# Patient Record
Sex: MALE | Race: WHITE | NOT HISPANIC OR LATINO | Employment: OTHER | URBAN - METROPOLITAN AREA
[De-identification: names, ages, dates, MRNs, and addresses within clinical notes are randomized per-mention and may not be internally consistent; named-entity substitution may affect disease eponyms.]

---

## 2021-11-02 ENCOUNTER — OFFICE VISIT (OUTPATIENT)
Dept: FAMILY MEDICINE | Facility: CLINIC | Age: 71
End: 2021-11-02
Payer: MEDICARE

## 2021-11-02 VITALS
TEMPERATURE: 98 F | BODY MASS INDEX: 32.75 KG/M2 | RESPIRATION RATE: 16 BRPM | WEIGHT: 233.94 LBS | HEART RATE: 65 BPM | OXYGEN SATURATION: 98 % | HEIGHT: 71 IN | DIASTOLIC BLOOD PRESSURE: 66 MMHG | SYSTOLIC BLOOD PRESSURE: 110 MMHG

## 2021-11-02 DIAGNOSIS — M10.9 GOUT, UNSPECIFIED CAUSE, UNSPECIFIED CHRONICITY, UNSPECIFIED SITE: ICD-10-CM

## 2021-11-02 DIAGNOSIS — Z23 NEED FOR INFLUENZA VACCINATION: ICD-10-CM

## 2021-11-02 DIAGNOSIS — Z86.79 HISTORY OF ATRIAL FIBRILLATION: ICD-10-CM

## 2021-11-02 DIAGNOSIS — I77.0 A-V FISTULA: ICD-10-CM

## 2021-11-02 DIAGNOSIS — Z76.89 ESTABLISHING CARE WITH NEW DOCTOR, ENCOUNTER FOR: Primary | ICD-10-CM

## 2021-11-02 DIAGNOSIS — E66.09 CLASS 1 OBESITY DUE TO EXCESS CALORIES WITH SERIOUS COMORBIDITY AND BODY MASS INDEX (BMI) OF 32.0 TO 32.9 IN ADULT: ICD-10-CM

## 2021-11-02 DIAGNOSIS — N18.4 CKD (CHRONIC KIDNEY DISEASE), STAGE IV: ICD-10-CM

## 2021-11-02 DIAGNOSIS — I34.0 MITRAL VALVE INSUFFICIENCY, UNSPECIFIED ETIOLOGY: ICD-10-CM

## 2021-11-02 DIAGNOSIS — Z95.0 PACEMAKER: ICD-10-CM

## 2021-11-02 DIAGNOSIS — I25.10 CORONARY ARTERY DISEASE, UNSPECIFIED VESSEL OR LESION TYPE, UNSPECIFIED WHETHER ANGINA PRESENT, UNSPECIFIED WHETHER NATIVE OR TRANSPLANTED HEART: ICD-10-CM

## 2021-11-02 DIAGNOSIS — I10 HYPERTENSION, UNSPECIFIED TYPE: ICD-10-CM

## 2021-11-02 PROCEDURE — 99999 PR PBB SHADOW E&M-NEW PATIENT-LVL III: ICD-10-PCS | Mod: PBBFAC,,, | Performed by: INTERNAL MEDICINE

## 2021-11-02 PROCEDURE — 4010F ACE/ARB THERAPY RXD/TAKEN: CPT | Mod: CPTII,S$GLB,, | Performed by: INTERNAL MEDICINE

## 2021-11-02 PROCEDURE — 1159F PR MEDICATION LIST DOCUMENTED IN MEDICAL RECORD: ICD-10-PCS | Mod: CPTII,S$GLB,, | Performed by: INTERNAL MEDICINE

## 2021-11-02 PROCEDURE — 3288F FALL RISK ASSESSMENT DOCD: CPT | Mod: CPTII,S$GLB,, | Performed by: INTERNAL MEDICINE

## 2021-11-02 PROCEDURE — 3008F BODY MASS INDEX DOCD: CPT | Mod: CPTII,S$GLB,, | Performed by: INTERNAL MEDICINE

## 2021-11-02 PROCEDURE — 1159F MED LIST DOCD IN RCRD: CPT | Mod: CPTII,S$GLB,, | Performed by: INTERNAL MEDICINE

## 2021-11-02 PROCEDURE — 1101F PR PT FALLS ASSESS DOC 0-1 FALLS W/OUT INJ PAST YR: ICD-10-PCS | Mod: CPTII,S$GLB,, | Performed by: INTERNAL MEDICINE

## 2021-11-02 PROCEDURE — 3078F DIAST BP <80 MM HG: CPT | Mod: CPTII,S$GLB,, | Performed by: INTERNAL MEDICINE

## 2021-11-02 PROCEDURE — 1160F RVW MEDS BY RX/DR IN RCRD: CPT | Mod: CPTII,S$GLB,, | Performed by: INTERNAL MEDICINE

## 2021-11-02 PROCEDURE — 4010F PR ACE/ARB THEARPY RXD/TAKEN: ICD-10-PCS | Mod: CPTII,S$GLB,, | Performed by: INTERNAL MEDICINE

## 2021-11-02 PROCEDURE — 1101F PT FALLS ASSESS-DOCD LE1/YR: CPT | Mod: CPTII,S$GLB,, | Performed by: INTERNAL MEDICINE

## 2021-11-02 PROCEDURE — 1126F AMNT PAIN NOTED NONE PRSNT: CPT | Mod: CPTII,S$GLB,, | Performed by: INTERNAL MEDICINE

## 2021-11-02 PROCEDURE — 99999 PR PBB SHADOW E&M-NEW PATIENT-LVL III: CPT | Mod: PBBFAC,,, | Performed by: INTERNAL MEDICINE

## 2021-11-02 PROCEDURE — 1160F PR REVIEW ALL MEDS BY PRESCRIBER/CLIN PHARMACIST DOCUMENTED: ICD-10-PCS | Mod: CPTII,S$GLB,, | Performed by: INTERNAL MEDICINE

## 2021-11-02 PROCEDURE — 3074F PR MOST RECENT SYSTOLIC BLOOD PRESSURE < 130 MM HG: ICD-10-PCS | Mod: CPTII,S$GLB,, | Performed by: INTERNAL MEDICINE

## 2021-11-02 PROCEDURE — 99204 OFFICE O/P NEW MOD 45 MIN: CPT | Mod: S$GLB,,, | Performed by: INTERNAL MEDICINE

## 2021-11-02 PROCEDURE — 3078F PR MOST RECENT DIASTOLIC BLOOD PRESSURE < 80 MM HG: ICD-10-PCS | Mod: CPTII,S$GLB,, | Performed by: INTERNAL MEDICINE

## 2021-11-02 PROCEDURE — 3074F SYST BP LT 130 MM HG: CPT | Mod: CPTII,S$GLB,, | Performed by: INTERNAL MEDICINE

## 2021-11-02 PROCEDURE — 3288F PR FALLS RISK ASSESSMENT DOCUMENTED: ICD-10-PCS | Mod: CPTII,S$GLB,, | Performed by: INTERNAL MEDICINE

## 2021-11-02 PROCEDURE — 1126F PR PAIN SEVERITY QUANTIFIED, NO PAIN PRESENT: ICD-10-PCS | Mod: CPTII,S$GLB,, | Performed by: INTERNAL MEDICINE

## 2021-11-02 PROCEDURE — 99204 PR OFFICE/OUTPT VISIT, NEW, LEVL IV, 45-59 MIN: ICD-10-PCS | Mod: S$GLB,,, | Performed by: INTERNAL MEDICINE

## 2021-11-02 PROCEDURE — 3008F PR BODY MASS INDEX (BMI) DOCUMENTED: ICD-10-PCS | Mod: CPTII,S$GLB,, | Performed by: INTERNAL MEDICINE

## 2021-11-02 RX ORDER — VALSARTAN 160 MG/1
160 TABLET ORAL
COMMUNITY
Start: 2021-09-06 | End: 2022-02-02 | Stop reason: SDUPTHER

## 2021-11-02 RX ORDER — ESCITALOPRAM OXALATE 20 MG/1
20 TABLET ORAL DAILY
COMMUNITY
Start: 2021-09-06 | End: 2022-03-17 | Stop reason: SDUPTHER

## 2021-11-02 RX ORDER — FEBUXOSTAT 40 MG/1
40 TABLET, FILM COATED ORAL
COMMUNITY
Start: 2021-09-06 | End: 2022-03-17 | Stop reason: SDUPTHER

## 2021-11-02 RX ORDER — NITROGLYCERIN 0.4 MG/1
0.4 TABLET SUBLINGUAL
COMMUNITY
Start: 2021-09-06 | End: 2023-10-23 | Stop reason: SDUPTHER

## 2021-11-02 RX ORDER — WARFARIN 2 MG/1
2 TABLET ORAL
COMMUNITY
Start: 2021-09-06 | End: 2022-02-02 | Stop reason: SDUPTHER

## 2021-11-02 RX ORDER — HYDROCODONE BITARTRATE AND ACETAMINOPHEN 5; 325 MG/1; MG/1
1 TABLET ORAL EVERY 6 HOURS PRN
COMMUNITY
Start: 2021-09-07 | End: 2022-03-13 | Stop reason: ALTCHOICE

## 2021-11-02 RX ORDER — ATORVASTATIN CALCIUM 40 MG/1
40 TABLET, FILM COATED ORAL
COMMUNITY
Start: 2021-09-06 | End: 2022-05-10 | Stop reason: SDUPTHER

## 2021-11-02 RX ORDER — CALCITRIOL 0.25 UG/1
0.25 CAPSULE ORAL
COMMUNITY
Start: 2021-09-06 | End: 2022-06-27 | Stop reason: SDUPTHER

## 2021-11-02 RX ORDER — ASPIRIN 81 MG/1
81 TABLET ORAL DAILY
COMMUNITY
End: 2022-03-17 | Stop reason: SDUPTHER

## 2021-11-02 RX ORDER — TORSEMIDE 20 MG/1
20 TABLET ORAL
COMMUNITY
Start: 2021-09-06 | End: 2022-02-23 | Stop reason: SDUPTHER

## 2021-11-03 PROBLEM — M10.9 GOUT: Status: ACTIVE | Noted: 2021-11-03

## 2021-11-03 PROBLEM — Z86.79 HISTORY OF ATRIAL FIBRILLATION: Status: ACTIVE | Noted: 2021-11-03

## 2021-11-03 PROBLEM — I77.0 A-V FISTULA: Status: ACTIVE | Noted: 2021-11-03

## 2021-11-03 PROBLEM — Z95.0 PACEMAKER: Status: ACTIVE | Noted: 2021-11-03

## 2021-11-03 PROBLEM — N18.4 CKD (CHRONIC KIDNEY DISEASE), STAGE IV: Status: ACTIVE | Noted: 2021-11-03

## 2021-11-03 PROBLEM — I34.0 MITRAL VALVE INSUFFICIENCY: Status: ACTIVE | Noted: 2021-11-03

## 2021-11-03 PROBLEM — I10 HYPERTENSION: Status: ACTIVE | Noted: 2021-11-03

## 2021-11-03 PROBLEM — I25.10 CORONARY ARTERY DISEASE: Status: ACTIVE | Noted: 2021-11-03

## 2021-11-03 PROBLEM — E66.09 CLASS 1 OBESITY DUE TO EXCESS CALORIES WITH SERIOUS COMORBIDITY AND BODY MASS INDEX (BMI) OF 32.0 TO 32.9 IN ADULT: Status: ACTIVE | Noted: 2021-11-03

## 2021-11-03 PROBLEM — E66.811 CLASS 1 OBESITY DUE TO EXCESS CALORIES WITH SERIOUS COMORBIDITY AND BODY MASS INDEX (BMI) OF 32.0 TO 32.9 IN ADULT: Status: ACTIVE | Noted: 2021-11-03

## 2021-11-05 DIAGNOSIS — Z12.11 COLON CANCER SCREENING: ICD-10-CM

## 2022-01-10 ENCOUNTER — TELEPHONE (OUTPATIENT)
Dept: FAMILY MEDICINE | Facility: CLINIC | Age: 72
End: 2022-01-10

## 2022-02-02 ENCOUNTER — PATIENT MESSAGE (OUTPATIENT)
Dept: CARDIOLOGY | Facility: CLINIC | Age: 72
End: 2022-02-02
Payer: MEDICARE

## 2022-02-02 ENCOUNTER — ANTI-COAG VISIT (OUTPATIENT)
Dept: CARDIOLOGY | Facility: CLINIC | Age: 72
End: 2022-02-02
Payer: MEDICARE

## 2022-02-02 ENCOUNTER — OFFICE VISIT (OUTPATIENT)
Dept: FAMILY MEDICINE | Facility: CLINIC | Age: 72
End: 2022-02-02
Payer: MEDICARE

## 2022-02-02 ENCOUNTER — LAB VISIT (OUTPATIENT)
Dept: LAB | Facility: HOSPITAL | Age: 72
End: 2022-02-02
Attending: INTERNAL MEDICINE
Payer: MEDICARE

## 2022-02-02 VITALS
OXYGEN SATURATION: 97 % | SYSTOLIC BLOOD PRESSURE: 110 MMHG | HEIGHT: 71 IN | RESPIRATION RATE: 16 BRPM | BODY MASS INDEX: 32.1 KG/M2 | TEMPERATURE: 98 F | DIASTOLIC BLOOD PRESSURE: 64 MMHG | WEIGHT: 229.25 LBS | HEART RATE: 63 BPM

## 2022-02-02 DIAGNOSIS — Z86.79 HISTORY OF ATRIAL FIBRILLATION: ICD-10-CM

## 2022-02-02 DIAGNOSIS — Z00.00 ROUTINE GENERAL MEDICAL EXAMINATION AT HEALTH CARE FACILITY: ICD-10-CM

## 2022-02-02 DIAGNOSIS — Z12.5 ENCOUNTER FOR PROSTATE CANCER SCREENING: ICD-10-CM

## 2022-02-02 DIAGNOSIS — Z79.01 LONG TERM (CURRENT) USE OF ANTICOAGULANTS: Primary | ICD-10-CM

## 2022-02-02 DIAGNOSIS — R73.9 ELEVATED BLOOD SUGAR: ICD-10-CM

## 2022-02-02 DIAGNOSIS — I10 HYPERTENSION, UNSPECIFIED TYPE: Primary | ICD-10-CM

## 2022-02-02 DIAGNOSIS — I10 HYPERTENSION, UNSPECIFIED TYPE: ICD-10-CM

## 2022-02-02 DIAGNOSIS — I34.0 MITRAL VALVE INSUFFICIENCY, UNSPECIFIED ETIOLOGY: ICD-10-CM

## 2022-02-02 DIAGNOSIS — I25.10 CORONARY ARTERY DISEASE, UNSPECIFIED VESSEL OR LESION TYPE, UNSPECIFIED WHETHER ANGINA PRESENT, UNSPECIFIED WHETHER NATIVE OR TRANSPLANTED HEART: ICD-10-CM

## 2022-02-02 DIAGNOSIS — Z13.6 ENCOUNTER FOR LIPID SCREENING FOR CARDIOVASCULAR DISEASE: ICD-10-CM

## 2022-02-02 DIAGNOSIS — Z85.46 HISTORY OF PROSTATE CANCER: ICD-10-CM

## 2022-02-02 DIAGNOSIS — N18.4 CKD (CHRONIC KIDNEY DISEASE), STAGE IV: ICD-10-CM

## 2022-02-02 DIAGNOSIS — Z13.220 ENCOUNTER FOR LIPID SCREENING FOR CARDIOVASCULAR DISEASE: ICD-10-CM

## 2022-02-02 DIAGNOSIS — Z95.0 PACEMAKER: ICD-10-CM

## 2022-02-02 DIAGNOSIS — I77.0 A-V FISTULA: ICD-10-CM

## 2022-02-02 DIAGNOSIS — M10.9 GOUT, UNSPECIFIED CAUSE, UNSPECIFIED CHRONICITY, UNSPECIFIED SITE: ICD-10-CM

## 2022-02-02 DIAGNOSIS — G43.809 OTHER MIGRAINE WITHOUT STATUS MIGRAINOSUS, NOT INTRACTABLE: ICD-10-CM

## 2022-02-02 DIAGNOSIS — Z79.01 LONG TERM (CURRENT) USE OF ANTICOAGULANTS: ICD-10-CM

## 2022-02-02 DIAGNOSIS — E66.09 CLASS 1 OBESITY DUE TO EXCESS CALORIES WITH SERIOUS COMORBIDITY AND BODY MASS INDEX (BMI) OF 32.0 TO 32.9 IN ADULT: ICD-10-CM

## 2022-02-02 LAB
BASOPHILS # BLD AUTO: 0.04 K/UL (ref 0–0.2)
BASOPHILS NFR BLD: 0.5 % (ref 0–1.9)
COMPLEXED PSA SERPL-MCNC: <0.01 NG/ML (ref 0–4)
DIFFERENTIAL METHOD: ABNORMAL
EOSINOPHIL # BLD AUTO: 0.3 K/UL (ref 0–0.5)
EOSINOPHIL NFR BLD: 3.3 % (ref 0–8)
ERYTHROCYTE [DISTWIDTH] IN BLOOD BY AUTOMATED COUNT: 12.9 % (ref 11.5–14.5)
ESTIMATED AVG GLUCOSE: 100 MG/DL (ref 68–131)
HBA1C MFR BLD: 5.1 % (ref 4–5.6)
HCT VFR BLD AUTO: 42.4 % (ref 40–54)
HGB BLD-MCNC: 13.5 G/DL (ref 14–18)
IMM GRANULOCYTES # BLD AUTO: 0.02 K/UL (ref 0–0.04)
IMM GRANULOCYTES NFR BLD AUTO: 0.2 % (ref 0–0.5)
INR PPP: 1.4 (ref 0.8–1.2)
INR PPP: 1.4 (ref 0.8–1.2)
LYMPHOCYTES # BLD AUTO: 2.3 K/UL (ref 1–4.8)
LYMPHOCYTES NFR BLD: 27.8 % (ref 18–48)
MCH RBC QN AUTO: 29.9 PG (ref 27–31)
MCHC RBC AUTO-ENTMCNC: 31.8 G/DL (ref 32–36)
MCV RBC AUTO: 94 FL (ref 82–98)
MONOCYTES # BLD AUTO: 1.3 K/UL (ref 0.3–1)
MONOCYTES NFR BLD: 15.1 % (ref 4–15)
NEUTROPHILS # BLD AUTO: 4.4 K/UL (ref 1.8–7.7)
NEUTROPHILS NFR BLD: 53.1 % (ref 38–73)
NRBC BLD-RTO: 0 /100 WBC
PLATELET # BLD AUTO: 170 K/UL (ref 150–450)
PMV BLD AUTO: 12.7 FL (ref 9.2–12.9)
PROTHROMBIN TIME: 14.6 SEC (ref 9–12.5)
PROTHROMBIN TIME: 14.6 SEC (ref 9–12.5)
RBC # BLD AUTO: 4.51 M/UL (ref 4.6–6.2)
WBC # BLD AUTO: 8.27 K/UL (ref 3.9–12.7)

## 2022-02-02 PROCEDURE — 3288F PR FALLS RISK ASSESSMENT DOCUMENTED: ICD-10-PCS | Mod: CPTII,S$GLB,, | Performed by: INTERNAL MEDICINE

## 2022-02-02 PROCEDURE — 1101F PR PT FALLS ASSESS DOC 0-1 FALLS W/OUT INJ PAST YR: ICD-10-PCS | Mod: CPTII,S$GLB,, | Performed by: INTERNAL MEDICINE

## 2022-02-02 PROCEDURE — 99214 PR OFFICE/OUTPT VISIT, EST, LEVL IV, 30-39 MIN: ICD-10-PCS | Mod: S$GLB,,, | Performed by: INTERNAL MEDICINE

## 2022-02-02 PROCEDURE — 3074F SYST BP LT 130 MM HG: CPT | Mod: CPTII,S$GLB,, | Performed by: INTERNAL MEDICINE

## 2022-02-02 PROCEDURE — 3078F DIAST BP <80 MM HG: CPT | Mod: CPTII,S$GLB,, | Performed by: INTERNAL MEDICINE

## 2022-02-02 PROCEDURE — 3008F PR BODY MASS INDEX (BMI) DOCUMENTED: ICD-10-PCS | Mod: CPTII,S$GLB,, | Performed by: INTERNAL MEDICINE

## 2022-02-02 PROCEDURE — 3074F PR MOST RECENT SYSTOLIC BLOOD PRESSURE < 130 MM HG: ICD-10-PCS | Mod: CPTII,S$GLB,, | Performed by: INTERNAL MEDICINE

## 2022-02-02 PROCEDURE — 1101F PT FALLS ASSESS-DOCD LE1/YR: CPT | Mod: CPTII,S$GLB,, | Performed by: INTERNAL MEDICINE

## 2022-02-02 PROCEDURE — 3078F PR MOST RECENT DIASTOLIC BLOOD PRESSURE < 80 MM HG: ICD-10-PCS | Mod: CPTII,S$GLB,, | Performed by: INTERNAL MEDICINE

## 2022-02-02 PROCEDURE — 36415 COLL VENOUS BLD VENIPUNCTURE: CPT | Mod: PO | Performed by: INTERNAL MEDICINE

## 2022-02-02 PROCEDURE — 99999 PR PBB SHADOW E&M-EST. PATIENT-LVL V: CPT | Mod: PBBFAC,,, | Performed by: INTERNAL MEDICINE

## 2022-02-02 PROCEDURE — 85610 PROTHROMBIN TIME: CPT | Performed by: INTERNAL MEDICINE

## 2022-02-02 PROCEDURE — 4010F ACE/ARB THERAPY RXD/TAKEN: CPT | Mod: CPTII,S$GLB,, | Performed by: INTERNAL MEDICINE

## 2022-02-02 PROCEDURE — 99214 OFFICE O/P EST MOD 30 MIN: CPT | Mod: S$GLB,,, | Performed by: INTERNAL MEDICINE

## 2022-02-02 PROCEDURE — 80053 COMPREHEN METABOLIC PANEL: CPT | Performed by: INTERNAL MEDICINE

## 2022-02-02 PROCEDURE — 1160F RVW MEDS BY RX/DR IN RCRD: CPT | Mod: CPTII,S$GLB,, | Performed by: INTERNAL MEDICINE

## 2022-02-02 PROCEDURE — 84443 ASSAY THYROID STIM HORMONE: CPT | Performed by: INTERNAL MEDICINE

## 2022-02-02 PROCEDURE — 1159F MED LIST DOCD IN RCRD: CPT | Mod: CPTII,S$GLB,, | Performed by: INTERNAL MEDICINE

## 2022-02-02 PROCEDURE — 1159F PR MEDICATION LIST DOCUMENTED IN MEDICAL RECORD: ICD-10-PCS | Mod: CPTII,S$GLB,, | Performed by: INTERNAL MEDICINE

## 2022-02-02 PROCEDURE — 4010F PR ACE/ARB THEARPY RXD/TAKEN: ICD-10-PCS | Mod: CPTII,S$GLB,, | Performed by: INTERNAL MEDICINE

## 2022-02-02 PROCEDURE — 3288F FALL RISK ASSESSMENT DOCD: CPT | Mod: CPTII,S$GLB,, | Performed by: INTERNAL MEDICINE

## 2022-02-02 PROCEDURE — 1125F AMNT PAIN NOTED PAIN PRSNT: CPT | Mod: CPTII,S$GLB,, | Performed by: INTERNAL MEDICINE

## 2022-02-02 PROCEDURE — 99999 PR PBB SHADOW E&M-EST. PATIENT-LVL V: ICD-10-PCS | Mod: PBBFAC,,, | Performed by: INTERNAL MEDICINE

## 2022-02-02 PROCEDURE — 1160F PR REVIEW ALL MEDS BY PRESCRIBER/CLIN PHARMACIST DOCUMENTED: ICD-10-PCS | Mod: CPTII,S$GLB,, | Performed by: INTERNAL MEDICINE

## 2022-02-02 PROCEDURE — 84153 ASSAY OF PSA TOTAL: CPT | Performed by: INTERNAL MEDICINE

## 2022-02-02 PROCEDURE — 80061 LIPID PANEL: CPT | Performed by: INTERNAL MEDICINE

## 2022-02-02 PROCEDURE — 85025 COMPLETE CBC W/AUTO DIFF WBC: CPT | Performed by: INTERNAL MEDICINE

## 2022-02-02 PROCEDURE — 1125F PR PAIN SEVERITY QUANTIFIED, PAIN PRESENT: ICD-10-PCS | Mod: CPTII,S$GLB,, | Performed by: INTERNAL MEDICINE

## 2022-02-02 PROCEDURE — 83036 HEMOGLOBIN GLYCOSYLATED A1C: CPT | Performed by: INTERNAL MEDICINE

## 2022-02-02 PROCEDURE — 3008F BODY MASS INDEX DOCD: CPT | Mod: CPTII,S$GLB,, | Performed by: INTERNAL MEDICINE

## 2022-02-02 RX ORDER — WARFARIN 2 MG/1
TABLET ORAL
Qty: 90 TABLET | Refills: 0 | Status: SHIPPED | OUTPATIENT
Start: 2022-02-02 | End: 2022-05-10 | Stop reason: SDUPTHER

## 2022-02-02 RX ORDER — VALSARTAN 160 MG/1
160 TABLET ORAL DAILY
Qty: 90 TABLET | Refills: 1 | Status: SHIPPED | OUTPATIENT
Start: 2022-02-02 | End: 2022-03-23

## 2022-02-02 NOTE — PROGRESS NOTES
Subjective:       Patient ID: Vivek Lema is a pleasant 71 y.o. White male patient    Chief Complaint: Follow-up      Patient is a pt I saw last on 11/02/2021 to establish care, see my last notes and the list of problems below.    HPI     Pt with PMH as below, who just moved from [place] with his wife 4 weeks ago to be close to their daughter.   He came to see me in November to start to establish care in [facility] and make sure he has a good transition due to his complicated PMH. He had his records faxed to me.  As per last visit:  Reported CAD, he had CABG 3 year ago with probable treatment of AS. H/O a fib and on Coumadin. He has a PM. He reported that he would need repair of the mitral valve, possibly clip?   He reported good control of his Coumadin level.  He also had a Nephrologist, he had a AV fistular built on [employer], as creat went up to 4, but went down to 2-2.7, so monitoring only.   He also has gout but no recent flare.  He states his today with his wife again, risk of needing to be a new patient to me tomorrow.  His daughter is goes going to establish care with me today.  He reports feeling fine, though a bit tired because of the moving and missing sometime [place].  Would like to be referred to Cardiology and Nephrology, as well as to the Coumadin Clinic. Would also like to do blood work, checking among others re: PSA.  He needs a refill for BP meds and Coumadin.  He reports that he may take pain meds for migraines but very rarely.       Patient Active Problem List   Diagnosis    Hypertension    CKD (chronic kidney disease), stage IV    A-V fistula    Coronary artery disease    History of atrial fibrillation    Pacemaker    Mitral valve insufficiency    Class 1 obesity due to excess calories with serious comorbidity and body mass index (BMI) of 32.0 to 32.9 in adult    Gout    Long term (current) use of anticoagulants          ACTIVE MEDICAL ISSUES:  Documented in Problem List     PAST MEDICAL  "HISTORY  Documented     PAST SURGICAL HISTORY:  Documented     SOCIAL HISTORY:  Documented     FAMILY HISTORY:  Documented     ALLERGIES AND MEDICATIONS: updated and reviewed.  Documented    Review of Systems   Constitutional: Negative.    HENT: Negative.    Respiratory: Negative.    Cardiovascular: Negative.    Gastrointestinal: Negative.    Neurological: Negative.    All other systems reviewed and are negative.      Objective:      Physical Exam  Constitutional:       Appearance: Normal appearance. He is obese.   HENT:      Right Ear: Tympanic membrane normal.      Left Ear: Tympanic membrane normal.   Eyes:      Conjunctiva/sclera: Conjunctivae normal.   Cardiovascular:      Rate and Rhythm: Normal rate and regular rhythm.      Pulses: Normal pulses.      Heart sounds: Murmur (2/6 holosystolic apex, clic of aortic valve?) heard.        Comments: PM  Pulmonary:      Effort: Pulmonary effort is normal.      Breath sounds: Normal breath sounds.   Abdominal:      General: Bowel sounds are normal.   Skin:     Comments: L sided AV fistula  Scar of CABG calm   Neurological:      General: No focal deficit present.      Mental Status: He is alert and oriented to person, place, and time.   Psychiatric:         Mood and Affect: Mood normal.         Behavior: Behavior normal.         Thought Content: Thought content normal.         Judgment: Judgment normal.         Vitals:    02/02/22 0848   BP: 110/64   BP Location: Right arm   Patient Position: Sitting   BP Method: Large (Manual)   Pulse: 63   Resp: 16   Temp: 98.2 °F (36.8 °C)   TempSrc: Oral   SpO2: 97%   Weight: 104 kg (229 lb 4.5 oz)   Height: 5' 11" (1.803 m)     Body mass index is 31.98 kg/m².    RESULTS: Reviewed labs from last 12 months    Last Lab Results:     Lab Results   Component Value Date    WBC 8.27 02/02/2022    HGB 13.5 (L) 02/02/2022    HCT 42.4 02/02/2022     02/02/2022     Assessment:       1. Hypertension, unspecified type    2. Coronary " artery disease, unspecified vessel or lesion type, unspecified whether angina present, unspecified whether native or transplanted heart    3. History of atrial fibrillation    4. Pacemaker    5. Mitral valve insufficiency, unspecified etiology    6. CKD (chronic kidney disease), stage IV    7. A-V fistula    8. Class 1 obesity due to excess calories with serious comorbidity and body mass index (BMI) of 32.0 to 32.9 in adult    9. Gout, unspecified cause, unspecified chronicity, unspecified site    10. Other migraine without status migrainosus, not intractable    11. History of prostate cancer    12. Encounter for lipid screening for cardiovascular disease    13. Elevated blood sugar    14. Encounter for prostate cancer screening    15. Routine general medical examination at health care facility        Plan:   Vivek was seen today for follow-up.    Diagnoses and all orders for this visit:    Hypertension, unspecified type  -     CBC Auto Differential; Future  -     Comprehensive Metabolic Panel; Future  -     TSH; Future  -     valsartan (DIOVAN) 160 MG tablet; Take 1 tablet (160 mg total) by mouth once daily.    Will do blood work and refill meds. BP at goal.    Coronary artery disease, unspecified vessel or lesion type, unspecified whether angina present, unspecified whether native or transplanted heart  -     Ambulatory referral/consult to Cardiology; Future    See HPI and former records.  See below.    History of atrial fibrillation  -     Ambulatory referral/consult to Cardiology; Future  -     Ambulatory referral/consult to Anticoagulation Monitoring; Future  -     Protime-INR; Future  -     warfarin (COUMADIN) 2 MG tablet; As per INR    On Coumadin, INR checked, referred to Coumadin Clinic.    Pacemaker    Referred to Cardiology.    Mitral valve insufficiency, unspecified etiology  -     Ambulatory referral/consult to Cardiology; Future    I had all of his records scanned and attached, but as pt is supposed  to establish care soon as may need procedure, I did a copy of the part re: Cardiology for him to provide to specialist if needed. Referral placed.    CKD (chronic kidney disease), stage IV  -     Ambulatory referral/consult to Nephrology; Future    Will monitor and refer to Nephrology. See HPI.    A-V fistula    Class 1 obesity due to excess calories with serious comorbidity and body mass index (BMI) of 32.0 to 32.9 in adult    Gout, unspecified cause, unspecified chronicity, unspecified site    Other migraine without status migrainosus, not intractable    States he received Lortab 7.5 mg PRN, taking more than sparingly, still has some left.    History of prostate cancer  -     PSA, Screening; Future    Will check PSA.    Encounter for lipid screening for cardiovascular disease  -     Lipid Panel; Future    Elevated blood sugar  -     Hemoglobin A1C; Future    Encounter for prostate cancer screening  -     PSA, Screening; Future    Routine general medical examination at health care facility  -     Hemoglobin A1C; Future      Follow up in about 4 weeks (around 3/2/2022) for f-up.    This note was created by combination of typed  and M-Modal dictation.  Transcription errors may be present.  If there are any questions, please contact me.

## 2022-02-02 NOTE — PROGRESS NOTES
72 y/o male on OAC for stroke prevention with atrial fibrillation - previously managed elsewhere. Other PMH includes mitral valve insufficiency, CAD, HTN, obesity, gout, CKD.    Per MD, acceptable to change INR goal to 2-3 from 2-2.5 on consult.

## 2022-02-02 NOTE — PROGRESS NOTES
Health Maintenance Due   Topic Date Due    Hepatitis C Screening  Consult with pcp    Lipid Panel  Pt will complete    Colorectal Cancer Screening  Pt will schedule    Shingles Vaccine (1 of 2) Hx chickenpox, notified can get it at the pharmacy      Pneumococcal Vaccines (Age 65+) (1 of 1 - PPSV23) Pt completed    Influenza Vaccine (1) Pt completed    COVID-19 Vaccine (3 - Booster for Pfizer series) Pt completed

## 2022-02-02 NOTE — PROGRESS NOTES
Spoke with Mr. Lema. Pt education reviewed regarding when to call the coumadin clinic as well as diet while taking warfarin. Mr. Lema confirmed he has a 2mg warfarin tablet. He takes 4mg on Mon, Wed, Fri and 2mg all other days. He avoids moderate to high vitamin K foods. He has 1-2 ETOH drinks every other day. We discussed the risks associated with alcohol use while taking warfarin. Pt's questions addressed and he verbalized understanding or all information provided. 'Diet' and 'When to call' information sheet provided via portal.     INR low- pt denies any changes.

## 2022-02-03 ENCOUNTER — TELEPHONE (OUTPATIENT)
Dept: FAMILY MEDICINE | Facility: CLINIC | Age: 72
End: 2022-02-03
Payer: MEDICARE

## 2022-02-03 LAB
ALBUMIN SERPL BCP-MCNC: 3.9 G/DL (ref 3.5–5.2)
ALP SERPL-CCNC: 82 U/L (ref 55–135)
ALT SERPL W/O P-5'-P-CCNC: 11 U/L (ref 10–44)
ANION GAP SERPL CALC-SCNC: 13 MMOL/L (ref 8–16)
AST SERPL-CCNC: 15 U/L (ref 10–40)
BILIRUB SERPL-MCNC: 1.2 MG/DL (ref 0.1–1)
BUN SERPL-MCNC: 42 MG/DL (ref 8–23)
CALCIUM SERPL-MCNC: 9.3 MG/DL (ref 8.7–10.5)
CHLORIDE SERPL-SCNC: 107 MMOL/L (ref 95–110)
CO2 SERPL-SCNC: 22 MMOL/L (ref 23–29)
CREAT SERPL-MCNC: 3.4 MG/DL (ref 0.5–1.4)
EST. GFR  (AFRICAN AMERICAN): 20 ML/MIN/1.73 M^2
EST. GFR  (NON AFRICAN AMERICAN): 17 ML/MIN/1.73 M^2
GLUCOSE SERPL-MCNC: 101 MG/DL (ref 70–110)
POTASSIUM SERPL-SCNC: 4.5 MMOL/L (ref 3.5–5.1)
PROT SERPL-MCNC: 7.2 G/DL (ref 6–8.4)
SODIUM SERPL-SCNC: 142 MMOL/L (ref 136–145)
TSH SERPL DL<=0.005 MIU/L-ACNC: 1.78 UIU/ML (ref 0.4–4)

## 2022-02-03 NOTE — TELEPHONE ENCOUNTER
Received call from [facility] regarding directions for coumadin, cannot take as directed; I gave them the current directions from coumadin clinic of 4mg on mon, wed and fri and 2mg on all other days

## 2022-02-04 LAB
CHOLEST SERPL-MCNC: 154 MG/DL (ref 120–199)
CHOLEST/HDLC SERPL: 4.7 {RATIO} (ref 2–5)
HDLC SERPL-MCNC: 33 MG/DL (ref 40–75)
HDLC SERPL: 21.4 % (ref 20–50)
LDLC SERPL CALC-MCNC: 86.8 MG/DL (ref 63–159)
NONHDLC SERPL-MCNC: 121 MG/DL
TRIGL SERPL-MCNC: 171 MG/DL (ref 30–150)

## 2022-02-07 ENCOUNTER — PATIENT OUTREACH (OUTPATIENT)
Dept: ADMINISTRATIVE | Facility: OTHER | Age: 72
End: 2022-02-07
Payer: MEDICARE

## 2022-02-07 NOTE — PROGRESS NOTES
Health Maintenance Due   Topic Date Due    Hepatitis C Screening  Never done    Colorectal Cancer Screening  Never done    Shingles Vaccine (1 of 2) Never done    Pneumococcal Vaccines (Age 65+) (1 of 1 - PPSV23) Never done    Influenza Vaccine (1) Never done     Updates were requested from care everywhere.  Chart was reviewed for overdue Proactive [facility] Encounters (THANIA) topics (CRS, Breast Cancer Screening, Eye exam)  Health Maintenance has been updated.  LINKS immunization registry triggered.  Immunizations were reconciled.

## 2022-02-08 ENCOUNTER — ANTI-COAG VISIT (OUTPATIENT)
Dept: CARDIOLOGY | Facility: CLINIC | Age: 72
End: 2022-02-08
Payer: MEDICARE

## 2022-02-08 ENCOUNTER — LAB VISIT (OUTPATIENT)
Dept: LAB | Facility: HOSPITAL | Age: 72
End: 2022-02-08
Attending: INTERNAL MEDICINE
Payer: MEDICARE

## 2022-02-08 ENCOUNTER — OFFICE VISIT (OUTPATIENT)
Dept: CARDIOLOGY | Facility: CLINIC | Age: 72
End: 2022-02-08
Payer: MEDICARE

## 2022-02-08 VITALS
HEIGHT: 71 IN | WEIGHT: 229 LBS | HEART RATE: 61 BPM | DIASTOLIC BLOOD PRESSURE: 66 MMHG | OXYGEN SATURATION: 98 % | SYSTOLIC BLOOD PRESSURE: 126 MMHG | BODY MASS INDEX: 32.06 KG/M2

## 2022-02-08 DIAGNOSIS — I25.10 CORONARY ARTERY DISEASE INVOLVING NATIVE HEART WITHOUT ANGINA PECTORIS, UNSPECIFIED VESSEL OR LESION TYPE: ICD-10-CM

## 2022-02-08 DIAGNOSIS — I34.0 MITRAL VALVE INSUFFICIENCY, UNSPECIFIED ETIOLOGY: ICD-10-CM

## 2022-02-08 DIAGNOSIS — E78.49 OTHER HYPERLIPIDEMIA: ICD-10-CM

## 2022-02-08 DIAGNOSIS — Z95.2 S/P AVR (AORTIC VALVE REPLACEMENT) AND AORTOPLASTY: Primary | ICD-10-CM

## 2022-02-08 DIAGNOSIS — Z79.01 LONG TERM (CURRENT) USE OF ANTICOAGULANTS: ICD-10-CM

## 2022-02-08 DIAGNOSIS — Z86.79 HISTORY OF ATRIAL FIBRILLATION: ICD-10-CM

## 2022-02-08 DIAGNOSIS — N18.4 CKD (CHRONIC KIDNEY DISEASE), STAGE IV: ICD-10-CM

## 2022-02-08 DIAGNOSIS — I10 PRIMARY HYPERTENSION: ICD-10-CM

## 2022-02-08 DIAGNOSIS — Z95.0 PACEMAKER: ICD-10-CM

## 2022-02-08 DIAGNOSIS — Z86.79 HISTORY OF ATRIAL FIBRILLATION: Primary | ICD-10-CM

## 2022-02-08 DIAGNOSIS — R06.02 SHORTNESS OF BREATH: ICD-10-CM

## 2022-02-08 DIAGNOSIS — I48.0 PAF (PAROXYSMAL ATRIAL FIBRILLATION): ICD-10-CM

## 2022-02-08 DIAGNOSIS — E66.09 CLASS 1 OBESITY DUE TO EXCESS CALORIES WITH SERIOUS COMORBIDITY AND BODY MASS INDEX (BMI) OF 31.0 TO 31.9 IN ADULT: ICD-10-CM

## 2022-02-08 DIAGNOSIS — I77.0 A-V FISTULA: ICD-10-CM

## 2022-02-08 LAB
INR PPP: 2.1 (ref 0.8–1.2)
PROTHROMBIN TIME: 21.9 SEC (ref 9–12.5)

## 2022-02-08 PROCEDURE — 3288F FALL RISK ASSESSMENT DOCD: CPT | Mod: CPTII,S$GLB,, | Performed by: INTERNAL MEDICINE

## 2022-02-08 PROCEDURE — 93793 PR ANTICOAGULANT MGMT FOR PT TAKING WARFARIN: ICD-10-PCS | Mod: S$GLB,,,

## 2022-02-08 PROCEDURE — 3044F HG A1C LEVEL LT 7.0%: CPT | Mod: CPTII,S$GLB,, | Performed by: INTERNAL MEDICINE

## 2022-02-08 PROCEDURE — 99204 PR OFFICE/OUTPT VISIT, NEW, LEVL IV, 45-59 MIN: ICD-10-PCS | Mod: S$GLB,,, | Performed by: INTERNAL MEDICINE

## 2022-02-08 PROCEDURE — 3078F PR MOST RECENT DIASTOLIC BLOOD PRESSURE < 80 MM HG: ICD-10-PCS | Mod: CPTII,S$GLB,, | Performed by: INTERNAL MEDICINE

## 2022-02-08 PROCEDURE — 4010F ACE/ARB THERAPY RXD/TAKEN: CPT | Mod: CPTII,S$GLB,, | Performed by: INTERNAL MEDICINE

## 2022-02-08 PROCEDURE — 4010F PR ACE/ARB THEARPY RXD/TAKEN: ICD-10-PCS | Mod: CPTII,S$GLB,, | Performed by: INTERNAL MEDICINE

## 2022-02-08 PROCEDURE — 1160F RVW MEDS BY RX/DR IN RCRD: CPT | Mod: CPTII,S$GLB,, | Performed by: INTERNAL MEDICINE

## 2022-02-08 PROCEDURE — 85610 PROTHROMBIN TIME: CPT | Performed by: INTERNAL MEDICINE

## 2022-02-08 PROCEDURE — 1101F PT FALLS ASSESS-DOCD LE1/YR: CPT | Mod: CPTII,S$GLB,, | Performed by: INTERNAL MEDICINE

## 2022-02-08 PROCEDURE — 3008F BODY MASS INDEX DOCD: CPT | Mod: CPTII,S$GLB,, | Performed by: INTERNAL MEDICINE

## 2022-02-08 PROCEDURE — 3044F PR MOST RECENT HEMOGLOBIN A1C LEVEL <7.0%: ICD-10-PCS | Mod: CPTII,S$GLB,, | Performed by: INTERNAL MEDICINE

## 2022-02-08 PROCEDURE — 99999 PR PBB SHADOW E&M-EST. PATIENT-LVL IV: CPT | Mod: PBBFAC,,, | Performed by: INTERNAL MEDICINE

## 2022-02-08 PROCEDURE — 3074F PR MOST RECENT SYSTOLIC BLOOD PRESSURE < 130 MM HG: ICD-10-PCS | Mod: CPTII,S$GLB,, | Performed by: INTERNAL MEDICINE

## 2022-02-08 PROCEDURE — 1101F PR PT FALLS ASSESS DOC 0-1 FALLS W/OUT INJ PAST YR: ICD-10-PCS | Mod: CPTII,S$GLB,, | Performed by: INTERNAL MEDICINE

## 2022-02-08 PROCEDURE — 1160F PR REVIEW ALL MEDS BY PRESCRIBER/CLIN PHARMACIST DOCUMENTED: ICD-10-PCS | Mod: CPTII,S$GLB,, | Performed by: INTERNAL MEDICINE

## 2022-02-08 PROCEDURE — 1126F AMNT PAIN NOTED NONE PRSNT: CPT | Mod: CPTII,S$GLB,, | Performed by: INTERNAL MEDICINE

## 2022-02-08 PROCEDURE — 99204 OFFICE O/P NEW MOD 45 MIN: CPT | Mod: S$GLB,,, | Performed by: INTERNAL MEDICINE

## 2022-02-08 PROCEDURE — 99999 PR PBB SHADOW E&M-EST. PATIENT-LVL IV: ICD-10-PCS | Mod: PBBFAC,,, | Performed by: INTERNAL MEDICINE

## 2022-02-08 PROCEDURE — 1126F PR PAIN SEVERITY QUANTIFIED, NO PAIN PRESENT: ICD-10-PCS | Mod: CPTII,S$GLB,, | Performed by: INTERNAL MEDICINE

## 2022-02-08 PROCEDURE — 3078F DIAST BP <80 MM HG: CPT | Mod: CPTII,S$GLB,, | Performed by: INTERNAL MEDICINE

## 2022-02-08 PROCEDURE — 1159F MED LIST DOCD IN RCRD: CPT | Mod: CPTII,S$GLB,, | Performed by: INTERNAL MEDICINE

## 2022-02-08 PROCEDURE — 3074F SYST BP LT 130 MM HG: CPT | Mod: CPTII,S$GLB,, | Performed by: INTERNAL MEDICINE

## 2022-02-08 PROCEDURE — 3288F PR FALLS RISK ASSESSMENT DOCUMENTED: ICD-10-PCS | Mod: CPTII,S$GLB,, | Performed by: INTERNAL MEDICINE

## 2022-02-08 PROCEDURE — 93793 ANTICOAG MGMT PT WARFARIN: CPT | Mod: S$GLB,,,

## 2022-02-08 PROCEDURE — 36415 COLL VENOUS BLD VENIPUNCTURE: CPT | Mod: PO | Performed by: INTERNAL MEDICINE

## 2022-02-08 PROCEDURE — 3008F PR BODY MASS INDEX (BMI) DOCUMENTED: ICD-10-PCS | Mod: CPTII,S$GLB,, | Performed by: INTERNAL MEDICINE

## 2022-02-08 PROCEDURE — 1159F PR MEDICATION LIST DOCUMENTED IN MEDICAL RECORD: ICD-10-PCS | Mod: CPTII,S$GLB,, | Performed by: INTERNAL MEDICINE

## 2022-02-08 NOTE — PROGRESS NOTES
CARDIOLOGY CONSULTATION    REASON FOR CONSULT:   Vivek Lema is a 72 y.o. male who presents for establishment of care.      HISTORY OF PRESENT ILLNESS:     Vivek Lema presents for establishment of care.  Recently relocated from [place].  History of aortic bioprosthesis secondary to aortic insufficiency with an aortic aneurysm.  Left atrial appendage ligation.  Sick sinus syndrome status post dual chamber pacemaker placement.  In August 2021 underwent attempted MitraClip procedure.  Unsuccessful percutaneous repair.  Patient was deemed unsuitable for open repair secondary to comorbidities.  Prior sternotomy.  Patient states that his shortness of breath is worsening.  Symptoms less than 1 block.  No PND orthopnea.    Echocardiogram 08/18/2021:  Ejection fraction of 60-65%.  Normal right ventricular size and function.  Severe left atrial enlargement.  Status post Bentall procedure with a 27 mm magna ease bioprosthesis in the aortic position.  Mild to moderate mitral regurgitation.    Right/left heart catheterization 07/16/2021:    Nonobstructive coronary artery disease with the exception of a ramus intermedius that was of small caliber.  Mild pulmonary hypertension.  Elevated V-wave in setting of severe mitral regurgitation.  V-wave of 43 mm Hg.  Left anterior descending artery with a mid to distal 30-40% stenosis.  Ramus intermedius with a mid 70 80% stenosis.  Small caliber vessel.  Left circumflex artery:  Non dominant.  Ectatic and aneurysmal.  Mid 40% stenosis in the AV groove followed by a 40-50% stenosis prior to the bifurcation of OM 2.   Right coronary artery is ectatic and aneurysmal with a mid 20-30% stenosis.  Superior branch of the PDA has a 50% stenosis proximally in the inferior branch has luminal irregularities.  PLV branch with a proximal 30-40% stenosis followed by mid 40-50% stenosis.    Echocardiogram 05/24/2021:  Ejection fraction 45-50%.  Severe left atrial enlargement.  Bioprosthetic  valve in aortic position (27 mm magna ease bioprosthesis).  Moderate to severe mitral regurgitation    Echocardiogram 03/31/2020:  Ejection fraction 54%.  Aortic bioprosthesis.  No perivalvular regurgitation.        CARDIOVASCULAR HISTORY:     Aortic bioprosthesis secondary to aortic aneurysm/aortic insufficiency  Atrial fibrillation  Mitral regurgitation  Cardiomyopathy  Permanent pacemaker secondary to sick sinus syndrome    PAST MEDICAL HISTORY:     Past Medical History:   Diagnosis Date    CAD (coronary artery disease)     Disorder of kidney and ureter        PAST SURGICAL HISTORY:     Past Surgical History:   Procedure Laterality Date    CORONARY ARTERY BYPASS GRAFT      with valve repair (aortic?)       ALLERGIES AND MEDICATION:   Review of patient's allergies indicates:  No Known Allergies     Medication List          Accurate as of February 8, 2022 10:21 AM. If you have any questions, ask your nurse or doctor.            CONTINUE taking these medications    aspirin 81 MG EC tablet  Commonly known as: ECOTRIN     atorvastatin 40 MG tablet  Commonly known as: LIPITOR     calcitRIOL 0.25 MCG Cap  Commonly known as: ROCALTROL     EScitalopram oxalate 20 MG tablet  Commonly known as: LEXAPRO     febuxostat 40 mg Tab  Commonly known as: ULORIC     HYDROcodone-acetaminophen 5-325 mg per tablet  Commonly known as: NORCO     nitroGLYCERIN 0.4 MG SL tablet  Commonly known as: NITROSTAT     torsemide 20 MG Tab  Commonly known as: DEMADEX     valsartan 160 MG tablet  Commonly known as: DIOVAN  Take 1 tablet (160 mg total) by mouth once daily.     warfarin 2 MG tablet  Commonly known as: COUMADIN  As per INR            SOCIAL HISTORY:     Social History     Socioeconomic History    Marital status:    Tobacco Use    Smoking status: Never Smoker    Smokeless tobacco: Never Used       FAMILY HISTORY:   History reviewed. No pertinent family history.    REVIEW OF SYSTEMS:   Review of Systems   Respiratory:  "Positive for shortness of breath. Negative for sputum production and wheezing.    Cardiovascular: Positive for chest pain. Negative for palpitations, orthopnea, claudication, leg swelling and PND.   Gastrointestinal: Negative for abdominal pain, heartburn, nausea and vomiting.   Neurological: Negative for dizziness, speech change, focal weakness, loss of consciousness, weakness and headaches.       PHYSICAL EXAM:     Vitals:    02/08/22 0902   BP: 126/66   Pulse: 61    Body mass index is 31.94 kg/m².  Weight: 103.9 kg (229 lb)   Height: 5' 11" (180.3 cm)     Physical Exam  Vitals reviewed.   Constitutional:       General: He is not in acute distress.     Appearance: He is well-developed, overweight and well-nourished. He is not diaphoretic.   Neck:      Vascular: No carotid bruit or JVD.   Cardiovascular:      Rate and Rhythm: Normal rate and regular rhythm.      Pulses: Normal pulses.      Heart sounds: Murmur heard.    Systolic murmur is present with a grade of 2/6.      Pulmonary:      Effort: Pulmonary effort is normal.      Breath sounds: Normal breath sounds.   Abdominal:      General: Bowel sounds are normal.      Palpations: Abdomen is soft.      Tenderness: There is no abdominal tenderness.   Musculoskeletal:      Right lower leg: No edema.      Left lower leg: No edema.   Neurological:      Mental Status: He is alert and oriented to person, place, and time.   Psychiatric:         Mood and Affect: Mood and affect normal.         Speech: Speech normal.         Behavior: Behavior normal.         DATA:   EKG: (personally reviewed tracing)  02/08/2022-atrial paced  Laboratory:  CBC:  Recent Labs   Lab 02/02/22  0945   WBC 8.27   Hemoglobin 13.5 L   Hematocrit 42.4   Platelets 170       CHEMISTRIES:  Recent Labs   Lab 02/02/22  0945   Glucose 101   Sodium 142   Potassium 4.5   BUN 42 H   Creatinine 3.4 H   eGFR if  20 A   eGFR if non African American 17 A   Calcium 9.3       CARDIAC BIOMARKERS:   "      COAGS:  Recent Labs   Lab 02/02/22  0945   INR 1.4 H  1.4 H       LIPIDS/LFTS:  Recent Labs   Lab 02/02/22  0945   Cholesterol 154   Triglycerides 171 H   HDL 33 L   LDL Cholesterol 86.8   Non-HDL Cholesterol 121   AST 15   ALT 11       Cardiovascular Testing:    Echocardiogram 08/18/2021:  Ejection fraction of 60-65%.  Normal right ventricular size and function.  Severe left atrial enlargement.  Status post Bentall procedure with a 27 mm magna ease bioprosthesis in the aortic position.  Mild to moderate mitral regurgitation.    Right/left heart catheterization 07/16/2021:    Nonobstructive coronary artery disease with the exception of a ramus intermedius that was of small caliber.  Mild pulmonary hypertension.  Elevated V-wave in setting of severe mitral regurgitation.  V-wave of 43 mm Hg.  Left anterior descending artery with a mid to distal 30-40% stenosis.  Ramus intermedius with a mid 70 80% stenosis.  Small caliber vessel.  Left circumflex artery:  Non dominant.  Ectatic and aneurysmal.  Mid 40% stenosis in the AV groove followed by a 40-50% stenosis prior to the bifurcation of OM 2.   Right coronary artery is ectatic and aneurysmal with a mid 20-30% stenosis.  Superior branch of the PDA has a 50% stenosis proximally in the inferior branch has luminal irregularities.  PLV branch with a proximal 30-40% stenosis followed by mid 40-50% stenosis.    Echocardiogram 05/24/2021:  Ejection fraction 45-50%.  Severe left atrial enlargement.  Bioprosthetic valve in aortic position (27 mm magna ease bioprosthesis).  Moderate to severe mitral regurgitation    Echocardiogram 03/31/2020:  Ejection fraction 54%.  Aortic bioprosthesis.  No perivalvular regurgitation.      ASSESSMENT:     1. Aortic valve prosthesis: Secondary to ascending aortic aneurysm/aortic insufficiency.  27 mm magna ease bioprosthesis.  2. Mitral regurgitation:  Status post unsuccessful MitraClip  3. Permanent pacemaker/sick sinus syndrome  4.  Paroxysmal atrial fibrillation  5. Chronic anticoagulation  6. Nonobstructive coronary artery disease  7. Hypertension  8. Hyperlipidemia:  LDL 86  9. Chronic kidney disease:  Creatinine 3.4  10. Obesity    PLAN:     1. Aortic valve prosthesis:  Follow-up echocardiogram  2. Mitral regurgitation:  Progressive shortness of breath.  Follow-up echocardiogram.  [facility].  3. Permanent pacemaker:  Obtain device information.  Interrogate when able.  4. Paroxysmal atrial fibrillation:  Rate control.  Anticoagulation.  5. Nonobstructive coronary artery disease:  By heart catheterization in 2021. Risk factor modification.  6. Hypertension:  Continue current management.  7. Hyperlipidemia:  Continue current management.  8. Chronic kidney disease:  Has been referred to nephrology  9. Return to clinic 1 month.          Vivek Hannon MD, MPH, FACC, [facility]

## 2022-02-23 ENCOUNTER — LAB VISIT (OUTPATIENT)
Dept: LAB | Facility: HOSPITAL | Age: 72
End: 2022-02-23
Attending: INTERNAL MEDICINE
Payer: MEDICARE

## 2022-02-23 ENCOUNTER — ANTI-COAG VISIT (OUTPATIENT)
Dept: CARDIOLOGY | Facility: CLINIC | Age: 72
End: 2022-02-23
Payer: MEDICARE

## 2022-02-23 ENCOUNTER — OFFICE VISIT (OUTPATIENT)
Dept: NEPHROLOGY | Facility: CLINIC | Age: 72
End: 2022-02-23
Payer: MEDICARE

## 2022-02-23 VITALS
DIASTOLIC BLOOD PRESSURE: 80 MMHG | WEIGHT: 224 LBS | OXYGEN SATURATION: 98 % | HEART RATE: 74 BPM | SYSTOLIC BLOOD PRESSURE: 128 MMHG | BODY MASS INDEX: 31.24 KG/M2

## 2022-02-23 DIAGNOSIS — Z79.01 LONG TERM (CURRENT) USE OF ANTICOAGULANTS: ICD-10-CM

## 2022-02-23 DIAGNOSIS — Z86.79 HISTORY OF ATRIAL FIBRILLATION: Primary | ICD-10-CM

## 2022-02-23 DIAGNOSIS — Z86.79 HISTORY OF ATRIAL FIBRILLATION: ICD-10-CM

## 2022-02-23 DIAGNOSIS — N18.4 CKD (CHRONIC KIDNEY DISEASE), STAGE IV: Primary | ICD-10-CM

## 2022-02-23 LAB
INR PPP: 3.2 (ref 0.8–1.2)
PROTHROMBIN TIME: 32.3 SEC (ref 9–12.5)

## 2022-02-23 PROCEDURE — 99999 PR PBB SHADOW E&M-EST. PATIENT-LVL IV: CPT | Mod: PBBFAC,,, | Performed by: INTERNAL MEDICINE

## 2022-02-23 PROCEDURE — 3044F HG A1C LEVEL LT 7.0%: CPT | Mod: CPTII,S$GLB,, | Performed by: INTERNAL MEDICINE

## 2022-02-23 PROCEDURE — 4010F PR ACE/ARB THEARPY RXD/TAKEN: ICD-10-PCS | Mod: CPTII,S$GLB,, | Performed by: INTERNAL MEDICINE

## 2022-02-23 PROCEDURE — 1159F MED LIST DOCD IN RCRD: CPT | Mod: CPTII,S$GLB,, | Performed by: INTERNAL MEDICINE

## 2022-02-23 PROCEDURE — 3044F PR MOST RECENT HEMOGLOBIN A1C LEVEL <7.0%: ICD-10-PCS | Mod: CPTII,S$GLB,, | Performed by: INTERNAL MEDICINE

## 2022-02-23 PROCEDURE — 1160F PR REVIEW ALL MEDS BY PRESCRIBER/CLIN PHARMACIST DOCUMENTED: ICD-10-PCS | Mod: CPTII,S$GLB,, | Performed by: INTERNAL MEDICINE

## 2022-02-23 PROCEDURE — 3079F PR MOST RECENT DIASTOLIC BLOOD PRESSURE 80-89 MM HG: ICD-10-PCS | Mod: CPTII,S$GLB,, | Performed by: INTERNAL MEDICINE

## 2022-02-23 PROCEDURE — 93793 PR ANTICOAGULANT MGMT FOR PT TAKING WARFARIN: ICD-10-PCS | Mod: S$GLB,,,

## 2022-02-23 PROCEDURE — 1160F RVW MEDS BY RX/DR IN RCRD: CPT | Mod: CPTII,S$GLB,, | Performed by: INTERNAL MEDICINE

## 2022-02-23 PROCEDURE — 3008F BODY MASS INDEX DOCD: CPT | Mod: CPTII,S$GLB,, | Performed by: INTERNAL MEDICINE

## 2022-02-23 PROCEDURE — 1126F AMNT PAIN NOTED NONE PRSNT: CPT | Mod: CPTII,S$GLB,, | Performed by: INTERNAL MEDICINE

## 2022-02-23 PROCEDURE — 1159F PR MEDICATION LIST DOCUMENTED IN MEDICAL RECORD: ICD-10-PCS | Mod: CPTII,S$GLB,, | Performed by: INTERNAL MEDICINE

## 2022-02-23 PROCEDURE — 93793 ANTICOAG MGMT PT WARFARIN: CPT | Mod: S$GLB,,,

## 2022-02-23 PROCEDURE — 3074F SYST BP LT 130 MM HG: CPT | Mod: CPTII,S$GLB,, | Performed by: INTERNAL MEDICINE

## 2022-02-23 PROCEDURE — 36415 COLL VENOUS BLD VENIPUNCTURE: CPT | Mod: PO | Performed by: INTERNAL MEDICINE

## 2022-02-23 PROCEDURE — 1101F PT FALLS ASSESS-DOCD LE1/YR: CPT | Mod: CPTII,S$GLB,, | Performed by: INTERNAL MEDICINE

## 2022-02-23 PROCEDURE — 99215 OFFICE O/P EST HI 40 MIN: CPT | Mod: S$GLB,,, | Performed by: INTERNAL MEDICINE

## 2022-02-23 PROCEDURE — 3288F PR FALLS RISK ASSESSMENT DOCUMENTED: ICD-10-PCS | Mod: CPTII,S$GLB,, | Performed by: INTERNAL MEDICINE

## 2022-02-23 PROCEDURE — 3288F FALL RISK ASSESSMENT DOCD: CPT | Mod: CPTII,S$GLB,, | Performed by: INTERNAL MEDICINE

## 2022-02-23 PROCEDURE — 3074F PR MOST RECENT SYSTOLIC BLOOD PRESSURE < 130 MM HG: ICD-10-PCS | Mod: CPTII,S$GLB,, | Performed by: INTERNAL MEDICINE

## 2022-02-23 PROCEDURE — 3008F PR BODY MASS INDEX (BMI) DOCUMENTED: ICD-10-PCS | Mod: CPTII,S$GLB,, | Performed by: INTERNAL MEDICINE

## 2022-02-23 PROCEDURE — 4010F ACE/ARB THERAPY RXD/TAKEN: CPT | Mod: CPTII,S$GLB,, | Performed by: INTERNAL MEDICINE

## 2022-02-23 PROCEDURE — 85610 PROTHROMBIN TIME: CPT | Performed by: INTERNAL MEDICINE

## 2022-02-23 PROCEDURE — 1101F PR PT FALLS ASSESS DOC 0-1 FALLS W/OUT INJ PAST YR: ICD-10-PCS | Mod: CPTII,S$GLB,, | Performed by: INTERNAL MEDICINE

## 2022-02-23 PROCEDURE — 3079F DIAST BP 80-89 MM HG: CPT | Mod: CPTII,S$GLB,, | Performed by: INTERNAL MEDICINE

## 2022-02-23 PROCEDURE — 3066F PR DOCUMENTATION OF TREATMENT FOR NEPHROPATHY: ICD-10-PCS | Mod: CPTII,S$GLB,, | Performed by: INTERNAL MEDICINE

## 2022-02-23 PROCEDURE — 3066F NEPHROPATHY DOC TX: CPT | Mod: CPTII,S$GLB,, | Performed by: INTERNAL MEDICINE

## 2022-02-23 PROCEDURE — 1126F PR PAIN SEVERITY QUANTIFIED, NO PAIN PRESENT: ICD-10-PCS | Mod: CPTII,S$GLB,, | Performed by: INTERNAL MEDICINE

## 2022-02-23 PROCEDURE — 99999 PR PBB SHADOW E&M-EST. PATIENT-LVL IV: ICD-10-PCS | Mod: PBBFAC,,, | Performed by: INTERNAL MEDICINE

## 2022-02-23 PROCEDURE — 99215 PR OFFICE/OUTPT VISIT, EST, LEVL V, 40-54 MIN: ICD-10-PCS | Mod: S$GLB,,, | Performed by: INTERNAL MEDICINE

## 2022-02-23 RX ORDER — ACETAMINOPHEN 500 MG
1 TABLET ORAL DAILY
COMMUNITY

## 2022-02-23 RX ORDER — TORSEMIDE 20 MG/1
80 TABLET ORAL DAILY
Qty: 360 TABLET | Refills: 0 | Status: SHIPPED | OUTPATIENT
Start: 2022-02-23 | End: 2022-03-23

## 2022-02-23 NOTE — PROGRESS NOTES
CHIEF COMPLAINT/HPI: Vivek is a 72 y.o. man with PMH of CAD CABG 3 year ago, repaired thoracic aortic aneurysm  ,In August 2021 underwent attempted MitraClip procedure.  Unsuccessful percutaneous repair.  Patient was deemed unsuitable for open repair secondary to comorbidities ,A.fib , mitral regurgitation , PPM secondary to SSS , Aortic bioprosthesis secondary to aortic aneurysm/aortic insufficiency . EF 45-50%  prostate cancer s/p surgery 2019 and clear for 2 years   Advanced CKD was following with Nephrologist in [place] , they relocate to [place] with as their daughter lives here , they have their own place . He has AVF in place .  He is here to establish care in [facility] , with his wife ,   They are very pleasant and seem very compliant .      Home # is [phone]     Past Medical History:   Diagnosis Date    CAD (coronary artery disease)     Disorder of kidney and ureter        Vivek reports that he has never smoked. He has never used smokeless tobacco.   Drinks beer daily ( ~2 cans )    FAMILY HX :  + family hx of HTN , Emphysema and CKD .    ROS:    Review of Systems   Constitutional: Negative for activity change, appetite change, chills, fever and unexpected weight change.   HENT: Negative for congestion, ear discharge, hearing loss, nosebleeds, sore throat and tinnitus.    Eyes: Negative for photophobia, pain, redness and visual disturbance.   Respiratory: Negative for cough, chest tightness, shortness of breath and wheezing.    Cardiovascular: Negative for chest pain, palpitations and leg swelling.   Gastrointestinal: Negative for abdominal distention, constipation, diarrhea, nausea and vomiting.   Endocrine: Negative for cold intolerance, heat intolerance, polydipsia and polyuria.   Genitourinary: Negative for decreased urine volume, difficulty urinating, dysuria, flank pain and hematuria.   Musculoskeletal: Negative for arthralgias, gait problem, joint swelling and neck stiffness.   Skin: Negative  for rash.   Neurological: Negative for dizziness, tremors, weakness, numbness and headaches.   Psychiatric/Behavioral: Negative for confusion and sleep disturbance.       PE:    Vitals:    02/23/22 1033   BP: 128/80   Pulse: 74   SpO2: 98%   Weight: 101.6 kg (224 lb)       Physical Exam  Constitutional:       General: He is not in acute distress.     Appearance: He is not diaphoretic.   HENT:      Head: Normocephalic and atraumatic.      Right Ear: External ear normal.      Left Ear: External ear normal.   Eyes:      General:         Right eye: No discharge.         Left eye: No discharge.      Conjunctiva/sclera: Conjunctivae normal.      Pupils: Pupils are equal, round, and reactive to light.   Neck:      Vascular: No JVD.   Cardiovascular:      Rate and Rhythm: Normal rate and regular rhythm.      Heart sounds: No murmur heard.    No friction rub. No gallop.   Pulmonary:      Effort: Pulmonary effort is normal. No respiratory distress.      Breath sounds: Normal breath sounds. No stridor. No wheezing or rales.   Chest:      Chest wall: No tenderness.   Abdominal:      Palpations: Abdomen is soft.      Tenderness: There is no abdominal tenderness. There is no guarding or rebound.   Musculoskeletal:         General: No tenderness.      Cervical back: Normal range of motion and neck supple.      Comments: Lr forearm AVF with thrill and bruit    Skin:     Findings: No erythema or rash.   Neurological:      Mental Status: He is alert and oriented to person, place, and time.           Impression/Plan:    1. CKD (chronic kidney disease), stage IV      # CKD V: likely due to DM , and HTN ,   Has AVF inplace ,  No issues with volume, K and Acidosis so far per patient.  Will obtain record from previous nephrology .     Lab Results   Component Value Date    CREATININE 3.4 (H) 02/02/2022     Protein Creatinine Ratios:     No results found for: UTPCR    Acid-Base:   Lab Results   Component Value Date     02/02/2022     K 4.5 02/02/2022    CO2 22 (L) 02/02/2022     #HTN: Blood pressures acceptable ,  on Torsemide 60 mg in am and 20 mgat night  and Valsartan 160 mg   He feels so thirsty and stated his creatinine was in the 2s range and after his diuretic increased to 80 mg daily , so will decrease to 60 daily and stop the 20 mg in pm , and patient counseled on taking the night dose if increased wt , LE edema ,   Daily wt .    # Renal osteodystrophy:   Lab Results   Component Value Date    .6 (H) 02/23/2022    CALCIUM 9.3 02/02/2022       # Anemia:   Lab Results   Component Value Date    HGB 12.7 (L) 02/23/2022        # DM:  Last HbA1C   Lab Results   Component Value Date    HGBA1C 5.1 02/02/2022       # Lipid management:   Lab Results   Component Value Date    LDLCALC 86.8 02/02/2022       # ESRD planing: AVF in place     Follow up in 1 m , with labs today and labs before he comes .

## 2022-02-25 ENCOUNTER — TELEPHONE (OUTPATIENT)
Dept: NEPHROLOGY | Facility: CLINIC | Age: 72
End: 2022-02-25
Payer: MEDICARE

## 2022-02-25 NOTE — TELEPHONE ENCOUNTER
----- Message from Fareed Doe MD sent at 2/24/2022  7:19 PM CST -----  Contact: Pharmacy  No , that is what he is on , but we discussed 60 mg in am only and the night  if needed     ----- Message -----  From: Rissa Navarrete LPN  Sent: 2/24/2022  11:08 AM CST  To: Fareed Doe MD    Did you want pt to take 3 tabs in the A.M  and 1tab in the P.M for a total of 80mg daily   ----- Message -----  From: Latonya Morales  Sent: 2/24/2022  10:21 AM CST  To: Nader Guerrier Staff    Pharmacy has been trying to get in contact w/ nurse in regards to instructions on prescription: torsemide (DEMADEX) 20 MG Tab    [id number] [place] [facility] [place] [place] - [address]   Phone:  [phone]  Fax:  [phone]

## 2022-03-02 ENCOUNTER — HOSPITAL ENCOUNTER (OUTPATIENT)
Dept: CARDIOLOGY | Facility: HOSPITAL | Age: 72
Discharge: HOME | End: 2022-03-02
Attending: INTERNAL MEDICINE
Payer: MEDICARE

## 2022-03-02 DIAGNOSIS — Z79.01 LONG TERM (CURRENT) USE OF ANTICOAGULANTS: ICD-10-CM

## 2022-03-02 DIAGNOSIS — Z95.0 PACEMAKER: ICD-10-CM

## 2022-03-02 DIAGNOSIS — I48.0 PAF (PAROXYSMAL ATRIAL FIBRILLATION): ICD-10-CM

## 2022-03-02 DIAGNOSIS — Z95.2 S/P AVR (AORTIC VALVE REPLACEMENT) AND AORTOPLASTY: ICD-10-CM

## 2022-03-02 DIAGNOSIS — I10 PRIMARY HYPERTENSION: ICD-10-CM

## 2022-03-02 DIAGNOSIS — N18.4 CKD (CHRONIC KIDNEY DISEASE), STAGE IV: ICD-10-CM

## 2022-03-02 DIAGNOSIS — E78.49 OTHER HYPERLIPIDEMIA: ICD-10-CM

## 2022-03-02 DIAGNOSIS — I34.0 MITRAL VALVE INSUFFICIENCY, UNSPECIFIED ETIOLOGY: ICD-10-CM

## 2022-03-02 LAB
AV INDEX (PROSTH): 0.71
AV MEAN GRADIENT: 14 MMHG
AV PEAK GRADIENT: 24 MMHG
AV VALVE AREA: 3.49 CM2
AV VELOCITY RATIO: 0.66
CV ECHO LV RWT: 0.49 CM
DOP CALC AO PEAK VEL: 2.44 M/S
DOP CALC AO VTI: 49.21 CM
DOP CALC LVOT AREA: 4.9 CM2
DOP CALC LVOT DIAMETER: 2.5 CM
DOP CALC LVOT PEAK VEL: 1.6 M/S
DOP CALC LVOT STROKE VOLUME: 171.72 CM3
DOP CALCLVOT PEAK VEL VTI: 35 CM
E WAVE DECELERATION TIME: 223.02 MSEC
E/A RATIO: 1.39
E/E' RATIO: 12.24 M/S
ECHO LV POSTERIOR WALL: 1.31 CM (ref 0.6–1.1)
EJECTION FRACTION: 55 %
FRACTIONAL SHORTENING: 25 % (ref 28–44)
INTERVENTRICULAR SEPTUM: 1.27 CM (ref 0.6–1.1)
IVRT: 212.23 MSEC
LA MAJOR: 6.35 CM
LA MINOR: 6.33 CM
LA WIDTH: 4.09 CM
LEFT ATRIUM SIZE: 5.35 CM
LEFT ATRIUM VOLUME: 117.92 CM3
LEFT INTERNAL DIMENSION IN SYSTOLE: 4.04 CM (ref 2.1–4)
LEFT VENTRICLE DIASTOLIC VOLUME: 141.09 ML
LEFT VENTRICLE SYSTOLIC VOLUME: 71.77 ML
LEFT VENTRICULAR INTERNAL DIMENSION IN DIASTOLE: 5.4 CM (ref 3.5–6)
LEFT VENTRICULAR MASS: 292.39 G
LV LATERAL E/E' RATIO: 8.67 M/S
LV SEPTAL E/E' RATIO: 20.8 M/S
MV PEAK A VEL: 0.75 M/S
MV PEAK E VEL: 1.04 M/S
MV STENOSIS PRESSURE HALF TIME: 64.68 MS
MV VALVE AREA P 1/2 METHOD: 3.4 CM2
PISA TR MAX VEL: 1.23 M/S
PULM VEIN S/D RATIO: 0.89
PV PEAK D VEL: 0.62 M/S
PV PEAK S VEL: 0.55 M/S
PV PEAK VELOCITY: 1.08 CM/S
RA MAJOR: 6.08 CM
RA PRESSURE: 3 MMHG
RA WIDTH: 4.6 CM
RIGHT VENTRICULAR END-DIASTOLIC DIMENSION: 3.84 CM
RV TISSUE DOPPLER FREE WALL SYSTOLIC VELOCITY 1 (APICAL 4 CHAMBER VIEW): 8.51 CM/S
SINUS: 3.37 CM
STJ: 2.94 CM
TDI LATERAL: 0.12 M/S
TDI SEPTAL: 0.05 M/S
TDI: 0.09 M/S
TR MAX PG: 6 MMHG
TRICUSPID ANNULAR PLANE SYSTOLIC EXCURSION: 1.49 CM
TV REST PULMONARY ARTERY PRESSURE: 9 MMHG

## 2022-03-02 PROCEDURE — 93306 TTE W/DOPPLER COMPLETE: CPT | Mod: 26,,, | Performed by: INTERNAL MEDICINE

## 2022-03-02 PROCEDURE — 93306 ECHO (CUPID ONLY): ICD-10-PCS | Mod: 26,,, | Performed by: INTERNAL MEDICINE

## 2022-03-02 PROCEDURE — 93306 TTE W/DOPPLER COMPLETE: CPT

## 2022-03-13 ENCOUNTER — HOSPITAL ENCOUNTER (EMERGENCY)
Facility: HOSPITAL | Age: 72
Discharge: HOME | End: 2022-03-13
Attending: EMERGENCY MEDICINE
Payer: MEDICARE

## 2022-03-13 VITALS
RESPIRATION RATE: 15 BRPM | SYSTOLIC BLOOD PRESSURE: 124 MMHG | OXYGEN SATURATION: 100 % | BODY MASS INDEX: 31.92 KG/M2 | WEIGHT: 228 LBS | DIASTOLIC BLOOD PRESSURE: 65 MMHG | HEART RATE: 60 BPM | TEMPERATURE: 98 F | HEIGHT: 71 IN

## 2022-03-13 DIAGNOSIS — R79.1 ELEVATED INR (INTERNATIONAL NORMALIZED RATIO) DUE TO PRIOR ANTICOAGULANT MEDICATION INGESTION: ICD-10-CM

## 2022-03-13 DIAGNOSIS — M79.652 ACUTE THIGH PAIN, LEFT: ICD-10-CM

## 2022-03-13 DIAGNOSIS — S70.12XA HEMATOMA OF LEFT THIGH, INITIAL ENCOUNTER: Primary | ICD-10-CM

## 2022-03-13 LAB
ALBUMIN SERPL BCP-MCNC: 3.4 G/DL (ref 3.5–5.2)
ALP SERPL-CCNC: 75 U/L (ref 55–135)
ALT SERPL W/O P-5'-P-CCNC: 13 U/L (ref 10–44)
ANION GAP SERPL CALC-SCNC: 14 MMOL/L (ref 8–16)
AST SERPL-CCNC: 21 U/L (ref 10–40)
BASOPHILS # BLD AUTO: 0.06 K/UL (ref 0–0.2)
BASOPHILS NFR BLD: 0.6 % (ref 0–1.9)
BILIRUB SERPL-MCNC: 1.3 MG/DL (ref 0.1–1)
BUN SERPL-MCNC: 49 MG/DL (ref 8–23)
CALCIUM SERPL-MCNC: 9.2 MG/DL (ref 8.7–10.5)
CHLORIDE SERPL-SCNC: 102 MMOL/L (ref 95–110)
CK SERPL-CCNC: 500 U/L (ref 20–200)
CO2 SERPL-SCNC: 21 MMOL/L (ref 23–29)
CREAT SERPL-MCNC: 3.1 MG/DL (ref 0.5–1.4)
DIFFERENTIAL METHOD: ABNORMAL
EOSINOPHIL # BLD AUTO: 0.3 K/UL (ref 0–0.5)
EOSINOPHIL NFR BLD: 3 % (ref 0–8)
ERYTHROCYTE [DISTWIDTH] IN BLOOD BY AUTOMATED COUNT: 13.2 % (ref 11.5–14.5)
EST. GFR  (AFRICAN AMERICAN): 22 ML/MIN/1.73 M^2
EST. GFR  (NON AFRICAN AMERICAN): 19 ML/MIN/1.73 M^2
GLUCOSE SERPL-MCNC: 94 MG/DL (ref 70–110)
HCT VFR BLD AUTO: 34.9 % (ref 40–54)
HGB BLD-MCNC: 11.4 G/DL (ref 14–18)
IMM GRANULOCYTES # BLD AUTO: 0.05 K/UL (ref 0–0.04)
IMM GRANULOCYTES NFR BLD AUTO: 0.5 % (ref 0–0.5)
INR PPP: 5 (ref 0.8–1.2)
LYMPHOCYTES # BLD AUTO: 2.4 K/UL (ref 1–4.8)
LYMPHOCYTES NFR BLD: 22.7 % (ref 18–48)
MAGNESIUM SERPL-MCNC: 2.3 MG/DL (ref 1.6–2.6)
MCH RBC QN AUTO: 30.9 PG (ref 27–31)
MCHC RBC AUTO-ENTMCNC: 32.7 G/DL (ref 32–36)
MCV RBC AUTO: 95 FL (ref 82–98)
MONOCYTES # BLD AUTO: 1.6 K/UL (ref 0.3–1)
MONOCYTES NFR BLD: 15.1 % (ref 4–15)
NEUTROPHILS # BLD AUTO: 6.2 K/UL (ref 1.8–7.7)
NEUTROPHILS NFR BLD: 58.1 % (ref 38–73)
NRBC BLD-RTO: 0 /100 WBC
PHOSPHATE SERPL-MCNC: 4.2 MG/DL (ref 2.7–4.5)
PLATELET # BLD AUTO: 169 K/UL (ref 150–450)
PMV BLD AUTO: 12.4 FL (ref 9.2–12.9)
POTASSIUM SERPL-SCNC: 3.6 MMOL/L (ref 3.5–5.1)
PROT SERPL-MCNC: 6.8 G/DL (ref 6–8.4)
PROTHROMBIN TIME: 49.6 SEC (ref 9–12.5)
RBC # BLD AUTO: 3.69 M/UL (ref 4.6–6.2)
SODIUM SERPL-SCNC: 137 MMOL/L (ref 136–145)
WBC # BLD AUTO: 10.64 K/UL (ref 3.9–12.7)

## 2022-03-13 PROCEDURE — 80053 COMPREHEN METABOLIC PANEL: CPT | Performed by: EMERGENCY MEDICINE

## 2022-03-13 PROCEDURE — 25000003 PHARM REV CODE 250: Performed by: EMERGENCY MEDICINE

## 2022-03-13 PROCEDURE — 85025 COMPLETE CBC W/AUTO DIFF WBC: CPT | Performed by: EMERGENCY MEDICINE

## 2022-03-13 PROCEDURE — 83735 ASSAY OF MAGNESIUM: CPT | Performed by: EMERGENCY MEDICINE

## 2022-03-13 PROCEDURE — 84100 ASSAY OF PHOSPHORUS: CPT | Performed by: EMERGENCY MEDICINE

## 2022-03-13 PROCEDURE — 82550 ASSAY OF CK (CPK): CPT | Performed by: EMERGENCY MEDICINE

## 2022-03-13 PROCEDURE — 99284 EMERGENCY DEPT VISIT MOD MDM: CPT

## 2022-03-13 PROCEDURE — 85610 PROTHROMBIN TIME: CPT | Performed by: EMERGENCY MEDICINE

## 2022-03-13 RX ORDER — OXYCODONE AND ACETAMINOPHEN 5; 325 MG/1; MG/1
1 TABLET ORAL
Status: COMPLETED | OUTPATIENT
Start: 2022-03-13 | End: 2022-03-13

## 2022-03-13 RX ORDER — OXYCODONE AND ACETAMINOPHEN 5; 325 MG/1; MG/1
1 TABLET ORAL EVERY 4 HOURS PRN
Qty: 18 TABLET | Refills: 0 | Status: SHIPPED | OUTPATIENT
Start: 2022-03-13 | End: 2022-03-17 | Stop reason: SDUPTHER

## 2022-03-13 RX ADMIN — OXYCODONE HYDROCHLORIDE AND ACETAMINOPHEN 1 TABLET: 5; 325 TABLET ORAL at 09:03

## 2022-03-13 NOTE — PROGRESS NOTES
Patient in need of Rolling walker; order written; processed through [facility]. Chat sent to Ms. Chelsea Mckeon for approval; waiting to hear back from  Ms. Mckeon to determine if RW can be provided through [facility]     [place] approved for walker to be pulled from our DME closet. Packet prepared, walker pulled, and delivered to patient in ED.

## 2022-03-13 NOTE — DISCHARGE INSTRUCTIONS
Call your primary care doctor tomorrow morning and report your INR 0 5. Please have her advise you on when to begin your Coumadin at what dose.  Please stop her Coumadin for now.  Walker.  Elevate your leg.  Return if you get worse or if new problems develop.  Percocet for pain.

## 2022-03-13 NOTE — ED PROVIDER NOTES
Encounter Date: 3/13/2022       History     Chief Complaint   Patient presents with    Hip Pain     Pt to ER with c/o left hip pain x few days s/p hitting hip on side of furniture. Pt reports swelling from hip to knee.     HPI   This 72-year-old white male presents to the emergency with a history of atrial fibrillation and valvular heart disease complaining of pain in the lateral aspect of the left thigh after hitting it on a piece of furniture 5 days ago.  The trauma was minor.  The patient is maintained on Coumadin.  He is otherwise well without other injuries or problems.  He has also had coronary artery bypass grafting.  The patient relates that he is had surgery on his aorta as well.  Review of patient's allergies indicates:  No Known Allergies  Past Medical History:   Diagnosis Date    CAD (coronary artery disease)     Disorder of kidney and ureter      Past Surgical History:   Procedure Laterality Date    CORONARY ARTERY BYPASS GRAFT      with valve repair (aortic?)     History reviewed. No pertinent family history.  Social History     Tobacco Use    Smoking status: Never Smoker    Smokeless tobacco: Never Used   Substance Use Topics    Alcohol use: Yes     Alcohol/week: 6.0 standard drinks     Types: 6 Cans of beer per week     Comment: daily beer drinker    Drug use: Never     Review of Systems  The patient was questioned specifically with regard to the following.  General: Fever, chills, sweats. Neuro: Headache. Eyes: eye problems. ENT: Ear pain, sore throat. Cardiovascular: Chest pain. Respiratory: Cough, shortness of breath. Gastrointestinal: Abdominal pain, vomiting, diarrhea. Genitourinary: Painful urination.  Musculoskeletal: Arm and leg problems. Skin: Rash.  The review of systems was negative except for the following:  Left thigh pain, swelling, ecchymosis.  The patient is unable to walk.  Physical Exam     Initial Vitals [03/13/22 0729]   BP Pulse Resp Temp SpO2   110/62 72 20 98.6 °F (37  °C) (!) 1 %      MAP       --         Physical Exam  The patient was examined specifically for the following:   General:No significant distress, Good color, Warm and dry. Head and neck:Scalp atraumatic, Neck supple. Neurological:Appropriate conversation, Gross motor deficits. Eyes:Conjugate gaze, Clear corneas. ENT: No epistaxis. Cardiac: Regular rate and rhythm, Grossly normal heart tones. Pulmonary: Wheezing, Rales. Gastrointestinal: Abdominal tenderness, Abdominal distention. Musculoskeletal: Extremity deformity, Apparent pain with range of motion of the joints. Skin: Rash.   The findings on examination were normal except for the following:  The patient has swelling and tenderness of the left lateral thigh.  He has a 8 cm area of ecchymosis at the lateral mid thigh on the left.  Long bones are stable.  There is no significant hip tenderness or pain with range of motion of the hip her knee.  The patient will not walk.  Lungs are clear.  The heart tones are normal.  The patient has regular rate and rhythm.  The abdomen is nontender.  The patient has an elevated BMI.  ED Course   Procedures  Labs Reviewed   COMPREHENSIVE METABOLIC PANEL - Abnormal; Notable for the following components:       Result Value    CO2 21 (*)     BUN 49 (*)     Creatinine 3.1 (*)     Albumin 3.4 (*)     Total Bilirubin 1.3 (*)     eGFR if  22 (*)     eGFR if non  19 (*)     All other components within normal limits   CBC W/ AUTO DIFFERENTIAL - Abnormal; Notable for the following components:    RBC 3.69 (*)     Hemoglobin 11.4 (*)     Hematocrit 34.9 (*)     Immature Grans (Abs) 0.05 (*)     Mono # 1.6 (*)     Mono % 15.1 (*)     All other components within normal limits   PROTIME-INR - Abnormal; Notable for the following components:    Prothrombin Time 49.6 (*)     INR 5.0 (*)     All other components within normal limits    Narrative:     PT/INR critical result(s) called and verbal readback obtained from    Chary Su RN by JOVANY 03/13/2022 08:52   CK - Abnormal; Notable for the following components:     (*)     All other components within normal limits   MAGNESIUM   PHOSPHORUS          Imaging Results          X-Ray Femur Ap/Lat Left (Final result)  Result time 03/13/22 08:46:22    Final result by Tahir Graves DO (03/13/22 08:46:22)                 Impression:      No acute osseous abnormality of the left femur.      Electronically signed by: Tahir Graves  Date:    03/13/2022  Time:    08:46             Narrative:    EXAMINATION:  XR FEMUR 2 VIEW LEFT    CLINICAL HISTORY:  Pain in left thigh    TECHNIQUE:  AP and lateral views of the left femur were performed.    COMPARISON:  None.    FINDINGS:  There is no acute fracture or dislocation of the left femur.  Alignment is normal.  Joint spaces are preserved.  There is soft tissue edema.                              Medical decision making:  Given the above this patient presents to the emergency room complaining of pain in the left lateral thigh.  He hit this on a piece of furniture 5 days ago.  The patient is on Coumadin for valvular heart disease in atrial fibrillation.  His INR today is 5. I have asked him to stop his Coumadin and call Dr. Conway tomorrow.  He has a brisk dorsalis pedis pulse.  He is neurologically intact distal to his hematoma of the left thigh.  The hematoma is large.  X-rays of the femur failed to reveal fracture.  I will treat with a walker and pain medicine.       Medications   oxyCODONE-acetaminophen 5-325 mg per tablet 1 tablet (has no administration in time range)                          Clinical Impression:   Final diagnoses:  [M79.652] Acute thigh pain, left  [S70.12XA] Hematoma of left thigh, initial encounter (Primary)  [R79.1] Elevated INR (international normalized ratio) due to prior anticoagulant medication ingestion          ED Disposition Condition    Discharge Stable        ED Prescriptions     Medication Sig Dispense  Start Date End Date Auth. Provider    oxyCODONE-acetaminophen (PERCOCET) 5-325 mg per tablet Take 1 tablet by mouth every 4 (four) hours as needed for Pain. 18 tablet 3/13/2022  Todd Arreola MD        Follow-up Information     Follow up With Specialties Details Why Contact Info    Michelle Conway MD Family Medicine, Internal Medicine In 3 days  [address] [place] [address]  [phone]             Todd Arreola MD  03/13/22 0932

## 2022-03-14 ENCOUNTER — ANTI-COAG VISIT (OUTPATIENT)
Dept: CARDIOLOGY | Facility: CLINIC | Age: 72
End: 2022-03-14
Payer: MEDICARE

## 2022-03-14 DIAGNOSIS — Z86.79 HISTORY OF ATRIAL FIBRILLATION: Primary | ICD-10-CM

## 2022-03-14 DIAGNOSIS — Z79.01 LONG TERM (CURRENT) USE OF ANTICOAGULANTS: ICD-10-CM

## 2022-03-14 PROCEDURE — 93793 PR ANTICOAGULANT MGMT FOR PT TAKING WARFARIN: ICD-10-PCS | Mod: S$GLB,,,

## 2022-03-14 PROCEDURE — 93793 ANTICOAG MGMT PT WARFARIN: CPT | Mod: S$GLB,,,

## 2022-03-15 ENCOUNTER — ANTI-COAG VISIT (OUTPATIENT)
Dept: CARDIOLOGY | Facility: CLINIC | Age: 72
End: 2022-03-15
Payer: MEDICARE

## 2022-03-15 ENCOUNTER — LAB VISIT (OUTPATIENT)
Dept: LAB | Facility: HOSPITAL | Age: 72
End: 2022-03-15
Attending: INTERNAL MEDICINE
Payer: MEDICARE

## 2022-03-15 DIAGNOSIS — Z86.79 HISTORY OF ATRIAL FIBRILLATION: Primary | ICD-10-CM

## 2022-03-15 DIAGNOSIS — Z86.79 HISTORY OF ATRIAL FIBRILLATION: ICD-10-CM

## 2022-03-15 DIAGNOSIS — Z79.01 LONG TERM (CURRENT) USE OF ANTICOAGULANTS: ICD-10-CM

## 2022-03-15 LAB
INR PPP: 3.8 (ref 0.8–1.2)
PROTHROMBIN TIME: 38 SEC (ref 9–12.5)

## 2022-03-15 PROCEDURE — 93793 ANTICOAG MGMT PT WARFARIN: CPT | Mod: S$GLB,,,

## 2022-03-15 PROCEDURE — 85610 PROTHROMBIN TIME: CPT | Performed by: INTERNAL MEDICINE

## 2022-03-15 PROCEDURE — 36415 COLL VENOUS BLD VENIPUNCTURE: CPT | Mod: PO | Performed by: INTERNAL MEDICINE

## 2022-03-15 PROCEDURE — 93793 PR ANTICOAGULANT MGMT FOR PT TAKING WARFARIN: ICD-10-PCS | Mod: S$GLB,,,

## 2022-03-15 NOTE — PROGRESS NOTES
INR not at goal but is coming down. Medications, chart, and patient findings reviewed. See calendar for adjustments to dose and follow up plan.

## 2022-03-17 ENCOUNTER — OFFICE VISIT (OUTPATIENT)
Dept: FAMILY MEDICINE | Facility: CLINIC | Age: 72
End: 2022-03-17
Payer: MEDICARE

## 2022-03-17 ENCOUNTER — LAB VISIT (OUTPATIENT)
Dept: LAB | Facility: HOSPITAL | Age: 72
End: 2022-03-17
Attending: INTERNAL MEDICINE
Payer: MEDICARE

## 2022-03-17 VITALS
WEIGHT: 234.38 LBS | TEMPERATURE: 98 F | HEART RATE: 71 BPM | BODY MASS INDEX: 32.69 KG/M2 | DIASTOLIC BLOOD PRESSURE: 48 MMHG | SYSTOLIC BLOOD PRESSURE: 74 MMHG | RESPIRATION RATE: 18 BRPM

## 2022-03-17 DIAGNOSIS — S80.10XA HEMATOMA OF LOWER LEG: Primary | ICD-10-CM

## 2022-03-17 DIAGNOSIS — F32.A ANXIETY AND DEPRESSION: ICD-10-CM

## 2022-03-17 DIAGNOSIS — Z86.79 HISTORY OF ATRIAL FIBRILLATION: ICD-10-CM

## 2022-03-17 DIAGNOSIS — M10.9 GOUT, UNSPECIFIED CAUSE, UNSPECIFIED CHRONICITY, UNSPECIFIED SITE: ICD-10-CM

## 2022-03-17 DIAGNOSIS — I10 HYPERTENSION, UNSPECIFIED TYPE: ICD-10-CM

## 2022-03-17 DIAGNOSIS — I25.10 CORONARY ARTERY DISEASE, UNSPECIFIED VESSEL OR LESION TYPE, UNSPECIFIED WHETHER ANGINA PRESENT, UNSPECIFIED WHETHER NATIVE OR TRANSPLANTED HEART: ICD-10-CM

## 2022-03-17 DIAGNOSIS — S80.10XA HEMATOMA OF LOWER LEG: ICD-10-CM

## 2022-03-17 DIAGNOSIS — F41.9 ANXIETY AND DEPRESSION: ICD-10-CM

## 2022-03-17 DIAGNOSIS — I95.9 HYPOTENSION, UNSPECIFIED HYPOTENSION TYPE: ICD-10-CM

## 2022-03-17 LAB
ALBUMIN SERPL BCP-MCNC: 3.2 G/DL (ref 3.5–5.2)
ALP SERPL-CCNC: 67 U/L (ref 55–135)
ALT SERPL W/O P-5'-P-CCNC: 17 U/L (ref 10–44)
ANION GAP SERPL CALC-SCNC: 12 MMOL/L (ref 8–16)
AST SERPL-CCNC: 29 U/L (ref 10–40)
BASOPHILS # BLD AUTO: 0.04 K/UL (ref 0–0.2)
BASOPHILS NFR BLD: 0.4 % (ref 0–1.9)
BILIRUB SERPL-MCNC: 2.2 MG/DL (ref 0.1–1)
BUN SERPL-MCNC: 76 MG/DL (ref 8–23)
CALCIUM SERPL-MCNC: 9.6 MG/DL (ref 8.7–10.5)
CHLORIDE SERPL-SCNC: 101 MMOL/L (ref 95–110)
CO2 SERPL-SCNC: 23 MMOL/L (ref 23–29)
CREAT SERPL-MCNC: 4 MG/DL (ref 0.5–1.4)
DIFFERENTIAL METHOD: ABNORMAL
EOSINOPHIL # BLD AUTO: 0.4 K/UL (ref 0–0.5)
EOSINOPHIL NFR BLD: 3.6 % (ref 0–8)
ERYTHROCYTE [DISTWIDTH] IN BLOOD BY AUTOMATED COUNT: 13.6 % (ref 11.5–14.5)
EST. GFR  (AFRICAN AMERICAN): 16 ML/MIN/1.73 M^2
EST. GFR  (NON AFRICAN AMERICAN): 13.8 ML/MIN/1.73 M^2
GLUCOSE SERPL-MCNC: 106 MG/DL (ref 70–110)
HCT VFR BLD AUTO: 30 % (ref 40–54)
HGB BLD-MCNC: 9.4 G/DL (ref 14–18)
IMM GRANULOCYTES # BLD AUTO: 0.05 K/UL (ref 0–0.04)
IMM GRANULOCYTES NFR BLD AUTO: 0.5 % (ref 0–0.5)
LYMPHOCYTES # BLD AUTO: 1.2 K/UL (ref 1–4.8)
LYMPHOCYTES NFR BLD: 12 % (ref 18–48)
MCH RBC QN AUTO: 30.4 PG (ref 27–31)
MCHC RBC AUTO-ENTMCNC: 31.3 G/DL (ref 32–36)
MCV RBC AUTO: 97 FL (ref 82–98)
MONOCYTES # BLD AUTO: 1.5 K/UL (ref 0.3–1)
MONOCYTES NFR BLD: 14.6 % (ref 4–15)
NEUTROPHILS # BLD AUTO: 7 K/UL (ref 1.8–7.7)
NEUTROPHILS NFR BLD: 68.9 % (ref 38–73)
NRBC BLD-RTO: 0 /100 WBC
PLATELET # BLD AUTO: 181 K/UL (ref 150–450)
PMV BLD AUTO: 12.3 FL (ref 9.2–12.9)
POTASSIUM SERPL-SCNC: 4.5 MMOL/L (ref 3.5–5.1)
PROT SERPL-MCNC: 6.9 G/DL (ref 6–8.4)
RBC # BLD AUTO: 3.09 M/UL (ref 4.6–6.2)
SODIUM SERPL-SCNC: 136 MMOL/L (ref 136–145)
WBC # BLD AUTO: 10.08 K/UL (ref 3.9–12.7)

## 2022-03-17 PROCEDURE — 99214 OFFICE O/P EST MOD 30 MIN: CPT | Mod: S$GLB,,, | Performed by: INTERNAL MEDICINE

## 2022-03-17 PROCEDURE — 3044F PR MOST RECENT HEMOGLOBIN A1C LEVEL <7.0%: ICD-10-PCS | Mod: CPTII,S$GLB,, | Performed by: INTERNAL MEDICINE

## 2022-03-17 PROCEDURE — 1101F PT FALLS ASSESS-DOCD LE1/YR: CPT | Mod: CPTII,S$GLB,, | Performed by: INTERNAL MEDICINE

## 2022-03-17 PROCEDURE — 3008F PR BODY MASS INDEX (BMI) DOCUMENTED: ICD-10-PCS | Mod: CPTII,S$GLB,, | Performed by: INTERNAL MEDICINE

## 2022-03-17 PROCEDURE — 3060F PR POS MICROALBUMINURIA RESULT DOCUMENTED/REVIEW: ICD-10-PCS | Mod: CPTII,S$GLB,, | Performed by: INTERNAL MEDICINE

## 2022-03-17 PROCEDURE — 80053 COMPREHEN METABOLIC PANEL: CPT | Performed by: INTERNAL MEDICINE

## 2022-03-17 PROCEDURE — 1125F AMNT PAIN NOTED PAIN PRSNT: CPT | Mod: CPTII,S$GLB,, | Performed by: INTERNAL MEDICINE

## 2022-03-17 PROCEDURE — 1125F PR PAIN SEVERITY QUANTIFIED, PAIN PRESENT: ICD-10-PCS | Mod: CPTII,S$GLB,, | Performed by: INTERNAL MEDICINE

## 2022-03-17 PROCEDURE — 3074F SYST BP LT 130 MM HG: CPT | Mod: CPTII,S$GLB,, | Performed by: INTERNAL MEDICINE

## 2022-03-17 PROCEDURE — 1101F PR PT FALLS ASSESS DOC 0-1 FALLS W/OUT INJ PAST YR: ICD-10-PCS | Mod: CPTII,S$GLB,, | Performed by: INTERNAL MEDICINE

## 2022-03-17 PROCEDURE — 85025 COMPLETE CBC W/AUTO DIFF WBC: CPT | Performed by: INTERNAL MEDICINE

## 2022-03-17 PROCEDURE — 3078F PR MOST RECENT DIASTOLIC BLOOD PRESSURE < 80 MM HG: ICD-10-PCS | Mod: CPTII,S$GLB,, | Performed by: INTERNAL MEDICINE

## 2022-03-17 PROCEDURE — 1160F RVW MEDS BY RX/DR IN RCRD: CPT | Mod: CPTII,S$GLB,, | Performed by: INTERNAL MEDICINE

## 2022-03-17 PROCEDURE — 3288F FALL RISK ASSESSMENT DOCD: CPT | Mod: CPTII,S$GLB,, | Performed by: INTERNAL MEDICINE

## 2022-03-17 PROCEDURE — 3074F PR MOST RECENT SYSTOLIC BLOOD PRESSURE < 130 MM HG: ICD-10-PCS | Mod: CPTII,S$GLB,, | Performed by: INTERNAL MEDICINE

## 2022-03-17 PROCEDURE — 3008F BODY MASS INDEX DOCD: CPT | Mod: CPTII,S$GLB,, | Performed by: INTERNAL MEDICINE

## 2022-03-17 PROCEDURE — 99999 PR PBB SHADOW E&M-EST. PATIENT-LVL III: ICD-10-PCS | Mod: PBBFAC,,, | Performed by: INTERNAL MEDICINE

## 2022-03-17 PROCEDURE — 4010F PR ACE/ARB THEARPY RXD/TAKEN: ICD-10-PCS | Mod: CPTII,S$GLB,, | Performed by: INTERNAL MEDICINE

## 2022-03-17 PROCEDURE — 1159F MED LIST DOCD IN RCRD: CPT | Mod: CPTII,S$GLB,, | Performed by: INTERNAL MEDICINE

## 2022-03-17 PROCEDURE — 3288F PR FALLS RISK ASSESSMENT DOCUMENTED: ICD-10-PCS | Mod: CPTII,S$GLB,, | Performed by: INTERNAL MEDICINE

## 2022-03-17 PROCEDURE — 3066F NEPHROPATHY DOC TX: CPT | Mod: CPTII,S$GLB,, | Performed by: INTERNAL MEDICINE

## 2022-03-17 PROCEDURE — 36415 COLL VENOUS BLD VENIPUNCTURE: CPT | Mod: PO | Performed by: INTERNAL MEDICINE

## 2022-03-17 PROCEDURE — 4010F ACE/ARB THERAPY RXD/TAKEN: CPT | Mod: CPTII,S$GLB,, | Performed by: INTERNAL MEDICINE

## 2022-03-17 PROCEDURE — 3044F HG A1C LEVEL LT 7.0%: CPT | Mod: CPTII,S$GLB,, | Performed by: INTERNAL MEDICINE

## 2022-03-17 PROCEDURE — 3078F DIAST BP <80 MM HG: CPT | Mod: CPTII,S$GLB,, | Performed by: INTERNAL MEDICINE

## 2022-03-17 PROCEDURE — 1160F PR REVIEW ALL MEDS BY PRESCRIBER/CLIN PHARMACIST DOCUMENTED: ICD-10-PCS | Mod: CPTII,S$GLB,, | Performed by: INTERNAL MEDICINE

## 2022-03-17 PROCEDURE — 99999 PR PBB SHADOW E&M-EST. PATIENT-LVL III: CPT | Mod: PBBFAC,,, | Performed by: INTERNAL MEDICINE

## 2022-03-17 PROCEDURE — 99214 PR OFFICE/OUTPT VISIT, EST, LEVL IV, 30-39 MIN: ICD-10-PCS | Mod: S$GLB,,, | Performed by: INTERNAL MEDICINE

## 2022-03-17 PROCEDURE — 1159F PR MEDICATION LIST DOCUMENTED IN MEDICAL RECORD: ICD-10-PCS | Mod: CPTII,S$GLB,, | Performed by: INTERNAL MEDICINE

## 2022-03-17 PROCEDURE — 3060F POS MICROALBUMINURIA REV: CPT | Mod: CPTII,S$GLB,, | Performed by: INTERNAL MEDICINE

## 2022-03-17 PROCEDURE — 3066F PR DOCUMENTATION OF TREATMENT FOR NEPHROPATHY: ICD-10-PCS | Mod: CPTII,S$GLB,, | Performed by: INTERNAL MEDICINE

## 2022-03-17 RX ORDER — ESCITALOPRAM OXALATE 20 MG/1
20 TABLET ORAL DAILY
Qty: 90 TABLET | Refills: 1 | Status: SHIPPED | OUTPATIENT
Start: 2022-03-17 | End: 2022-10-01

## 2022-03-17 RX ORDER — OXYCODONE AND ACETAMINOPHEN 5; 325 MG/1; MG/1
1 TABLET ORAL EVERY 6 HOURS PRN
Qty: 28 TABLET | Refills: 0 | Status: SHIPPED | OUTPATIENT
Start: 2022-03-17 | End: 2022-06-27

## 2022-03-17 RX ORDER — ASPIRIN 81 MG/1
81 TABLET ORAL DAILY
Qty: 90 TABLET | Refills: 1 | Status: SHIPPED | OUTPATIENT
Start: 2022-03-17 | End: 2022-09-28 | Stop reason: SDUPTHER

## 2022-03-17 RX ORDER — FEBUXOSTAT 40 MG/1
40 TABLET, FILM COATED ORAL DAILY
Qty: 90 TABLET | Refills: 1 | Status: SHIPPED | OUTPATIENT
Start: 2022-03-17 | End: 2022-09-27

## 2022-03-17 NOTE — PROGRESS NOTES
Subjective:       Patient ID: Vivek Lema is a pleasant 72 y.o. White male patient    Chief Complaint: Follow-up      Patient is a pt I saw last on 02/02/2022. See my last notes and the list of problems below.    HPI     In the interval:  - 02/08/2022 Dr. Hannon, Cardiology.  - 02/23/2022 Dr. Doe, Nephrology.  - 03/13/2022 ED for painful  hematoma of L thigh in pt with INR 5.0 (on Coumadin) after hitting the area on a piece of furniture 5 days before.   He comes today stating that he is still in pain. He uses his walker, states it is hard for him to sit down, especially when has to sit down on the toilet, he can sleep at night when lying on the other side.   He reports that Percocet is helping, he takes it QID. Otherwise pain is really bad.  He thinks that INR has been too high as above as he drank more beer after the trauma, due to pain. He went to the ED as the pain was worsening, his daughter who is in nursing school advised him to go for fear of a severe bleeding.  He has been made aware at the ED that the pain may last for days/months.  INR is now 3.8, he was contacted by the [facility] re: how to go on with blood thinner.  He applied heating pads on his thigh.   No blood in the urine, no other bleeding.    Patient Active Problem List   Diagnosis    Hypertension    CKD (chronic kidney disease), stage IV    A-V fistula    Coronary artery disease    History of atrial fibrillation    Pacemaker    Mitral valve insufficiency    Class 1 obesity due to excess calories with serious comorbidity and body mass index (BMI) of 32.0 to 32.9 in adult    Gout    Long term (current) use of anticoagulants          ACTIVE MEDICAL ISSUES:  Documented in Problem List     PAST MEDICAL HISTORY  Documented     PAST SURGICAL HISTORY:  Documented     SOCIAL HISTORY:  Documented     FAMILY HISTORY:  Documented     ALLERGIES AND MEDICATIONS: updated and reviewed.  Documented    Review of Systems   Constitutional:  Negative.    HENT: Negative.    Respiratory: Negative.    Cardiovascular: Negative.    Gastrointestinal: Negative.    Musculoskeletal:        Pain L thigh where is a large hematoma   Neurological: Negative.    All other systems reviewed and are negative.      Objective:      Physical Exam  Vitals and nursing note reviewed.   Constitutional:       Appearance: Normal appearance.   HENT:      Right Ear: Tympanic membrane normal.      Left Ear: Tympanic membrane normal.   Cardiovascular:      Rate and Rhythm: Normal rate and regular rhythm.      Pulses: Normal pulses.      Heart sounds: Normal heart sounds.   Pulmonary:      Effort: Pulmonary effort is normal.      Breath sounds: Normal breath sounds.   Musculoskeletal:        Legs:    Neurological:      Mental Status: He is alert.         Vitals:    03/17/22 1324   BP: (!) 74/48   BP Location: Right arm   Patient Position: Sitting   BP Method: Large (Automatic)   Pulse: 71   Resp: 18   Temp: 98.1 °F (36.7 °C)   TempSrc: Oral   Weight: 106.3 kg (234 lb 5.6 oz)     Body mass index is 32.69 kg/m².    RESULTS: Reviewed labs from last 12 months    Last Lab Results:     Lab Results   Component Value Date    WBC 10.64 03/13/2022    HGB 11.4 (L) 03/13/2022    HCT 34.9 (L) 03/13/2022     03/13/2022     03/13/2022    K 3.6 03/13/2022     03/13/2022    CO2 21 (L) 03/13/2022    BUN 49 (H) 03/13/2022    CREATININE 3.1 (H) 03/13/2022    CALCIUM 9.2 03/13/2022    ALBUMIN 3.4 (L) 03/13/2022    AST 21 03/13/2022    ALT 13 03/13/2022    CHOL 154 02/02/2022    TRIG 171 (H) 02/02/2022    HDL 33 (L) 02/02/2022    LDLCALC 86.8 02/02/2022    HGBA1C 5.1 02/02/2022    TSH 1.782 02/02/2022    PSA <0.01 02/02/2022     Assessment:       1. Hematoma of lower leg    2. Hypotension, unspecified hypotension type    3. Hypertension, unspecified type    4. History of atrial fibrillation    5. Coronary artery disease, unspecified vessel or lesion type, unspecified whether angina  present, unspecified whether native or transplanted heart    6. Anxiety and depression    7. Gout, unspecified cause, unspecified chronicity, unspecified site        Plan:   Vivek was seen today for follow-up.    Diagnoses and all orders for this visit:    Hematoma of lower leg  -     Comprehensive Metabolic Panel; Future  -     CBC Auto Differential; Future  -     oxyCODONE-acetaminophen (PERCOCET) 5-325 mg per tablet; Take 1 tablet by mouth every 6 (six) hours as needed for Pain.    See HPI. Severe related pain, will refill Percocet,  checked, discussed the need to take stool softeners alongside oxycodone and not to take additional APAP. Needs to take the minimal amount needed. Cannot take NSAIDs. Advised to rest and elevate this LE, to apply ice, and he is aware that it may take a long time for this hematoma to go away. Has to use his walker. Of note, his R knee is rather weak as per his report and now he suffers from the L side, so risk of fall is increased. Foot is warm, and I can palpate the peripheral arteries well.    Hypotension, unspecified hypotension type    Double checked by me. Concerning as was normal in the past. Not symptomatic.  I will do blood work to rule out severe anemia, as hematoma is extensive.  Will need close monitoring.  He is on BP meds that I may have to adjust dosage of.     Hypertension, unspecified type    See above.    History of atrial fibrillation    On Coumadin, INR still at 3.8, is following recommendations of the [facility].    Coronary artery disease, unspecified vessel or lesion type, unspecified whether angina present, unspecified whether native or transplanted heart  -     aspirin (ECOTRIN) 81 MG EC tablet; Take 1 tablet (81 mg total) by mouth once daily.    No symptoms. I am concerned re: possible anemia and hypotension due to this.     Anxiety and depression  -     EScitalopram oxalate (LEXAPRO) 20 MG tablet; Take 1 tablet (20 mg total) by mouth once  daily.    Refill provided.    Gout, unspecified cause, unspecified chronicity, unspecified site  -     febuxostat (ULORIC) 40 mg Tab; Take 1 tablet (40 mg total) by mouth once daily.    Refill provided.    No follow-ups on file.    This note was created by combination of typed  and M-Modal dictation.  Transcription errors may be present.  If there are any questions, please contact me.    Blood work showing Hb down to 9.4, so no indication for transfusions, however creat is up from 3.2 to 4 with prerenal component. Due to very low BP, though asymptomatic, called the pt and had him take one pill of torsemide instead of 4 and 1/2 Diovan only this morning. Will see him this afternoon to monitor global situation and BP before week-end.

## 2022-03-18 ENCOUNTER — OFFICE VISIT (OUTPATIENT)
Dept: FAMILY MEDICINE | Facility: CLINIC | Age: 72
End: 2022-03-18
Payer: MEDICARE

## 2022-03-18 VITALS
RESPIRATION RATE: 16 BRPM | HEART RATE: 78 BPM | BODY MASS INDEX: 32.78 KG/M2 | OXYGEN SATURATION: 98 % | TEMPERATURE: 99 F | DIASTOLIC BLOOD PRESSURE: 55 MMHG | WEIGHT: 234.13 LBS | HEIGHT: 71 IN | SYSTOLIC BLOOD PRESSURE: 95 MMHG

## 2022-03-18 DIAGNOSIS — I95.9 HYPOTENSION, UNSPECIFIED HYPOTENSION TYPE: Primary | ICD-10-CM

## 2022-03-18 DIAGNOSIS — I25.10 CORONARY ARTERY DISEASE, UNSPECIFIED VESSEL OR LESION TYPE, UNSPECIFIED WHETHER ANGINA PRESENT, UNSPECIFIED WHETHER NATIVE OR TRANSPLANTED HEART: ICD-10-CM

## 2022-03-18 DIAGNOSIS — S80.10XA HEMATOMA OF LOWER LEG: ICD-10-CM

## 2022-03-18 DIAGNOSIS — Z95.0 PACEMAKER: ICD-10-CM

## 2022-03-18 DIAGNOSIS — N18.4 CKD (CHRONIC KIDNEY DISEASE), STAGE IV: ICD-10-CM

## 2022-03-18 DIAGNOSIS — Z86.79 HISTORY OF ATRIAL FIBRILLATION: ICD-10-CM

## 2022-03-18 DIAGNOSIS — D64.9 ANEMIA, UNSPECIFIED TYPE: ICD-10-CM

## 2022-03-18 PROCEDURE — 1159F MED LIST DOCD IN RCRD: CPT | Mod: CPTII,S$GLB,, | Performed by: INTERNAL MEDICINE

## 2022-03-18 PROCEDURE — 99214 PR OFFICE/OUTPT VISIT, EST, LEVL IV, 30-39 MIN: ICD-10-PCS | Mod: S$GLB,,, | Performed by: INTERNAL MEDICINE

## 2022-03-18 PROCEDURE — 99214 OFFICE O/P EST MOD 30 MIN: CPT | Mod: S$GLB,,, | Performed by: INTERNAL MEDICINE

## 2022-03-18 PROCEDURE — 3060F POS MICROALBUMINURIA REV: CPT | Mod: CPTII,S$GLB,, | Performed by: INTERNAL MEDICINE

## 2022-03-18 PROCEDURE — 3008F BODY MASS INDEX DOCD: CPT | Mod: CPTII,S$GLB,, | Performed by: INTERNAL MEDICINE

## 2022-03-18 PROCEDURE — 1160F PR REVIEW ALL MEDS BY PRESCRIBER/CLIN PHARMACIST DOCUMENTED: ICD-10-PCS | Mod: CPTII,S$GLB,, | Performed by: INTERNAL MEDICINE

## 2022-03-18 PROCEDURE — 99999 PR PBB SHADOW E&M-EST. PATIENT-LVL III: ICD-10-PCS | Mod: PBBFAC,,, | Performed by: INTERNAL MEDICINE

## 2022-03-18 PROCEDURE — 1159F PR MEDICATION LIST DOCUMENTED IN MEDICAL RECORD: ICD-10-PCS | Mod: CPTII,S$GLB,, | Performed by: INTERNAL MEDICINE

## 2022-03-18 PROCEDURE — 3078F DIAST BP <80 MM HG: CPT | Mod: CPTII,S$GLB,, | Performed by: INTERNAL MEDICINE

## 2022-03-18 PROCEDURE — 3078F PR MOST RECENT DIASTOLIC BLOOD PRESSURE < 80 MM HG: ICD-10-PCS | Mod: CPTII,S$GLB,, | Performed by: INTERNAL MEDICINE

## 2022-03-18 PROCEDURE — 4010F PR ACE/ARB THEARPY RXD/TAKEN: ICD-10-PCS | Mod: CPTII,S$GLB,, | Performed by: INTERNAL MEDICINE

## 2022-03-18 PROCEDURE — 3066F PR DOCUMENTATION OF TREATMENT FOR NEPHROPATHY: ICD-10-PCS | Mod: CPTII,S$GLB,, | Performed by: INTERNAL MEDICINE

## 2022-03-18 PROCEDURE — 3074F PR MOST RECENT SYSTOLIC BLOOD PRESSURE < 130 MM HG: ICD-10-PCS | Mod: CPTII,S$GLB,, | Performed by: INTERNAL MEDICINE

## 2022-03-18 PROCEDURE — 3066F NEPHROPATHY DOC TX: CPT | Mod: CPTII,S$GLB,, | Performed by: INTERNAL MEDICINE

## 2022-03-18 PROCEDURE — 3060F PR POS MICROALBUMINURIA RESULT DOCUMENTED/REVIEW: ICD-10-PCS | Mod: CPTII,S$GLB,, | Performed by: INTERNAL MEDICINE

## 2022-03-18 PROCEDURE — 3008F PR BODY MASS INDEX (BMI) DOCUMENTED: ICD-10-PCS | Mod: CPTII,S$GLB,, | Performed by: INTERNAL MEDICINE

## 2022-03-18 PROCEDURE — 3044F PR MOST RECENT HEMOGLOBIN A1C LEVEL <7.0%: ICD-10-PCS | Mod: CPTII,S$GLB,, | Performed by: INTERNAL MEDICINE

## 2022-03-18 PROCEDURE — 3074F SYST BP LT 130 MM HG: CPT | Mod: CPTII,S$GLB,, | Performed by: INTERNAL MEDICINE

## 2022-03-18 PROCEDURE — 1160F RVW MEDS BY RX/DR IN RCRD: CPT | Mod: CPTII,S$GLB,, | Performed by: INTERNAL MEDICINE

## 2022-03-18 PROCEDURE — 3044F HG A1C LEVEL LT 7.0%: CPT | Mod: CPTII,S$GLB,, | Performed by: INTERNAL MEDICINE

## 2022-03-18 PROCEDURE — 4010F ACE/ARB THERAPY RXD/TAKEN: CPT | Mod: CPTII,S$GLB,, | Performed by: INTERNAL MEDICINE

## 2022-03-18 PROCEDURE — 99999 PR PBB SHADOW E&M-EST. PATIENT-LVL III: CPT | Mod: PBBFAC,,, | Performed by: INTERNAL MEDICINE

## 2022-03-18 NOTE — PROGRESS NOTES
Subjective:       Patient ID: Vivek Lema is a pleasant 72 y.o. White male patient    Chief Complaint: BP check       Patient is a pt I saw yesterday, see my last notes and the list of problems below.    HPI     See my notes from yesterday.  I did blood work to assess for possible severe anemia in the context of a large hematoma in a patient with INR at 5.0 last week.  Hb came back at 9.4 while at 11 before.  Creatinine was up to 4.0 with prerenal component.  I called the patient this morning myself and asked him to take only 1 torsemide instead of 3 alongside half the dose of Diovan due to very low blood pressure yesterday, and I wanted to see him back as it is Friday to monitor his condition and to come with a plan for the weekend.  I saw him today with his wife and his daughter who is in nursing school,  going to graduate this year.  He reports no new element from yesterday, still has severe pain in regard of his LLE  where he has the large bruising.  Has been compliant with my recommendations.  His blood pressure is up to 95/55.     Patient Active Problem List   Diagnosis    Hypertension    CKD (chronic kidney disease), stage IV    A-V fistula    Coronary artery disease    History of atrial fibrillation    Pacemaker    Mitral valve insufficiency    Class 1 obesity due to excess calories with serious comorbidity and body mass index (BMI) of 32.0 to 32.9 in adult    Gout    Long term (current) use of anticoagulants          ACTIVE MEDICAL ISSUES:  Documented in Problem List     PAST MEDICAL HISTORY  Documented     PAST SURGICAL HISTORY:  Documented     SOCIAL HISTORY:  Documented     FAMILY HISTORY:  Documented     ALLERGIES AND MEDICATIONS: updated and reviewed.  Documented    Review of Systems   Constitutional: Negative.    HENT: Negative.    Respiratory: Negative.    Cardiovascular: Negative.    Gastrointestinal: Negative.    Musculoskeletal:        Pain L thigh where is a large hematoma  "  Neurological: Negative.    All other systems reviewed and are negative.      Objective:      Physical Exam  Vitals and nursing note reviewed.   Constitutional:       Appearance: Normal appearance.   HENT:      Right Ear: Tympanic membrane normal.      Left Ear: Tympanic membrane normal.   Cardiovascular:      Rate and Rhythm: Normal rate and regular rhythm.      Pulses: Normal pulses.      Heart sounds: Normal heart sounds.   Pulmonary:      Effort: Pulmonary effort is normal.      Breath sounds: Normal breath sounds.   Musculoskeletal:        Legs:    Neurological:      Mental Status: He is alert.         Vitals:    03/18/22 1314   BP: (!) 95/55   BP Location: Right arm   Patient Position: Sitting   BP Method: Medium (Manual)   Pulse: 78   Resp: 16   Temp: 98.7 °F (37.1 °C)   TempSrc: Oral   SpO2: 98%   Weight: 106.2 kg (234 lb 2.1 oz)   Height: 5' 11" (1.803 m)     Body mass index is 32.65 kg/m².    RESULTS: Reviewed labs from last 12 months    Last Lab Results:     Lab Results   Component Value Date    WBC 10.08 03/17/2022    HGB 9.4 (L) 03/17/2022    HCT 30.0 (L) 03/17/2022     03/17/2022     03/17/2022    K 4.5 03/17/2022     03/17/2022    CO2 23 03/17/2022    BUN 76 (H) 03/17/2022    CREATININE 4.0 (H) 03/17/2022    CALCIUM 9.6 03/17/2022    ALBUMIN 3.2 (L) 03/17/2022    AST 29 03/17/2022    ALT 17 03/17/2022    CHOL 154 02/02/2022    TRIG 171 (H) 02/02/2022    HDL 33 (L) 02/02/2022    LDLCALC 86.8 02/02/2022    HGBA1C 5.1 02/02/2022    TSH 1.782 02/02/2022    PSA <0.01 02/02/2022       Assessment:       1. Hypotension, unspecified hypotension type    2. Hematoma of lower leg    3. Anemia, unspecified type    4. CKD (chronic kidney disease), stage IV    5. Coronary artery disease, unspecified vessel or lesion type, unspecified whether angina present, unspecified whether native or transplanted heart    6. History of atrial fibrillation    7. Pacemaker        Plan:   Vivek was seen today " for bp check .    Diagnoses and all orders for this visit:    Hypotension, unspecified hypotension type    See HPI. Better today, will go on taking only one torsemide a day alongside 1/2 Diovan for the week-end, and he will monitor BP and symptoms. All infos provided in written form, all explanations given to daughter who is in nursing school.    Hematoma of lower leg    No change. Painful, take Percocet that I told him to use sparingly, not too concerned re: CKD as hepatic elimination. INR monitored.    Anemia, unspecified type    See HPI. No signs of active bleeding. INR monitored by [facility]. Anemia not severe enough to justify transfusions. Will be repeated on Monday for Nephrologist.    CKD (chronic kidney disease), stage IV    Worsened, probably due to bleeding. See hypotension. Blood work repeated on Monday for Nephrologist.     Coronary artery disease, unspecified vessel or lesion type, unspecified whether angina present, unspecified whether native or transplanted heart    No symptoms.    History of atrial fibrillation    Pacemaker    Low threshold to seek for medical attention this week-end. Informed both Cardiologist and Nephrologist.    No follow-ups on file.    This note was created by combination of typed  and M-Modal dictation.  Transcription errors may be present.  If there are any questions, please contact me.

## 2022-03-21 ENCOUNTER — ANTI-COAG VISIT (OUTPATIENT)
Dept: CARDIOLOGY | Facility: CLINIC | Age: 72
End: 2022-03-21
Payer: MEDICARE

## 2022-03-21 ENCOUNTER — LAB VISIT (OUTPATIENT)
Dept: LAB | Facility: HOSPITAL | Age: 72
End: 2022-03-21
Attending: INTERNAL MEDICINE
Payer: MEDICARE

## 2022-03-21 DIAGNOSIS — Z79.01 LONG TERM (CURRENT) USE OF ANTICOAGULANTS: ICD-10-CM

## 2022-03-21 DIAGNOSIS — N18.4 CKD (CHRONIC KIDNEY DISEASE), STAGE IV: ICD-10-CM

## 2022-03-21 DIAGNOSIS — Z86.79 HISTORY OF ATRIAL FIBRILLATION: ICD-10-CM

## 2022-03-21 DIAGNOSIS — Z86.79 HISTORY OF ATRIAL FIBRILLATION: Primary | ICD-10-CM

## 2022-03-21 LAB
ALBUMIN SERPL BCP-MCNC: 3.3 G/DL (ref 3.5–5.2)
ALP SERPL-CCNC: 61 U/L (ref 55–135)
ALT SERPL W/O P-5'-P-CCNC: 21 U/L (ref 10–44)
ANION GAP SERPL CALC-SCNC: 9 MMOL/L (ref 8–16)
AST SERPL-CCNC: 28 U/L (ref 10–40)
BASOPHILS # BLD AUTO: 0.05 K/UL (ref 0–0.2)
BASOPHILS NFR BLD: 0.7 % (ref 0–1.9)
BILIRUB SERPL-MCNC: 1.8 MG/DL (ref 0.1–1)
BUN SERPL-MCNC: 78 MG/DL (ref 8–23)
CALCIUM SERPL-MCNC: 9.6 MG/DL (ref 8.7–10.5)
CHLORIDE SERPL-SCNC: 108 MMOL/L (ref 95–110)
CO2 SERPL-SCNC: 23 MMOL/L (ref 23–29)
CREAT SERPL-MCNC: 3.3 MG/DL (ref 0.5–1.4)
DIFFERENTIAL METHOD: ABNORMAL
EOSINOPHIL # BLD AUTO: 0.5 K/UL (ref 0–0.5)
EOSINOPHIL NFR BLD: 6.1 % (ref 0–8)
ERYTHROCYTE [DISTWIDTH] IN BLOOD BY AUTOMATED COUNT: 13.4 % (ref 11.5–14.5)
EST. GFR  (AFRICAN AMERICAN): 20.1 ML/MIN/1.73 M^2
EST. GFR  (NON AFRICAN AMERICAN): 17.4 ML/MIN/1.73 M^2
GLUCOSE SERPL-MCNC: 101 MG/DL (ref 70–110)
HCT VFR BLD AUTO: 30.4 % (ref 40–54)
HGB BLD-MCNC: 9.5 G/DL (ref 14–18)
IMM GRANULOCYTES # BLD AUTO: 0.04 K/UL (ref 0–0.04)
IMM GRANULOCYTES NFR BLD AUTO: 0.5 % (ref 0–0.5)
INR PPP: 1.8 (ref 0.8–1.2)
LYMPHOCYTES # BLD AUTO: 1.5 K/UL (ref 1–4.8)
LYMPHOCYTES NFR BLD: 20.4 % (ref 18–48)
MCH RBC QN AUTO: 29.9 PG (ref 27–31)
MCHC RBC AUTO-ENTMCNC: 31.3 G/DL (ref 32–36)
MCV RBC AUTO: 96 FL (ref 82–98)
MONOCYTES # BLD AUTO: 1 K/UL (ref 0.3–1)
MONOCYTES NFR BLD: 13.2 % (ref 4–15)
NEUTROPHILS # BLD AUTO: 4.5 K/UL (ref 1.8–7.7)
NEUTROPHILS NFR BLD: 59.1 % (ref 38–73)
NRBC BLD-RTO: 0 /100 WBC
PLATELET # BLD AUTO: 265 K/UL (ref 150–450)
PMV BLD AUTO: 11.2 FL (ref 9.2–12.9)
POTASSIUM SERPL-SCNC: 4.7 MMOL/L (ref 3.5–5.1)
PROT SERPL-MCNC: 7 G/DL (ref 6–8.4)
PROTHROMBIN TIME: 18.2 SEC (ref 9–12.5)
RBC # BLD AUTO: 3.18 M/UL (ref 4.6–6.2)
SODIUM SERPL-SCNC: 140 MMOL/L (ref 136–145)
WBC # BLD AUTO: 7.55 K/UL (ref 3.9–12.7)

## 2022-03-21 PROCEDURE — 85025 COMPLETE CBC W/AUTO DIFF WBC: CPT | Performed by: INTERNAL MEDICINE

## 2022-03-21 PROCEDURE — 93793 PR ANTICOAGULANT MGMT FOR PT TAKING WARFARIN: ICD-10-PCS | Mod: S$GLB,,,

## 2022-03-21 PROCEDURE — 85610 PROTHROMBIN TIME: CPT | Performed by: INTERNAL MEDICINE

## 2022-03-21 PROCEDURE — 93793 ANTICOAG MGMT PT WARFARIN: CPT | Mod: S$GLB,,,

## 2022-03-21 PROCEDURE — 80053 COMPREHEN METABOLIC PANEL: CPT | Performed by: INTERNAL MEDICINE

## 2022-03-21 PROCEDURE — 36415 COLL VENOUS BLD VENIPUNCTURE: CPT | Performed by: INTERNAL MEDICINE

## 2022-03-21 NOTE — PROGRESS NOTES
INR slightly below goal. Medications, chart, and patient findings reviewed. Will not boost given supratherapeutic INRs last week - will continue new dose & recheck INR in 1 week. See calendar for adjustments to dose and follow up plan.    Update - patient reports taking lower dose than advised after holding last week. Will start new dose of 2mg daily & recheck INR Thursday.

## 2022-03-22 ENCOUNTER — PATIENT MESSAGE (OUTPATIENT)
Dept: ADMINISTRATIVE | Facility: HOSPITAL | Age: 72
End: 2022-03-22
Payer: MEDICARE

## 2022-03-23 ENCOUNTER — OFFICE VISIT (OUTPATIENT)
Dept: NEPHROLOGY | Facility: CLINIC | Age: 72
End: 2022-03-23
Payer: MEDICARE

## 2022-03-23 VITALS
HEART RATE: 60 BPM | HEIGHT: 71 IN | BODY MASS INDEX: 33 KG/M2 | WEIGHT: 235.69 LBS | DIASTOLIC BLOOD PRESSURE: 64 MMHG | SYSTOLIC BLOOD PRESSURE: 97 MMHG | OXYGEN SATURATION: 98 %

## 2022-03-23 DIAGNOSIS — N18.4 CKD (CHRONIC KIDNEY DISEASE) STAGE 4, GFR 15-29 ML/MIN: Primary | ICD-10-CM

## 2022-03-23 DIAGNOSIS — I10 HYPERTENSION, UNSPECIFIED TYPE: ICD-10-CM

## 2022-03-23 PROCEDURE — 1126F PR PAIN SEVERITY QUANTIFIED, NO PAIN PRESENT: ICD-10-PCS | Mod: CPTII,S$GLB,, | Performed by: INTERNAL MEDICINE

## 2022-03-23 PROCEDURE — 3078F DIAST BP <80 MM HG: CPT | Mod: CPTII,S$GLB,, | Performed by: INTERNAL MEDICINE

## 2022-03-23 PROCEDURE — 3074F SYST BP LT 130 MM HG: CPT | Mod: CPTII,S$GLB,, | Performed by: INTERNAL MEDICINE

## 2022-03-23 PROCEDURE — 3288F FALL RISK ASSESSMENT DOCD: CPT | Mod: CPTII,S$GLB,, | Performed by: INTERNAL MEDICINE

## 2022-03-23 PROCEDURE — 99215 OFFICE O/P EST HI 40 MIN: CPT | Mod: S$GLB,,, | Performed by: INTERNAL MEDICINE

## 2022-03-23 PROCEDURE — 4010F PR ACE/ARB THEARPY RXD/TAKEN: ICD-10-PCS | Mod: CPTII,S$GLB,, | Performed by: INTERNAL MEDICINE

## 2022-03-23 PROCEDURE — 3288F PR FALLS RISK ASSESSMENT DOCUMENTED: ICD-10-PCS | Mod: CPTII,S$GLB,, | Performed by: INTERNAL MEDICINE

## 2022-03-23 PROCEDURE — 99215 PR OFFICE/OUTPT VISIT, EST, LEVL V, 40-54 MIN: ICD-10-PCS | Mod: S$GLB,,, | Performed by: INTERNAL MEDICINE

## 2022-03-23 PROCEDURE — 1159F PR MEDICATION LIST DOCUMENTED IN MEDICAL RECORD: ICD-10-PCS | Mod: CPTII,S$GLB,, | Performed by: INTERNAL MEDICINE

## 2022-03-23 PROCEDURE — 3060F POS MICROALBUMINURIA REV: CPT | Mod: CPTII,S$GLB,, | Performed by: INTERNAL MEDICINE

## 2022-03-23 PROCEDURE — 3060F PR POS MICROALBUMINURIA RESULT DOCUMENTED/REVIEW: ICD-10-PCS | Mod: CPTII,S$GLB,, | Performed by: INTERNAL MEDICINE

## 2022-03-23 PROCEDURE — 99999 PR PBB SHADOW E&M-EST. PATIENT-LVL III: ICD-10-PCS | Mod: PBBFAC,,, | Performed by: INTERNAL MEDICINE

## 2022-03-23 PROCEDURE — 4010F ACE/ARB THERAPY RXD/TAKEN: CPT | Mod: CPTII,S$GLB,, | Performed by: INTERNAL MEDICINE

## 2022-03-23 PROCEDURE — 3008F BODY MASS INDEX DOCD: CPT | Mod: CPTII,S$GLB,, | Performed by: INTERNAL MEDICINE

## 2022-03-23 PROCEDURE — 99999 PR PBB SHADOW E&M-EST. PATIENT-LVL III: CPT | Mod: PBBFAC,,, | Performed by: INTERNAL MEDICINE

## 2022-03-23 PROCEDURE — 3044F HG A1C LEVEL LT 7.0%: CPT | Mod: CPTII,S$GLB,, | Performed by: INTERNAL MEDICINE

## 2022-03-23 PROCEDURE — 1126F AMNT PAIN NOTED NONE PRSNT: CPT | Mod: CPTII,S$GLB,, | Performed by: INTERNAL MEDICINE

## 2022-03-23 PROCEDURE — 3066F PR DOCUMENTATION OF TREATMENT FOR NEPHROPATHY: ICD-10-PCS | Mod: CPTII,S$GLB,, | Performed by: INTERNAL MEDICINE

## 2022-03-23 PROCEDURE — 3078F PR MOST RECENT DIASTOLIC BLOOD PRESSURE < 80 MM HG: ICD-10-PCS | Mod: CPTII,S$GLB,, | Performed by: INTERNAL MEDICINE

## 2022-03-23 PROCEDURE — 1101F PT FALLS ASSESS-DOCD LE1/YR: CPT | Mod: CPTII,S$GLB,, | Performed by: INTERNAL MEDICINE

## 2022-03-23 PROCEDURE — 1159F MED LIST DOCD IN RCRD: CPT | Mod: CPTII,S$GLB,, | Performed by: INTERNAL MEDICINE

## 2022-03-23 PROCEDURE — 3008F PR BODY MASS INDEX (BMI) DOCUMENTED: ICD-10-PCS | Mod: CPTII,S$GLB,, | Performed by: INTERNAL MEDICINE

## 2022-03-23 PROCEDURE — 3074F PR MOST RECENT SYSTOLIC BLOOD PRESSURE < 130 MM HG: ICD-10-PCS | Mod: CPTII,S$GLB,, | Performed by: INTERNAL MEDICINE

## 2022-03-23 PROCEDURE — 3066F NEPHROPATHY DOC TX: CPT | Mod: CPTII,S$GLB,, | Performed by: INTERNAL MEDICINE

## 2022-03-23 PROCEDURE — 3044F PR MOST RECENT HEMOGLOBIN A1C LEVEL <7.0%: ICD-10-PCS | Mod: CPTII,S$GLB,, | Performed by: INTERNAL MEDICINE

## 2022-03-23 PROCEDURE — 1101F PR PT FALLS ASSESS DOC 0-1 FALLS W/OUT INJ PAST YR: ICD-10-PCS | Mod: CPTII,S$GLB,, | Performed by: INTERNAL MEDICINE

## 2022-03-23 RX ORDER — VALSARTAN 80 MG/1
80 TABLET ORAL DAILY
Qty: 90 TABLET | Refills: 0 | Status: SHIPPED | OUTPATIENT
Start: 2022-03-23 | End: 2022-06-22

## 2022-03-23 RX ORDER — TORSEMIDE 20 MG/1
60 TABLET ORAL DAILY
Qty: 270 TABLET | Refills: 0 | Status: SHIPPED | OUTPATIENT
Start: 2022-03-23 | End: 2022-05-24

## 2022-03-23 NOTE — PROGRESS NOTES
"CHIEF COMPLAINT/HPI: Vivek is a 72 y.o. man with PMH of CAD CABG 3 year ago, repaired thoracic aortic aneurysm  ,In August 2021 underwent attempted MitraClip procedure.  Unsuccessful percutaneous repair.  Patient was deemed unsuitable for open repair secondary to comorbidities ,A.fib , mitral regurgitation , PPM secondary to SSS , Aortic bioprosthesis secondary to aortic aneurysm/aortic insufficiency . EF 45-50%  prostate cancer s/p surgery 2019 and clear for 2 years   Advanced CKD was following with Nephrologist in [place] , they relocate to [place] with as their daughter lives here , they have their own place . He has AVF in place .  He is here to establish care in [facility] , with his wife ,   They are very pleasant and seem very compliant .    On 3/13 he hit his Lt hip on a piece of furniture and sustained hematoma ,Hb dropped to ~9 from ~11 , BP was low in his PCP visit and his BP meds doses were dropped ,   I tried to contact him on Monday 3/21 ( after PCP secured chat )  to check on his BP and may be stop BP meds all together , but no answer .   Daughter with him today , feels fine , " not drinking as much since the incident of hematoma "  Home # is [phone]     Past Medical History:   Diagnosis Date    CAD (coronary artery disease)     Disorder of kidney and ureter        Vivek reports that he has never smoked. He has never used smokeless tobacco. He reports current alcohol use of about 6.0 standard drinks of alcohol per week. He reports that he does not use drugs.   Drinks beer daily ( ~2 cans )    FAMILY HX :  + family hx of HTN , Emphysema and CKD .    ROS:    Review of Systems   Constitutional: Negative for activity change, appetite change, chills, fever and unexpected weight change.   HENT: Negative for congestion, ear discharge, hearing loss, nosebleeds, sore throat and tinnitus.    Eyes: Negative for photophobia, pain, redness and visual disturbance.   Respiratory: Negative for cough, chest " "tightness, shortness of breath and wheezing.    Cardiovascular: Negative for chest pain, palpitations and leg swelling.   Gastrointestinal: Negative for abdominal distention, constipation, diarrhea, nausea and vomiting.   Endocrine: Negative for cold intolerance, heat intolerance, polydipsia and polyuria.   Genitourinary: Negative for decreased urine volume, difficulty urinating, dysuria, flank pain and hematuria.   Musculoskeletal: Negative for arthralgias, gait problem, joint swelling and neck stiffness.   Skin: Negative for rash.   Neurological: Negative for dizziness, tremors, weakness, numbness and headaches.   Psychiatric/Behavioral: Negative for confusion and sleep disturbance.       PE:    Vitals:    03/23/22 0913   BP: 97/64   Pulse: 60   SpO2: 98%   Weight: 106.9 kg (235 lb 10.8 oz)   Height: 5' 11" (1.803 m)       Physical Exam  Constitutional:       General: He is not in acute distress.     Appearance: He is not diaphoretic.   HENT:      Head: Normocephalic and atraumatic.      Right Ear: External ear normal.      Left Ear: External ear normal.   Eyes:      General:         Right eye: No discharge.         Left eye: No discharge.      Conjunctiva/sclera: Conjunctivae normal.      Pupils: Pupils are equal, round, and reactive to light.   Neck:      Vascular: No JVD.   Cardiovascular:      Rate and Rhythm: Normal rate and regular rhythm.      Heart sounds: No murmur heard.    No friction rub. No gallop.   Pulmonary:      Effort: Pulmonary effort is normal. No respiratory distress.      Breath sounds: Normal breath sounds. No stridor. No wheezing or rales.   Chest:      Chest wall: No tenderness.   Abdominal:      Palpations: Abdomen is soft.      Tenderness: There is no abdominal tenderness. There is no guarding or rebound.   Musculoskeletal:         General: No tenderness.      Cervical back: Normal range of motion and neck supple.      Comments: Lr forearm AVF with thrill and bruit    Skin:     Findings: " No erythema or rash.   Neurological:      Mental Status: He is alert and oriented to person, place, and time.           Impression/Plan:    1. Hypertension, unspecified type      # CKD V: likely due to DM , and HTN ,   Has AVF inplace ,  -No issues with volume, K and Acidosis so far per patient.  -request to obtain record from previous nephrology was sent .  -had YAHAIRA after the hematoma, creatinine trending down now , will keep monitoring ,     Lab Results   Component Value Date    CREATININE 3.3 (H) 03/21/2022     Protein Creatinine Ratios:    Prot/Creat Ratio, Urine   Date Value Ref Range Status   03/21/2022 0.12 0.00 - 0.20 Final   02/23/2022 Unable to calculate 0.00 - 0.20 Final       Acid-Base:   Lab Results   Component Value Date     03/21/2022    K 4.7 03/21/2022    CO2 23 03/21/2022     #HTN: Blood pressures acceptable ,  on Torsemide 60 mg in am and 20 mgat night  and Valsartan 160 mg   In the first visit he stated : He feels so thirsty and stated his creatinine was in the 2s range and after his diuretic increased to 80 mg daily , so will decrease to 60 daily and stop the 20 mg in pm , and patient counseled on taking the night dose if increased wt , LE edema ,   Daily wt .  3/23/22 : Due to low BP ( noticed after hematoma ) will lower Valsartan to 80 mg ( he will cut in half and monitor BP ) and Torsemide 40 mg and monitor UOP and symptoms / sign of overload and asked to take 3 if any or if not sure .  Will monitor closely     # Renal osteodystrophy:   Lab Results   Component Value Date    .6 (H) 02/23/2022    CALCIUM 9.6 03/21/2022    PHOS 4.2 03/13/2022       # Anemia:   Lab Results   Component Value Date    HGB 9.5 (L) 03/21/2022        # DM:  Last HbA1C   Lab Results   Component Value Date    HGBA1C 5.1 02/02/2022       # Lipid management:   Lab Results   Component Value Date    LDLCALC 86.8 02/02/2022       # ESRD planing: AVF in place     Follow up in 6w , but labs in 4 weeks

## 2022-03-24 ENCOUNTER — ANTI-COAG VISIT (OUTPATIENT)
Dept: CARDIOLOGY | Facility: CLINIC | Age: 72
End: 2022-03-24
Payer: MEDICARE

## 2022-03-24 ENCOUNTER — LAB VISIT (OUTPATIENT)
Dept: LAB | Facility: HOSPITAL | Age: 72
End: 2022-03-24
Attending: INTERNAL MEDICINE
Payer: MEDICARE

## 2022-03-24 DIAGNOSIS — Z79.01 LONG TERM (CURRENT) USE OF ANTICOAGULANTS: ICD-10-CM

## 2022-03-24 DIAGNOSIS — Z86.79 HISTORY OF ATRIAL FIBRILLATION: ICD-10-CM

## 2022-03-24 DIAGNOSIS — Z86.79 HISTORY OF ATRIAL FIBRILLATION: Primary | ICD-10-CM

## 2022-03-24 LAB
INR PPP: 1.5 (ref 0.8–1.2)
PROTHROMBIN TIME: 15.8 SEC (ref 9–12.5)

## 2022-03-24 PROCEDURE — 36415 COLL VENOUS BLD VENIPUNCTURE: CPT | Mod: PO | Performed by: INTERNAL MEDICINE

## 2022-03-24 PROCEDURE — 85610 PROTHROMBIN TIME: CPT | Performed by: INTERNAL MEDICINE

## 2022-03-24 PROCEDURE — 93793 PR ANTICOAGULANT MGMT FOR PT TAKING WARFARIN: ICD-10-PCS | Mod: S$GLB,,,

## 2022-03-24 PROCEDURE — 93793 ANTICOAG MGMT PT WARFARIN: CPT | Mod: S$GLB,,,

## 2022-03-28 ENCOUNTER — PATIENT OUTREACH (OUTPATIENT)
Dept: ADMINISTRATIVE | Facility: OTHER | Age: 72
End: 2022-03-28
Payer: MEDICARE

## 2022-03-29 ENCOUNTER — LAB VISIT (OUTPATIENT)
Dept: LAB | Facility: HOSPITAL | Age: 72
End: 2022-03-29
Attending: INTERNAL MEDICINE
Payer: MEDICARE

## 2022-03-29 ENCOUNTER — OFFICE VISIT (OUTPATIENT)
Dept: CARDIOLOGY | Facility: CLINIC | Age: 72
End: 2022-03-29
Payer: MEDICARE

## 2022-03-29 ENCOUNTER — ANTI-COAG VISIT (OUTPATIENT)
Dept: CARDIOLOGY | Facility: CLINIC | Age: 72
End: 2022-03-29
Payer: MEDICARE

## 2022-03-29 VITALS
OXYGEN SATURATION: 99 % | DIASTOLIC BLOOD PRESSURE: 62 MMHG | WEIGHT: 233.38 LBS | BODY MASS INDEX: 32.67 KG/M2 | SYSTOLIC BLOOD PRESSURE: 122 MMHG | HEIGHT: 71 IN | HEART RATE: 69 BPM | RESPIRATION RATE: 15 BRPM

## 2022-03-29 DIAGNOSIS — Z95.0 PACEMAKER: ICD-10-CM

## 2022-03-29 DIAGNOSIS — E78.49 OTHER HYPERLIPIDEMIA: ICD-10-CM

## 2022-03-29 DIAGNOSIS — Z95.2 S/P AVR (AORTIC VALVE REPLACEMENT) AND AORTOPLASTY: ICD-10-CM

## 2022-03-29 DIAGNOSIS — Z86.79 HISTORY OF ATRIAL FIBRILLATION: Primary | ICD-10-CM

## 2022-03-29 DIAGNOSIS — Z79.01 LONG TERM (CURRENT) USE OF ANTICOAGULANTS: ICD-10-CM

## 2022-03-29 DIAGNOSIS — R06.02 SHORTNESS OF BREATH: ICD-10-CM

## 2022-03-29 DIAGNOSIS — I10 PRIMARY HYPERTENSION: ICD-10-CM

## 2022-03-29 DIAGNOSIS — D64.9 ANEMIA, UNSPECIFIED TYPE: ICD-10-CM

## 2022-03-29 DIAGNOSIS — E66.09 CLASS 1 OBESITY DUE TO EXCESS CALORIES WITH SERIOUS COMORBIDITY AND BODY MASS INDEX (BMI) OF 32.0 TO 32.9 IN ADULT: ICD-10-CM

## 2022-03-29 DIAGNOSIS — Z86.79 HISTORY OF ATRIAL FIBRILLATION: ICD-10-CM

## 2022-03-29 DIAGNOSIS — I34.0 MITRAL VALVE INSUFFICIENCY, UNSPECIFIED ETIOLOGY: Primary | ICD-10-CM

## 2022-03-29 DIAGNOSIS — N18.4 CKD (CHRONIC KIDNEY DISEASE), STAGE IV: ICD-10-CM

## 2022-03-29 DIAGNOSIS — I48.0 PAF (PAROXYSMAL ATRIAL FIBRILLATION): ICD-10-CM

## 2022-03-29 LAB
INR PPP: 1.5 (ref 0.8–1.2)
PROTHROMBIN TIME: 15.7 SEC (ref 9–12.5)

## 2022-03-29 PROCEDURE — 93793 ANTICOAG MGMT PT WARFARIN: CPT | Mod: S$GLB,,,

## 2022-03-29 PROCEDURE — 1160F PR REVIEW ALL MEDS BY PRESCRIBER/CLIN PHARMACIST DOCUMENTED: ICD-10-PCS | Mod: CPTII,S$GLB,, | Performed by: INTERNAL MEDICINE

## 2022-03-29 PROCEDURE — 3078F PR MOST RECENT DIASTOLIC BLOOD PRESSURE < 80 MM HG: ICD-10-PCS | Mod: CPTII,S$GLB,, | Performed by: INTERNAL MEDICINE

## 2022-03-29 PROCEDURE — 3044F HG A1C LEVEL LT 7.0%: CPT | Mod: CPTII,S$GLB,, | Performed by: INTERNAL MEDICINE

## 2022-03-29 PROCEDURE — 4010F ACE/ARB THERAPY RXD/TAKEN: CPT | Mod: CPTII,S$GLB,, | Performed by: INTERNAL MEDICINE

## 2022-03-29 PROCEDURE — 85610 PROTHROMBIN TIME: CPT | Performed by: INTERNAL MEDICINE

## 2022-03-29 PROCEDURE — 3060F POS MICROALBUMINURIA REV: CPT | Mod: CPTII,S$GLB,, | Performed by: INTERNAL MEDICINE

## 2022-03-29 PROCEDURE — 3008F BODY MASS INDEX DOCD: CPT | Mod: CPTII,S$GLB,, | Performed by: INTERNAL MEDICINE

## 2022-03-29 PROCEDURE — 99214 PR OFFICE/OUTPT VISIT, EST, LEVL IV, 30-39 MIN: ICD-10-PCS | Mod: S$GLB,,, | Performed by: INTERNAL MEDICINE

## 2022-03-29 PROCEDURE — 1101F PR PT FALLS ASSESS DOC 0-1 FALLS W/OUT INJ PAST YR: ICD-10-PCS | Mod: CPTII,S$GLB,, | Performed by: INTERNAL MEDICINE

## 2022-03-29 PROCEDURE — 1160F RVW MEDS BY RX/DR IN RCRD: CPT | Mod: CPTII,S$GLB,, | Performed by: INTERNAL MEDICINE

## 2022-03-29 PROCEDURE — 93793 PR ANTICOAGULANT MGMT FOR PT TAKING WARFARIN: ICD-10-PCS | Mod: S$GLB,,,

## 2022-03-29 PROCEDURE — 3288F FALL RISK ASSESSMENT DOCD: CPT | Mod: CPTII,S$GLB,, | Performed by: INTERNAL MEDICINE

## 2022-03-29 PROCEDURE — 1126F PR PAIN SEVERITY QUANTIFIED, NO PAIN PRESENT: ICD-10-PCS | Mod: CPTII,S$GLB,, | Performed by: INTERNAL MEDICINE

## 2022-03-29 PROCEDURE — 3060F PR POS MICROALBUMINURIA RESULT DOCUMENTED/REVIEW: ICD-10-PCS | Mod: CPTII,S$GLB,, | Performed by: INTERNAL MEDICINE

## 2022-03-29 PROCEDURE — 4010F PR ACE/ARB THEARPY RXD/TAKEN: ICD-10-PCS | Mod: CPTII,S$GLB,, | Performed by: INTERNAL MEDICINE

## 2022-03-29 PROCEDURE — 3288F PR FALLS RISK ASSESSMENT DOCUMENTED: ICD-10-PCS | Mod: CPTII,S$GLB,, | Performed by: INTERNAL MEDICINE

## 2022-03-29 PROCEDURE — 3066F NEPHROPATHY DOC TX: CPT | Mod: CPTII,S$GLB,, | Performed by: INTERNAL MEDICINE

## 2022-03-29 PROCEDURE — 36415 COLL VENOUS BLD VENIPUNCTURE: CPT | Mod: PO | Performed by: INTERNAL MEDICINE

## 2022-03-29 PROCEDURE — 1159F MED LIST DOCD IN RCRD: CPT | Mod: CPTII,S$GLB,, | Performed by: INTERNAL MEDICINE

## 2022-03-29 PROCEDURE — 3074F SYST BP LT 130 MM HG: CPT | Mod: CPTII,S$GLB,, | Performed by: INTERNAL MEDICINE

## 2022-03-29 PROCEDURE — 99999 PR PBB SHADOW E&M-EST. PATIENT-LVL IV: CPT | Mod: PBBFAC,,, | Performed by: INTERNAL MEDICINE

## 2022-03-29 PROCEDURE — 1159F PR MEDICATION LIST DOCUMENTED IN MEDICAL RECORD: ICD-10-PCS | Mod: CPTII,S$GLB,, | Performed by: INTERNAL MEDICINE

## 2022-03-29 PROCEDURE — 3074F PR MOST RECENT SYSTOLIC BLOOD PRESSURE < 130 MM HG: ICD-10-PCS | Mod: CPTII,S$GLB,, | Performed by: INTERNAL MEDICINE

## 2022-03-29 PROCEDURE — 3008F PR BODY MASS INDEX (BMI) DOCUMENTED: ICD-10-PCS | Mod: CPTII,S$GLB,, | Performed by: INTERNAL MEDICINE

## 2022-03-29 PROCEDURE — 3044F PR MOST RECENT HEMOGLOBIN A1C LEVEL <7.0%: ICD-10-PCS | Mod: CPTII,S$GLB,, | Performed by: INTERNAL MEDICINE

## 2022-03-29 PROCEDURE — 99999 PR PBB SHADOW E&M-EST. PATIENT-LVL IV: ICD-10-PCS | Mod: PBBFAC,,, | Performed by: INTERNAL MEDICINE

## 2022-03-29 PROCEDURE — 1101F PT FALLS ASSESS-DOCD LE1/YR: CPT | Mod: CPTII,S$GLB,, | Performed by: INTERNAL MEDICINE

## 2022-03-29 PROCEDURE — 3066F PR DOCUMENTATION OF TREATMENT FOR NEPHROPATHY: ICD-10-PCS | Mod: CPTII,S$GLB,, | Performed by: INTERNAL MEDICINE

## 2022-03-29 PROCEDURE — 99214 OFFICE O/P EST MOD 30 MIN: CPT | Mod: S$GLB,,, | Performed by: INTERNAL MEDICINE

## 2022-03-29 PROCEDURE — 3078F DIAST BP <80 MM HG: CPT | Mod: CPTII,S$GLB,, | Performed by: INTERNAL MEDICINE

## 2022-03-29 PROCEDURE — 1126F AMNT PAIN NOTED NONE PRSNT: CPT | Mod: CPTII,S$GLB,, | Performed by: INTERNAL MEDICINE

## 2022-03-29 NOTE — PROGRESS NOTES
CARDIOLOGY CLINIC VISIT        HISTORY OF PRESENT ILLNESS:     Vivek Lema presents for continued care.  Seen 02/08/2022 for establishment of care.History of aortic bioprosthesis secondary to aortic insufficiency with an aortic aneurysm.  Left atrial appendage ligation.  Sick sinus syndrome status post dual chamber pacemaker placement.  In August 2021 underwent attempted MitraClip procedure.  Unsuccessful percutaneous repair.  Patient was deemed unsuitable for open repair secondary to comorbidities.  Prior sternotomy.  Patient states that his shortness of breath is worsening.  Symptoms less than 1 block.  No PND orthopnea.    Echocardiogram for 03/02/2022 shows normal left ventricular systolic function.  Severe left atrial enlargement.  No pulmonary hypertension.  No mention of mitral regurgitation.  Patient still with complaints of shortness of breath on exertion.  Last visit wanted him to see someone at [facility] for evaluation.    CARDIOVASCULAR HISTORY:     Aortic bioprosthesis secondary to aortic aneurysm/aortic insufficiency  Atrial fibrillation  Mitral regurgitation  Cardiomyopathy  Permanent pacemaker secondary to sick sinus syndrome    PAST MEDICAL HISTORY:     Past Medical History:   Diagnosis Date    CAD (coronary artery disease)     Disorder of kidney and ureter        PAST SURGICAL HISTORY:     Past Surgical History:   Procedure Laterality Date    CORONARY ARTERY BYPASS GRAFT      with valve repair (aortic?)       ALLERGIES AND MEDICATION:   Review of patient's allergies indicates:  No Known Allergies     Medication List          Accurate as of March 29, 2022  2:02 PM. If you have any questions, ask your nurse or doctor.            CHANGE how you take these medications    warfarin 2 MG tablet  Commonly known as: COUMADIN  As per INR  What changed:   · how much to take  · how to take this  · when to take this  · additional instructions        CONTINUE taking these medications    aspirin 81 MG EC  tablet  Commonly known as: ECOTRIN  Take 1 tablet (81 mg total) by mouth once daily.     atorvastatin 40 MG tablet  Commonly known as: LIPITOR     calcitRIOL 0.25 MCG Cap  Commonly known as: ROCALTROL     cholecalciferol (vitamin D3) 50 mcg (2,000 unit) Cap  Commonly known as: VITAMIN D3     EScitalopram oxalate 20 MG tablet  Commonly known as: LEXAPRO  Take 1 tablet (20 mg total) by mouth once daily.     febuxostat 40 mg Tab  Commonly known as: ULORIC  Take 1 tablet (40 mg total) by mouth once daily.     nitroGLYCERIN 0.4 MG SL tablet  Commonly known as: NITROSTAT     oxyCODONE-acetaminophen 5-325 mg per tablet  Commonly known as: PERCOCET  Take 1 tablet by mouth every 6 (six) hours as needed for Pain.     torsemide 20 MG Tab  Commonly known as: DEMADEX  Take 3 tablets (60 mg total) by mouth once daily. Take 3 tab in am     valsartan 80 MG tablet  Commonly known as: DIOVAN  Take 1 tablet (80 mg total) by mouth once daily.            SOCIAL HISTORY:     Social History     Socioeconomic History    Marital status:    Tobacco Use    Smoking status: Never Smoker    Smokeless tobacco: Never Used   Substance and Sexual Activity    Alcohol use: Yes     Alcohol/week: 6.0 standard drinks     Types: 6 Cans of beer per week     Comment: daily beer drinker    Drug use: Never       FAMILY HISTORY:   History reviewed. No pertinent family history.    REVIEW OF SYSTEMS:   Review of Systems   Respiratory: Positive for shortness of breath. Negative for sputum production and wheezing.    Cardiovascular: Negative for chest pain, palpitations, orthopnea, claudication, leg swelling and PND.   Gastrointestinal: Negative for abdominal pain, heartburn, nausea and vomiting.   Neurological: Negative for dizziness, speech change, focal weakness, loss of consciousness, weakness and headaches.       PHYSICAL EXAM:     Vitals:    03/29/22 1336   BP: 122/62   Pulse: 69   Resp: 15    Body mass index is 32.55 kg/m².  Weight: 105.8 kg  "(233 lb 5.7 oz)   Height: 5' 11" (180.3 cm)     Physical Exam  Vitals reviewed.   Constitutional:       General: He is not in acute distress.     Appearance: He is well-developed and overweight. He is not diaphoretic.   Neck:      Vascular: No carotid bruit or JVD.   Cardiovascular:      Rate and Rhythm: Normal rate and regular rhythm.      Pulses: Normal pulses.      Heart sounds: Murmur heard.    Systolic murmur is present with a grade of 2/6.  Pulmonary:      Effort: Pulmonary effort is normal.      Breath sounds: Normal breath sounds.   Abdominal:      General: Bowel sounds are normal.      Palpations: Abdomen is soft.      Tenderness: There is no abdominal tenderness.   Musculoskeletal:      Right lower leg: No edema.      Left lower leg: No edema.   Neurological:      Mental Status: He is alert and oriented to person, place, and time.   Psychiatric:         Speech: Speech normal.         Behavior: Behavior normal.         DATA:   EKG: (personally reviewed tracing)    Laboratory:  CBC:  Recent Labs   Lab 03/13/22  0755 03/17/22  1419 03/21/22  1110   WBC 10.64 10.08 7.55   Hemoglobin 11.4 L 9.4 L 9.5 L   Hematocrit 34.9 L 30.0 L 30.4 L   Platelets 169 181 265       CHEMISTRIES:  Recent Labs   Lab 02/23/22  1213 03/13/22  0755 03/17/22  1419 03/21/22  1110   Glucose 95 94 106 101   Sodium 144 137 136 140   Potassium 3.9 3.6 4.5 4.7   BUN 53 H 49 H 76 H 78 H   Creatinine 3.2 H 3.1 H 4.0 H 3.3 H   eGFR if  20.9 A 22 A 16.0 A 20.1 A   eGFR if non  18.1 A 19 A 13.8 A 17.4 A   Calcium 9.4 9.2 9.6 9.6   Magnesium 2.2 2.3  --   --        CARDIAC BIOMARKERS:  Recent Labs   Lab 03/13/22  0755    H       COAGS:  Recent Labs   Lab 03/21/22  1110 03/24/22  1103 03/29/22  1030   INR 1.8 H 1.5 H 1.5 H       LIPIDS/LFTS:  Recent Labs   Lab 02/02/22  0945 02/23/22  1213 03/13/22  0755 03/17/22  1419 03/21/22  1110   Cholesterol 154  --   --   --   --    Triglycerides 171 H  --   --   --   " --    HDL 33 L  --   --   --   --    LDL Cholesterol 86.8  --   --   --   --    Non-HDL Cholesterol 121  --   --   --   --    AST 15   < > 21 29 28   ALT 11   < > 13 17 21    < > = values in this interval not displayed.       Cardiovascular Testing:    Echocardiogram 03/02/2022:    · The left ventricle is normal in size with mild concentric hypertrophy and normal systolic function.  · The estimated ejection fraction is 55%.  · Normal left ventricular diastolic function.  · Severe left atrial enlargement.  · Mild right atrial enlargement.  · Normal right ventricular size with normal right ventricular systolic function.  · Normal central venous pressure (3 mmHg).  · The estimated PA systolic pressure is 9 mmHg.    Echocardiogram 08/18/2021:  Ejection fraction of 60-65%.  Normal right ventricular size and function.  Severe left atrial enlargement.  Status post Bentall procedure with a 27 mm magna ease bioprosthesis in the aortic position.  Mild to moderate mitral regurgitation.     Right/left heart catheterization 07/16/2021:     Nonobstructive coronary artery disease with the exception of a ramus intermedius that was of small caliber.  Mild pulmonary hypertension.  Elevated V-wave in setting of severe mitral regurgitation.  V-wave of 43 mm Hg.  Left anterior descending artery with a mid to distal 30-40% stenosis.  Ramus intermedius with a mid 70 80% stenosis.  Small caliber vessel.  Left circumflex artery:  Non dominant.  Ectatic and aneurysmal.  Mid 40% stenosis in the AV groove followed by a 40-50% stenosis prior to the bifurcation of OM 2.   Right coronary artery is ectatic and aneurysmal with a mid 20-30% stenosis.  Superior branch of the PDA has a 50% stenosis proximally in the inferior branch has luminal irregularities.  PLV branch with a proximal 30-40% stenosis followed by mid 40-50% stenosis.     Echocardiogram 05/24/2021:  Ejection fraction 45-50%.  Severe left atrial enlargement.  Bioprosthetic valve in  aortic position (27 mm magna ease bioprosthesis).  Moderate to severe mitral regurgitation     Echocardiogram 03/31/2020:  Ejection fraction 54%.  Aortic bioprosthesis.  No perivalvular regurgitation.    ASSESSMENT:     1. Aortic valve prosthesis: Secondary to ascending aortic aneurysm/aortic insufficiency.  27 mm magna ease bioprosthesis.  2. Mitral regurgitation:  Status post unsuccessful MitraClip  3. Permanent pacemaker/sick sinus syndrome  4. Paroxysmal atrial fibrillation  5. Chronic anticoagulation  6. Nonobstructive coronary artery disease  7. Hypertension  8. Hyperlipidemia:  LDL 86  9. Chronic kidney disease  10. Obesity  11. Shortness of breath:  Multifactorial.  Anemia.  Obesity.  Hypertension.  Prior report of significant mitral regurgitation.  Reported trivial on most recent echocardiogram.  12. Anemia    PLAN:     1. Aortic valve prosthesis:  Follow-up echocardiogram  2. Mitral regurgitation:  Progressive shortness of breath. (anemia/ckd) Follow-up echocardiogram did not mention MR.  Still will refer to [facility] for evaluation, repeat echo. On exam murmur of MR.  3. Permanent pacemaker:  Obtain device information.   4. Paroxysmal atrial fibrillation:  Rate control.  Anticoagulation.  5. Nonobstructive coronary artery disease:  By heart catheterization in 2021. Risk factor modification.  6. Hypertension:  Continue current management.  7. Hyperlipidemia:  Continue current management.  8. Chronic kidney disease:  Has been seen by nephrology  9. Return to clinic 2 months.           Vivek Hannon MD, MPH, FACC, [facility]

## 2022-04-05 ENCOUNTER — LAB VISIT (OUTPATIENT)
Dept: LAB | Facility: HOSPITAL | Age: 72
End: 2022-04-05
Attending: INTERNAL MEDICINE
Payer: MEDICARE

## 2022-04-05 ENCOUNTER — ANTI-COAG VISIT (OUTPATIENT)
Dept: CARDIOLOGY | Facility: CLINIC | Age: 72
End: 2022-04-05
Payer: MEDICARE

## 2022-04-05 DIAGNOSIS — Z86.79 HISTORY OF ATRIAL FIBRILLATION: Primary | ICD-10-CM

## 2022-04-05 DIAGNOSIS — Z79.01 LONG TERM (CURRENT) USE OF ANTICOAGULANTS: ICD-10-CM

## 2022-04-05 DIAGNOSIS — Z86.79 HISTORY OF ATRIAL FIBRILLATION: ICD-10-CM

## 2022-04-05 LAB
INR PPP: 1.8 (ref 0.8–1.2)
PROTHROMBIN TIME: 18.9 SEC (ref 9–12.5)

## 2022-04-05 PROCEDURE — 85610 PROTHROMBIN TIME: CPT | Performed by: INTERNAL MEDICINE

## 2022-04-05 PROCEDURE — 93793 PR ANTICOAGULANT MGMT FOR PT TAKING WARFARIN: ICD-10-PCS | Mod: S$GLB,,,

## 2022-04-05 PROCEDURE — 93793 ANTICOAG MGMT PT WARFARIN: CPT | Mod: S$GLB,,,

## 2022-04-05 PROCEDURE — 36415 COLL VENOUS BLD VENIPUNCTURE: CPT | Mod: PO | Performed by: INTERNAL MEDICINE

## 2022-04-12 ENCOUNTER — LAB VISIT (OUTPATIENT)
Dept: LAB | Facility: HOSPITAL | Age: 72
End: 2022-04-12
Attending: INTERNAL MEDICINE
Payer: MEDICARE

## 2022-04-12 ENCOUNTER — ANTI-COAG VISIT (OUTPATIENT)
Dept: CARDIOLOGY | Facility: CLINIC | Age: 72
End: 2022-04-12
Payer: MEDICARE

## 2022-04-12 DIAGNOSIS — Z79.01 LONG TERM (CURRENT) USE OF ANTICOAGULANTS: ICD-10-CM

## 2022-04-12 DIAGNOSIS — Z86.79 HISTORY OF ATRIAL FIBRILLATION: ICD-10-CM

## 2022-04-12 DIAGNOSIS — Z86.79 HISTORY OF ATRIAL FIBRILLATION: Primary | ICD-10-CM

## 2022-04-12 LAB
INR PPP: 2.7 (ref 0.8–1.2)
PROTHROMBIN TIME: 27.7 SEC (ref 9–12.5)

## 2022-04-12 PROCEDURE — 36415 COLL VENOUS BLD VENIPUNCTURE: CPT | Mod: PO | Performed by: INTERNAL MEDICINE

## 2022-04-12 PROCEDURE — 85610 PROTHROMBIN TIME: CPT | Performed by: INTERNAL MEDICINE

## 2022-04-12 PROCEDURE — 93793 ANTICOAG MGMT PT WARFARIN: CPT | Mod: S$GLB,,,

## 2022-04-12 PROCEDURE — 93793 PR ANTICOAGULANT MGMT FOR PT TAKING WARFARIN: ICD-10-PCS | Mod: S$GLB,,,

## 2022-04-19 ENCOUNTER — OFFICE VISIT (OUTPATIENT)
Dept: CARDIOLOGY | Facility: CLINIC | Age: 72
End: 2022-04-19
Payer: MEDICARE

## 2022-04-19 VITALS
DIASTOLIC BLOOD PRESSURE: 70 MMHG | HEIGHT: 72 IN | SYSTOLIC BLOOD PRESSURE: 124 MMHG | HEART RATE: 60 BPM | OXYGEN SATURATION: 99 % | WEIGHT: 228.81 LBS | BODY MASS INDEX: 30.99 KG/M2

## 2022-04-19 DIAGNOSIS — I25.10 CORONARY ARTERY DISEASE, UNSPECIFIED VESSEL OR LESION TYPE, UNSPECIFIED WHETHER ANGINA PRESENT, UNSPECIFIED WHETHER NATIVE OR TRANSPLANTED HEART: ICD-10-CM

## 2022-04-19 DIAGNOSIS — Z95.0 PACEMAKER: ICD-10-CM

## 2022-04-19 DIAGNOSIS — Z86.79 HISTORY OF ATRIAL FIBRILLATION: ICD-10-CM

## 2022-04-19 DIAGNOSIS — N18.4 CKD (CHRONIC KIDNEY DISEASE), STAGE IV: ICD-10-CM

## 2022-04-19 DIAGNOSIS — I34.0 MITRAL VALVE INSUFFICIENCY, UNSPECIFIED ETIOLOGY: ICD-10-CM

## 2022-04-19 PROCEDURE — 3078F PR MOST RECENT DIASTOLIC BLOOD PRESSURE < 80 MM HG: ICD-10-PCS | Mod: CPTII,S$GLB,, | Performed by: INTERNAL MEDICINE

## 2022-04-19 PROCEDURE — 3008F PR BODY MASS INDEX (BMI) DOCUMENTED: ICD-10-PCS | Mod: CPTII,S$GLB,, | Performed by: INTERNAL MEDICINE

## 2022-04-19 PROCEDURE — 3074F SYST BP LT 130 MM HG: CPT | Mod: CPTII,S$GLB,, | Performed by: INTERNAL MEDICINE

## 2022-04-19 PROCEDURE — 99214 PR OFFICE/OUTPT VISIT, EST, LEVL IV, 30-39 MIN: ICD-10-PCS | Mod: S$GLB,,, | Performed by: INTERNAL MEDICINE

## 2022-04-19 PROCEDURE — 1125F PR PAIN SEVERITY QUANTIFIED, PAIN PRESENT: ICD-10-PCS | Mod: CPTII,S$GLB,, | Performed by: INTERNAL MEDICINE

## 2022-04-19 PROCEDURE — 4010F ACE/ARB THERAPY RXD/TAKEN: CPT | Mod: CPTII,S$GLB,, | Performed by: INTERNAL MEDICINE

## 2022-04-19 PROCEDURE — 3044F HG A1C LEVEL LT 7.0%: CPT | Mod: CPTII,S$GLB,, | Performed by: INTERNAL MEDICINE

## 2022-04-19 PROCEDURE — 3066F PR DOCUMENTATION OF TREATMENT FOR NEPHROPATHY: ICD-10-PCS | Mod: CPTII,S$GLB,, | Performed by: INTERNAL MEDICINE

## 2022-04-19 PROCEDURE — 3074F PR MOST RECENT SYSTOLIC BLOOD PRESSURE < 130 MM HG: ICD-10-PCS | Mod: CPTII,S$GLB,, | Performed by: INTERNAL MEDICINE

## 2022-04-19 PROCEDURE — 3008F BODY MASS INDEX DOCD: CPT | Mod: CPTII,S$GLB,, | Performed by: INTERNAL MEDICINE

## 2022-04-19 PROCEDURE — 99214 OFFICE O/P EST MOD 30 MIN: CPT | Mod: S$GLB,,, | Performed by: INTERNAL MEDICINE

## 2022-04-19 PROCEDURE — 4010F PR ACE/ARB THEARPY RXD/TAKEN: ICD-10-PCS | Mod: CPTII,S$GLB,, | Performed by: INTERNAL MEDICINE

## 2022-04-19 PROCEDURE — 1160F PR REVIEW ALL MEDS BY PRESCRIBER/CLIN PHARMACIST DOCUMENTED: ICD-10-PCS | Mod: CPTII,S$GLB,, | Performed by: INTERNAL MEDICINE

## 2022-04-19 PROCEDURE — 1160F RVW MEDS BY RX/DR IN RCRD: CPT | Mod: CPTII,S$GLB,, | Performed by: INTERNAL MEDICINE

## 2022-04-19 PROCEDURE — 3044F PR MOST RECENT HEMOGLOBIN A1C LEVEL <7.0%: ICD-10-PCS | Mod: CPTII,S$GLB,, | Performed by: INTERNAL MEDICINE

## 2022-04-19 PROCEDURE — 3060F PR POS MICROALBUMINURIA RESULT DOCUMENTED/REVIEW: ICD-10-PCS | Mod: CPTII,S$GLB,, | Performed by: INTERNAL MEDICINE

## 2022-04-19 PROCEDURE — 3078F DIAST BP <80 MM HG: CPT | Mod: CPTII,S$GLB,, | Performed by: INTERNAL MEDICINE

## 2022-04-19 PROCEDURE — 3066F NEPHROPATHY DOC TX: CPT | Mod: CPTII,S$GLB,, | Performed by: INTERNAL MEDICINE

## 2022-04-19 PROCEDURE — 99999 PR PBB SHADOW E&M-EST. PATIENT-LVL IV: ICD-10-PCS | Mod: PBBFAC,,, | Performed by: INTERNAL MEDICINE

## 2022-04-19 PROCEDURE — 3060F POS MICROALBUMINURIA REV: CPT | Mod: CPTII,S$GLB,, | Performed by: INTERNAL MEDICINE

## 2022-04-19 PROCEDURE — 99999 PR PBB SHADOW E&M-EST. PATIENT-LVL IV: CPT | Mod: PBBFAC,,, | Performed by: INTERNAL MEDICINE

## 2022-04-19 PROCEDURE — 1159F PR MEDICATION LIST DOCUMENTED IN MEDICAL RECORD: ICD-10-PCS | Mod: CPTII,S$GLB,, | Performed by: INTERNAL MEDICINE

## 2022-04-19 PROCEDURE — 1125F AMNT PAIN NOTED PAIN PRSNT: CPT | Mod: CPTII,S$GLB,, | Performed by: INTERNAL MEDICINE

## 2022-04-19 PROCEDURE — 1159F MED LIST DOCD IN RCRD: CPT | Mod: CPTII,S$GLB,, | Performed by: INTERNAL MEDICINE

## 2022-04-19 NOTE — ASSESSMENT & PLAN NOTE
Coronary angiogram in 7/2021 with non obstructive CAD, no intervention needed at the time  Continue aspirin 81mg PO daily  Continue statin, goal LDL <70

## 2022-04-19 NOTE — ASSESSMENT & PLAN NOTE
Patient has history of moderate to severe mitral regurgitation s/p unsuccessful mitral clip attempt in 8/2020 at an OSH in [place]  He was since started on diuretics and most recent echo in 3/2022 with trace mitral insufficiency, given trace mitral insufficiency patient does not require any further intervention at this time  Patient euvolemic on exam, current diuretic dose is stable. Has not been started on dialysis (AVF in left arm)  Continue optimal medical therapy for mitral insufficiency  Can re-assess in the future if patient mitral disease worsens and requires intervention

## 2022-04-19 NOTE — ASSESSMENT & PLAN NOTE
Patient has not been started on dialysis, left arm AVF in place  Currently stable on diuretic dose

## 2022-04-19 NOTE — PROGRESS NOTES
Interventional Cardiology Clinic Note    Subjective:   Patient ID:  Vivek Lema is a 72 y.o. male with non obstructive coronary artery disease (2021), aortic bioprosthesis 27mm magna ease bioprosthesis, trace mitral insufficiency (in 5/2021 echo showed moderate to severe mitral regurgitation, echo from 3/2022 with trace mitral regurgitation), left atrial appendage ligation, sick sinus syndrome s/p pacemaker placement, paroxysmal atrial firbillation, unsuccessful mitral clip placement in 8/2021, and CKD stage III who was referred for evaluation of mitral insufficiency. Referred by Dr. Hannon.     HPI   Patient has reported increased shortness of breath over the past few months with exertion. He has associated chest pressure, it feels like someone is blowing up a balloon in his chest. It does not occur every time he exerts himself, only sometimes. Resolves without any intervention. He can walk as far as he needs to as long as he is going at a moderate pace, up to 1 hour. He can walk up 1 flight of stairs. Denies LE edema, PND, orthopnea. He has a left arm fistula but it has not been accessed for dialysis. He is on a stable diuretic dose. Denies syncope. Denies issues with bleeding. Compliant with his medications.       Review of Systems   Constitutional: Negative for chills and fever.   HENT: Negative for nosebleeds.    Eyes: Negative for redness.   Cardiovascular: Positive for dyspnea on exertion. Negative for chest pain, claudication, leg swelling, near-syncope, orthopnea, palpitations, paroxysmal nocturnal dyspnea and syncope.   Respiratory: Negative for cough, shortness of breath and sputum production.    Hematologic/Lymphatic: Negative for bleeding problem.   Musculoskeletal: Negative for joint swelling and muscle weakness.   Gastrointestinal: Negative for hematemesis, melena, nausea and vomiting.   Genitourinary: Negative for hematuria.   Neurological: Negative for dizziness and weakness.          History:        Past Medical History:   Diagnosis Date    CAD (coronary artery disease)     Disorder of kidney and ureter      Past Surgical History:   Procedure Laterality Date    CORONARY ARTERY BYPASS GRAFT      with valve repair (aortic?)     Social History     Socioeconomic History    Marital status:    Tobacco Use    Smoking status: Never Smoker    Smokeless tobacco: Never Used   Substance and Sexual Activity    Alcohol use: Yes     Alcohol/week: 6.0 standard drinks     Types: 6 Cans of beer per week     Comment: occ    Drug use: Never     History reviewed. No pertinent family history.   Review of patient's allergies indicates:  No Known Allergies  has a current medication list which includes the following prescription(s): aspirin, atorvastatin, calcitriol, cholecalciferol (vitamin d3), escitalopram oxalate, febuxostat, torsemide, valsartan, warfarin, nitroglycerin, and oxycodone-acetaminophen.           Meds:   Review of patient's allergies indicates:  No Known Allergies    Current Outpatient Medications:     aspirin (ECOTRIN) 81 MG EC tablet, Take 1 tablet (81 mg total) by mouth once daily., Disp: 90 tablet, Rfl: 1    atorvastatin (LIPITOR) 40 MG tablet, Take 40 mg by mouth., Disp: , Rfl:     calcitRIOL (ROCALTROL) 0.25 MCG Cap, Take 0.25 mcg by mouth., Disp: , Rfl:     cholecalciferol, vitamin D3, (VITAMIN D3) 50 mcg (2,000 unit) Cap, Take 1 capsule by mouth once daily., Disp: , Rfl:     EScitalopram oxalate (LEXAPRO) 20 MG tablet, Take 1 tablet (20 mg total) by mouth once daily., Disp: 90 tablet, Rfl: 1    febuxostat (ULORIC) 40 mg Tab, Take 1 tablet (40 mg total) by mouth once daily., Disp: 90 tablet, Rfl: 1    torsemide (DEMADEX) 20 MG Tab, Take 3 tablets (60 mg total) by mouth once daily. Take 3 tab in am (Patient taking differently: Take 60 mg by mouth once daily. Take 2 tab in am), Disp: 270 tablet, Rfl: 0    valsartan (DIOVAN) 80 MG tablet, Take 1 tablet (80 mg total) by mouth once  "daily., Disp: 90 tablet, Rfl: 0    warfarin (COUMADIN) 2 MG tablet, As per INR (Patient taking differently: Take 2 mg by mouth Daily. As per INR--takes 4mg on Monday, Wednesday and Friday and 2 mg on the other days), Disp: 90 tablet, Rfl: 0    nitroGLYCERIN (NITROSTAT) 0.4 MG SL tablet, Place 0.4 mg under the tongue., Disp: , Rfl:     oxyCODONE-acetaminophen (PERCOCET) 5-325 mg per tablet, Take 1 tablet by mouth every 6 (six) hours as needed for Pain. (Patient not taking: Reported on 4/19/2022), Disp: 28 tablet, Rfl: 0    Objective:   /70 (BP Location: Right arm, Patient Position: Sitting, BP Method: Large (Automatic))   Pulse 60   Ht 5' 11.5" (1.816 m)   Wt 103.8 kg (228 lb 13.4 oz)   SpO2 99%   BMI 31.47 kg/m²   Physical Exam  Constitutional:       Appearance: Normal appearance.   HENT:      Head: Normocephalic and atraumatic.      Mouth/Throat:      Mouth: Mucous membranes are moist.   Eyes:      Extraocular Movements: Extraocular movements intact.   Cardiovascular:      Rate and Rhythm: Normal rate and regular rhythm.      Pulses:           Radial pulses are 2+ on the right side.      Heart sounds: Normal heart sounds, S1 normal and S2 normal. No murmur heard.     Comments: Bioprosthetic click audible  Left arm with AV fistula - thrill present  Pulmonary:      Effort: Pulmonary effort is normal.      Breath sounds: Normal breath sounds. No wheezing, rhonchi or rales.   Abdominal:      General: Bowel sounds are normal. There is no distension.      Palpations: Abdomen is soft.      Tenderness: There is no abdominal tenderness.   Musculoskeletal:      Cervical back: Normal range of motion.      Right lower leg: No edema.      Left lower leg: No edema.   Skin:     General: Skin is warm and dry.   Neurological:      Mental Status: He is alert and oriented to person, place, and time.   Psychiatric:         Mood and Affect: Mood normal.         Behavior: Behavior normal.         Labs:     Lab Results "   Component Value Date     03/21/2022    K 4.7 03/21/2022     03/21/2022    CO2 23 03/21/2022    BUN 78 (H) 03/21/2022    CREATININE 3.3 (H) 03/21/2022    ANIONGAP 9 03/21/2022     Lab Results   Component Value Date    HGBA1C 5.1 02/02/2022     No results found for: BNP, BNPTRIAGEBLO    Lab Results   Component Value Date    WBC 7.55 03/21/2022    HGB 9.5 (L) 03/21/2022    HCT 30.4 (L) 03/21/2022     03/21/2022    GRAN 4.5 03/21/2022    GRAN 59.1 03/21/2022     Lab Results   Component Value Date    CHOL 154 02/02/2022    HDL 33 (L) 02/02/2022    LDLCALC 86.8 02/02/2022    TRIG 171 (H) 02/02/2022       Lab Results   Component Value Date     03/21/2022    K 4.7 03/21/2022     03/21/2022    CO2 23 03/21/2022    BUN 78 (H) 03/21/2022    CREATININE 3.3 (H) 03/21/2022    ANIONGAP 9 03/21/2022     Lab Results   Component Value Date    HGBA1C 5.1 02/02/2022     No results found for: BNP, BNPTRIAGEBLO Lab Results   Component Value Date    WBC 7.55 03/21/2022    HGB 9.5 (L) 03/21/2022    HCT 30.4 (L) 03/21/2022     03/21/2022    GRAN 4.5 03/21/2022    GRAN 59.1 03/21/2022     Lab Results   Component Value Date    CHOL 154 02/02/2022    HDL 33 (L) 02/02/2022    LDLCALC 86.8 02/02/2022    TRIG 171 (H) 02/02/2022                Cardiovascular Imaging:     Cardiovascular Testing:     Echocardiogram 03/02/2022:     · The left ventricle is normal in size with mild concentric hypertrophy and normal systolic function.  · The estimated ejection fraction is 55%.  · Normal left ventricular diastolic function.  · Severe left atrial enlargement.  · Mild right atrial enlargement.  · Normal right ventricular size with normal right ventricular systolic function.  · Normal central venous pressure (3 mmHg).  · The estimated PA systolic pressure is 9 mmHg.     Echocardiogram 08/18/2021:  Ejection fraction of 60-65%.  Normal right ventricular size and function.  Severe left atrial enlargement.  Status post  Bentall procedure with a 27 mm magna ease bioprosthesis in the aortic position.  Mild to moderate mitral regurgitation.     Right/left heart catheterization 07/16/2021:     Nonobstructive coronary artery disease with the exception of a ramus intermedius that was of small caliber.  Mild pulmonary hypertension.  Elevated V-wave in setting of severe mitral regurgitation.  V-wave of 43 mm Hg.  Left anterior descending artery with a mid to distal 30-40% stenosis.  Ramus intermedius with a mid 70 80% stenosis.  Small caliber vessel.  Left circumflex artery:  Non dominant.  Ectatic and aneurysmal.  Mid 40% stenosis in the AV groove followed by a 40-50% stenosis prior to the bifurcation of OM 2.   Right coronary artery is ectatic and aneurysmal with a mid 20-30% stenosis.  Superior branch of the PDA has a 50% stenosis proximally in the inferior branch has luminal irregularities.  PLV branch with a proximal 30-40% stenosis followed by mid 40-50% stenosis.     Echocardiogram 05/24/2021:  Ejection fraction 45-50%.  Severe left atrial enlargement.  Bioprosthetic valve in aortic position (27 mm magna ease bioprosthesis).  Moderate to severe mitral regurgitation     Echocardiogram 03/31/2020:  Ejection fraction 54%.  Aortic bioprosthesis.  No perivalvular regurgitation.    Echo 3/2/2022  · The left ventricle is normal in size with mild concentric hypertrophy and normal systolic function.  · The estimated ejection fraction is 55%.  · Normal left ventricular diastolic function.  · Severe left atrial enlargement.  · Mild right atrial enlargement.  · Normal right ventricular size with normal right ventricular systolic function.  · Normal central venous pressure (3 mmHg).  · The estimated PA systolic pressure is 9 mmHg.         Assessment:       1. Mitral valve insufficiency, unspecified etiology    2. CKD (chronic kidney disease), stage IV    3. Coronary artery disease, unspecified vessel or lesion type, unspecified whether angina  present, unspecified whether native or transplanted heart    4. History of atrial fibrillation    5. Pacemaker             Plan:     Nonrheumatic mitral valve regurgitation  Patient has history of moderate to severe mitral regurgitation s/p unsuccessful mitral clip attempt in 8/2020 at an OSH in [place]  He was since started on diuretics and most recent echo in 3/2022 with trace mitral insufficiency, given trace mitral insufficiency patient does not require any further intervention at this time  Patient euvolemic on exam, current diuretic dose is stable. Has not been started on dialysis (AVF in left arm)  Continue optimal medical therapy for mitral insufficiency  Can re-assess in the future if patient mitral disease worsens and requires intervention      CKD (chronic kidney disease), stage IV  Patient has not been started on dialysis, left arm AVF in place  Currently stable on diuretic dose    Coronary artery disease  Coronary angiogram in 7/2021 with non obstructive CAD, no intervention needed at the time  Continue aspirin 81mg PO daily  Continue statin, goal LDL <70    History of atrial fibrillation  Denies palpitations  On warfarin for anticoagulation, denies issues with bleeding    Pacemaker  History of sick sinus syndrome  Will need device checked at his next clinic visit with Dr. Hannon        Signed:  Chas Carlisle M.D.  Interventional Cardiology Fellow PGY-7  [facility]   04/19/2022          Interventional Cardiology Staff  I have personally taken the history and examined this patient. I have discussed and agree with the resident's findings and plan as documented in the resident's note.  72-year-old male who is on guideline directed medical therapy referred by Dr. Humphrey Hannon and now has mild mitral regurgitation.  I recommend continuing medical therapy for mitral regurgitation and follow-up with Dr. Hannon.  Will be happy to follow patient p.r.n. symptoms or moderate to severe  regurgitation.  Marino Morales

## 2022-04-29 ENCOUNTER — PATIENT MESSAGE (OUTPATIENT)
Dept: CARDIOLOGY | Facility: CLINIC | Age: 72
End: 2022-04-29
Payer: MEDICARE

## 2022-05-03 ENCOUNTER — ANTI-COAG VISIT (OUTPATIENT)
Dept: CARDIOLOGY | Facility: CLINIC | Age: 72
End: 2022-05-03
Payer: MEDICARE

## 2022-05-03 ENCOUNTER — LAB VISIT (OUTPATIENT)
Dept: LAB | Facility: HOSPITAL | Age: 72
End: 2022-05-03
Attending: INTERNAL MEDICINE
Payer: MEDICARE

## 2022-05-03 DIAGNOSIS — Z79.01 LONG TERM (CURRENT) USE OF ANTICOAGULANTS: ICD-10-CM

## 2022-05-03 DIAGNOSIS — Z86.79 HISTORY OF ATRIAL FIBRILLATION: ICD-10-CM

## 2022-05-03 DIAGNOSIS — Z86.79 HISTORY OF ATRIAL FIBRILLATION: Primary | ICD-10-CM

## 2022-05-03 LAB
INR PPP: 2.6 (ref 0.8–1.2)
PROTHROMBIN TIME: 27 SEC (ref 9–12.5)

## 2022-05-03 PROCEDURE — 85610 PROTHROMBIN TIME: CPT | Performed by: INTERNAL MEDICINE

## 2022-05-03 PROCEDURE — 93793 ANTICOAG MGMT PT WARFARIN: CPT | Mod: S$GLB,,,

## 2022-05-03 PROCEDURE — 36415 COLL VENOUS BLD VENIPUNCTURE: CPT | Mod: PO | Performed by: INTERNAL MEDICINE

## 2022-05-03 PROCEDURE — 93793 PR ANTICOAGULANT MGMT FOR PT TAKING WARFARIN: ICD-10-PCS | Mod: S$GLB,,,

## 2022-05-05 ENCOUNTER — PATIENT OUTREACH (OUTPATIENT)
Dept: ADMINISTRATIVE | Facility: OTHER | Age: 72
End: 2022-05-05
Payer: MEDICARE

## 2022-05-06 NOTE — PROGRESS NOTES
Health Maintenance Due   Topic Date Due    Hepatitis C Screening  Never done    Colorectal Cancer Screening  Never done    Shingles Vaccine (1 of 2) Never done    Pneumococcal Vaccines (Age 65+) (1 - PCV) Never done    COVID-19 Vaccine (4 - Booster for Pfizer series) 04/07/2022     Updates were requested from care everywhere.  Chart was reviewed for overdue Proactive [facility] Encounters (THANIA) topics (CRS, Breast Cancer Screening, Eye exam)  Health Maintenance has been updated.  LINKS immunization registry triggered.  Immunizations were reconciled.  Fecal Immunochemical Test (iFOBT)Expected 5/6/2022

## 2022-05-10 ENCOUNTER — LAB VISIT (OUTPATIENT)
Dept: LAB | Facility: HOSPITAL | Age: 72
End: 2022-05-10
Attending: INTERNAL MEDICINE
Payer: MEDICARE

## 2022-05-10 DIAGNOSIS — I10 HYPERTENSION, UNSPECIFIED TYPE: ICD-10-CM

## 2022-05-10 DIAGNOSIS — N18.4 CKD (CHRONIC KIDNEY DISEASE) STAGE 4, GFR 15-29 ML/MIN: ICD-10-CM

## 2022-05-10 DIAGNOSIS — Z86.79 HISTORY OF ATRIAL FIBRILLATION: ICD-10-CM

## 2022-05-10 LAB
25(OH)D3+25(OH)D2 SERPL-MCNC: 41 NG/ML (ref 30–96)
ALBUMIN SERPL BCP-MCNC: 3.6 G/DL (ref 3.5–5.2)
ALP SERPL-CCNC: 88 U/L (ref 55–135)
ALT SERPL W/O P-5'-P-CCNC: 14 U/L (ref 10–44)
ANION GAP SERPL CALC-SCNC: 10 MMOL/L (ref 8–16)
AST SERPL-CCNC: 20 U/L (ref 10–40)
BASOPHILS # BLD AUTO: 0.05 K/UL (ref 0–0.2)
BASOPHILS NFR BLD: 0.8 % (ref 0–1.9)
BILIRUB SERPL-MCNC: 1.4 MG/DL (ref 0.1–1)
BUN SERPL-MCNC: 45 MG/DL (ref 8–23)
CALCIUM SERPL-MCNC: 9.3 MG/DL (ref 8.7–10.5)
CHLORIDE SERPL-SCNC: 105 MMOL/L (ref 95–110)
CO2 SERPL-SCNC: 24 MMOL/L (ref 23–29)
CREAT SERPL-MCNC: 2.7 MG/DL (ref 0.5–1.4)
DIFFERENTIAL METHOD: ABNORMAL
EOSINOPHIL # BLD AUTO: 0.2 K/UL (ref 0–0.5)
EOSINOPHIL NFR BLD: 3.5 % (ref 0–8)
ERYTHROCYTE [DISTWIDTH] IN BLOOD BY AUTOMATED COUNT: 13.3 % (ref 11.5–14.5)
EST. GFR  (AFRICAN AMERICAN): 25.7 ML/MIN/1.73 M^2
EST. GFR  (NON AFRICAN AMERICAN): 22.2 ML/MIN/1.73 M^2
GLUCOSE SERPL-MCNC: 93 MG/DL (ref 70–110)
HCT VFR BLD AUTO: 36 % (ref 40–54)
HGB BLD-MCNC: 11.7 G/DL (ref 14–18)
IMM GRANULOCYTES # BLD AUTO: 0.02 K/UL (ref 0–0.04)
IMM GRANULOCYTES NFR BLD AUTO: 0.3 % (ref 0–0.5)
LYMPHOCYTES # BLD AUTO: 1.7 K/UL (ref 1–4.8)
LYMPHOCYTES NFR BLD: 26.4 % (ref 18–48)
MCH RBC QN AUTO: 29.4 PG (ref 27–31)
MCHC RBC AUTO-ENTMCNC: 32.5 G/DL (ref 32–36)
MCV RBC AUTO: 91 FL (ref 82–98)
MONOCYTES # BLD AUTO: 0.8 K/UL (ref 0.3–1)
MONOCYTES NFR BLD: 11.9 % (ref 4–15)
NEUTROPHILS # BLD AUTO: 3.7 K/UL (ref 1.8–7.7)
NEUTROPHILS NFR BLD: 57.1 % (ref 38–73)
NRBC BLD-RTO: 0 /100 WBC
PHOSPHATE SERPL-MCNC: 3.1 MG/DL (ref 2.7–4.5)
PLATELET # BLD AUTO: 174 K/UL (ref 150–450)
PMV BLD AUTO: 12.1 FL (ref 9.2–12.9)
POTASSIUM SERPL-SCNC: 4.1 MMOL/L (ref 3.5–5.1)
PROT SERPL-MCNC: 7.1 G/DL (ref 6–8.4)
RBC # BLD AUTO: 3.98 M/UL (ref 4.6–6.2)
SODIUM SERPL-SCNC: 139 MMOL/L (ref 136–145)
WBC # BLD AUTO: 6.48 K/UL (ref 3.9–12.7)

## 2022-05-10 PROCEDURE — 36415 COLL VENOUS BLD VENIPUNCTURE: CPT | Performed by: INTERNAL MEDICINE

## 2022-05-10 PROCEDURE — 85025 COMPLETE CBC W/AUTO DIFF WBC: CPT | Performed by: INTERNAL MEDICINE

## 2022-05-10 PROCEDURE — 82306 VITAMIN D 25 HYDROXY: CPT | Performed by: INTERNAL MEDICINE

## 2022-05-10 PROCEDURE — 80053 COMPREHEN METABOLIC PANEL: CPT | Performed by: INTERNAL MEDICINE

## 2022-05-10 PROCEDURE — 84100 ASSAY OF PHOSPHORUS: CPT | Performed by: INTERNAL MEDICINE

## 2022-05-10 RX ORDER — WARFARIN 2 MG/1
2 TABLET ORAL DAILY
Qty: 126 TABLET | Refills: 0 | Status: SHIPPED | OUTPATIENT
Start: 2022-05-10 | End: 2022-08-05

## 2022-05-10 RX ORDER — ATORVASTATIN CALCIUM 40 MG/1
40 TABLET, FILM COATED ORAL DAILY
Qty: 90 TABLET | Refills: 1 | Status: SHIPPED | OUTPATIENT
Start: 2022-05-10 | End: 2022-11-07

## 2022-05-10 RX ORDER — WARFARIN 2 MG/1
TABLET ORAL
Qty: 128 TABLET | OUTPATIENT
Start: 2022-05-10

## 2022-05-10 NOTE — TELEPHONE ENCOUNTER
No new care gaps identified.  [facility] Embedded Care Gaps. Reference number: [id number]. 5/10/2022   3:35:12 PM CDT

## 2022-05-10 NOTE — TELEPHONE ENCOUNTER
----- Message from Adriana Gómez sent at 5/10/2022  3:25 PM CDT -----  Regarding: self [phone]  Type: RX Refill Request    Who Called:  self    Have you contacted your pharmacy: yes    Refill or New Rx: refill    RX Name and Strength:warfarin (COUMADIN) 2 MG tablet  he stated he takes them at a schedule and 90 doesn't last                and atorvastatin (LIPITOR) 40 MG tablet      Preferred Pharmacy with phone number:   [id number] - [place] [address]  [place] [address]  Phone: [phone] Fax: [phone]    Local or Mail Order: local    Would the patient rather a call back or a response via [facility]? Call back    Best Call Back Number:[phone]

## 2022-05-21 ENCOUNTER — PATIENT MESSAGE (OUTPATIENT)
Dept: FAMILY MEDICINE | Facility: CLINIC | Age: 72
End: 2022-05-21
Payer: MEDICARE

## 2022-05-23 ENCOUNTER — TELEPHONE (OUTPATIENT)
Dept: FAMILY MEDICINE | Facility: CLINIC | Age: 72
End: 2022-05-23
Payer: MEDICARE

## 2022-05-23 DIAGNOSIS — U07.1 COVID-19 VIRUS INFECTION: Primary | ICD-10-CM

## 2022-05-23 DIAGNOSIS — U07.1 COVID: ICD-10-CM

## 2022-05-23 NOTE — TELEPHONE ENCOUNTER
----- Message from Khadijah Castañeda sent at 5/23/2022 10:51 AM CDT -----  Regarding: wife  .Type: Patient Call Back    Who called: wife- Evelia     What is the request in detail: replying to message previously and states the patient would definitely like to have the covid infusion placed. Please call     Can the clinic reply by [facility]? No     Would the patient rather a call back or a response via [facility]?  Call     Best call back number: [phone]

## 2022-05-23 NOTE — TELEPHONE ENCOUNTER
Spoke to pts spouse and informed her orders have been placed for infusion; she verbalized understanding

## 2022-05-25 ENCOUNTER — INFUSION (OUTPATIENT)
Dept: INFECTIOUS DISEASES | Facility: HOSPITAL | Age: 72
End: 2022-05-25
Attending: INTERNAL MEDICINE
Payer: MEDICARE

## 2022-05-25 VITALS
WEIGHT: 219 LBS | TEMPERATURE: 98 F | HEART RATE: 60 BPM | HEIGHT: 69 IN | SYSTOLIC BLOOD PRESSURE: 118 MMHG | OXYGEN SATURATION: 96 % | BODY MASS INDEX: 32.44 KG/M2 | RESPIRATION RATE: 18 BRPM | DIASTOLIC BLOOD PRESSURE: 74 MMHG

## 2022-05-25 DIAGNOSIS — U07.1 COVID-19 VIRUS INFECTION: ICD-10-CM

## 2022-05-25 DIAGNOSIS — U07.1 COVID-19: Primary | ICD-10-CM

## 2022-05-25 PROCEDURE — 63600175 PHARM REV CODE 636 W HCPCS: Performed by: INTERNAL MEDICINE

## 2022-05-25 PROCEDURE — M0222 HC IV INJECTION, BEBTELOVIMAB, INCL POST ADMIN MONIT: HCPCS | Performed by: INTERNAL MEDICINE

## 2022-05-25 RX ORDER — ONDANSETRON 4 MG/1
4 TABLET, ORALLY DISINTEGRATING ORAL
Status: ACTIVE | OUTPATIENT
Start: 2022-05-25 | End: 2022-05-26

## 2022-05-25 RX ORDER — ACETAMINOPHEN 325 MG/1
650 TABLET ORAL
Status: ACTIVE | OUTPATIENT
Start: 2022-05-25 | End: 2022-05-26

## 2022-05-25 RX ORDER — DIPHENHYDRAMINE HYDROCHLORIDE 50 MG/ML
25 INJECTION INTRAMUSCULAR; INTRAVENOUS
Status: ACTIVE | OUTPATIENT
Start: 2022-05-25 | End: 2022-05-26

## 2022-05-25 RX ORDER — EPINEPHRINE 0.3 MG/.3ML
0.3 INJECTION SUBCUTANEOUS
Status: ACTIVE | OUTPATIENT
Start: 2022-05-25 | End: 2022-05-28

## 2022-05-25 RX ORDER — ALBUTEROL SULFATE 90 UG/1
2 AEROSOL, METERED RESPIRATORY (INHALATION)
Status: ACTIVE | OUTPATIENT
Start: 2022-05-25 | End: 2022-05-28

## 2022-05-25 RX ORDER — BEBTELOVIMAB 87.5 MG/ML
175 INJECTION, SOLUTION INTRAVENOUS
Status: COMPLETED | OUTPATIENT
Start: 2022-05-25 | End: 2022-05-25

## 2022-05-25 RX ADMIN — BEBTELOVIMAB 175 MG: 87.5 INJECTION, SOLUTION INTRAVENOUS at 10:05

## 2022-05-25 NOTE — PROGRESS NOTES
Bebtelovimab IV Injection administered. Pt tolerated injection well. No S/S of injection reaction noted at present time. One hour observation started.     Patient received injection at 1035.

## 2022-05-25 NOTE — PROGRESS NOTES
Patient arrives for Bebtelovimab IV Injection. Ambulatory. Pt AAox3. No distress noted. RR even and unlabored.     Symptoms and onset date:  Flu-like symptoms, cough     Tested COVID + on 05/21/22

## 2022-05-26 ENCOUNTER — PATIENT MESSAGE (OUTPATIENT)
Dept: CARDIOLOGY | Facility: CLINIC | Age: 72
End: 2022-05-26
Payer: MEDICARE

## 2022-05-31 ENCOUNTER — OFFICE VISIT (OUTPATIENT)
Dept: CARDIOLOGY | Facility: CLINIC | Age: 72
End: 2022-05-31
Payer: MEDICARE

## 2022-05-31 ENCOUNTER — TELEPHONE (OUTPATIENT)
Dept: CARDIOLOGY | Facility: CLINIC | Age: 72
End: 2022-05-31

## 2022-05-31 VITALS
HEIGHT: 69 IN | SYSTOLIC BLOOD PRESSURE: 120 MMHG | RESPIRATION RATE: 15 BRPM | HEART RATE: 60 BPM | DIASTOLIC BLOOD PRESSURE: 74 MMHG | BODY MASS INDEX: 33.14 KG/M2 | OXYGEN SATURATION: 98 % | WEIGHT: 223.75 LBS

## 2022-05-31 DIAGNOSIS — Z95.2 S/P AVR (AORTIC VALVE REPLACEMENT) AND AORTOPLASTY: ICD-10-CM

## 2022-05-31 DIAGNOSIS — E66.09 CLASS 1 OBESITY DUE TO EXCESS CALORIES WITH SERIOUS COMORBIDITY AND BODY MASS INDEX (BMI) OF 33.0 TO 33.9 IN ADULT: ICD-10-CM

## 2022-05-31 DIAGNOSIS — E78.49 OTHER HYPERLIPIDEMIA: ICD-10-CM

## 2022-05-31 DIAGNOSIS — I10 PRIMARY HYPERTENSION: ICD-10-CM

## 2022-05-31 DIAGNOSIS — I48.0 PAF (PAROXYSMAL ATRIAL FIBRILLATION): Primary | ICD-10-CM

## 2022-05-31 DIAGNOSIS — Z95.0 PACEMAKER: ICD-10-CM

## 2022-05-31 DIAGNOSIS — Z79.01 LONG TERM (CURRENT) USE OF ANTICOAGULANTS: ICD-10-CM

## 2022-05-31 PROCEDURE — 99999 PR PBB SHADOW E&M-EST. PATIENT-LVL IV: ICD-10-PCS | Mod: PBBFAC,,, | Performed by: INTERNAL MEDICINE

## 2022-05-31 PROCEDURE — 4010F ACE/ARB THERAPY RXD/TAKEN: CPT | Mod: CPTII,S$GLB,, | Performed by: INTERNAL MEDICINE

## 2022-05-31 PROCEDURE — 3288F FALL RISK ASSESSMENT DOCD: CPT | Mod: CPTII,S$GLB,, | Performed by: INTERNAL MEDICINE

## 2022-05-31 PROCEDURE — 99999 PR PBB SHADOW E&M-EST. PATIENT-LVL IV: CPT | Mod: PBBFAC,,, | Performed by: INTERNAL MEDICINE

## 2022-05-31 PROCEDURE — 1126F PR PAIN SEVERITY QUANTIFIED, NO PAIN PRESENT: ICD-10-PCS | Mod: CPTII,S$GLB,, | Performed by: INTERNAL MEDICINE

## 2022-05-31 PROCEDURE — 3066F PR DOCUMENTATION OF TREATMENT FOR NEPHROPATHY: ICD-10-PCS | Mod: CPTII,S$GLB,, | Performed by: INTERNAL MEDICINE

## 2022-05-31 PROCEDURE — 1101F PT FALLS ASSESS-DOCD LE1/YR: CPT | Mod: CPTII,S$GLB,, | Performed by: INTERNAL MEDICINE

## 2022-05-31 PROCEDURE — 1159F MED LIST DOCD IN RCRD: CPT | Mod: CPTII,S$GLB,, | Performed by: INTERNAL MEDICINE

## 2022-05-31 PROCEDURE — 3008F PR BODY MASS INDEX (BMI) DOCUMENTED: ICD-10-PCS | Mod: CPTII,S$GLB,, | Performed by: INTERNAL MEDICINE

## 2022-05-31 PROCEDURE — 3044F PR MOST RECENT HEMOGLOBIN A1C LEVEL <7.0%: ICD-10-PCS | Mod: CPTII,S$GLB,, | Performed by: INTERNAL MEDICINE

## 2022-05-31 PROCEDURE — 99214 PR OFFICE/OUTPT VISIT, EST, LEVL IV, 30-39 MIN: ICD-10-PCS | Mod: S$GLB,,, | Performed by: INTERNAL MEDICINE

## 2022-05-31 PROCEDURE — 99214 OFFICE O/P EST MOD 30 MIN: CPT | Mod: S$GLB,,, | Performed by: INTERNAL MEDICINE

## 2022-05-31 PROCEDURE — 1159F PR MEDICATION LIST DOCUMENTED IN MEDICAL RECORD: ICD-10-PCS | Mod: CPTII,S$GLB,, | Performed by: INTERNAL MEDICINE

## 2022-05-31 PROCEDURE — 3060F POS MICROALBUMINURIA REV: CPT | Mod: CPTII,S$GLB,, | Performed by: INTERNAL MEDICINE

## 2022-05-31 PROCEDURE — 3074F PR MOST RECENT SYSTOLIC BLOOD PRESSURE < 130 MM HG: ICD-10-PCS | Mod: CPTII,S$GLB,, | Performed by: INTERNAL MEDICINE

## 2022-05-31 PROCEDURE — 3078F DIAST BP <80 MM HG: CPT | Mod: CPTII,S$GLB,, | Performed by: INTERNAL MEDICINE

## 2022-05-31 PROCEDURE — 3288F PR FALLS RISK ASSESSMENT DOCUMENTED: ICD-10-PCS | Mod: CPTII,S$GLB,, | Performed by: INTERNAL MEDICINE

## 2022-05-31 PROCEDURE — 3074F SYST BP LT 130 MM HG: CPT | Mod: CPTII,S$GLB,, | Performed by: INTERNAL MEDICINE

## 2022-05-31 PROCEDURE — 4010F PR ACE/ARB THEARPY RXD/TAKEN: ICD-10-PCS | Mod: CPTII,S$GLB,, | Performed by: INTERNAL MEDICINE

## 2022-05-31 PROCEDURE — 1101F PR PT FALLS ASSESS DOC 0-1 FALLS W/OUT INJ PAST YR: ICD-10-PCS | Mod: CPTII,S$GLB,, | Performed by: INTERNAL MEDICINE

## 2022-05-31 PROCEDURE — 1160F RVW MEDS BY RX/DR IN RCRD: CPT | Mod: CPTII,S$GLB,, | Performed by: INTERNAL MEDICINE

## 2022-05-31 PROCEDURE — 1160F PR REVIEW ALL MEDS BY PRESCRIBER/CLIN PHARMACIST DOCUMENTED: ICD-10-PCS | Mod: CPTII,S$GLB,, | Performed by: INTERNAL MEDICINE

## 2022-05-31 PROCEDURE — 3078F PR MOST RECENT DIASTOLIC BLOOD PRESSURE < 80 MM HG: ICD-10-PCS | Mod: CPTII,S$GLB,, | Performed by: INTERNAL MEDICINE

## 2022-05-31 PROCEDURE — 3060F PR POS MICROALBUMINURIA RESULT DOCUMENTED/REVIEW: ICD-10-PCS | Mod: CPTII,S$GLB,, | Performed by: INTERNAL MEDICINE

## 2022-05-31 PROCEDURE — 3008F BODY MASS INDEX DOCD: CPT | Mod: CPTII,S$GLB,, | Performed by: INTERNAL MEDICINE

## 2022-05-31 PROCEDURE — 1126F AMNT PAIN NOTED NONE PRSNT: CPT | Mod: CPTII,S$GLB,, | Performed by: INTERNAL MEDICINE

## 2022-05-31 PROCEDURE — 3044F HG A1C LEVEL LT 7.0%: CPT | Mod: CPTII,S$GLB,, | Performed by: INTERNAL MEDICINE

## 2022-05-31 PROCEDURE — 3066F NEPHROPATHY DOC TX: CPT | Mod: CPTII,S$GLB,, | Performed by: INTERNAL MEDICINE

## 2022-05-31 NOTE — TELEPHONE ENCOUNTER
I spoke with someone from the medical record office at the [facility] who informed me that the pt had his 2272 [employer] pacemaker placed on 2/15/2018, and he last had it checked by [facility] on 4/5/2021.

## 2022-05-31 NOTE — PROGRESS NOTES
CARDIOLOGY CLINIC VISIT        HISTORY OF PRESENT ILLNESS:     Vivek Lema presents for continued care. Last seen 03/29/2022.  Seen 02/08/2022 for establishment of care.History of aortic bioprosthesis secondary to aortic insufficiency with an aortic aneurysm.  Left atrial appendage ligation.  Sick sinus syndrome status post dual chamber pacemaker placement.  In August 2021 underwent attempted MitraClip procedure.  Unsuccessful percutaneous repair.  Patient was deemed unsuitable for open repair secondary to comorbidities.  Prior sternotomy.  Patient states that his shortness of breath is worsening.  Symptoms less than 1 block.  No PND orthopnea.    Echocardiogram for 03/02/2022 shows normal left ventricular systolic function.  Severe left atrial enlargement.  No pulmonary hypertension.  No mention of mitral regurgitation.  Patient still with complaints of shortness of breath on exertion.  Last visit wanted him to see someone at [facility] for evaluation.    05/31/2022:  Patient was evaluated by Dr. Morales.  Most recent echocardiogram did not show any significant mitral regurgitation.  Feels good.  No shortness of breath.  Blood pressure looks good.  Creatinine stable.        CARDIOVASCULAR HISTORY:     Aortic bioprosthesis secondary to aortic aneurysm/aortic insufficiency  Atrial fibrillation  Mitral regurgitation  Cardiomyopathy  Permanent pacemaker secondary to sick sinus syndrome    PAST MEDICAL HISTORY:     Past Medical History:   Diagnosis Date    CAD (coronary artery disease)     Disorder of kidney and ureter        PAST SURGICAL HISTORY:     Past Surgical History:   Procedure Laterality Date    CORONARY ARTERY BYPASS GRAFT      with valve repair (aortic?)       ALLERGIES AND MEDICATION:   Review of patient's allergies indicates:  No Known Allergies     Medication List          Accurate as of May 31, 2022 10:37 AM. If you have any questions, ask your nurse or doctor.            CONTINUE taking these  medications    aspirin 81 MG EC tablet  Commonly known as: ECOTRIN  Take 1 tablet (81 mg total) by mouth once daily.     atorvastatin 40 MG tablet  Commonly known as: LIPITOR  Take 1 tablet (40 mg total) by mouth once daily.     calcitRIOL 0.25 MCG Cap  Commonly known as: ROCALTROL     cholecalciferol (vitamin D3) 50 mcg (2,000 unit) Cap capsule  Commonly known as: VITAMIN D3     EScitalopram oxalate 20 MG tablet  Commonly known as: LEXAPRO  Take 1 tablet (20 mg total) by mouth once daily.     febuxostat 40 mg Tab  Commonly known as: ULORIC  Take 1 tablet (40 mg total) by mouth once daily.     nitroGLYCERIN 0.4 MG SL tablet  Commonly known as: NITROSTAT     oxyCODONE-acetaminophen 5-325 mg per tablet  Commonly known as: PERCOCET  Take 1 tablet by mouth every 6 (six) hours as needed for Pain.     torsemide 20 MG Tab  Commonly known as: DEMADEX  Take 3 tablets (60 mg total) by mouth once daily. Take 2 tab in am     valsartan 80 MG tablet  Commonly known as: DIOVAN  Take 1 tablet (80 mg total) by mouth once daily.     warfarin 2 MG tablet  Commonly known as: COUMADIN  Take 1 tablet (2 mg total) by mouth Daily. As per INR--takes 4mg on Monday, Wednesday and Friday and 2 mg on the other days            SOCIAL HISTORY:     Social History     Socioeconomic History    Marital status:    Tobacco Use    Smoking status: Never Smoker    Smokeless tobacco: Never Used   Substance and Sexual Activity    Alcohol use: Yes     Alcohol/week: 6.0 standard drinks     Types: 6 Cans of beer per week     Comment: occ    Drug use: Never       FAMILY HISTORY:   History reviewed. No pertinent family history.    REVIEW OF SYSTEMS:   Review of Systems   Respiratory: Negative for sputum production, shortness of breath and wheezing.    Cardiovascular: Negative for chest pain, palpitations, orthopnea, claudication, leg swelling and PND.   Gastrointestinal: Negative for abdominal pain, heartburn, nausea and vomiting.   Neurological:  "Negative for dizziness, speech change, focal weakness, loss of consciousness, weakness and headaches.       PHYSICAL EXAM:     Vitals:    05/31/22 1027   BP: 120/74   Pulse: 60   Resp: 15    Body mass index is 33.04 kg/m².  Weight: 101.5 kg (223 lb 12.3 oz)   Height: 5' 9" (175.3 cm)     Physical Exam  Vitals reviewed.   Constitutional:       General: He is not in acute distress.     Appearance: He is well-developed and overweight. He is not diaphoretic.   Neck:      Vascular: No carotid bruit or JVD.   Cardiovascular:      Rate and Rhythm: Normal rate and regular rhythm.      Pulses: Normal pulses.      Heart sounds: Murmur heard.    Systolic murmur is present with a grade of 2/6.  Pulmonary:      Effort: Pulmonary effort is normal.      Breath sounds: Normal breath sounds.   Abdominal:      General: Bowel sounds are normal.      Palpations: Abdomen is soft.      Tenderness: There is no abdominal tenderness.   Musculoskeletal:      Right lower leg: No edema.      Left lower leg: No edema.   Neurological:      Mental Status: He is alert and oriented to person, place, and time.   Psychiatric:         Speech: Speech normal.         Behavior: Behavior normal.         DATA:   EKG: (personally reviewed tracing)    Laboratory:  CBC:  Recent Labs   Lab 03/17/22  1419 03/21/22  1110 05/10/22  1413   WBC 10.08 7.55 6.48   Hemoglobin 9.4 L 9.5 L 11.7 L   Hematocrit 30.0 L 30.4 L 36.0 L   Platelets 181 265 174       CHEMISTRIES:  Recent Labs   Lab 02/23/22  1213 03/13/22  0755 03/17/22  1419 03/21/22  1110 05/10/22  1413   Glucose 95 94 106 101 93   Sodium 144 137 136 140 139   Potassium 3.9 3.6 4.5 4.7 4.1   BUN 53 H 49 H 76 H 78 H 45 H   Creatinine 3.2 H 3.1 H 4.0 H 3.3 H 2.7 H   eGFR if  20.9 A 22 A 16.0 A 20.1 A 25.7 A   eGFR if non  18.1 A 19 A 13.8 A 17.4 A 22.2 A   Calcium 9.4 9.2 9.6 9.6 9.3   Magnesium 2.2 2.3  --   --   --        CARDIAC BIOMARKERS:  Recent Labs   Lab 03/13/22  0755 "    H       COAGS:  Recent Labs   Lab 04/05/22  1101 04/12/22  0956 05/03/22  1327   INR 1.8 H 2.7 H 2.6 H       LIPIDS/LFTS:  Recent Labs   Lab 02/02/22  0945 02/23/22  1213 03/17/22  1419 03/21/22  1110 05/10/22  1413   Cholesterol 154  --   --   --   --    Triglycerides 171 H  --   --   --   --    HDL 33 L  --   --   --   --    LDL Cholesterol 86.8  --   --   --   --    Non-HDL Cholesterol 121  --   --   --   --    AST 15   < > 29 28 20   ALT 11   < > 17 21 14    < > = values in this interval not displayed.       Cardiovascular Testing:    Echocardiogram 03/02/2022:    · The left ventricle is normal in size with mild concentric hypertrophy and normal systolic function.  · The estimated ejection fraction is 55%.  · Normal left ventricular diastolic function.  · Severe left atrial enlargement.  · Mild right atrial enlargement.  · Normal right ventricular size with normal right ventricular systolic function.  · Normal central venous pressure (3 mmHg).  · The estimated PA systolic pressure is 9 mmHg.    Echocardiogram 08/18/2021:  Ejection fraction of 60-65%.  Normal right ventricular size and function.  Severe left atrial enlargement.  Status post Bentall procedure with a 27 mm magna ease bioprosthesis in the aortic position.  Mild to moderate mitral regurgitation.     Right/left heart catheterization 07/16/2021:     Nonobstructive coronary artery disease with the exception of a ramus intermedius that was of small caliber.  Mild pulmonary hypertension.  Elevated V-wave in setting of severe mitral regurgitation.  V-wave of 43 mm Hg.  Left anterior descending artery with a mid to distal 30-40% stenosis.  Ramus intermedius with a mid 70 80% stenosis.  Small caliber vessel.  Left circumflex artery:  Non dominant.  Ectatic and aneurysmal.  Mid 40% stenosis in the AV groove followed by a 40-50% stenosis prior to the bifurcation of OM 2.   Right coronary artery is ectatic and aneurysmal with a mid 20-30% stenosis.   Superior branch of the PDA has a 50% stenosis proximally in the inferior branch has luminal irregularities.  PLV branch with a proximal 30-40% stenosis followed by mid 40-50% stenosis.     Echocardiogram 05/24/2021:  Ejection fraction 45-50%.  Severe left atrial enlargement.  Bioprosthetic valve in aortic position (27 mm magna ease bioprosthesis).  Moderate to severe mitral regurgitation     Echocardiogram 03/31/2020:  Ejection fraction 54%.  Aortic bioprosthesis.  No perivalvular regurgitation.    ASSESSMENT:     1. Aortic valve prosthesis: Secondary to ascending aortic aneurysm/aortic insufficiency.  27 mm magna ease bioprosthesis.  2. Mitral regurgitation:   trivial by last echocardiogram  3. Permanent pacemaker/sick sinus syndrome  4. Paroxysmal atrial fibrillation  5. Chronic anticoagulation  6. Nonobstructive coronary artery disease  7. Hypertension  8. Hyperlipidemia:  LDL 86  9. Chronic kidney disease  10. Obesity      PLAN:     1. Aortic valve prosthesis:  Follow-up echocardiogram showed normal functioning  2. Mitral regurgitation:   monitor. Trivial by last echo  3. Permanent pacemaker:  Obtain device information.   4. Paroxysmal atrial fibrillation:  Rate control.  Anticoagulation.  5. Nonobstructive coronary artery disease:  By heart catheterization in 2021. Risk factor modification.  6. Hypertension:  Continue current management.  7. Hyperlipidemia:  Continue current management.  8. Return to clinic 6 months. Sooner depending on device interrogation.           Vivek Hannon MD, MPH, FACC, [facility]

## 2022-06-07 ENCOUNTER — ANTI-COAG VISIT (OUTPATIENT)
Dept: CARDIOLOGY | Facility: CLINIC | Age: 72
End: 2022-06-07
Payer: MEDICARE

## 2022-06-07 ENCOUNTER — LAB VISIT (OUTPATIENT)
Dept: LAB | Facility: HOSPITAL | Age: 72
End: 2022-06-07
Attending: INTERNAL MEDICINE
Payer: MEDICARE

## 2022-06-07 DIAGNOSIS — Z79.01 LONG TERM (CURRENT) USE OF ANTICOAGULANTS: ICD-10-CM

## 2022-06-07 DIAGNOSIS — Z86.79 HISTORY OF ATRIAL FIBRILLATION: ICD-10-CM

## 2022-06-07 DIAGNOSIS — Z86.79 HISTORY OF ATRIAL FIBRILLATION: Primary | ICD-10-CM

## 2022-06-07 LAB
INR PPP: 2.4 (ref 0.8–1.2)
PROTHROMBIN TIME: 24.9 SEC (ref 9–12.5)

## 2022-06-07 PROCEDURE — 85610 PROTHROMBIN TIME: CPT | Performed by: INTERNAL MEDICINE

## 2022-06-07 PROCEDURE — 36415 COLL VENOUS BLD VENIPUNCTURE: CPT | Mod: PO | Performed by: INTERNAL MEDICINE

## 2022-06-07 PROCEDURE — 93793 ANTICOAG MGMT PT WARFARIN: CPT | Mod: S$GLB,,,

## 2022-06-07 PROCEDURE — 93793 PR ANTICOAGULANT MGMT FOR PT TAKING WARFARIN: ICD-10-PCS | Mod: S$GLB,,,

## 2022-06-27 ENCOUNTER — OFFICE VISIT (OUTPATIENT)
Dept: FAMILY MEDICINE | Facility: CLINIC | Age: 72
End: 2022-06-27
Payer: MEDICARE

## 2022-06-27 ENCOUNTER — ANTI-COAG VISIT (OUTPATIENT)
Dept: CARDIOLOGY | Facility: CLINIC | Age: 72
End: 2022-06-27
Payer: MEDICARE

## 2022-06-27 ENCOUNTER — LAB VISIT (OUTPATIENT)
Dept: LAB | Facility: HOSPITAL | Age: 72
End: 2022-06-27
Attending: INTERNAL MEDICINE
Payer: MEDICARE

## 2022-06-27 VITALS
TEMPERATURE: 98 F | RESPIRATION RATE: 16 BRPM | WEIGHT: 228.81 LBS | HEART RATE: 60 BPM | DIASTOLIC BLOOD PRESSURE: 72 MMHG | OXYGEN SATURATION: 97 % | HEIGHT: 69 IN | BODY MASS INDEX: 33.89 KG/M2 | SYSTOLIC BLOOD PRESSURE: 130 MMHG

## 2022-06-27 DIAGNOSIS — Z86.79 HISTORY OF ATRIAL FIBRILLATION: ICD-10-CM

## 2022-06-27 DIAGNOSIS — Z86.79 HISTORY OF ATRIAL FIBRILLATION: Primary | ICD-10-CM

## 2022-06-27 DIAGNOSIS — I10 HYPERTENSION, UNSPECIFIED TYPE: ICD-10-CM

## 2022-06-27 DIAGNOSIS — F41.9 ANXIETY AND DEPRESSION: ICD-10-CM

## 2022-06-27 DIAGNOSIS — E66.09 CLASS 1 OBESITY DUE TO EXCESS CALORIES WITH SERIOUS COMORBIDITY AND BODY MASS INDEX (BMI) OF 33.0 TO 33.9 IN ADULT: ICD-10-CM

## 2022-06-27 DIAGNOSIS — I77.0 A-V FISTULA: ICD-10-CM

## 2022-06-27 DIAGNOSIS — Z86.16 HISTORY OF COVID-19: Primary | ICD-10-CM

## 2022-06-27 DIAGNOSIS — F32.A ANXIETY AND DEPRESSION: ICD-10-CM

## 2022-06-27 DIAGNOSIS — I34.0 MITRAL VALVE INSUFFICIENCY, UNSPECIFIED ETIOLOGY: ICD-10-CM

## 2022-06-27 DIAGNOSIS — M10.9 GOUT, UNSPECIFIED CAUSE, UNSPECIFIED CHRONICITY, UNSPECIFIED SITE: ICD-10-CM

## 2022-06-27 DIAGNOSIS — Z79.01 LONG TERM (CURRENT) USE OF ANTICOAGULANTS: ICD-10-CM

## 2022-06-27 DIAGNOSIS — Z95.0 PACEMAKER: ICD-10-CM

## 2022-06-27 DIAGNOSIS — Z85.46 HISTORY OF PROSTATE CANCER: ICD-10-CM

## 2022-06-27 DIAGNOSIS — I25.10 CORONARY ARTERY DISEASE, UNSPECIFIED VESSEL OR LESION TYPE, UNSPECIFIED WHETHER ANGINA PRESENT, UNSPECIFIED WHETHER NATIVE OR TRANSPLANTED HEART: ICD-10-CM

## 2022-06-27 DIAGNOSIS — N18.4 CKD (CHRONIC KIDNEY DISEASE), STAGE IV: ICD-10-CM

## 2022-06-27 LAB
INR PPP: 2.4 (ref 0.8–1.2)
PROTHROMBIN TIME: 24.5 SEC (ref 9–12.5)

## 2022-06-27 PROCEDURE — 3075F PR MOST RECENT SYSTOLIC BLOOD PRESS GE 130-139MM HG: ICD-10-PCS | Mod: CPTII,S$GLB,, | Performed by: INTERNAL MEDICINE

## 2022-06-27 PROCEDURE — 3044F PR MOST RECENT HEMOGLOBIN A1C LEVEL <7.0%: ICD-10-PCS | Mod: CPTII,S$GLB,, | Performed by: INTERNAL MEDICINE

## 2022-06-27 PROCEDURE — 1159F MED LIST DOCD IN RCRD: CPT | Mod: CPTII,S$GLB,, | Performed by: INTERNAL MEDICINE

## 2022-06-27 PROCEDURE — 3075F SYST BP GE 130 - 139MM HG: CPT | Mod: CPTII,S$GLB,, | Performed by: INTERNAL MEDICINE

## 2022-06-27 PROCEDURE — 3060F POS MICROALBUMINURIA REV: CPT | Mod: CPTII,S$GLB,, | Performed by: INTERNAL MEDICINE

## 2022-06-27 PROCEDURE — 1159F PR MEDICATION LIST DOCUMENTED IN MEDICAL RECORD: ICD-10-PCS | Mod: CPTII,S$GLB,, | Performed by: INTERNAL MEDICINE

## 2022-06-27 PROCEDURE — 3066F PR DOCUMENTATION OF TREATMENT FOR NEPHROPATHY: ICD-10-PCS | Mod: CPTII,S$GLB,, | Performed by: INTERNAL MEDICINE

## 2022-06-27 PROCEDURE — 99214 OFFICE O/P EST MOD 30 MIN: CPT | Mod: S$GLB,,, | Performed by: INTERNAL MEDICINE

## 2022-06-27 PROCEDURE — 1101F PR PT FALLS ASSESS DOC 0-1 FALLS W/OUT INJ PAST YR: ICD-10-PCS | Mod: CPTII,S$GLB,, | Performed by: INTERNAL MEDICINE

## 2022-06-27 PROCEDURE — 99999 PR PBB SHADOW E&M-EST. PATIENT-LVL IV: ICD-10-PCS | Mod: PBBFAC,,, | Performed by: INTERNAL MEDICINE

## 2022-06-27 PROCEDURE — 3066F NEPHROPATHY DOC TX: CPT | Mod: CPTII,S$GLB,, | Performed by: INTERNAL MEDICINE

## 2022-06-27 PROCEDURE — 1160F RVW MEDS BY RX/DR IN RCRD: CPT | Mod: CPTII,S$GLB,, | Performed by: INTERNAL MEDICINE

## 2022-06-27 PROCEDURE — 99999 PR PBB SHADOW E&M-EST. PATIENT-LVL IV: CPT | Mod: PBBFAC,,, | Performed by: INTERNAL MEDICINE

## 2022-06-27 PROCEDURE — 4010F PR ACE/ARB THEARPY RXD/TAKEN: ICD-10-PCS | Mod: CPTII,S$GLB,, | Performed by: INTERNAL MEDICINE

## 2022-06-27 PROCEDURE — 3288F FALL RISK ASSESSMENT DOCD: CPT | Mod: CPTII,S$GLB,, | Performed by: INTERNAL MEDICINE

## 2022-06-27 PROCEDURE — 3008F PR BODY MASS INDEX (BMI) DOCUMENTED: ICD-10-PCS | Mod: CPTII,S$GLB,, | Performed by: INTERNAL MEDICINE

## 2022-06-27 PROCEDURE — 3044F HG A1C LEVEL LT 7.0%: CPT | Mod: CPTII,S$GLB,, | Performed by: INTERNAL MEDICINE

## 2022-06-27 PROCEDURE — 1126F AMNT PAIN NOTED NONE PRSNT: CPT | Mod: CPTII,S$GLB,, | Performed by: INTERNAL MEDICINE

## 2022-06-27 PROCEDURE — 3078F DIAST BP <80 MM HG: CPT | Mod: CPTII,S$GLB,, | Performed by: INTERNAL MEDICINE

## 2022-06-27 PROCEDURE — 1101F PT FALLS ASSESS-DOCD LE1/YR: CPT | Mod: CPTII,S$GLB,, | Performed by: INTERNAL MEDICINE

## 2022-06-27 PROCEDURE — 1126F PR PAIN SEVERITY QUANTIFIED, NO PAIN PRESENT: ICD-10-PCS | Mod: CPTII,S$GLB,, | Performed by: INTERNAL MEDICINE

## 2022-06-27 PROCEDURE — 3008F BODY MASS INDEX DOCD: CPT | Mod: CPTII,S$GLB,, | Performed by: INTERNAL MEDICINE

## 2022-06-27 PROCEDURE — 99214 PR OFFICE/OUTPT VISIT, EST, LEVL IV, 30-39 MIN: ICD-10-PCS | Mod: S$GLB,,, | Performed by: INTERNAL MEDICINE

## 2022-06-27 PROCEDURE — 3288F PR FALLS RISK ASSESSMENT DOCUMENTED: ICD-10-PCS | Mod: CPTII,S$GLB,, | Performed by: INTERNAL MEDICINE

## 2022-06-27 PROCEDURE — 4010F ACE/ARB THERAPY RXD/TAKEN: CPT | Mod: CPTII,S$GLB,, | Performed by: INTERNAL MEDICINE

## 2022-06-27 PROCEDURE — 3060F PR POS MICROALBUMINURIA RESULT DOCUMENTED/REVIEW: ICD-10-PCS | Mod: CPTII,S$GLB,, | Performed by: INTERNAL MEDICINE

## 2022-06-27 PROCEDURE — 3078F PR MOST RECENT DIASTOLIC BLOOD PRESSURE < 80 MM HG: ICD-10-PCS | Mod: CPTII,S$GLB,, | Performed by: INTERNAL MEDICINE

## 2022-06-27 PROCEDURE — 1160F PR REVIEW ALL MEDS BY PRESCRIBER/CLIN PHARMACIST DOCUMENTED: ICD-10-PCS | Mod: CPTII,S$GLB,, | Performed by: INTERNAL MEDICINE

## 2022-06-27 PROCEDURE — 85610 PROTHROMBIN TIME: CPT | Performed by: INTERNAL MEDICINE

## 2022-06-27 RX ORDER — CALCITRIOL 0.25 UG/1
0.25 CAPSULE ORAL DAILY
Qty: 90 CAPSULE | Refills: 1 | Status: SHIPPED | OUTPATIENT
Start: 2022-06-27 | End: 2022-12-29

## 2022-06-27 RX ORDER — VALSARTAN 160 MG/1
160 TABLET ORAL DAILY
COMMUNITY
Start: 2022-05-05 | End: 2022-06-27

## 2022-06-27 NOTE — PROGRESS NOTES
Subjective:       Patient ID: Vivek Lema is a pleasant 72 y.o. White male patient    Chief Complaint: Follow-up      Patient is a pt I saw last on 03/17/2022. See my last notes and the list of problems below.    HPI     Pt with a long and complicated PMH, moved recently from [place].  In the interval, got COVID on 05/21/2022 alongside his wife..  Referred for infusion of bebtelovimab, good recovery, fell fatigued for 2 weeks of fatigue. Pleased of infusion.  Seen by Dr. Hannon on 05/31/2022 for PAF.Will check PM in 2 months. Has to do blood work for Dr. Doe, Nephrologist, on 08/22/2022, then visit with him beginning of September.  Would like to check PSA alongside BMP.  He is here with his wife today who is my next pt.     Patient Active Problem List   Diagnosis    Hypertension    CKD (chronic kidney disease), stage IV    A-V fistula    Coronary artery disease    History of atrial fibrillation    Pacemaker    Nonrheumatic mitral valve regurgitation    Class 1 obesity due to excess calories with serious comorbidity and body mass index (BMI) of 32.0 to 32.9 in adult    Gout    Long term (current) use of anticoagulants    COVID          ACTIVE MEDICAL ISSUES:  Documented in Problem List     PAST MEDICAL HISTORY  Documented     PAST SURGICAL HISTORY:  Documented     SOCIAL HISTORY:  Documented     FAMILY HISTORY:  Documented     ALLERGIES AND MEDICATIONS: updated and reviewed.  Documented    Review of Systems   Constitutional: Negative.    HENT: Negative.    Respiratory: Negative.    Cardiovascular: Negative.    Gastrointestinal: Negative.    Skin: Negative.    Neurological: Negative.    All other systems reviewed and are negative.      Objective:      Physical Exam  Vitals and nursing note reviewed.   Constitutional:       Appearance: He is well-developed. He is obese.   HENT:      Right Ear: Tympanic membrane and external ear normal.      Left Ear: Tympanic membrane and external ear normal.   Eyes:     "  Conjunctiva/sclera: Conjunctivae normal.   Neck:      Thyroid: No thyromegaly.   Cardiovascular:      Rate and Rhythm: Normal rate and regular rhythm.      Pulses: Normal pulses.      Heart sounds: Normal heart sounds.   Pulmonary:      Effort: Pulmonary effort is normal.      Breath sounds: Normal breath sounds. No wheezing.   Chest:      Chest wall: No tenderness.   Abdominal:      General: Bowel sounds are normal.      Palpations: Abdomen is soft. There is no mass.      Tenderness: There is no abdominal tenderness.   Musculoskeletal:         General: No tenderness or deformity. Normal range of motion.      Cervical back: Normal range of motion.   Lymphadenopathy:      Cervical: No cervical adenopathy.   Skin:     General: Skin is warm and dry.   Neurological:      Mental Status: He is alert and oriented to person, place, and time.   Psychiatric:         Mood and Affect: Mood normal.         Behavior: Behavior normal.         Thought Content: Thought content normal.         Judgment: Judgment normal.         Vitals:    06/27/22 1314   BP: 130/72   BP Location: Right arm   Patient Position: Sitting   BP Method: Small (Manual)   Pulse: 60   Resp: 16   Temp: 98.2 °F (36.8 °C)   TempSrc: Oral   SpO2: 97%   Weight: 103.8 kg (228 lb 13.4 oz)   Height: 5' 9" (1.753 m)     Body mass index is 33.79 kg/m².    RESULTS: Reviewed labs from last 12 months    Last Lab Results:     Lab Results   Component Value Date    WBC 6.48 05/10/2022    HGB 11.7 (L) 05/10/2022    HCT 36.0 (L) 05/10/2022     05/10/2022     06/27/2022    K 4.0 06/27/2022     06/27/2022    CO2 25 06/27/2022    BUN 41 (H) 06/27/2022    CREATININE 2.6 (H) 06/27/2022    CALCIUM 9.0 06/27/2022    ALBUMIN 3.6 05/10/2022    AST 20 05/10/2022    ALT 14 05/10/2022    CHOL 154 02/02/2022    TRIG 171 (H) 02/02/2022    HDL 33 (L) 02/02/2022    LDLCALC 86.8 02/02/2022    HGBA1C 5.1 02/02/2022    TSH 1.782 02/02/2022    PSA <0.01 02/02/2022 "       Echocardiogram 03/02/2022:     · The left ventricle is normal in size with mild concentric hypertrophy and normal systolic function.  · The estimated ejection fraction is 55%.  · Normal left ventricular diastolic function.  · Severe left atrial enlargement.  · Mild right atrial enlargement.  · Normal right ventricular size with normal right ventricular systolic function.  · Normal central venous pressure (3 mmHg).  · The estimated PA systolic pressure is 9 mmHg.    Right/left heart catheterization 07/16/2021:     Nonobstructive coronary artery disease with the exception of a ramus intermedius that was of small caliber.  Mild pulmonary hypertension.  Elevated V-wave in setting of severe mitral regurgitation.  V-wave of 43 mm Hg.  Left anterior descending artery with a mid to distal 30-40% stenosis.  Ramus intermedius with a mid 70 80% stenosis.  Small caliber vessel.  Left circumflex artery:  Non dominant.  Ectatic and aneurysmal.  Mid 40% stenosis in the AV groove followed by a 40-50% stenosis prior to the bifurcation of OM 2.   Right coronary artery is ectatic and aneurysmal with a mid 20-30% stenosis.  Superior branch of the PDA has a 50% stenosis proximally in the inferior branch has luminal irregularities.  PLV branch with a proximal 30-40% stenosis followed by mid 40-50% stenosis.     Assessment:       1. History of COVID-19    2. Hypertension, unspecified type    3. Class 1 obesity due to excess calories with serious comorbidity and body mass index (BMI) of 33.0 to 33.9 in adult    4. CKD (chronic kidney disease), stage IV    5. A-V fistula    6. Coronary artery disease, unspecified vessel or lesion type, unspecified whether angina present, unspecified whether native or transplanted heart    7. Mitral valve insufficiency, unspecified etiology    8. History of atrial fibrillation    9. Pacemaker    10. Anxiety and depression    11. Gout, unspecified cause, unspecified chronicity, unspecified site    12.  History of prostate cancer        Plan:   Vivek was seen today for follow-up.    Diagnoses and all orders for this visit:    History of COVID-19    See HPI. Good recovery. Advised to take the second booster 4 months after receiving EUA infusion.    Hypertension, unspecified type    BP at goal, same treatment.    Class 1 obesity due to excess calories with serious comorbidity and body mass index (BMI) of 33.0 to 33.9 in adult    Working on his lifestyle.    CKD (chronic kidney disease), stage IV  -     Basic Metabolic Panel; Future  -     calcitRIOL (ROCALTROL) 0.25 MCG Cap; Take 1 capsule (0.25 mcg total) by mouth once daily.    F-up Dr. Doe, blood work scheduled in August before seeing him but pt would like to recheck kidney function after COVID.     A-V fistula    Same, but never had to undergo dialysis.    Coronary artery disease, unspecified vessel or lesion type, unspecified whether angina present, unspecified whether native or transplanted heart    F-up Dr. Hannon.    Mitral valve insufficiency, unspecified etiology    See notes from Cardiology.    History of atrial fibrillation    On Coumadin.    Pacemaker    Will be checked in 2 months.    Anxiety and depression    Seems to be doing well, tries to adjust to [place].    Gout, unspecified cause, unspecified chronicity, unspecified site    No recent flare.    History of prostate cancer  -     PROSTATE SPECIFIC ANTIGEN, DIAGNOSTIC; Future    Will check PSA at his request.    Will take Shingrix at the pharmacy.    Follow up in about 3 months (around 9/27/2022) for f-up .    This note was created by combination of typed  and M-Modal dictation.  Transcription errors may be present.  If there are any questions, please contact me.

## 2022-06-27 NOTE — PROGRESS NOTES
Health Maintenance Due   Topic     Hepatitis C Screening  CONSULT WITH PCP    Colorectal Cancer Screening  CONSULT WITH PCP    Shingles Vaccine (1 of 2) hx chickenpox ; inform pt can get vaccine at pharmacy.    Pneumococcal Vaccines (Age 65+) (1 - PCV) CONSULT WITH PCP    COVID-19 Vaccine (4 - Booster for Pfizer series) CONSULT WITH PCP

## 2022-07-25 ENCOUNTER — ANTI-COAG VISIT (OUTPATIENT)
Dept: CARDIOLOGY | Facility: CLINIC | Age: 72
End: 2022-07-25
Payer: MEDICARE

## 2022-07-25 ENCOUNTER — LAB VISIT (OUTPATIENT)
Dept: LAB | Facility: HOSPITAL | Age: 72
End: 2022-07-25
Attending: INTERNAL MEDICINE
Payer: MEDICARE

## 2022-07-25 ENCOUNTER — PATIENT MESSAGE (OUTPATIENT)
Dept: ADMINISTRATIVE | Facility: HOSPITAL | Age: 72
End: 2022-07-25
Payer: MEDICARE

## 2022-07-25 DIAGNOSIS — Z79.01 LONG TERM (CURRENT) USE OF ANTICOAGULANTS: ICD-10-CM

## 2022-07-25 DIAGNOSIS — Z86.79 HISTORY OF ATRIAL FIBRILLATION: ICD-10-CM

## 2022-07-25 DIAGNOSIS — Z86.79 HISTORY OF ATRIAL FIBRILLATION: Primary | ICD-10-CM

## 2022-07-25 LAB
INR PPP: 2.1 (ref 0.8–1.2)
PROTHROMBIN TIME: 21.4 SEC (ref 9–12.5)

## 2022-07-25 PROCEDURE — 36415 COLL VENOUS BLD VENIPUNCTURE: CPT | Mod: PO | Performed by: INTERNAL MEDICINE

## 2022-07-25 PROCEDURE — 93793 ANTICOAG MGMT PT WARFARIN: CPT | Mod: S$GLB,,,

## 2022-07-25 PROCEDURE — 85610 PROTHROMBIN TIME: CPT | Performed by: INTERNAL MEDICINE

## 2022-07-25 PROCEDURE — 93793 PR ANTICOAGULANT MGMT FOR PT TAKING WARFARIN: ICD-10-PCS | Mod: S$GLB,,,

## 2022-08-07 DIAGNOSIS — I10 HYPERTENSION, UNSPECIFIED TYPE: ICD-10-CM

## 2022-08-07 NOTE — TELEPHONE ENCOUNTER
No new care gaps identified.  [facility] Embedded Care Gaps. Reference number: [id number]. 8/07/2022   8:29:05 AM YARA

## 2022-08-08 NOTE — TELEPHONE ENCOUNTER
Refill Routing Note   Medication(s) are not appropriate for processing by [facility] for the following reason(s):      - Patient states taking requested medication(s) differently than prescribed    [facility] action(s):  Defer       Medication Therapy Plan: Patient keeps picking different strengths of diovan.  Medication reconciliation completed: No     Appointments  past 12m or future 3m with PCP    Date Provider   Last Visit   6/27/2022 Michelle Conway MD   Next Visit   9/28/2022 Michelle Conway MD   ED visits in past 90 days: 0        Note composed:11:19 AM 08/08/2022

## 2022-08-11 RX ORDER — VALSARTAN 80 MG/1
80 TABLET ORAL DAILY
Qty: 90 TABLET | Refills: 0 | OUTPATIENT
Start: 2022-08-11

## 2022-08-11 RX ORDER — VALSARTAN 160 MG/1
TABLET ORAL
Qty: 90 TABLET | Refills: 1 | OUTPATIENT
Start: 2022-08-11

## 2022-08-15 ENCOUNTER — OFFICE VISIT (OUTPATIENT)
Dept: CARDIOLOGY | Facility: CLINIC | Age: 72
End: 2022-08-15
Payer: MEDICARE

## 2022-08-15 VITALS
SYSTOLIC BLOOD PRESSURE: 124 MMHG | RESPIRATION RATE: 16 BRPM | OXYGEN SATURATION: 97 % | WEIGHT: 226.44 LBS | BODY MASS INDEX: 33.44 KG/M2 | DIASTOLIC BLOOD PRESSURE: 72 MMHG | HEART RATE: 60 BPM

## 2022-08-15 DIAGNOSIS — E66.09 CLASS 1 OBESITY DUE TO EXCESS CALORIES WITH SERIOUS COMORBIDITY AND BODY MASS INDEX (BMI) OF 33.0 TO 33.9 IN ADULT: ICD-10-CM

## 2022-08-15 DIAGNOSIS — I10 PRIMARY HYPERTENSION: ICD-10-CM

## 2022-08-15 DIAGNOSIS — E78.49 OTHER HYPERLIPIDEMIA: ICD-10-CM

## 2022-08-15 DIAGNOSIS — Z95.2 S/P AVR (AORTIC VALVE REPLACEMENT) AND AORTOPLASTY: ICD-10-CM

## 2022-08-15 DIAGNOSIS — Z79.01 LONG TERM (CURRENT) USE OF ANTICOAGULANTS: ICD-10-CM

## 2022-08-15 DIAGNOSIS — I48.0 PAF (PAROXYSMAL ATRIAL FIBRILLATION): ICD-10-CM

## 2022-08-15 DIAGNOSIS — I25.10 CORONARY ARTERY DISEASE INVOLVING NATIVE CORONARY ARTERY OF NATIVE HEART WITHOUT ANGINA PECTORIS: ICD-10-CM

## 2022-08-15 DIAGNOSIS — Z95.0 PACEMAKER: Primary | ICD-10-CM

## 2022-08-15 PROCEDURE — 3066F NEPHROPATHY DOC TX: CPT | Mod: CPTII,S$GLB,, | Performed by: INTERNAL MEDICINE

## 2022-08-15 PROCEDURE — 3288F FALL RISK ASSESSMENT DOCD: CPT | Mod: CPTII,S$GLB,, | Performed by: INTERNAL MEDICINE

## 2022-08-15 PROCEDURE — 3044F HG A1C LEVEL LT 7.0%: CPT | Mod: CPTII,S$GLB,, | Performed by: INTERNAL MEDICINE

## 2022-08-15 PROCEDURE — 1159F PR MEDICATION LIST DOCUMENTED IN MEDICAL RECORD: ICD-10-PCS | Mod: CPTII,S$GLB,, | Performed by: INTERNAL MEDICINE

## 2022-08-15 PROCEDURE — 3008F PR BODY MASS INDEX (BMI) DOCUMENTED: ICD-10-PCS | Mod: CPTII,S$GLB,, | Performed by: INTERNAL MEDICINE

## 2022-08-15 PROCEDURE — 3074F SYST BP LT 130 MM HG: CPT | Mod: CPTII,S$GLB,, | Performed by: INTERNAL MEDICINE

## 2022-08-15 PROCEDURE — 3008F BODY MASS INDEX DOCD: CPT | Mod: CPTII,S$GLB,, | Performed by: INTERNAL MEDICINE

## 2022-08-15 PROCEDURE — 3044F PR MOST RECENT HEMOGLOBIN A1C LEVEL <7.0%: ICD-10-PCS | Mod: CPTII,S$GLB,, | Performed by: INTERNAL MEDICINE

## 2022-08-15 PROCEDURE — 1101F PR PT FALLS ASSESS DOC 0-1 FALLS W/OUT INJ PAST YR: ICD-10-PCS | Mod: CPTII,S$GLB,, | Performed by: INTERNAL MEDICINE

## 2022-08-15 PROCEDURE — 1160F RVW MEDS BY RX/DR IN RCRD: CPT | Mod: CPTII,S$GLB,, | Performed by: INTERNAL MEDICINE

## 2022-08-15 PROCEDURE — 93289 INTERROG DEVICE EVAL HEART: CPT | Mod: 26,,, | Performed by: INTERNAL MEDICINE

## 2022-08-15 PROCEDURE — 93289 PR INTERROG EVAL, IN PERSON,CARDVERT/DEFIB: ICD-10-PCS | Mod: 26,,, | Performed by: INTERNAL MEDICINE

## 2022-08-15 PROCEDURE — 4010F PR ACE/ARB THEARPY RXD/TAKEN: ICD-10-PCS | Mod: CPTII,S$GLB,, | Performed by: INTERNAL MEDICINE

## 2022-08-15 PROCEDURE — 1159F MED LIST DOCD IN RCRD: CPT | Mod: CPTII,S$GLB,, | Performed by: INTERNAL MEDICINE

## 2022-08-15 PROCEDURE — 1160F PR REVIEW ALL MEDS BY PRESCRIBER/CLIN PHARMACIST DOCUMENTED: ICD-10-PCS | Mod: CPTII,S$GLB,, | Performed by: INTERNAL MEDICINE

## 2022-08-15 PROCEDURE — 99999 PR PBB SHADOW E&M-EST. PATIENT-LVL IV: ICD-10-PCS | Mod: PBBFAC,,, | Performed by: INTERNAL MEDICINE

## 2022-08-15 PROCEDURE — 3060F POS MICROALBUMINURIA REV: CPT | Mod: CPTII,S$GLB,, | Performed by: INTERNAL MEDICINE

## 2022-08-15 PROCEDURE — 99999 PR PBB SHADOW E&M-EST. PATIENT-LVL IV: CPT | Mod: PBBFAC,,, | Performed by: INTERNAL MEDICINE

## 2022-08-15 PROCEDURE — 4010F ACE/ARB THERAPY RXD/TAKEN: CPT | Mod: CPTII,S$GLB,, | Performed by: INTERNAL MEDICINE

## 2022-08-15 PROCEDURE — 3288F PR FALLS RISK ASSESSMENT DOCUMENTED: ICD-10-PCS | Mod: CPTII,S$GLB,, | Performed by: INTERNAL MEDICINE

## 2022-08-15 PROCEDURE — 99214 OFFICE O/P EST MOD 30 MIN: CPT | Mod: 25,S$GLB,, | Performed by: INTERNAL MEDICINE

## 2022-08-15 PROCEDURE — 3066F PR DOCUMENTATION OF TREATMENT FOR NEPHROPATHY: ICD-10-PCS | Mod: CPTII,S$GLB,, | Performed by: INTERNAL MEDICINE

## 2022-08-15 PROCEDURE — 99214 PR OFFICE/OUTPT VISIT, EST, LEVL IV, 30-39 MIN: ICD-10-PCS | Mod: 25,S$GLB,, | Performed by: INTERNAL MEDICINE

## 2022-08-15 PROCEDURE — 1101F PT FALLS ASSESS-DOCD LE1/YR: CPT | Mod: CPTII,S$GLB,, | Performed by: INTERNAL MEDICINE

## 2022-08-15 PROCEDURE — 3060F PR POS MICROALBUMINURIA RESULT DOCUMENTED/REVIEW: ICD-10-PCS | Mod: CPTII,S$GLB,, | Performed by: INTERNAL MEDICINE

## 2022-08-15 PROCEDURE — 3074F PR MOST RECENT SYSTOLIC BLOOD PRESSURE < 130 MM HG: ICD-10-PCS | Mod: CPTII,S$GLB,, | Performed by: INTERNAL MEDICINE

## 2022-08-15 PROCEDURE — 3078F PR MOST RECENT DIASTOLIC BLOOD PRESSURE < 80 MM HG: ICD-10-PCS | Mod: CPTII,S$GLB,, | Performed by: INTERNAL MEDICINE

## 2022-08-15 PROCEDURE — 3078F DIAST BP <80 MM HG: CPT | Mod: CPTII,S$GLB,, | Performed by: INTERNAL MEDICINE

## 2022-08-15 NOTE — PROGRESS NOTES
CARDIOLOGY CLINIC VISIT        HISTORY OF PRESENT ILLNESS:     Vivek Lema presents for continued care. Last seen 05/31/2022.  Seen 02/08/2022 for establishment of care.History of aortic bioprosthesis secondary to aortic insufficiency with an aortic aneurysm.  Left atrial appendage ligation.  Sick sinus syndrome status post dual chamber pacemaker placement.  In August 2021 underwent attempted MitraClip procedure.  Unsuccessful percutaneous repair.  Patient was deemed unsuitable for open repair secondary to comorbidities.  Prior sternotomy.  Patient states that his shortness of breath is worsening.  Symptoms less than 1 block.  No PND orthopnea.    Echocardiogram for 03/02/2022 shows normal left ventricular systolic function.  Severe left atrial enlargement.  No pulmonary hypertension.  No mention of mitral regurgitation.  Patient still with complaints of shortness of breath on exertion.  Last visit wanted him to see someone at [facility] for evaluation.    05/31/2022:  Patient was evaluated by Dr. Morales.  Most recent echocardiogram did not show any significant mitral regurgitation.  Feels good.  No shortness of breath.  Blood pressure looks good.  Creatinine stable.    08/15/2022:  Feels good.  No complaints.  Pacemaker interrogated today.  36 seconds of atrial fibrillation noted.  PMT noted.  PVARP turned off.  RA decreased to 0.4 milliseconds.        CARDIOVASCULAR HISTORY:     Aortic bioprosthesis secondary to aortic aneurysm/aortic insufficiency  Atrial fibrillation  Mitral regurgitation  Cardiomyopathy  Permanent pacemaker secondary to sick sinus syndrome    PAST MEDICAL HISTORY:     Past Medical History:   Diagnosis Date    CAD (coronary artery disease)     Disorder of kidney and ureter        PAST SURGICAL HISTORY:     Past Surgical History:   Procedure Laterality Date    CORONARY ARTERY BYPASS GRAFT      with valve repair (aortic?)       ALLERGIES AND MEDICATION:   Review of patient's allergies  indicates:  No Known Allergies     Medication List          Accurate as of August 15, 2022  2:04 PM. If you have any questions, ask your nurse or doctor.            CONTINUE taking these medications    aspirin 81 MG EC tablet  Commonly known as: ECOTRIN  Take 1 tablet (81 mg total) by mouth once daily.     atorvastatin 40 MG tablet  Commonly known as: LIPITOR  Take 1 tablet (40 mg total) by mouth once daily.     calcitRIOL 0.25 MCG Cap  Commonly known as: ROCALTROL  Take 1 capsule (0.25 mcg total) by mouth once daily.     cholecalciferol (vitamin D3) 50 mcg (2,000 unit) Cap capsule  Commonly known as: VITAMIN D3     EScitalopram oxalate 20 MG tablet  Commonly known as: LEXAPRO  Take 1 tablet (20 mg total) by mouth once daily.     febuxostat 40 mg Tab  Commonly known as: ULORIC  Take 1 tablet (40 mg total) by mouth once daily.     nitroGLYCERIN 0.4 MG SL tablet  Commonly known as: NITROSTAT     torsemide 20 MG Tab  Commonly known as: DEMADEX  Take 3 tablets (60 mg total) by mouth once daily. Take 2 tab in am     valsartan 80 MG tablet  Commonly known as: DIOVAN  TAKE 1 TABLET(80 MG) BY MOUTH EVERY DAY     warfarin 2 MG tablet  Commonly known as: COUMADIN  AS PER INR TAKE 4 MG MONDAY, WEDNESDAY AND FRIDAY AND 2 MG ON THE OTHER DAYS            SOCIAL HISTORY:     Social History     Socioeconomic History    Marital status:    Tobacco Use    Smoking status: Never Smoker    Smokeless tobacco: Never Used   Substance and Sexual Activity    Alcohol use: Yes     Alcohol/week: 6.0 standard drinks     Types: 6 Cans of beer per week     Comment: occ    Drug use: Never       FAMILY HISTORY:   History reviewed. No pertinent family history.    REVIEW OF SYSTEMS:   Review of Systems   Respiratory: Negative for sputum production, shortness of breath and wheezing.    Cardiovascular: Negative for chest pain, palpitations, orthopnea, claudication, leg swelling and PND.   Gastrointestinal: Negative for abdominal pain,  heartburn, nausea and vomiting.   Neurological: Negative for dizziness, speech change, focal weakness, loss of consciousness, weakness and headaches.       PHYSICAL EXAM:     Vitals:    08/15/22 1302   BP: 124/72   Pulse: 60   Resp: 16    Body mass index is 33.44 kg/m².  Weight: 102.7 kg (226 lb 6.6 oz)         Physical Exam  Vitals reviewed.   Constitutional:       General: He is not in acute distress.     Appearance: He is well-developed and overweight. He is not diaphoretic.   Neck:      Vascular: No carotid bruit or JVD.   Cardiovascular:      Rate and Rhythm: Normal rate and regular rhythm.      Pulses: Normal pulses.      Heart sounds: Murmur heard.    Systolic murmur is present with a grade of 2/6.  Pulmonary:      Effort: Pulmonary effort is normal.      Breath sounds: Normal breath sounds.   Abdominal:      General: Bowel sounds are normal.      Palpations: Abdomen is soft.      Tenderness: There is no abdominal tenderness.   Musculoskeletal:      Right lower leg: No edema.      Left lower leg: No edema.   Neurological:      Mental Status: He is alert and oriented to person, place, and time.   Psychiatric:         Speech: Speech normal.         Behavior: Behavior normal.         DATA:   EKG: (personally reviewed tracing)    Laboratory:  CBC:  Recent Labs   Lab 03/17/22  1419 03/21/22  1110 05/10/22  1413   WBC 10.08 7.55 6.48   Hemoglobin 9.4 L 9.5 L 11.7 L   Hematocrit 30.0 L 30.4 L 36.0 L   Platelets 181 265 174       CHEMISTRIES:  Recent Labs   Lab 02/23/22  1213 03/13/22  0755 03/17/22  1419 03/21/22  1110 05/10/22  1413 06/27/22  1405   Glucose 95 94   < > 101 93 85   Sodium 144 137   < > 140 139 141   Potassium 3.9 3.6   < > 4.7 4.1 4.0   BUN 53 H 49 H   < > 78 H 45 H 41 H   Creatinine 3.2 H 3.1 H   < > 3.3 H 2.7 H 2.6 H   eGFR if  20.9 A 22 A   < > 20.1 A 25.7 A 27 A   eGFR if non  18.1 A 19 A   < > 17.4 A 22.2 A 23 A   Calcium 9.4 9.2   < > 9.6 9.3 9.0   Magnesium 2.2  2.3  --   --   --   --     < > = values in this interval not displayed.       CARDIAC BIOMARKERS:  Recent Labs   Lab 03/13/22  0755    H       COAGS:  Recent Labs   Lab 06/07/22  0947 06/27/22  1405 07/25/22  1032   INR 2.4 H 2.4 H  2.4 H 2.1 H       LIPIDS/LFTS:  Recent Labs   Lab 02/02/22  0945 02/23/22  1213 03/17/22  1419 03/21/22  1110 05/10/22  1413   Cholesterol 154  --   --   --   --    Triglycerides 171 H  --   --   --   --    HDL 33 L  --   --   --   --    LDL Cholesterol 86.8  --   --   --   --    Non-HDL Cholesterol 121  --   --   --   --    AST 15   < > 29 28 20   ALT 11   < > 17 21 14    < > = values in this interval not displayed.       Cardiovascular Testing:    Echocardiogram 03/02/2022:    · The left ventricle is normal in size with mild concentric hypertrophy and normal systolic function.  · The estimated ejection fraction is 55%.  · Normal left ventricular diastolic function.  · Severe left atrial enlargement.  · Mild right atrial enlargement.  · Normal right ventricular size with normal right ventricular systolic function.  · Normal central venous pressure (3 mmHg).  · The estimated PA systolic pressure is 9 mmHg.    Echocardiogram 08/18/2021:  Ejection fraction of 60-65%.  Normal right ventricular size and function.  Severe left atrial enlargement.  Status post Bentall procedure with a 27 mm magna ease bioprosthesis in the aortic position.  Mild to moderate mitral regurgitation.     Right/left heart catheterization 07/16/2021:     Nonobstructive coronary artery disease with the exception of a ramus intermedius that was of small caliber.  Mild pulmonary hypertension.  Elevated V-wave in setting of severe mitral regurgitation.  V-wave of 43 mm Hg.  Left anterior descending artery with a mid to distal 30-40% stenosis.  Ramus intermedius with a mid 70 80% stenosis.  Small caliber vessel.  Left circumflex artery:  Non dominant.  Ectatic and aneurysmal.  Mid 40% stenosis in the AV groove  followed by a 40-50% stenosis prior to the bifurcation of OM 2.   Right coronary artery is ectatic and aneurysmal with a mid 20-30% stenosis.  Superior branch of the PDA has a 50% stenosis proximally in the inferior branch has luminal irregularities.  PLV branch with a proximal 30-40% stenosis followed by mid 40-50% stenosis.     Echocardiogram 05/24/2021:  Ejection fraction 45-50%.  Severe left atrial enlargement.  Bioprosthetic valve in aortic position (27 mm magna ease bioprosthesis).  Moderate to severe mitral regurgitation     Echocardiogram 03/31/2020:  Ejection fraction 54%.  Aortic bioprosthesis.  No perivalvular regurgitation.    ASSESSMENT:     1. Aortic valve prosthesis: Secondary to ascending aortic aneurysm/aortic insufficiency.  27 mm magna ease bioprosthesis.  2. Mitral regurgitation:   trivial by last echocardiogram  3. Permanent pacemaker/sick sinus syndrome  4. Paroxysmal atrial fibrillation  5. Chronic anticoagulation  6. Nonobstructive coronary artery disease  7. Hypertension  8. Hyperlipidemia:  LDL 86  9. Chronic kidney disease  10. Obesity      PLAN:     1. Aortic valve prosthesis:  Follow-up echocardiogram showed normal functioning  2. Mitral regurgitation:   monitor. Trivial by last echo  3. Permanent pacemaker:  Interrogated today.  4. Paroxysmal atrial fibrillation:  Minimal noted PPM interrogation. Rate control.  Anticoagulation.  5. Nonobstructive coronary artery disease:  By heart catheterization in 2021. Risk factor modification.  6. Hypertension:  Continue current management.  7. Hyperlipidemia:  Continue current management.  8. Return to clinic 6 months. Device interrogation.         Vivek Hannon MD, MPH, FACC, [facility]

## 2022-08-29 ENCOUNTER — LAB VISIT (OUTPATIENT)
Dept: LAB | Facility: HOSPITAL | Age: 72
End: 2022-08-29
Attending: INTERNAL MEDICINE
Payer: MEDICARE

## 2022-08-29 ENCOUNTER — ANTI-COAG VISIT (OUTPATIENT)
Dept: CARDIOLOGY | Facility: CLINIC | Age: 72
End: 2022-08-29
Payer: MEDICARE

## 2022-08-29 DIAGNOSIS — Z86.79 HISTORY OF ATRIAL FIBRILLATION: Primary | ICD-10-CM

## 2022-08-29 DIAGNOSIS — N18.4 CKD (CHRONIC KIDNEY DISEASE), STAGE IV: ICD-10-CM

## 2022-08-29 DIAGNOSIS — Z79.01 LONG TERM (CURRENT) USE OF ANTICOAGULANTS: ICD-10-CM

## 2022-08-29 LAB
ALBUMIN/CREAT UR: 57.6 UG/MG (ref 0–30)
CREAT UR-MCNC: 59 MG/DL (ref 23–375)
CREAT UR-MCNC: 59 MG/DL (ref 23–375)
MICROALBUMIN UR DL<=1MG/L-MCNC: 34 UG/ML
PROT UR-MCNC: 7 MG/DL (ref 0–15)
PROT/CREAT UR: 0.12 MG/G{CREAT} (ref 0–0.2)

## 2022-08-29 PROCEDURE — 93793 PR ANTICOAGULANT MGMT FOR PT TAKING WARFARIN: ICD-10-PCS | Mod: S$GLB,,,

## 2022-08-29 PROCEDURE — 82570 ASSAY OF URINE CREATININE: CPT | Performed by: INTERNAL MEDICINE

## 2022-08-29 PROCEDURE — 93793 ANTICOAG MGMT PT WARFARIN: CPT | Mod: S$GLB,,,

## 2022-08-29 PROCEDURE — 82043 UR ALBUMIN QUANTITATIVE: CPT | Performed by: INTERNAL MEDICINE

## 2022-09-06 ENCOUNTER — TELEPHONE (OUTPATIENT)
Dept: NEPHROLOGY | Facility: CLINIC | Age: 72
End: 2022-09-06
Payer: MEDICARE

## 2022-09-06 NOTE — TELEPHONE ENCOUNTER
Pt came to lobby today and I scheduled appt with Dr. Doe     ----- Message from Angelito Navarrete sent at 9/6/2022  9:13 AM CDT -----  Contact: pt  Pt requesting call back RE: R/s appt that was missed, nothing available in epic. Please call        Confirmed contact below:  Contact Name:Vivek Lema  Phone Number: [phone]

## 2022-09-27 NOTE — PROGRESS NOTES
Subjective:       Patient ID: Vivek Lema is a pleasant 72 y.o. White male patient    Chief Complaint: Follow-up      Patient is a pt I saw last on 06/27/2022. See my last notes and the list of problems below.     HPI     In the interval:  - 08/15/2022 Dr. Hannon, Cardiology.  Pt with long and complicated PMH who comes today for a regular f-up visit.  Reports feeling fine, no change in medications as per Dr. Hannon.  He also has f-up with Dr. Doe from Nephrology.  Most recent blood work showing kidney function trending down.  He would like refill of Lortab he takes very sparingly when has a headache that is not improving with APAP (cannot take NSAIDs). His last refill was months ago.  He would like to be referred for colonoscopy. Last one was 5 year ago with polyps (in [place]).  Would like to have flu shot today.    Patient Active Problem List   Diagnosis    Hypertension    CKD (chronic kidney disease), stage IV    A-V fistula    Coronary artery disease    History of atrial fibrillation    Pacemaker    Nonrheumatic mitral valve regurgitation    Class 1 obesity due to excess calories with serious comorbidity and body mass index (BMI) of 32.0 to 32.9 in adult    Gout    Long term (current) use of anticoagulants    COVID          ACTIVE MEDICAL ISSUES:  Documented in Problem List     PAST MEDICAL HISTORY  Documented     PAST SURGICAL HISTORY:  Documented     SOCIAL HISTORY:  Documented     FAMILY HISTORY:  Documented     ALLERGIES AND MEDICATIONS: updated and reviewed.  Documented    Review of Systems   Constitutional: Negative.    HENT: Negative.     Respiratory: Negative.     Cardiovascular: Negative.    Gastrointestinal: Negative.    Skin: Negative.    Neurological: Negative.    All other systems reviewed and are negative.    Objective:      Physical Exam  Vitals and nursing note reviewed.   Constitutional:       Appearance: Normal appearance. He is well-developed. He is obese.   HENT:      Right Ear: Tympanic  "membrane and external ear normal.      Left Ear: Tympanic membrane and external ear normal.   Eyes:      Conjunctiva/sclera: Conjunctivae normal.   Neck:      Thyroid: No thyromegaly.   Cardiovascular:      Rate and Rhythm: Normal rate and regular rhythm.      Pulses: Normal pulses.      Heart sounds: Normal heart sounds.   Pulmonary:      Effort: Pulmonary effort is normal.      Breath sounds: Normal breath sounds. No wheezing.   Chest:      Chest wall: No tenderness.   Abdominal:      General: Bowel sounds are normal.      Palpations: Abdomen is soft. There is no mass.      Tenderness: There is no abdominal tenderness.   Musculoskeletal:         General: No tenderness or deformity. Normal range of motion.      Cervical back: Normal range of motion.   Lymphadenopathy:      Cervical: No cervical adenopathy.   Skin:     General: Skin is warm and dry.   Neurological:      Mental Status: He is alert and oriented to person, place, and time.   Psychiatric:         Mood and Affect: Mood normal.         Behavior: Behavior normal.         Thought Content: Thought content normal.         Judgment: Judgment normal.       Vitals:    09/28/22 0848 09/28/22 1336   BP: (!) 146/78 138/80   BP Location: Right arm Right arm   Patient Position: Sitting Sitting   BP Method: Large (Automatic) Large (Manual)   Pulse: 71    Resp: 16    Temp: 98.1 °F (36.7 °C)    TempSrc: Oral    SpO2: 96%    Weight: 104.6 kg (230 lb 9.6 oz)    Height: 5' 9" (1.753 m)      Body mass index is 34.05 kg/m².    RESULTS: Reviewed labs from last 12 months    Last Lab Results:     Lab Results   Component Value Date    WBC 7.33 08/29/2022    HGB 12.9 (L) 08/29/2022    HCT 40.1 08/29/2022     08/29/2022     08/29/2022    K 4.1 08/29/2022     08/29/2022    CO2 25 08/29/2022    BUN 31 (H) 08/29/2022    CREATININE 2.7 (H) 08/29/2022    CALCIUM 9.1 08/29/2022    ALBUMIN 3.6 08/29/2022    AST 19 08/29/2022    ALT 10 08/29/2022    CHOL 154 02/02/2022 "    TRIG 171 (H) 2022    HDL 33 (L) 2022    LDLCALC 86.8 2022    HGBA1C 5.1 2022    TSH 1.782 2022    PSA <0.01 2022       Assessment:       1. History of COVID-19    2. Hypertension, unspecified type    3. Class 1 obesity due to excess calories with serious comorbidity and body mass index (BMI) of 33.0 to 33.9 in adult    4. CKD (chronic kidney disease), stage IV    5. A-V fistula    6. Coronary artery disease, unspecified vessel or lesion type, unspecified whether angina present, unspecified whether native or transplanted heart    7. History of atrial fibrillation    8. Mitral valve insufficiency, unspecified etiology    9. Nonintractable headache, unspecified chronicity pattern, unspecified headache type    10. Need for influenza vaccination    11. Screening for malignant neoplasm of colon    12. Hemorrhoids, unspecified hemorrhoid type    13. Gout, unspecified cause, unspecified chronicity, unspecified site        Plan:   Vivek was seen today for follow-up.    Diagnoses and all orders for this visit:    History of COVID-19    Good recovery, received EUA infusion, will take the new COVID bivalent booster when 4 months from infusion.     Hypertension, unspecified type  -     valsartan (DIOVAN) 80 MG tablet; Take 1 tablet (80 mg total) by mouth once daily.    BP at goal at recheck, same treatment. Blood work reviewed.    Class 1 obesity due to excess calories with serious comorbidity and body mass index (BMI) of 33.0 to 33.9 in adult    Discussed lifestyle.   We discussed weight issues and safe, effective ways of losing pounds, includin) diet:  low carbohydrate, low fat diet, stay away from fast food, fried and processed food, use whole grain, lot of fruits and vegetables, use healthy fat such as avocado, nuts and olive oil in reasonable quantity, stay away from sodas. Regular meals with lean proteins.  2) physical activity: ideally 150 min a week, with cardiovascular and  resistance activity. WALKING ON THE LEVEE IS FINE!  Patient was encouraged to set realistic attainable goals for weight loss, and we will follow up periodically.    CKD (chronic kidney disease), stage IV    Monitored by Nephrologist. Stable, even better. No NSAIDs.    A-V fistula    Not in use.    Coronary artery disease, unspecified vessel or lesion type, unspecified whether angina present, unspecified whether native or transplanted heart  -     aspirin (ECOTRIN) 81 MG EC tablet; Take 1 tablet (81 mg total) by mouth once daily.    No symptoms. F-up Dr. Hannon.    History of atrial fibrillation    See above. On blood thinner.    Mitral valve insufficiency, unspecified etiology    Nonintractable headache, unspecified chronicity pattern, unspecified headache type  -     HYDROcodone-acetaminophen (NORCO) 7.5-325 mg per tablet; Take not more than one pill a day PRN as direct    See HPI. Using Norco more than sparingly as per his former PCP in [place].    Need for influenza vaccination  -     Influenza (FLUAD) - Quadrivalent (Adjuvanted) *Preferred* (65+) (PF)    Screening for malignant neoplasm of colon  -     Ambulatory referral/consult to Endo Procedure ; Future    Hemorrhoids, unspecified hemorrhoid type  -     hydrocortisone (ANUSOL-HC) 25 mg suppository; Place 1 suppository (25 mg total) rectally 2 (two) times daily as needed for Hemorrhoids.    Gout, unspecified cause, unspecified chronicity, unspecified site  -     febuxostat (ULORIC) 40 mg Tab; Take 1 tablet (40 mg total) by mouth once daily.    Follow up in about 3 months (around 12/28/2022) for f-up.    This note was created by combination of typed  and M-Modal dictation.  Transcription errors may be present.  If there are any questions, please contact me.

## 2022-09-28 ENCOUNTER — OFFICE VISIT (OUTPATIENT)
Dept: FAMILY MEDICINE | Facility: CLINIC | Age: 72
End: 2022-09-28
Payer: MEDICARE

## 2022-09-28 ENCOUNTER — LAB VISIT (OUTPATIENT)
Dept: LAB | Facility: HOSPITAL | Age: 72
End: 2022-09-28
Attending: INTERNAL MEDICINE
Payer: MEDICARE

## 2022-09-28 ENCOUNTER — ANTI-COAG VISIT (OUTPATIENT)
Dept: CARDIOLOGY | Facility: CLINIC | Age: 72
End: 2022-09-28
Payer: MEDICARE

## 2022-09-28 VITALS
HEART RATE: 71 BPM | DIASTOLIC BLOOD PRESSURE: 80 MMHG | OXYGEN SATURATION: 96 % | SYSTOLIC BLOOD PRESSURE: 138 MMHG | WEIGHT: 230.63 LBS | HEIGHT: 69 IN | TEMPERATURE: 98 F | BODY MASS INDEX: 34.16 KG/M2 | RESPIRATION RATE: 16 BRPM

## 2022-09-28 DIAGNOSIS — Z79.01 LONG TERM (CURRENT) USE OF ANTICOAGULANTS: ICD-10-CM

## 2022-09-28 DIAGNOSIS — Z86.16 HISTORY OF COVID-19: Primary | ICD-10-CM

## 2022-09-28 DIAGNOSIS — I77.0 A-V FISTULA: ICD-10-CM

## 2022-09-28 DIAGNOSIS — Z12.11 SCREENING FOR MALIGNANT NEOPLASM OF COLON: ICD-10-CM

## 2022-09-28 DIAGNOSIS — I25.10 CORONARY ARTERY DISEASE, UNSPECIFIED VESSEL OR LESION TYPE, UNSPECIFIED WHETHER ANGINA PRESENT, UNSPECIFIED WHETHER NATIVE OR TRANSPLANTED HEART: ICD-10-CM

## 2022-09-28 DIAGNOSIS — E66.09 CLASS 1 OBESITY DUE TO EXCESS CALORIES WITH SERIOUS COMORBIDITY AND BODY MASS INDEX (BMI) OF 33.0 TO 33.9 IN ADULT: ICD-10-CM

## 2022-09-28 DIAGNOSIS — N18.4 CKD (CHRONIC KIDNEY DISEASE), STAGE IV: ICD-10-CM

## 2022-09-28 DIAGNOSIS — M10.9 GOUT, UNSPECIFIED CAUSE, UNSPECIFIED CHRONICITY, UNSPECIFIED SITE: ICD-10-CM

## 2022-09-28 DIAGNOSIS — K64.9 HEMORRHOIDS, UNSPECIFIED HEMORRHOID TYPE: ICD-10-CM

## 2022-09-28 DIAGNOSIS — Z23 NEED FOR INFLUENZA VACCINATION: ICD-10-CM

## 2022-09-28 DIAGNOSIS — I10 HYPERTENSION, UNSPECIFIED TYPE: ICD-10-CM

## 2022-09-28 DIAGNOSIS — Z86.79 HISTORY OF ATRIAL FIBRILLATION: ICD-10-CM

## 2022-09-28 DIAGNOSIS — I34.0 MITRAL VALVE INSUFFICIENCY, UNSPECIFIED ETIOLOGY: ICD-10-CM

## 2022-09-28 DIAGNOSIS — R51.9 NONINTRACTABLE HEADACHE, UNSPECIFIED CHRONICITY PATTERN, UNSPECIFIED HEADACHE TYPE: ICD-10-CM

## 2022-09-28 DIAGNOSIS — Z86.79 HISTORY OF ATRIAL FIBRILLATION: Primary | ICD-10-CM

## 2022-09-28 LAB
INR PPP: 3.5 (ref 0.8–1.2)
PROTHROMBIN TIME: 35 SEC (ref 9–12.5)

## 2022-09-28 PROCEDURE — G0008 FLU VACCINE - QUADRIVALENT - ADJUVANTED: ICD-10-PCS | Mod: S$GLB,,, | Performed by: INTERNAL MEDICINE

## 2022-09-28 PROCEDURE — 1160F RVW MEDS BY RX/DR IN RCRD: CPT | Mod: CPTII,S$GLB,, | Performed by: INTERNAL MEDICINE

## 2022-09-28 PROCEDURE — 3008F BODY MASS INDEX DOCD: CPT | Mod: CPTII,S$GLB,, | Performed by: INTERNAL MEDICINE

## 2022-09-28 PROCEDURE — 3075F SYST BP GE 130 - 139MM HG: CPT | Mod: CPTII,S$GLB,, | Performed by: INTERNAL MEDICINE

## 2022-09-28 PROCEDURE — 4010F ACE/ARB THERAPY RXD/TAKEN: CPT | Mod: CPTII,S$GLB,, | Performed by: INTERNAL MEDICINE

## 2022-09-28 PROCEDURE — 3060F PR POS MICROALBUMINURIA RESULT DOCUMENTED/REVIEW: ICD-10-PCS | Mod: CPTII,S$GLB,, | Performed by: INTERNAL MEDICINE

## 2022-09-28 PROCEDURE — 3079F DIAST BP 80-89 MM HG: CPT | Mod: CPTII,S$GLB,, | Performed by: INTERNAL MEDICINE

## 2022-09-28 PROCEDURE — 99999 PR PBB SHADOW E&M-EST. PATIENT-LVL V: ICD-10-PCS | Mod: PBBFAC,,, | Performed by: INTERNAL MEDICINE

## 2022-09-28 PROCEDURE — 1159F PR MEDICATION LIST DOCUMENTED IN MEDICAL RECORD: ICD-10-PCS | Mod: CPTII,S$GLB,, | Performed by: INTERNAL MEDICINE

## 2022-09-28 PROCEDURE — 3079F PR MOST RECENT DIASTOLIC BLOOD PRESSURE 80-89 MM HG: ICD-10-PCS | Mod: CPTII,S$GLB,, | Performed by: INTERNAL MEDICINE

## 2022-09-28 PROCEDURE — 3075F PR MOST RECENT SYSTOLIC BLOOD PRESS GE 130-139MM HG: ICD-10-PCS | Mod: CPTII,S$GLB,, | Performed by: INTERNAL MEDICINE

## 2022-09-28 PROCEDURE — 90694 VACC AIIV4 NO PRSRV 0.5ML IM: CPT | Mod: S$GLB,,, | Performed by: INTERNAL MEDICINE

## 2022-09-28 PROCEDURE — G0008 ADMIN INFLUENZA VIRUS VAC: HCPCS | Mod: S$GLB,,, | Performed by: INTERNAL MEDICINE

## 2022-09-28 PROCEDURE — 3288F FALL RISK ASSESSMENT DOCD: CPT | Mod: CPTII,S$GLB,, | Performed by: INTERNAL MEDICINE

## 2022-09-28 PROCEDURE — 99214 OFFICE O/P EST MOD 30 MIN: CPT | Mod: S$GLB,,, | Performed by: INTERNAL MEDICINE

## 2022-09-28 PROCEDURE — 36415 COLL VENOUS BLD VENIPUNCTURE: CPT | Mod: PO | Performed by: INTERNAL MEDICINE

## 2022-09-28 PROCEDURE — 3060F POS MICROALBUMINURIA REV: CPT | Mod: CPTII,S$GLB,, | Performed by: INTERNAL MEDICINE

## 2022-09-28 PROCEDURE — 90694 FLU VACCINE - QUADRIVALENT - ADJUVANTED: ICD-10-PCS | Mod: S$GLB,,, | Performed by: INTERNAL MEDICINE

## 2022-09-28 PROCEDURE — 1160F PR REVIEW ALL MEDS BY PRESCRIBER/CLIN PHARMACIST DOCUMENTED: ICD-10-PCS | Mod: CPTII,S$GLB,, | Performed by: INTERNAL MEDICINE

## 2022-09-28 PROCEDURE — 3288F PR FALLS RISK ASSESSMENT DOCUMENTED: ICD-10-PCS | Mod: CPTII,S$GLB,, | Performed by: INTERNAL MEDICINE

## 2022-09-28 PROCEDURE — 3008F PR BODY MASS INDEX (BMI) DOCUMENTED: ICD-10-PCS | Mod: CPTII,S$GLB,, | Performed by: INTERNAL MEDICINE

## 2022-09-28 PROCEDURE — 99214 PR OFFICE/OUTPT VISIT, EST, LEVL IV, 30-39 MIN: ICD-10-PCS | Mod: S$GLB,,, | Performed by: INTERNAL MEDICINE

## 2022-09-28 PROCEDURE — 4010F PR ACE/ARB THEARPY RXD/TAKEN: ICD-10-PCS | Mod: CPTII,S$GLB,, | Performed by: INTERNAL MEDICINE

## 2022-09-28 PROCEDURE — 3044F HG A1C LEVEL LT 7.0%: CPT | Mod: CPTII,S$GLB,, | Performed by: INTERNAL MEDICINE

## 2022-09-28 PROCEDURE — 3044F PR MOST RECENT HEMOGLOBIN A1C LEVEL <7.0%: ICD-10-PCS | Mod: CPTII,S$GLB,, | Performed by: INTERNAL MEDICINE

## 2022-09-28 PROCEDURE — 1101F PT FALLS ASSESS-DOCD LE1/YR: CPT | Mod: CPTII,S$GLB,, | Performed by: INTERNAL MEDICINE

## 2022-09-28 PROCEDURE — 1101F PR PT FALLS ASSESS DOC 0-1 FALLS W/OUT INJ PAST YR: ICD-10-PCS | Mod: CPTII,S$GLB,, | Performed by: INTERNAL MEDICINE

## 2022-09-28 PROCEDURE — 85610 PROTHROMBIN TIME: CPT | Performed by: INTERNAL MEDICINE

## 2022-09-28 PROCEDURE — 3066F PR DOCUMENTATION OF TREATMENT FOR NEPHROPATHY: ICD-10-PCS | Mod: CPTII,S$GLB,, | Performed by: INTERNAL MEDICINE

## 2022-09-28 PROCEDURE — 3066F NEPHROPATHY DOC TX: CPT | Mod: CPTII,S$GLB,, | Performed by: INTERNAL MEDICINE

## 2022-09-28 PROCEDURE — 99999 PR PBB SHADOW E&M-EST. PATIENT-LVL V: CPT | Mod: PBBFAC,,, | Performed by: INTERNAL MEDICINE

## 2022-09-28 PROCEDURE — 1159F MED LIST DOCD IN RCRD: CPT | Mod: CPTII,S$GLB,, | Performed by: INTERNAL MEDICINE

## 2022-09-28 RX ORDER — FEBUXOSTAT 40 MG/1
40 TABLET, FILM COATED ORAL DAILY
Qty: 90 TABLET | Refills: 1 | Status: SHIPPED | OUTPATIENT
Start: 2022-09-28 | End: 2023-03-27

## 2022-09-28 RX ORDER — HYDROCORTISONE ACETATE 25 MG/1
25 SUPPOSITORY RECTAL 2 TIMES DAILY PRN
Qty: 100 SUPPOSITORY | Refills: 1 | Status: SHIPPED | OUTPATIENT
Start: 2022-09-28 | End: 2022-10-28

## 2022-09-28 RX ORDER — VALSARTAN 80 MG/1
80 TABLET ORAL DAILY
Qty: 90 TABLET | Refills: 0 | Status: SHIPPED | OUTPATIENT
Start: 2022-09-28 | End: 2022-12-29

## 2022-09-28 RX ORDER — ASPIRIN 81 MG/1
81 TABLET ORAL DAILY
Qty: 90 TABLET | Refills: 1 | Status: SHIPPED | OUTPATIENT
Start: 2022-09-28 | End: 2023-04-26

## 2022-09-28 RX ORDER — HYDROCODONE BITARTRATE AND ACETAMINOPHEN 7.5; 325 MG/1; MG/1
TABLET ORAL
Qty: 30 TABLET | Refills: 0 | Status: SHIPPED | OUTPATIENT
Start: 2022-09-28 | End: 2023-03-15 | Stop reason: SDUPTHER

## 2022-09-28 NOTE — PROGRESS NOTES
Health Maintenance Due   Topic     Hepatitis C Screening      Colorectal Cancer Screening      Shingles Vaccine (1 of 2)     Pneumococcal Vaccines (Age 65+) (1 - PCV)     COVID-19 Vaccine (4 - Booster for Pfizer series)     Influenza Vaccine (1)

## 2022-10-12 ENCOUNTER — ANTI-COAG VISIT (OUTPATIENT)
Dept: CARDIOLOGY | Facility: CLINIC | Age: 72
End: 2022-10-12
Payer: MEDICARE

## 2022-10-12 ENCOUNTER — LAB VISIT (OUTPATIENT)
Dept: LAB | Facility: HOSPITAL | Age: 72
End: 2022-10-12
Attending: INTERNAL MEDICINE
Payer: MEDICARE

## 2022-10-12 DIAGNOSIS — Z79.01 LONG TERM (CURRENT) USE OF ANTICOAGULANTS: ICD-10-CM

## 2022-10-12 DIAGNOSIS — Z86.79 HISTORY OF ATRIAL FIBRILLATION: ICD-10-CM

## 2022-10-12 DIAGNOSIS — Z86.79 HISTORY OF ATRIAL FIBRILLATION: Primary | ICD-10-CM

## 2022-10-12 LAB
INR PPP: 1.6 (ref 0.8–1.2)
PROTHROMBIN TIME: 17.2 SEC (ref 9–12.5)

## 2022-10-12 PROCEDURE — 93793 PR ANTICOAGULANT MGMT FOR PT TAKING WARFARIN: ICD-10-PCS | Mod: S$GLB,,,

## 2022-10-12 PROCEDURE — 85610 PROTHROMBIN TIME: CPT | Performed by: INTERNAL MEDICINE

## 2022-10-12 PROCEDURE — 36415 COLL VENOUS BLD VENIPUNCTURE: CPT | Mod: PO | Performed by: INTERNAL MEDICINE

## 2022-10-12 PROCEDURE — 93793 ANTICOAG MGMT PT WARFARIN: CPT | Mod: S$GLB,,,

## 2022-10-26 ENCOUNTER — ANTI-COAG VISIT (OUTPATIENT)
Dept: CARDIOLOGY | Facility: CLINIC | Age: 72
End: 2022-10-26
Payer: MEDICARE

## 2022-10-26 ENCOUNTER — LAB VISIT (OUTPATIENT)
Dept: LAB | Facility: HOSPITAL | Age: 72
End: 2022-10-26
Attending: INTERNAL MEDICINE
Payer: MEDICARE

## 2022-10-26 DIAGNOSIS — Z86.79 HISTORY OF ATRIAL FIBRILLATION: ICD-10-CM

## 2022-10-26 DIAGNOSIS — Z79.01 LONG TERM (CURRENT) USE OF ANTICOAGULANTS: ICD-10-CM

## 2022-10-26 DIAGNOSIS — Z86.79 HISTORY OF ATRIAL FIBRILLATION: Primary | ICD-10-CM

## 2022-10-26 LAB
INR PPP: 1.9 (ref 0.8–1.2)
PROTHROMBIN TIME: 19.3 SEC (ref 9–12.5)

## 2022-10-26 PROCEDURE — 93793 ANTICOAG MGMT PT WARFARIN: CPT | Mod: S$GLB,,,

## 2022-10-26 PROCEDURE — 93793 PR ANTICOAGULANT MGMT FOR PT TAKING WARFARIN: ICD-10-PCS | Mod: S$GLB,,,

## 2022-10-26 PROCEDURE — 85610 PROTHROMBIN TIME: CPT | Performed by: INTERNAL MEDICINE

## 2022-10-26 PROCEDURE — 36415 COLL VENOUS BLD VENIPUNCTURE: CPT | Mod: PO | Performed by: INTERNAL MEDICINE

## 2022-10-31 ENCOUNTER — CLINICAL SUPPORT (OUTPATIENT)
Dept: ENDOSCOPY | Facility: HOSPITAL | Age: 72
End: 2022-10-31
Attending: INTERNAL MEDICINE
Payer: MEDICARE

## 2022-10-31 ENCOUNTER — TELEPHONE (OUTPATIENT)
Dept: ENDOSCOPY | Facility: HOSPITAL | Age: 72
End: 2022-10-31

## 2022-10-31 DIAGNOSIS — Z12.11 SCREENING FOR MALIGNANT NEOPLASM OF COLON: ICD-10-CM

## 2022-10-31 NOTE — TELEPHONE ENCOUNTER
Patient has a referral for a Colonoscopy and is taking coumadin. Per endoscopy protocol it needs to be held for 5 days prior to the procedure. Ok to hold? Please advise.     Thanks,  Susu

## 2022-10-31 NOTE — PLAN OF CARE
Contacted patient for 1:30 pm PAT appointment to schedule colonoscopy. Patient is taking coumadin. Requested hold approval from Coumadin clinic. Patient rescheduled for PAT appointment on 11/14/22.

## 2022-11-02 ENCOUNTER — TELEPHONE (OUTPATIENT)
Dept: ENDOSCOPY | Facility: HOSPITAL | Age: 72
End: 2022-11-02
Payer: MEDICARE

## 2022-11-02 NOTE — TELEPHONE ENCOUNTER
November 1, 2022  Mihaela Ortega, PharmD  to Me  Michelle Conway MD      12:46 PM   We have cleared this patient to hold coumadin 5 days for c-scope procedure (date TBD). We are not planning to bridge with lovenox.  Please let us know if you have any questions or concerns; otherwise, we will proceed as planned.     Respectfully,   Mihaela Ortega, PharmD   Coumadin Clinic   Ph [phone]   October 31, 2022           3:29 PM  Olesya Davidson, PharmD routed this conversation to Mihaela Ortega, PharmD           1:45 PM  You routed this conversation to [facility] Coumad Provider    Me        1:45 PM  Note  Patient has a referral for a Colonoscopy and is taking coumadin. Per endoscopy protocol it needs to be held for 5 days prior to the procedure. Ok to hold? Please advise.      Thanks,  Susu

## 2022-11-09 ENCOUNTER — LAB VISIT (OUTPATIENT)
Dept: LAB | Facility: HOSPITAL | Age: 72
End: 2022-11-09
Attending: INTERNAL MEDICINE
Payer: MEDICARE

## 2022-11-09 ENCOUNTER — ANTI-COAG VISIT (OUTPATIENT)
Dept: CARDIOLOGY | Facility: CLINIC | Age: 72
End: 2022-11-09
Payer: MEDICARE

## 2022-11-09 ENCOUNTER — TELEPHONE (OUTPATIENT)
Dept: FAMILY MEDICINE | Facility: CLINIC | Age: 72
End: 2022-11-09
Payer: MEDICARE

## 2022-11-09 DIAGNOSIS — I10 HYPERTENSION, UNSPECIFIED TYPE: Primary | ICD-10-CM

## 2022-11-09 DIAGNOSIS — Z86.79 HISTORY OF ATRIAL FIBRILLATION: ICD-10-CM

## 2022-11-09 DIAGNOSIS — Z86.79 HISTORY OF ATRIAL FIBRILLATION: Primary | ICD-10-CM

## 2022-11-09 DIAGNOSIS — Z79.01 LONG TERM (CURRENT) USE OF ANTICOAGULANTS: ICD-10-CM

## 2022-11-09 LAB
INR PPP: 3.4 (ref 0.8–1.2)
PROTHROMBIN TIME: 34.2 SEC (ref 9–12.5)

## 2022-11-09 PROCEDURE — 85610 PROTHROMBIN TIME: CPT | Performed by: INTERNAL MEDICINE

## 2022-11-09 PROCEDURE — 36415 COLL VENOUS BLD VENIPUNCTURE: CPT | Mod: PO | Performed by: INTERNAL MEDICINE

## 2022-11-09 PROCEDURE — 93793 PR ANTICOAGULANT MGMT FOR PT TAKING WARFARIN: ICD-10-PCS | Mod: S$GLB,,,

## 2022-11-09 PROCEDURE — 93793 ANTICOAG MGMT PT WARFARIN: CPT | Mod: S$GLB,,,

## 2022-11-09 RX ORDER — ATORVASTATIN CALCIUM 40 MG/1
40 TABLET, FILM COATED ORAL DAILY
Qty: 90 TABLET | Refills: 0 | Status: SHIPPED | OUTPATIENT
Start: 2022-11-09 | End: 2023-02-06

## 2022-11-09 RX ORDER — WARFARIN 2 MG/1
TABLET ORAL
Qty: 126 TABLET | Refills: 0 | Status: SHIPPED | OUTPATIENT
Start: 2022-11-09 | End: 2023-02-04

## 2022-11-09 RX ORDER — WARFARIN 2 MG/1
TABLET ORAL
Qty: 126 TABLET | Refills: 0 | Status: SHIPPED | OUTPATIENT
Start: 2022-11-09 | End: 2022-11-09 | Stop reason: SDUPTHER

## 2022-11-14 ENCOUNTER — CLINICAL SUPPORT (OUTPATIENT)
Dept: ENDOSCOPY | Facility: HOSPITAL | Age: 72
End: 2022-11-14
Attending: INTERNAL MEDICINE
Payer: MEDICARE

## 2022-11-14 VITALS — BODY MASS INDEX: 29.96 KG/M2 | WEIGHT: 214 LBS | HEIGHT: 71 IN

## 2022-11-14 DIAGNOSIS — Z86.010 HISTORY OF COLON POLYPS: Primary | ICD-10-CM

## 2022-11-14 DIAGNOSIS — Z12.11 SCREENING FOR MALIGNANT NEOPLASM OF COLON: ICD-10-CM

## 2022-11-14 RX ORDER — SODIUM, POTASSIUM,MAG SULFATES 17.5-3.13G
1 SOLUTION, RECONSTITUTED, ORAL ORAL DAILY
Qty: 1 KIT | Refills: 0 | Status: SHIPPED | OUTPATIENT
Start: 2022-11-14 | End: 2022-11-16

## 2022-11-14 NOTE — PLAN OF CARE
Pt scheduled for colonoscopy on 1/17/22 on 2nd floor with Dr. Oviedo. Per coumadin clinic Pt ok to hold Coumadin x 5 days prior to procedure - see telephone encounter dated 11/2/22. Prep instructions reviewed and mailed to Pt  and Pt wife per Pt request. Pt  and Pt wife verbalized understanding.

## 2022-11-15 ENCOUNTER — LAB VISIT (OUTPATIENT)
Dept: LAB | Facility: HOSPITAL | Age: 72
End: 2022-11-15
Attending: INTERNAL MEDICINE
Payer: MEDICARE

## 2022-11-15 ENCOUNTER — OFFICE VISIT (OUTPATIENT)
Dept: NEPHROLOGY | Facility: CLINIC | Age: 72
End: 2022-11-15
Payer: MEDICARE

## 2022-11-15 VITALS
HEART RATE: 76 BPM | WEIGHT: 214 LBS | SYSTOLIC BLOOD PRESSURE: 120 MMHG | DIASTOLIC BLOOD PRESSURE: 76 MMHG | BODY MASS INDEX: 29.85 KG/M2 | OXYGEN SATURATION: 95 %

## 2022-11-15 DIAGNOSIS — I10 HYPERTENSION, UNSPECIFIED TYPE: ICD-10-CM

## 2022-11-15 DIAGNOSIS — N18.4 CKD (CHRONIC KIDNEY DISEASE) STAGE 4, GFR 15-29 ML/MIN: Primary | ICD-10-CM

## 2022-11-15 DIAGNOSIS — N18.4 CKD (CHRONIC KIDNEY DISEASE) STAGE 4, GFR 15-29 ML/MIN: ICD-10-CM

## 2022-11-15 LAB
ALBUMIN SERPL BCP-MCNC: 3.5 G/DL (ref 3.5–5.2)
ALP SERPL-CCNC: 92 U/L (ref 55–135)
ALT SERPL W/O P-5'-P-CCNC: 9 U/L (ref 10–44)
ANION GAP SERPL CALC-SCNC: 14 MMOL/L (ref 8–16)
AST SERPL-CCNC: 18 U/L (ref 10–40)
BASOPHILS # BLD AUTO: 0.05 K/UL (ref 0–0.2)
BASOPHILS NFR BLD: 0.6 % (ref 0–1.9)
BILIRUB SERPL-MCNC: 1.2 MG/DL (ref 0.1–1)
BUN SERPL-MCNC: 55 MG/DL (ref 8–23)
CALCIUM SERPL-MCNC: 9.3 MG/DL (ref 8.7–10.5)
CHLORIDE SERPL-SCNC: 108 MMOL/L (ref 95–110)
CO2 SERPL-SCNC: 23 MMOL/L (ref 23–29)
CREAT SERPL-MCNC: 3.2 MG/DL (ref 0.5–1.4)
DIFFERENTIAL METHOD: ABNORMAL
EOSINOPHIL # BLD AUTO: 0.5 K/UL (ref 0–0.5)
EOSINOPHIL NFR BLD: 6.4 % (ref 0–8)
ERYTHROCYTE [DISTWIDTH] IN BLOOD BY AUTOMATED COUNT: 13.2 % (ref 11.5–14.5)
EST. GFR  (NO RACE VARIABLE): 19.6 ML/MIN/1.73 M^2
GLUCOSE SERPL-MCNC: 93 MG/DL (ref 70–110)
HCT VFR BLD AUTO: 41.1 % (ref 40–54)
HGB BLD-MCNC: 12.9 G/DL (ref 14–18)
IMM GRANULOCYTES # BLD AUTO: 0.02 K/UL (ref 0–0.04)
IMM GRANULOCYTES NFR BLD AUTO: 0.3 % (ref 0–0.5)
LYMPHOCYTES # BLD AUTO: 1.6 K/UL (ref 1–4.8)
LYMPHOCYTES NFR BLD: 20 % (ref 18–48)
MCH RBC QN AUTO: 29 PG (ref 27–31)
MCHC RBC AUTO-ENTMCNC: 31.4 G/DL (ref 32–36)
MCV RBC AUTO: 92 FL (ref 82–98)
MONOCYTES # BLD AUTO: 1.1 K/UL (ref 0.3–1)
MONOCYTES NFR BLD: 13.4 % (ref 4–15)
NEUTROPHILS # BLD AUTO: 4.8 K/UL (ref 1.8–7.7)
NEUTROPHILS NFR BLD: 59.3 % (ref 38–73)
NRBC BLD-RTO: 0 /100 WBC
PHOSPHATE SERPL-MCNC: 3.7 MG/DL (ref 2.7–4.5)
PLATELET # BLD AUTO: 217 K/UL (ref 150–450)
PMV BLD AUTO: 12.2 FL (ref 9.2–12.9)
POTASSIUM SERPL-SCNC: 4.4 MMOL/L (ref 3.5–5.1)
PROT SERPL-MCNC: 7.3 G/DL (ref 6–8.4)
RBC # BLD AUTO: 4.45 M/UL (ref 4.6–6.2)
SODIUM SERPL-SCNC: 145 MMOL/L (ref 136–145)
WBC # BLD AUTO: 8 K/UL (ref 3.9–12.7)

## 2022-11-15 PROCEDURE — 1101F PT FALLS ASSESS-DOCD LE1/YR: CPT | Mod: CPTII,S$GLB,, | Performed by: INTERNAL MEDICINE

## 2022-11-15 PROCEDURE — 1101F PR PT FALLS ASSESS DOC 0-1 FALLS W/OUT INJ PAST YR: ICD-10-PCS | Mod: CPTII,S$GLB,, | Performed by: INTERNAL MEDICINE

## 2022-11-15 PROCEDURE — 3074F SYST BP LT 130 MM HG: CPT | Mod: CPTII,S$GLB,, | Performed by: INTERNAL MEDICINE

## 2022-11-15 PROCEDURE — 1126F PR PAIN SEVERITY QUANTIFIED, NO PAIN PRESENT: ICD-10-PCS | Mod: CPTII,S$GLB,, | Performed by: INTERNAL MEDICINE

## 2022-11-15 PROCEDURE — 99999 PR PBB SHADOW E&M-EST. PATIENT-LVL III: ICD-10-PCS | Mod: PBBFAC,,, | Performed by: INTERNAL MEDICINE

## 2022-11-15 PROCEDURE — 80053 COMPREHEN METABOLIC PANEL: CPT | Performed by: INTERNAL MEDICINE

## 2022-11-15 PROCEDURE — 3044F HG A1C LEVEL LT 7.0%: CPT | Mod: CPTII,S$GLB,, | Performed by: INTERNAL MEDICINE

## 2022-11-15 PROCEDURE — 3066F PR DOCUMENTATION OF TREATMENT FOR NEPHROPATHY: ICD-10-PCS | Mod: CPTII,S$GLB,, | Performed by: INTERNAL MEDICINE

## 2022-11-15 PROCEDURE — 3060F PR POS MICROALBUMINURIA RESULT DOCUMENTED/REVIEW: ICD-10-PCS | Mod: CPTII,S$GLB,, | Performed by: INTERNAL MEDICINE

## 2022-11-15 PROCEDURE — 3066F NEPHROPATHY DOC TX: CPT | Mod: CPTII,S$GLB,, | Performed by: INTERNAL MEDICINE

## 2022-11-15 PROCEDURE — 3060F POS MICROALBUMINURIA REV: CPT | Mod: CPTII,S$GLB,, | Performed by: INTERNAL MEDICINE

## 2022-11-15 PROCEDURE — 4010F PR ACE/ARB THEARPY RXD/TAKEN: ICD-10-PCS | Mod: CPTII,S$GLB,, | Performed by: INTERNAL MEDICINE

## 2022-11-15 PROCEDURE — 3288F PR FALLS RISK ASSESSMENT DOCUMENTED: ICD-10-PCS | Mod: CPTII,S$GLB,, | Performed by: INTERNAL MEDICINE

## 2022-11-15 PROCEDURE — 4010F ACE/ARB THERAPY RXD/TAKEN: CPT | Mod: CPTII,S$GLB,, | Performed by: INTERNAL MEDICINE

## 2022-11-15 PROCEDURE — 1160F PR REVIEW ALL MEDS BY PRESCRIBER/CLIN PHARMACIST DOCUMENTED: ICD-10-PCS | Mod: CPTII,S$GLB,, | Performed by: INTERNAL MEDICINE

## 2022-11-15 PROCEDURE — 99215 PR OFFICE/OUTPT VISIT, EST, LEVL V, 40-54 MIN: ICD-10-PCS | Mod: S$GLB,,, | Performed by: INTERNAL MEDICINE

## 2022-11-15 PROCEDURE — 3288F FALL RISK ASSESSMENT DOCD: CPT | Mod: CPTII,S$GLB,, | Performed by: INTERNAL MEDICINE

## 2022-11-15 PROCEDURE — 3044F PR MOST RECENT HEMOGLOBIN A1C LEVEL <7.0%: ICD-10-PCS | Mod: CPTII,S$GLB,, | Performed by: INTERNAL MEDICINE

## 2022-11-15 PROCEDURE — 1159F MED LIST DOCD IN RCRD: CPT | Mod: CPTII,S$GLB,, | Performed by: INTERNAL MEDICINE

## 2022-11-15 PROCEDURE — 3078F DIAST BP <80 MM HG: CPT | Mod: CPTII,S$GLB,, | Performed by: INTERNAL MEDICINE

## 2022-11-15 PROCEDURE — 99999 PR PBB SHADOW E&M-EST. PATIENT-LVL III: CPT | Mod: PBBFAC,,, | Performed by: INTERNAL MEDICINE

## 2022-11-15 PROCEDURE — 99215 OFFICE O/P EST HI 40 MIN: CPT | Mod: S$GLB,,, | Performed by: INTERNAL MEDICINE

## 2022-11-15 PROCEDURE — 3008F BODY MASS INDEX DOCD: CPT | Mod: CPTII,S$GLB,, | Performed by: INTERNAL MEDICINE

## 2022-11-15 PROCEDURE — 3078F PR MOST RECENT DIASTOLIC BLOOD PRESSURE < 80 MM HG: ICD-10-PCS | Mod: CPTII,S$GLB,, | Performed by: INTERNAL MEDICINE

## 2022-11-15 PROCEDURE — 3008F PR BODY MASS INDEX (BMI) DOCUMENTED: ICD-10-PCS | Mod: CPTII,S$GLB,, | Performed by: INTERNAL MEDICINE

## 2022-11-15 PROCEDURE — 1159F PR MEDICATION LIST DOCUMENTED IN MEDICAL RECORD: ICD-10-PCS | Mod: CPTII,S$GLB,, | Performed by: INTERNAL MEDICINE

## 2022-11-15 PROCEDURE — 85025 COMPLETE CBC W/AUTO DIFF WBC: CPT | Performed by: INTERNAL MEDICINE

## 2022-11-15 PROCEDURE — 1126F AMNT PAIN NOTED NONE PRSNT: CPT | Mod: CPTII,S$GLB,, | Performed by: INTERNAL MEDICINE

## 2022-11-15 PROCEDURE — 3074F PR MOST RECENT SYSTOLIC BLOOD PRESSURE < 130 MM HG: ICD-10-PCS | Mod: CPTII,S$GLB,, | Performed by: INTERNAL MEDICINE

## 2022-11-15 PROCEDURE — 36415 COLL VENOUS BLD VENIPUNCTURE: CPT | Performed by: INTERNAL MEDICINE

## 2022-11-15 PROCEDURE — 1160F RVW MEDS BY RX/DR IN RCRD: CPT | Mod: CPTII,S$GLB,, | Performed by: INTERNAL MEDICINE

## 2022-11-15 PROCEDURE — 84100 ASSAY OF PHOSPHORUS: CPT | Performed by: INTERNAL MEDICINE

## 2022-11-15 NOTE — PROGRESS NOTES
"CHIEF COMPLAINT/HPI: Vivek is a 72 y.o. man with PMH of CAD CABG 3 year ago, repaired thoracic aortic aneurysm  ,In August 2021 underwent attempted MitraClip procedure.  Unsuccessful percutaneous repair.  Patient was deemed unsuitable for open repair secondary to comorbidities ,A.fib , mitral regurgitation , PPM secondary to SSS , Aortic bioprosthesis secondary to aortic aneurysm/aortic insufficiency . EF 45-50%  prostate cancer s/p surgery 2019 and clear for 2 years   Advanced CKD was following with Nephrologist in [place] , they relocate to [place] with as their daughter lives here , they have their own place . He has AVF in place .  He is here to establish care in [facility] , with his wife ,   They are very pleasant and seem very compliant .    On 3/13 he hit his Lt hip on a piece of furniture and sustained hematoma ,Hb dropped to ~9 from ~11 , BP was low in his PCP visit and his BP meds doses were dropped ,   I tried to contact him on Monday 3/21 ( after PCP secured chat )  to check on his BP and may be stop BP meds all together , but no answer .   Daughter with him today , feels fine , " not drinking as much since the incident of hematoma "  Home # is [phone]   11/15/22 feels fine, will obtain labs today.    Past Medical History:   Diagnosis Date    CAD (coronary artery disease)     Disorder of kidney and ureter        Vivek reports that he has never smoked. He has never used smokeless tobacco. He reports current alcohol use of about 6.0 standard drinks per week. He reports that he does not use drugs.   Drinks beer daily ( ~2 cans )    FAMILY HX :  + family hx of HTN , Emphysema and CKD .    ROS:    Review of Systems   Constitutional:  Negative for activity change, appetite change, chills, fever and unexpected weight change.   HENT:  Negative for congestion, ear discharge, hearing loss, nosebleeds, sore throat and tinnitus.    Eyes:  Negative for photophobia, pain, redness and visual disturbance. "   Respiratory:  Negative for cough, chest tightness, shortness of breath and wheezing.    Cardiovascular:  Negative for chest pain, palpitations and leg swelling.   Gastrointestinal:  Negative for abdominal distention, constipation, diarrhea, nausea and vomiting.   Endocrine: Negative for cold intolerance, heat intolerance, polydipsia and polyuria.   Genitourinary:  Negative for decreased urine volume, difficulty urinating, dysuria, flank pain and hematuria.   Musculoskeletal:  Negative for arthralgias, gait problem, joint swelling and neck stiffness.   Skin:  Negative for rash.   Neurological:  Negative for dizziness, tremors, weakness, numbness and headaches.   Psychiatric/Behavioral:  Negative for confusion and sleep disturbance.      PE:    Vitals:    11/15/22 1302   BP: 120/76   Pulse: 76   SpO2: 95%   Weight: 97.1 kg       Physical Exam  Constitutional:       General: He is not in acute distress.     Appearance: He is not diaphoretic.   HENT:      Head: Normocephalic and atraumatic.      Right Ear: External ear normal.      Left Ear: External ear normal.   Eyes:      General:         Right eye: No discharge.         Left eye: No discharge.      Conjunctiva/sclera: Conjunctivae normal.      Pupils: Pupils are equal, round, and reactive to light.   Neck:      Vascular: No JVD.   Cardiovascular:      Rate and Rhythm: Normal rate and regular rhythm.      Heart sounds: No murmur heard.    No friction rub. No gallop.   Pulmonary:      Effort: Pulmonary effort is normal. No respiratory distress.      Breath sounds: Normal breath sounds. No stridor. No wheezing or rales.   Chest:      Chest wall: No tenderness.   Abdominal:      Palpations: Abdomen is soft.      Tenderness: There is no abdominal tenderness. There is no guarding or rebound.   Musculoskeletal:         General: No tenderness.      Cervical back: Normal range of motion and neck supple.      Comments: Lr forearm AVF with thrill and bruit    Skin:      Findings: No erythema or rash.   Neurological:      Mental Status: He is alert and oriented to person, place, and time.         Impression/Plan:    No diagnosis found.    # CKD V: likely due to DM , and HTN ,   Has AVF inplace ,  -No issues with volume, K and Acidosis so far per patient.  -request to obtain record from previous nephrology was sent .  -had YAHAIRA after the hematoma, creatinine trending down now , will keep monitoring ,     Lab Results   Component Value Date    CREATININE 2.7 (H) 08/29/2022     Protein Creatinine Ratios:    Prot/Creat Ratio, Urine   Date Value Ref Range Status   08/29/2022 0.12 0.00 - 0.20 Final   05/10/2022 0.13 0.00 - 0.20 Final   03/21/2022 0.12 0.00 - 0.20 Final       Acid-Base:   Lab Results   Component Value Date     08/29/2022    K 4.1 08/29/2022    CO2 25 08/29/2022     #HTN: Blood pressures acceptable ,  on Torsemide 60 mg in am and 20 mgat night  and Valsartan 160 mg ( asked to hold on his colonosccopy day)   In the first visit he stated : He feels so thirsty and stated his creatinine was in the 2s range and after his diuretic increased to 80 mg daily , so will decrease to 60 daily and stop the 20 mg in pm , and patient counseled on taking the night dose if increased wt , LE edema ,   Daily wt .  3/23/22 : Due to low BP ( noticed after hematoma ) will lower Valsartan to 80 mg ( he will cut in half and monitor BP ) and Torsemide 40 mg and monitor UOP and symptoms / sign of overload and asked to take 3 if any or if not sure .  Will monitor closely     # Renal osteodystrophy:   Lab Results   Component Value Date    .6 (H) 02/23/2022    CALCIUM 9.1 08/29/2022    PHOS 3.1 05/10/2022       # Anemia:   Lab Results   Component Value Date    HGB 12.9 (L) 08/29/2022        # DM:  Last HbA1C   Lab Results   Component Value Date    HGBA1C 5.1 02/02/2022       # Lipid management:   Lab Results   Component Value Date    LDLCALC 86.8 02/02/2022       # ESRD planing: AVF in place      Labs today and labs in 2 months if labs are stable,

## 2022-11-16 ENCOUNTER — LAB VISIT (OUTPATIENT)
Dept: LAB | Facility: HOSPITAL | Age: 72
End: 2022-11-16
Attending: INTERNAL MEDICINE
Payer: MEDICARE

## 2022-11-16 ENCOUNTER — ANTI-COAG VISIT (OUTPATIENT)
Dept: CARDIOLOGY | Facility: CLINIC | Age: 72
End: 2022-11-16
Payer: MEDICARE

## 2022-11-16 ENCOUNTER — PATIENT MESSAGE (OUTPATIENT)
Dept: NEPHROLOGY | Facility: CLINIC | Age: 72
End: 2022-11-16
Payer: MEDICARE

## 2022-11-16 DIAGNOSIS — Z86.79 HISTORY OF ATRIAL FIBRILLATION: ICD-10-CM

## 2022-11-16 DIAGNOSIS — Z86.79 HISTORY OF ATRIAL FIBRILLATION: Primary | ICD-10-CM

## 2022-11-16 DIAGNOSIS — Z79.01 LONG TERM (CURRENT) USE OF ANTICOAGULANTS: ICD-10-CM

## 2022-11-16 DIAGNOSIS — N18.4 CKD (CHRONIC KIDNEY DISEASE) STAGE 4, GFR 15-29 ML/MIN: Primary | ICD-10-CM

## 2022-11-16 LAB
INR PPP: 4.1 (ref 0.8–1.2)
PROTHROMBIN TIME: 41.2 SEC (ref 9–12.5)

## 2022-11-16 PROCEDURE — 93793 ANTICOAG MGMT PT WARFARIN: CPT | Mod: S$GLB,,,

## 2022-11-16 PROCEDURE — 85610 PROTHROMBIN TIME: CPT | Performed by: INTERNAL MEDICINE

## 2022-11-16 PROCEDURE — 93793 PR ANTICOAGULANT MGMT FOR PT TAKING WARFARIN: ICD-10-PCS | Mod: S$GLB,,,

## 2022-11-16 PROCEDURE — 36415 COLL VENOUS BLD VENIPUNCTURE: CPT | Mod: PO | Performed by: INTERNAL MEDICINE

## 2022-11-28 ENCOUNTER — ANTI-COAG VISIT (OUTPATIENT)
Dept: CARDIOLOGY | Facility: CLINIC | Age: 72
End: 2022-11-28
Payer: MEDICARE

## 2022-11-28 ENCOUNTER — LAB VISIT (OUTPATIENT)
Dept: LAB | Facility: HOSPITAL | Age: 72
End: 2022-11-28
Attending: INTERNAL MEDICINE
Payer: MEDICARE

## 2022-11-28 DIAGNOSIS — Z79.01 LONG TERM (CURRENT) USE OF ANTICOAGULANTS: ICD-10-CM

## 2022-11-28 DIAGNOSIS — Z86.79 HISTORY OF ATRIAL FIBRILLATION: Primary | ICD-10-CM

## 2022-11-28 DIAGNOSIS — Z86.79 HISTORY OF ATRIAL FIBRILLATION: ICD-10-CM

## 2022-11-28 LAB
INR PPP: 2 (ref 0.8–1.2)
PROTHROMBIN TIME: 21 SEC (ref 9–12.5)

## 2022-11-28 PROCEDURE — 93793 ANTICOAG MGMT PT WARFARIN: CPT | Mod: S$GLB,,,

## 2022-11-28 PROCEDURE — 85610 PROTHROMBIN TIME: CPT | Performed by: INTERNAL MEDICINE

## 2022-11-28 PROCEDURE — 36415 COLL VENOUS BLD VENIPUNCTURE: CPT | Mod: PO | Performed by: INTERNAL MEDICINE

## 2022-11-28 PROCEDURE — 93793 PR ANTICOAGULANT MGMT FOR PT TAKING WARFARIN: ICD-10-PCS | Mod: S$GLB,,,

## 2022-12-12 ENCOUNTER — ANTI-COAG VISIT (OUTPATIENT)
Dept: CARDIOLOGY | Facility: CLINIC | Age: 72
End: 2022-12-12
Payer: MEDICARE

## 2022-12-12 ENCOUNTER — LAB VISIT (OUTPATIENT)
Dept: LAB | Facility: HOSPITAL | Age: 72
End: 2022-12-12
Attending: INTERNAL MEDICINE
Payer: MEDICARE

## 2022-12-12 DIAGNOSIS — Z79.01 LONG TERM (CURRENT) USE OF ANTICOAGULANTS: ICD-10-CM

## 2022-12-12 DIAGNOSIS — Z86.79 HISTORY OF ATRIAL FIBRILLATION: ICD-10-CM

## 2022-12-12 DIAGNOSIS — Z86.79 HISTORY OF ATRIAL FIBRILLATION: Primary | ICD-10-CM

## 2022-12-12 LAB
INR PPP: 2.7 (ref 0.8–1.2)
PROTHROMBIN TIME: 27.8 SEC (ref 9–12.5)

## 2022-12-12 PROCEDURE — 36415 COLL VENOUS BLD VENIPUNCTURE: CPT | Mod: PO | Performed by: INTERNAL MEDICINE

## 2022-12-12 PROCEDURE — 93793 ANTICOAG MGMT PT WARFARIN: CPT | Mod: S$GLB,,,

## 2022-12-12 PROCEDURE — 85610 PROTHROMBIN TIME: CPT | Performed by: INTERNAL MEDICINE

## 2022-12-12 PROCEDURE — 93793 PR ANTICOAGULANT MGMT FOR PT TAKING WARFARIN: ICD-10-PCS | Mod: S$GLB,,,

## 2022-12-27 ENCOUNTER — OFFICE VISIT (OUTPATIENT)
Dept: CARDIOLOGY | Facility: CLINIC | Age: 72
End: 2022-12-27
Payer: MEDICARE

## 2022-12-27 VITALS
BODY MASS INDEX: 31.65 KG/M2 | OXYGEN SATURATION: 97 % | WEIGHT: 226.06 LBS | SYSTOLIC BLOOD PRESSURE: 109 MMHG | RESPIRATION RATE: 18 BRPM | HEART RATE: 60 BPM | HEIGHT: 71 IN | DIASTOLIC BLOOD PRESSURE: 61 MMHG

## 2022-12-27 DIAGNOSIS — Z95.0 PACEMAKER: ICD-10-CM

## 2022-12-27 DIAGNOSIS — I48.0 PAF (PAROXYSMAL ATRIAL FIBRILLATION): ICD-10-CM

## 2022-12-27 DIAGNOSIS — I10 PRIMARY HYPERTENSION: ICD-10-CM

## 2022-12-27 DIAGNOSIS — I25.10 CORONARY ARTERY DISEASE INVOLVING NATIVE CORONARY ARTERY OF NATIVE HEART WITHOUT ANGINA PECTORIS: ICD-10-CM

## 2022-12-27 DIAGNOSIS — N18.4 CKD (CHRONIC KIDNEY DISEASE), STAGE IV: ICD-10-CM

## 2022-12-27 DIAGNOSIS — Z79.01 LONG TERM (CURRENT) USE OF ANTICOAGULANTS: ICD-10-CM

## 2022-12-27 DIAGNOSIS — E78.49 OTHER HYPERLIPIDEMIA: ICD-10-CM

## 2022-12-27 DIAGNOSIS — Z95.2 S/P AVR (AORTIC VALVE REPLACEMENT) AND AORTOPLASTY: Primary | ICD-10-CM

## 2022-12-27 PROCEDURE — 3066F NEPHROPATHY DOC TX: CPT | Mod: CPTII,S$GLB,, | Performed by: INTERNAL MEDICINE

## 2022-12-27 PROCEDURE — 99214 PR OFFICE/OUTPT VISIT, EST, LEVL IV, 30-39 MIN: ICD-10-PCS | Mod: 25,S$GLB,, | Performed by: INTERNAL MEDICINE

## 2022-12-27 PROCEDURE — 3074F PR MOST RECENT SYSTOLIC BLOOD PRESSURE < 130 MM HG: ICD-10-PCS | Mod: CPTII,S$GLB,, | Performed by: INTERNAL MEDICINE

## 2022-12-27 PROCEDURE — 1126F PR PAIN SEVERITY QUANTIFIED, NO PAIN PRESENT: ICD-10-PCS | Mod: CPTII,S$GLB,, | Performed by: INTERNAL MEDICINE

## 2022-12-27 PROCEDURE — 99999 PR PBB SHADOW E&M-EST. PATIENT-LVL IV: ICD-10-PCS | Mod: PBBFAC,,, | Performed by: INTERNAL MEDICINE

## 2022-12-27 PROCEDURE — 99214 OFFICE O/P EST MOD 30 MIN: CPT | Mod: 25,S$GLB,, | Performed by: INTERNAL MEDICINE

## 2022-12-27 PROCEDURE — 1101F PT FALLS ASSESS-DOCD LE1/YR: CPT | Mod: CPTII,S$GLB,, | Performed by: INTERNAL MEDICINE

## 2022-12-27 PROCEDURE — 3060F POS MICROALBUMINURIA REV: CPT | Mod: CPTII,S$GLB,, | Performed by: INTERNAL MEDICINE

## 2022-12-27 PROCEDURE — 3078F DIAST BP <80 MM HG: CPT | Mod: CPTII,S$GLB,, | Performed by: INTERNAL MEDICINE

## 2022-12-27 PROCEDURE — 93000 ELECTROCARDIOGRAM COMPLETE: CPT | Mod: S$GLB,,, | Performed by: INTERNAL MEDICINE

## 2022-12-27 PROCEDURE — 3074F SYST BP LT 130 MM HG: CPT | Mod: CPTII,S$GLB,, | Performed by: INTERNAL MEDICINE

## 2022-12-27 PROCEDURE — 1159F PR MEDICATION LIST DOCUMENTED IN MEDICAL RECORD: ICD-10-PCS | Mod: CPTII,S$GLB,, | Performed by: INTERNAL MEDICINE

## 2022-12-27 PROCEDURE — 3044F PR MOST RECENT HEMOGLOBIN A1C LEVEL <7.0%: ICD-10-PCS | Mod: CPTII,S$GLB,, | Performed by: INTERNAL MEDICINE

## 2022-12-27 PROCEDURE — 4010F PR ACE/ARB THEARPY RXD/TAKEN: ICD-10-PCS | Mod: CPTII,S$GLB,, | Performed by: INTERNAL MEDICINE

## 2022-12-27 PROCEDURE — 4010F ACE/ARB THERAPY RXD/TAKEN: CPT | Mod: CPTII,S$GLB,, | Performed by: INTERNAL MEDICINE

## 2022-12-27 PROCEDURE — 1126F AMNT PAIN NOTED NONE PRSNT: CPT | Mod: CPTII,S$GLB,, | Performed by: INTERNAL MEDICINE

## 2022-12-27 PROCEDURE — 1160F PR REVIEW ALL MEDS BY PRESCRIBER/CLIN PHARMACIST DOCUMENTED: ICD-10-PCS | Mod: CPTII,S$GLB,, | Performed by: INTERNAL MEDICINE

## 2022-12-27 PROCEDURE — 99999 PR PBB SHADOW E&M-EST. PATIENT-LVL IV: CPT | Mod: PBBFAC,,, | Performed by: INTERNAL MEDICINE

## 2022-12-27 PROCEDURE — 93000 EKG 12-LEAD: ICD-10-PCS | Mod: S$GLB,,, | Performed by: INTERNAL MEDICINE

## 2022-12-27 PROCEDURE — 3288F PR FALLS RISK ASSESSMENT DOCUMENTED: ICD-10-PCS | Mod: CPTII,S$GLB,, | Performed by: INTERNAL MEDICINE

## 2022-12-27 PROCEDURE — 3008F PR BODY MASS INDEX (BMI) DOCUMENTED: ICD-10-PCS | Mod: CPTII,S$GLB,, | Performed by: INTERNAL MEDICINE

## 2022-12-27 PROCEDURE — 3078F PR MOST RECENT DIASTOLIC BLOOD PRESSURE < 80 MM HG: ICD-10-PCS | Mod: CPTII,S$GLB,, | Performed by: INTERNAL MEDICINE

## 2022-12-27 PROCEDURE — 3008F BODY MASS INDEX DOCD: CPT | Mod: CPTII,S$GLB,, | Performed by: INTERNAL MEDICINE

## 2022-12-27 PROCEDURE — 3060F PR POS MICROALBUMINURIA RESULT DOCUMENTED/REVIEW: ICD-10-PCS | Mod: CPTII,S$GLB,, | Performed by: INTERNAL MEDICINE

## 2022-12-27 PROCEDURE — 1101F PR PT FALLS ASSESS DOC 0-1 FALLS W/OUT INJ PAST YR: ICD-10-PCS | Mod: CPTII,S$GLB,, | Performed by: INTERNAL MEDICINE

## 2022-12-27 PROCEDURE — 1159F MED LIST DOCD IN RCRD: CPT | Mod: CPTII,S$GLB,, | Performed by: INTERNAL MEDICINE

## 2022-12-27 PROCEDURE — 3066F PR DOCUMENTATION OF TREATMENT FOR NEPHROPATHY: ICD-10-PCS | Mod: CPTII,S$GLB,, | Performed by: INTERNAL MEDICINE

## 2022-12-27 PROCEDURE — 3044F HG A1C LEVEL LT 7.0%: CPT | Mod: CPTII,S$GLB,, | Performed by: INTERNAL MEDICINE

## 2022-12-27 PROCEDURE — 3288F FALL RISK ASSESSMENT DOCD: CPT | Mod: CPTII,S$GLB,, | Performed by: INTERNAL MEDICINE

## 2022-12-27 PROCEDURE — 1160F RVW MEDS BY RX/DR IN RCRD: CPT | Mod: CPTII,S$GLB,, | Performed by: INTERNAL MEDICINE

## 2022-12-27 NOTE — PROGRESS NOTES
CARDIOLOGY CLINIC VISIT        HISTORY OF PRESENT ILLNESS:     Vivek Lema presents for continued care.     Seen 02/08/2022 for establishment of care.History of aortic bioprosthesis secondary to aortic insufficiency with an aortic aneurysm.  Left atrial appendage ligation.  Sick sinus syndrome status post dual chamber pacemaker placement.  In August 2021 underwent attempted MitraClip procedure.  Unsuccessful percutaneous repair.  Patient was deemed unsuitable for open repair secondary to comorbidities.  Prior sternotomy.  Patient states that his shortness of breath is worsening.  Symptoms less than 1 block.  No PND orthopnea.    Echocardiogram for 03/02/2022 shows normal left ventricular systolic function.  Severe left atrial enlargement.  No pulmonary hypertension.  No mention of mitral regurgitation.  Patient still with complaints of shortness of breath on exertion.  Last visit wanted him to see someone at [facility] for evaluation.    05/31/2022:  Patient was evaluated by Dr. Morales.  Most recent echocardiogram did not show any significant mitral regurgitation.  Feels good.  No shortness of breath.  Blood pressure looks good.  Creatinine stable.    08/15/2022:  Feels good.  No complaints.  Pacemaker interrogated today.  36 seconds of atrial fibrillation noted.  PMT noted.  PVARP turned off.  RA decreased to 0.4 milliseconds.    12/27/2022: No complaints today. Blood pressure looks good.        CARDIOVASCULAR HISTORY:     Aortic bioprosthesis secondary to aortic aneurysm/aortic insufficiency  Atrial fibrillation  Mitral regurgitation  Cardiomyopathy  Permanent pacemaker secondary to sick sinus syndrome    PAST MEDICAL HISTORY:     Past Medical History:   Diagnosis Date    CAD (coronary artery disease)     Disorder of kidney and ureter        PAST SURGICAL HISTORY:     Past Surgical History:   Procedure Laterality Date    CORONARY ARTERY BYPASS GRAFT      with valve repair (aortic?)       ALLERGIES AND  MEDICATION:   Review of patient's allergies indicates:  No Known Allergies     Medication List            Accurate as of December 27, 2022  4:07 PM. If you have any questions, ask your nurse or doctor.                CONTINUE taking these medications      aspirin 81 MG EC tablet  Commonly known as: ECOTRIN  Take 1 tablet (81 mg total) by mouth once daily.     atorvastatin 40 MG tablet  Commonly known as: LIPITOR  Take 1 tablet (40 mg total) by mouth once daily.     calcitRIOL 0.25 MCG Cap  Commonly known as: ROCALTROL  Take 1 capsule (0.25 mcg total) by mouth once daily.     cholecalciferol (vitamin D3) 50 mcg (2,000 unit) Cap capsule  Commonly known as: VITAMIN D3     EScitalopram oxalate 20 MG tablet  Commonly known as: LEXAPRO  TAKE 1 TABLET(20 MG) BY MOUTH EVERY DAY     febuxostat 40 mg Tab  Commonly known as: ULORIC  Take 1 tablet (40 mg total) by mouth once daily.     HYDROcodone-acetaminophen 7.5-325 mg per tablet  Commonly known as: NORCO  Take not more than one pill a day PRN as direct     nitroGLYCERIN 0.4 MG SL tablet  Commonly known as: NITROSTAT     torsemide 20 MG Tab  Commonly known as: DEMADEX  TAKE 3 TABLETS BY MOUTH ONCE DAILY( NOTE FROM MD TAKE 2 OF THESE TABLETS IN THE AM)     valsartan 80 MG tablet  Commonly known as: DIOVAN  Take 1 tablet (80 mg total) by mouth once daily.     warfarin 2 MG tablet  Commonly known as: COUMADIN  TAKE BY MOUTH AS PER INR. TAKE 4MG ON MONDAY, WEDNESDAY, AND FRIDAY. TAKE 2MG ON ALL OTHER DAYS  Strength: 2 mg              SOCIAL HISTORY:     Social History     Socioeconomic History    Marital status:    Tobacco Use    Smoking status: Never    Smokeless tobacco: Never   Substance and Sexual Activity    Alcohol use: Yes     Alcohol/week: 6.0 standard drinks     Types: 6 Cans of beer per week     Comment: occ    Drug use: Never       FAMILY HISTORY:   History reviewed. No pertinent family history.    REVIEW OF SYSTEMS:   Review of Systems   Respiratory:  Negative  "for sputum production, shortness of breath and wheezing.    Cardiovascular:  Negative for chest pain, palpitations, orthopnea, claudication, leg swelling and PND.   Gastrointestinal:  Negative for abdominal pain, heartburn, nausea and vomiting.   Neurological:  Negative for dizziness, speech change, focal weakness, loss of consciousness, weakness and headaches.     PHYSICAL EXAM:     Vitals:    12/27/22 1551   BP: 109/61   Pulse: 60   Resp: 18    Body mass index is 31.53 kg/m².  Weight: 102.6 kg (226 lb 1.3 oz)   Height: 5' 11" (180.3 cm)     Physical Exam  Vitals reviewed.   Constitutional:       General: He is not in acute distress.     Appearance: He is well-developed and overweight. He is not diaphoretic.   Neck:      Vascular: No carotid bruit or JVD.   Cardiovascular:      Rate and Rhythm: Normal rate and regular rhythm.      Pulses: Normal pulses.      Heart sounds: Murmur heard.   Systolic murmur is present with a grade of 2/6.   Pulmonary:      Effort: Pulmonary effort is normal.      Breath sounds: Normal breath sounds.   Abdominal:      General: Bowel sounds are normal.      Palpations: Abdomen is soft.      Tenderness: There is no abdominal tenderness.   Musculoskeletal:      Right lower leg: No edema.      Left lower leg: No edema.   Neurological:      Mental Status: He is alert and oriented to person, place, and time.   Psychiatric:         Speech: Speech normal.         Behavior: Behavior normal.       DATA:   EKG: (personally reviewed tracing)  12/27/2022 - atrial paced  Laboratory:  CBC:  Recent Labs   Lab 05/10/22  1413 08/29/22  0855 11/15/22  1349   WBC 6.48 7.33 8.00   Hemoglobin 11.7 L 12.9 L 12.9 L   Hematocrit 36.0 L 40.1 41.1   Platelets 174 178 217       CHEMISTRIES:  Recent Labs   Lab 02/23/22  1213 03/13/22  0755 03/17/22  1419 03/21/22  1110 05/10/22  1413 06/27/22  1405 08/29/22  0855 11/15/22  1349   Glucose 95 94   < > 101 93 85 93 93   Sodium 144 137   < > 140 139 141 142 145 "   Potassium 3.9 3.6   < > 4.7 4.1 4.0 4.1 4.4   BUN 53 H 49 H   < > 78 H 45 H 41 H 31 H 55 H   Creatinine 3.2 H 3.1 H   < > 3.3 H 2.7 H 2.6 H 2.7 H 3.2 H   eGFR if  20.9 A 22 A   < > 20.1 A 25.7 A 27 A  --   --    eGFR if non  18.1 A 19 A   < > 17.4 A 22.2 A 23 A  --   --    Calcium 9.4 9.2   < > 9.6 9.3 9.0 9.1 9.3   Magnesium 2.2 2.3  --   --   --   --   --   --     < > = values in this interval not displayed.       CARDIAC BIOMARKERS:  Recent Labs   Lab 03/13/22  0755    H       COAGS:  Recent Labs   Lab 11/16/22  1320 11/28/22  1022 12/12/22  1003   INR 4.1 H 2.0 H 2.7 H       LIPIDS/LFTS:  Recent Labs   Lab 02/02/22  0945 02/23/22  1213 05/10/22  1413 08/29/22  0855 11/15/22  1349   Cholesterol 154  --   --   --   --    Triglycerides 171 H  --   --   --   --    HDL 33 L  --   --   --   --    LDL Cholesterol 86.8  --   --   --   --    Non-HDL Cholesterol 121  --   --   --   --    AST 15   < > 20 19 18   ALT 11   < > 14 10 9 L    < > = values in this interval not displayed.       Cardiovascular Testing:    Echocardiogram 03/02/2022:    The left ventricle is normal in size with mild concentric hypertrophy and normal systolic function.  The estimated ejection fraction is 55%.  Normal left ventricular diastolic function.  Severe left atrial enlargement.  Mild right atrial enlargement.  Normal right ventricular size with normal right ventricular systolic function.  Normal central venous pressure (3 mmHg).  The estimated PA systolic pressure is 9 mmHg.    Echocardiogram 08/18/2021:  Ejection fraction of 60-65%.  Normal right ventricular size and function.  Severe left atrial enlargement.  Status post Bentall procedure with a 27 mm magna ease bioprosthesis in the aortic position.  Mild to moderate mitral regurgitation.     Right/left heart catheterization 07/16/2021:     Nonobstructive coronary artery disease with the exception of a ramus intermedius that was of small caliber.   Mild pulmonary hypertension.  Elevated V-wave in setting of severe mitral regurgitation.  V-wave of 43 mm Hg.  Left anterior descending artery with a mid to distal 30-40% stenosis.  Ramus intermedius with a mid 70 80% stenosis.  Small caliber vessel.  Left circumflex artery:  Non dominant.  Ectatic and aneurysmal.  Mid 40% stenosis in the AV groove followed by a 40-50% stenosis prior to the bifurcation of OM 2.   Right coronary artery is ectatic and aneurysmal with a mid 20-30% stenosis.  Superior branch of the PDA has a 50% stenosis proximally in the inferior branch has luminal irregularities.  PLV branch with a proximal 30-40% stenosis followed by mid 40-50% stenosis.     Echocardiogram 05/24/2021:  Ejection fraction 45-50%.  Severe left atrial enlargement.  Bioprosthetic valve in aortic position (27 mm magna ease bioprosthesis).  Moderate to severe mitral regurgitation     Echocardiogram 03/31/2020:  Ejection fraction 54%.  Aortic bioprosthesis.  No perivalvular regurgitation.    ASSESSMENT:     Aortic valve prosthesis: Secondary to ascending aortic aneurysm/aortic insufficiency.  27 mm magna ease bioprosthesis.  Mitral regurgitation:   trivial by last echocardiogram  Permanent pacemaker/sick sinus syndrome  Paroxysmal atrial fibrillation  Chronic anticoagulation  Nonobstructive coronary artery disease  Hypertension  Hyperlipidemia:  LDL 86  Chronic kidney disease  Obesity      PLAN:     Aortic valve prosthesis:  Last echocardiogram showed normal function.  Mitral regurgitation:   trivial by last echocardiogram  Permanent pacemaker/sick sinus syndrome/Paroxysmal atrial fibrillation: Minimal atrial fibrillation noted on last interrogation.  Continue rate control.  Anticoagulation.  Hypertension: Continue current management.  Hyperlipidemia:  Continue current management.  Return to clinic 6 months.         Vivek Hannon MD, MPH, FACC, [facility]

## 2022-12-28 ENCOUNTER — LAB VISIT (OUTPATIENT)
Dept: LAB | Facility: HOSPITAL | Age: 72
End: 2022-12-28
Attending: INTERNAL MEDICINE
Payer: MEDICARE

## 2022-12-28 DIAGNOSIS — Z86.79 HISTORY OF ATRIAL FIBRILLATION: ICD-10-CM

## 2022-12-28 DIAGNOSIS — Z79.01 LONG TERM (CURRENT) USE OF ANTICOAGULANTS: ICD-10-CM

## 2022-12-28 LAB
INR PPP: 2.6 (ref 0.8–1.2)
PROTHROMBIN TIME: 26 SEC (ref 9–12.5)

## 2022-12-28 PROCEDURE — 36415 COLL VENOUS BLD VENIPUNCTURE: CPT | Mod: PO | Performed by: INTERNAL MEDICINE

## 2022-12-28 PROCEDURE — 85610 PROTHROMBIN TIME: CPT | Performed by: INTERNAL MEDICINE

## 2022-12-29 ENCOUNTER — ANTI-COAG VISIT (OUTPATIENT)
Dept: CARDIOLOGY | Facility: CLINIC | Age: 72
End: 2022-12-29
Payer: MEDICARE

## 2022-12-29 DIAGNOSIS — Z86.79 HISTORY OF ATRIAL FIBRILLATION: Primary | ICD-10-CM

## 2022-12-29 DIAGNOSIS — Z79.01 LONG TERM (CURRENT) USE OF ANTICOAGULANTS: ICD-10-CM

## 2022-12-29 PROCEDURE — 93793 ANTICOAG MGMT PT WARFARIN: CPT | Mod: S$GLB,,,

## 2022-12-29 PROCEDURE — 93793 PR ANTICOAGULANT MGMT FOR PT TAKING WARFARIN: ICD-10-PCS | Mod: S$GLB,,,

## 2023-01-04 DIAGNOSIS — Z12.11 SPECIAL SCREENING FOR MALIGNANT NEOPLASMS, COLON: Primary | ICD-10-CM

## 2023-01-04 RX ORDER — SODIUM, POTASSIUM,MAG SULFATES 17.5-3.13G
1 SOLUTION, RECONSTITUTED, ORAL ORAL DAILY
Qty: 1 KIT | Refills: 0 | Status: SHIPPED | OUTPATIENT
Start: 2023-01-04 | End: 2023-01-06

## 2023-01-10 ENCOUNTER — ANTI-COAG VISIT (OUTPATIENT)
Dept: CARDIOLOGY | Facility: CLINIC | Age: 73
End: 2023-01-10
Payer: MEDICARE

## 2023-01-10 ENCOUNTER — LAB VISIT (OUTPATIENT)
Dept: LAB | Facility: HOSPITAL | Age: 73
End: 2023-01-10
Attending: INTERNAL MEDICINE
Payer: MEDICARE

## 2023-01-10 DIAGNOSIS — Z79.01 LONG TERM (CURRENT) USE OF ANTICOAGULANTS: ICD-10-CM

## 2023-01-10 DIAGNOSIS — Z86.79 HISTORY OF ATRIAL FIBRILLATION: Primary | ICD-10-CM

## 2023-01-10 DIAGNOSIS — Z86.79 HISTORY OF ATRIAL FIBRILLATION: ICD-10-CM

## 2023-01-10 LAB
INR PPP: 2.8 (ref 0.8–1.2)
PROTHROMBIN TIME: 28.2 SEC (ref 9–12.5)

## 2023-01-10 PROCEDURE — 36415 COLL VENOUS BLD VENIPUNCTURE: CPT | Mod: PO | Performed by: INTERNAL MEDICINE

## 2023-01-10 PROCEDURE — 93793 ANTICOAG MGMT PT WARFARIN: CPT | Mod: S$GLB,,,

## 2023-01-10 PROCEDURE — 93793 PR ANTICOAGULANT MGMT FOR PT TAKING WARFARIN: ICD-10-PCS | Mod: S$GLB,,,

## 2023-01-10 PROCEDURE — 85610 PROTHROMBIN TIME: CPT | Performed by: INTERNAL MEDICINE

## 2023-01-10 NOTE — PROGRESS NOTES
INR at goal. Medications and chart reviewed. Needs to hold anticoagulation for upcoming procedure on 1/17. See calendar.

## 2023-01-11 ENCOUNTER — PATIENT MESSAGE (OUTPATIENT)
Dept: ENDOSCOPY | Facility: HOSPITAL | Age: 73
End: 2023-01-11
Payer: MEDICARE

## 2023-01-13 ENCOUNTER — TELEPHONE (OUTPATIENT)
Dept: ENDOSCOPY | Facility: HOSPITAL | Age: 73
End: 2023-01-13
Payer: MEDICARE

## 2023-01-13 NOTE — TELEPHONE ENCOUNTER
Contacted patient regarding upcoming procedure. All questions answered. Patient states he received email with instructions. Patient verbalized understanding.

## 2023-01-16 ENCOUNTER — ANESTHESIA EVENT (OUTPATIENT)
Dept: ENDOSCOPY | Facility: HOSPITAL | Age: 73
End: 2023-01-16
Payer: MEDICARE

## 2023-01-17 ENCOUNTER — ANESTHESIA (OUTPATIENT)
Dept: ENDOSCOPY | Facility: HOSPITAL | Age: 73
End: 2023-01-17
Payer: MEDICARE

## 2023-01-17 ENCOUNTER — HOSPITAL ENCOUNTER (OUTPATIENT)
Facility: HOSPITAL | Age: 73
Discharge: HOME | End: 2023-01-17
Attending: INTERNAL MEDICINE | Admitting: INTERNAL MEDICINE
Payer: MEDICARE

## 2023-01-17 VITALS
SYSTOLIC BLOOD PRESSURE: 129 MMHG | BODY MASS INDEX: 29.96 KG/M2 | TEMPERATURE: 98 F | RESPIRATION RATE: 16 BRPM | OXYGEN SATURATION: 96 % | HEART RATE: 68 BPM | DIASTOLIC BLOOD PRESSURE: 70 MMHG | HEIGHT: 71 IN | WEIGHT: 214 LBS

## 2023-01-17 DIAGNOSIS — Z12.11 COLON CANCER SCREENING: ICD-10-CM

## 2023-01-17 PROCEDURE — 88305 TISSUE EXAM BY PATHOLOGIST: CPT | Performed by: PATHOLOGY

## 2023-01-17 PROCEDURE — D9220A PRA ANESTHESIA: Mod: PT,ANES,, | Performed by: ANESTHESIOLOGY

## 2023-01-17 PROCEDURE — D9220A PRA ANESTHESIA: Mod: PT,CRNA,, | Performed by: NURSE ANESTHETIST, CERTIFIED REGISTERED

## 2023-01-17 PROCEDURE — D9220A PRA ANESTHESIA: ICD-10-PCS | Mod: PT,CRNA,, | Performed by: NURSE ANESTHETIST, CERTIFIED REGISTERED

## 2023-01-17 PROCEDURE — 45385 COLONOSCOPY W/LESION REMOVAL: CPT | Mod: PT,,, | Performed by: INTERNAL MEDICINE

## 2023-01-17 PROCEDURE — 88305 TISSUE EXAM BY PATHOLOGIST: ICD-10-PCS | Mod: 26,,, | Performed by: PATHOLOGY

## 2023-01-17 PROCEDURE — 27201089 HC SNARE, DISP (ANY): Performed by: INTERNAL MEDICINE

## 2023-01-17 PROCEDURE — 45385 PR COLONOSCOPY,REMV LESN,SNARE: ICD-10-PCS | Mod: PT,,, | Performed by: INTERNAL MEDICINE

## 2023-01-17 PROCEDURE — 25000003 PHARM REV CODE 250: Performed by: NURSE ANESTHETIST, CERTIFIED REGISTERED

## 2023-01-17 PROCEDURE — 37000008 HC ANESTHESIA 1ST 15 MINUTES: Performed by: INTERNAL MEDICINE

## 2023-01-17 PROCEDURE — 45385 COLONOSCOPY W/LESION REMOVAL: CPT | Mod: PT | Performed by: INTERNAL MEDICINE

## 2023-01-17 PROCEDURE — 37000009 HC ANESTHESIA EA ADD 15 MINS: Performed by: INTERNAL MEDICINE

## 2023-01-17 PROCEDURE — D9220A PRA ANESTHESIA: ICD-10-PCS | Mod: PT,ANES,, | Performed by: ANESTHESIOLOGY

## 2023-01-17 PROCEDURE — 63600175 PHARM REV CODE 636 W HCPCS: Performed by: NURSE ANESTHETIST, CERTIFIED REGISTERED

## 2023-01-17 PROCEDURE — 88305 TISSUE EXAM BY PATHOLOGIST: CPT | Mod: 26,,, | Performed by: PATHOLOGY

## 2023-01-17 RX ORDER — SODIUM CHLORIDE 9 MG/ML
INJECTION, SOLUTION INTRAVENOUS CONTINUOUS
Status: DISCONTINUED | OUTPATIENT
Start: 2023-01-17 | End: 2023-01-17 | Stop reason: HOSPADM

## 2023-01-17 RX ORDER — SODIUM CHLORIDE 0.9 % (FLUSH) 0.9 %
10 SYRINGE (ML) INJECTION
Status: DISCONTINUED | OUTPATIENT
Start: 2023-01-17 | End: 2023-01-17 | Stop reason: HOSPADM

## 2023-01-17 RX ORDER — PROPOFOL 10 MG/ML
VIAL (ML) INTRAVENOUS CONTINUOUS PRN
Status: DISCONTINUED | OUTPATIENT
Start: 2023-01-17 | End: 2023-01-17

## 2023-01-17 RX ORDER — PROPOFOL 10 MG/ML
VIAL (ML) INTRAVENOUS
Status: DISCONTINUED | OUTPATIENT
Start: 2023-01-17 | End: 2023-01-17

## 2023-01-17 RX ORDER — LIDOCAINE HYDROCHLORIDE 20 MG/ML
INJECTION INTRAVENOUS
Status: DISCONTINUED | OUTPATIENT
Start: 2023-01-17 | End: 2023-01-17

## 2023-01-17 RX ADMIN — SODIUM CHLORIDE: 0.9 INJECTION, SOLUTION INTRAVENOUS at 08:01

## 2023-01-17 RX ADMIN — PROPOFOL 70 MG: 10 INJECTION, EMULSION INTRAVENOUS at 08:01

## 2023-01-17 RX ADMIN — LIDOCAINE HYDROCHLORIDE 50 MG: 20 INJECTION INTRAVENOUS at 08:01

## 2023-01-17 RX ADMIN — Medication 150 MCG/KG/MIN: at 08:01

## 2023-01-17 NOTE — ANESTHESIA POSTPROCEDURE EVALUATION
Anesthesia Post Evaluation    Patient: Vivek Lema     Procedure(s) Performed: Procedure(s) (LRB):  COLONOSCOPY (N/A)    Final Anesthesia Type: general (Natural airway)      Patient location during evaluation: [facility]  Patient participation: Yes- Able to Participate  Level of consciousness: awake and alert  Post-procedure vital signs: reviewed and stable  Pain management: adequate  Airway patency: patent    PONV status at discharge: No PONV  Anesthetic complications: no      Cardiovascular status: hemodynamically stable  Respiratory status: unassisted  Hydration status: euvolemic  Follow-up not needed.          Vitals Value Taken Time   /70 01/17/23 0950   Temp 36.6 °C (97.8 °F) 01/17/23 0945   Pulse 63 01/17/23 0954   Resp 16 01/17/23 0954   SpO2 97 % 01/17/23 0954   Vitals shown include unvalidated device data.      No case tracking events are documented in the log.      Pain/Portillo Score: Portillo Score: 10 (1/17/2023 10:17 AM)

## 2023-01-17 NOTE — PLAN OF CARE
Pt awake and alert.  Pain tolerable and procedure/surgery site stable.  Discharge instructions reviewed and handout given.  Pt states ready to go home.

## 2023-01-17 NOTE — TRANSFER OF CARE
"Anesthesia Transfer of Care Note    Patient: Vivek Lema     Procedure(s) Performed: Procedure(s) (LRB):  COLONOSCOPY (N/A)    Patient location: PACU    Anesthesia Type: general    Transport from OR: Transported from OR on room air with adequate spontaneous ventilation    Post pain: adequate analgesia    Post assessment: no apparent anesthetic complications and tolerated procedure well    Post vital signs: stable    Level of consciousness: awake, alert and oriented    Nausea/Vomiting: no nausea/vomiting    Complications: none    Transfer of care protocol was followed      Last vitals:   Visit Vitals  /67(BP Location: Right arm, Patient Position: Lying)   Pulse 80   Temp 97.7   Resp 18   Ht 5' 11" (1.803 m)   Wt 97.1 kg (214 lb)   SpO2 98%   BMI 29.85 kg/m²     "

## 2023-01-17 NOTE — H&P
Short Stay Endoscopy History and Physical    PCP - Michelle Conway MD  Referring Physician - Michelle Conway MD  [address] [place] [address]    Procedure -colonoscopy  ASA - per anesthesia  Mallampati - per anesthesia  History of Anesthesia problems - no  Family history Anesthesia problems -  no   Plan of anesthesia - General    HPI:  This is a 72 y.o. male here for evaluation of: screening    Reflux - no  Dysphagia - no  Abdominal pain - no  Diarrhea - no    ROS:  Constitutional: No fevers, chills, No weight loss  CV: No chest pain  Pulm: No cough, No shortness of breath  Ophtho: No vision changes  GI: see HPI  Derm: No rash    Medical History:  has a past medical history of A-fib, CAD (coronary artery disease), CKD (chronic kidney disease), Disorder of kidney and ureter, HTN (hypertension), Mitral regurgitation, and Pacemaker.    Surgical History:  has a past surgical history that includes Coronary artery bypass graft; Colonoscopy; and AV fistula placement (Left).    Family History: family history is not on file..    Social History:  reports that he has never smoked. He has never used smokeless tobacco. He reports current alcohol use of about 6.0 standard drinks per week. He reports that he does not use drugs.    Review of patient's allergies indicates:  No Known Allergies    Medications:   Medications Prior to Admission   Medication Sig Dispense Refill Last Dose    aspirin (ECOTRIN) 81 MG EC tablet Take 1 tablet (81 mg total) by mouth once daily. 90 tablet 1 1/16/2023    atorvastatin (LIPITOR) 40 MG tablet Take 1 tablet (40 mg total) by mouth once daily. 90 tablet 0 1/16/2023    EScitalopram oxalate (LEXAPRO) 20 MG tablet TAKE 1 TABLET(20 MG) BY MOUTH EVERY DAY 90 tablet 3 1/16/2023    febuxostat (ULORIC) 40 mg Tab Take 1 tablet (40 mg total) by mouth once daily. 90 tablet 1 1/16/2023    torsemide (DEMADEX) 20 MG Tab TAKE 3 TABLETS BY MOUTH ONCE DAILY( NOTE FROM MD TAKE 2 OF THESE TABLETS  IN THE AM) 90 tablet 3 1/16/2023    valsartan (DIOVAN) 80 MG tablet TAKE 1 TABLET(80 MG) BY MOUTH EVERY DAY 90 tablet 0 1/17/2023    calcitRIOL (ROCALTROL) 0.25 MCG Cap TAKE 1 CAPSULE(0.25 MCG) BY MOUTH EVERY DAY 90 capsule 1     cholecalciferol, vitamin D3, (VITAMIN D3) 50 mcg (2,000 unit) Cap Take 1 capsule by mouth once daily.       HYDROcodone-acetaminophen (NORCO) 7.5-325 mg per tablet Take not more than one pill a day PRN as direct 30 tablet 0     nitroGLYCERIN (NITROSTAT) 0.4 MG SL tablet Place 0.4 mg under the tongue.       warfarin (COUMADIN) 2 MG tablet TAKE BY MOUTH AS PER INR. TAKE 4MG ON MONDAY, WEDNESDAY, AND FRIDAY. TAKE 2MG ON ALL OTHER DAYS  Strength: 2 mg 126 tablet 0 1/11/2023       Physical Exam:    Vital Signs:   Vitals:    01/17/23 0757   BP: 135/80   Pulse: 83   Resp: 18   Temp: 98.2 °F (36.8 °C)       General Appearance: Well appearing in no acute distress    Labs:  Lab Results   Component Value Date    WBC 8.00 11/15/2022    HGB 12.9 (L) 11/15/2022    HCT 41.1 11/15/2022     11/15/2022    CHOL 154 02/02/2022    TRIG 171 (H) 02/02/2022    HDL 33 (L) 02/02/2022    ALT 9 (L) 11/15/2022    AST 18 11/15/2022     11/15/2022    K 4.4 11/15/2022     11/15/2022    CREATININE 3.2 (H) 11/15/2022    BUN 55 (H) 11/15/2022    CO2 23 11/15/2022    TSH 1.782 02/02/2022    PSA <0.01 02/02/2022    INR 2.8 (H) 01/10/2023    HGBA1C 5.1 02/02/2022       I have explained the risks and benefits of this endoscopic procedure to the patient including but not limited to bleeding, inflammation, infection, perforation, and death.      Todd Oviedo MD

## 2023-01-17 NOTE — ANESTHESIA PREPROCEDURE EVALUATION
01/17/2023  Vivek Lema Jr. is a 72 y.o., male.    Pre-operative evaluation for Procedure(s) (LRB):  COLONOSCOPY (N/A)    Vivek Lema Jr. is a 72 y.o. male     Patient Active Problem List   Diagnosis    Hypertension    CKD (chronic kidney disease), stage IV    A-V fistula    Coronary artery disease    History of atrial fibrillation    Pacemaker    Nonrheumatic mitral valve regurgitation    Class 1 obesity due to excess calories with serious comorbidity and body mass index (BMI) of 32.0 to 32.9 in adult    Gout    Long term (current) use of anticoagulants    COVID       Review of patient's allergies indicates:  No Known Allergies    No current facility-administered medications on file prior to encounter.     Current Outpatient Medications on File Prior to Encounter   Medication Sig Dispense Refill    aspirin (ECOTRIN) 81 MG EC tablet Take 1 tablet (81 mg total) by mouth once daily. 90 tablet 1    atorvastatin (LIPITOR) 40 MG tablet Take 1 tablet (40 mg total) by mouth once daily. 90 tablet 0    EScitalopram oxalate (LEXAPRO) 20 MG tablet TAKE 1 TABLET(20 MG) BY MOUTH EVERY DAY 90 tablet 3    febuxostat (ULORIC) 40 mg Tab Take 1 tablet (40 mg total) by mouth once daily. 90 tablet 1    torsemide (DEMADEX) 20 MG Tab TAKE 3 TABLETS BY MOUTH ONCE DAILY( NOTE FROM MD TAKE 2 OF THESE TABLETS IN THE AM) 90 tablet 3    cholecalciferol, vitamin D3, (VITAMIN D3) 50 mcg (2,000 unit) Cap Take 1 capsule by mouth once daily.      HYDROcodone-acetaminophen (NORCO) 7.5-325 mg per tablet Take not more than one pill a day PRN as direct 30 tablet 0    nitroGLYCERIN (NITROSTAT) 0.4 MG SL tablet Place 0.4 mg under the tongue.      warfarin (COUMADIN) 2 MG tablet TAKE BY MOUTH AS PER INR. TAKE 4MG ON MONDAY, WEDNESDAY, AND FRIDAY. TAKE 2MG ON ALL OTHER DAYS  Strength: 2 mg 126 tablet 0        Past Surgical History:   Procedure Laterality Date    AV FISTULA PLACEMENT Left     COLONOSCOPY      CORONARY ARTERY BYPASS GRAFT      with valve repair (aortic?)       Social History     Socioeconomic History    Marital status:    Tobacco Use    Smoking status: Never    Smokeless tobacco: Never   Substance and Sexual Activity    Alcohol use: Yes     Alcohol/week: 6.0 standard drinks     Types: 6 Cans of beer per week     Comment: occ    Drug use: Never         CBC: No results for input(s): WBC, RBC, HGB, HCT, PLT, MCV, MCH, MCHC in the last 72 hours.    CMP: No results for input(s): NA, K, CL, CO2, BUN, CREATININE, GLU, MG, PHOS, CALCIUM, ALBUMIN, PROT, ALKPHOS, ALT, AST, BILITOT in the last 72 hours.    INR  No results for input(s): PT, INR, PROTIME, APTT in the last 72 hours.        Diagnostic Studies:      EKD Echo:  No results found for this or any previous visit.        Pre-op Assessment    I have reviewed the Patient Summary Reports.    I have reviewed the NPO Status.   I have reviewed the Medications.     Review of Systems  Anesthesia Hx:  No problems with previous Anesthesia   Denies Personal Hx of Anesthesia complications.   Cardiovascular:   Pacemaker Hypertension Valvular problems/Murmurs    Pulmonary:  Pulmonary Normal    Renal/:   Chronic Renal Disease, CKD    Hepatic/GI:  Hepatic/GI Normal    Neurological:   Denies CVA. Denies Seizures.        Physical Exam  General: Cooperative, Alert and Oriented    Airway:  Mallampati: III   Mouth Opening: Normal  TM Distance: Normal  Tongue: Normal  Neck ROM: Extension Decreased, Right Lateral Motion Decreased    Dental:        Anesthesia Plan  Type of Anesthesia, risks & benefits discussed:    Anesthesia Type: Gen Natural Airway  Intra-op Monitoring Plan: Standard ASA Monitors  Induction:  IV  Informed Consent: Informed consent signed with the Patient and all parties understand the risks and agree with anesthesia plan.  All  questions answered.   ASA Score: 3  Day of Surgery Review of History & Physical: H&P Update referred to the surgeon/provider.    Ready For Surgery From Anesthesia Perspective.     .

## 2023-01-17 NOTE — PROVATION PATIENT INSTRUCTIONS
Discharge Summary/Instructions after an Endoscopic Procedure  Patient Name: Vivek Lema  Patient MRN: [id number]  Patient YOB: 1950 Tuesday, January 17, 2023  Todd Oviedo MD  Dear patient,  As a result of recent federal legislation (The Federal Cures Act), you may   receive lab or pathology results from your procedure in your [facility]   account before your physician is able to contact you. Your physician or   their representative will relay the results to you with their   recommendations at their soonest availability.  Thank you,  RESTRICTIONS:  During your procedure today, you received medications for sedation.  These   medications may affect your judgment, balance and coordination.  Therefore,   for 24 hours, you have the following restrictions:   - DO NOT drive a car, operate machinery, make legal/financial decisions,   sign important papers or drink alcohol.    ACTIVITY:  Today: no heavy lifting, straining or running due to procedural   sedation/anesthesia.  The following day: return to full activity including work.  DIET:  Eat and drink normally unless instructed otherwise.     TREATMENT FOR COMMON SIDE EFFECTS:  - Mild abdominal pain, nausea, belching, bloating or excessive gas:  rest,   eat lightly and use a heating pad.  - Sore Throat: treat with throat lozenges and/or gargle with warm salt   water.  - Because air was used during the procedure, expelling large amounts of air   from your rectum or belching is normal.  - If a bowel prep was taken, you may not have a bowel movement for 1-3 days.    This is normal.  SYMPTOMS TO WATCH FOR AND REPORT TO YOUR PHYSICIAN:  1. Abdominal pain or bloating, other than gas cramps.  2. Chest pain.  3. Back pain.  4. Signs of infection such as: chills or fever occurring within 24 hours   after the procedure.  5. Rectal bleeding, which would show as bright red, maroon, or black stools.   (A tablespoon of blood from the rectum is not serious, especially if    hemorrhoids are present.)  6. Vomiting.  7. Weakness or dizziness.  GO DIRECTLY TO THE NEAREST EMERGENCY ROOM IF YOU HAVE ANY OF THE FOLLOWING:      Difficulty breathing              Chills and/or fever over 101 F   Persistent vomiting and/or vomiting blood   Severe abdominal pain   Severe chest pain   Black, tarry stools   Bleeding- more than one tablespoon   Any other symptom or condition that you feel may need urgent attention  Your doctor recommends these additional instructions:  If any biopsies were taken, your doctors clinic will contact you in 1 to 2   weeks with any results.  - Discharge patient to home.   - Resume previous diet.   - Continue present medications.   - Await pathology results.   - Repeat colonoscopy in 3 years for surveillance.   - Return to primary care physician at appointment to be scheduled.  For questions, problems or results please call your physician - Todd Oviedo MD at Work:  [phone].  [facility], EMERGENCY ROOM PHONE NUMBER: [phone]  IF A COMPLICATION OR EMERGENCY SITUATION ARISES AND YOU ARE UNABLE TO REACH   YOUR PHYSICIAN - GO DIRECTLY TO THE EMERGENCY ROOM.  Todd Oviedo MD  1/17/2023 8:57:12 AM  This report has been verified and signed electronically.  Dear patient,  As a result of recent federal legislation (The Federal Cures Act), you may   receive lab or pathology results from your procedure in your [facility]   account before your physician is able to contact you. Your physician or   their representative will relay the results to you with their   recommendations at their soonest availability.  Thank you,  [employer]ATION

## 2023-01-20 LAB
FINAL PATHOLOGIC DIAGNOSIS: NORMAL
GROSS: NORMAL
Lab: NORMAL

## 2023-01-25 ENCOUNTER — ANTI-COAG VISIT (OUTPATIENT)
Dept: CARDIOLOGY | Facility: CLINIC | Age: 73
End: 2023-01-25
Payer: MEDICARE

## 2023-01-25 ENCOUNTER — LAB VISIT (OUTPATIENT)
Dept: LAB | Facility: HOSPITAL | Age: 73
End: 2023-01-25
Attending: INTERNAL MEDICINE
Payer: MEDICARE

## 2023-01-25 DIAGNOSIS — Z79.01 LONG TERM (CURRENT) USE OF ANTICOAGULANTS: ICD-10-CM

## 2023-01-25 DIAGNOSIS — Z86.79 HISTORY OF ATRIAL FIBRILLATION: Primary | ICD-10-CM

## 2023-01-25 DIAGNOSIS — Z86.79 HISTORY OF ATRIAL FIBRILLATION: ICD-10-CM

## 2023-01-25 LAB
INR PPP: 2.6 (ref 0.8–1.2)
PROTHROMBIN TIME: 26.1 SEC (ref 9–12.5)

## 2023-01-25 PROCEDURE — 93793 PR ANTICOAGULANT MGMT FOR PT TAKING WARFARIN: ICD-10-PCS | Mod: S$GLB,,,

## 2023-01-25 PROCEDURE — 93793 ANTICOAG MGMT PT WARFARIN: CPT | Mod: S$GLB,,,

## 2023-01-25 PROCEDURE — 85610 PROTHROMBIN TIME: CPT | Performed by: INTERNAL MEDICINE

## 2023-01-25 PROCEDURE — 36415 COLL VENOUS BLD VENIPUNCTURE: CPT | Mod: PO | Performed by: INTERNAL MEDICINE

## 2023-02-08 ENCOUNTER — LAB VISIT (OUTPATIENT)
Dept: LAB | Facility: HOSPITAL | Age: 73
End: 2023-02-08
Attending: INTERNAL MEDICINE
Payer: MEDICARE

## 2023-02-08 DIAGNOSIS — Z79.01 LONG TERM (CURRENT) USE OF ANTICOAGULANTS: ICD-10-CM

## 2023-02-08 DIAGNOSIS — Z86.79 HISTORY OF ATRIAL FIBRILLATION: ICD-10-CM

## 2023-02-08 LAB
INR PPP: 2.8 (ref 0.8–1.2)
PROTHROMBIN TIME: 27.6 SEC (ref 9–12.5)

## 2023-02-08 PROCEDURE — 36415 COLL VENOUS BLD VENIPUNCTURE: CPT | Mod: PO | Performed by: INTERNAL MEDICINE

## 2023-02-08 PROCEDURE — 85610 PROTHROMBIN TIME: CPT | Performed by: INTERNAL MEDICINE

## 2023-02-09 ENCOUNTER — ANTI-COAG VISIT (OUTPATIENT)
Dept: CARDIOLOGY | Facility: CLINIC | Age: 73
End: 2023-02-09
Payer: MEDICARE

## 2023-02-09 DIAGNOSIS — Z86.79 HISTORY OF ATRIAL FIBRILLATION: Primary | ICD-10-CM

## 2023-02-09 DIAGNOSIS — Z79.01 LONG TERM (CURRENT) USE OF ANTICOAGULANTS: ICD-10-CM

## 2023-02-09 PROCEDURE — 93793 ANTICOAG MGMT PT WARFARIN: CPT | Mod: S$GLB,,,

## 2023-02-09 PROCEDURE — 93793 PR ANTICOAGULANT MGMT FOR PT TAKING WARFARIN: ICD-10-PCS | Mod: S$GLB,,,

## 2023-03-02 ENCOUNTER — OFFICE VISIT (OUTPATIENT)
Dept: OPTOMETRY | Facility: CLINIC | Age: 73
End: 2023-03-02
Payer: MEDICARE

## 2023-03-02 ENCOUNTER — LAB VISIT (OUTPATIENT)
Dept: LAB | Facility: HOSPITAL | Age: 73
End: 2023-03-02
Payer: MEDICARE

## 2023-03-02 DIAGNOSIS — Z79.01 LONG TERM (CURRENT) USE OF ANTICOAGULANTS: ICD-10-CM

## 2023-03-02 DIAGNOSIS — H35.3221 EXUDATIVE AGE-RELATED MACULAR DEGENERATION OF LEFT EYE WITH ACTIVE CHOROIDAL NEOVASCULARIZATION: Primary | ICD-10-CM

## 2023-03-02 DIAGNOSIS — Z86.79 HISTORY OF ATRIAL FIBRILLATION: ICD-10-CM

## 2023-03-02 DIAGNOSIS — H35.372 EPIRETINAL MEMBRANE (ERM) OF LEFT EYE: ICD-10-CM

## 2023-03-02 LAB
INR PPP: 2.1 (ref 0.8–1.2)
PROTHROMBIN TIME: 21.1 SEC (ref 9–12.5)

## 2023-03-02 PROCEDURE — 1101F PR PT FALLS ASSESS DOC 0-1 FALLS W/OUT INJ PAST YR: ICD-10-PCS | Mod: CPTII,S$GLB,, | Performed by: OPTOMETRIST

## 2023-03-02 PROCEDURE — 3288F FALL RISK ASSESSMENT DOCD: CPT | Mod: CPTII,S$GLB,, | Performed by: OPTOMETRIST

## 2023-03-02 PROCEDURE — 3288F PR FALLS RISK ASSESSMENT DOCUMENTED: ICD-10-PCS | Mod: CPTII,S$GLB,, | Performed by: OPTOMETRIST

## 2023-03-02 PROCEDURE — 1126F AMNT PAIN NOTED NONE PRSNT: CPT | Mod: CPTII,S$GLB,, | Performed by: OPTOMETRIST

## 2023-03-02 PROCEDURE — 99999 PR PBB SHADOW E&M-EST. PATIENT-LVL II: CPT | Mod: PBBFAC,,, | Performed by: OPTOMETRIST

## 2023-03-02 PROCEDURE — 85610 PROTHROMBIN TIME: CPT | Performed by: INTERNAL MEDICINE

## 2023-03-02 PROCEDURE — 92134 CPTRZ OPH DX IMG PST SGM RTA: CPT | Mod: S$GLB,,, | Performed by: OPTOMETRIST

## 2023-03-02 PROCEDURE — 36415 COLL VENOUS BLD VENIPUNCTURE: CPT | Performed by: INTERNAL MEDICINE

## 2023-03-02 PROCEDURE — 92004 PR EYE EXAM, NEW PATIENT,COMPREHESV: ICD-10-PCS | Mod: S$GLB,,, | Performed by: OPTOMETRIST

## 2023-03-02 PROCEDURE — 92004 COMPRE OPH EXAM NEW PT 1/>: CPT | Mod: S$GLB,,, | Performed by: OPTOMETRIST

## 2023-03-02 PROCEDURE — 99999 PR PBB SHADOW E&M-EST. PATIENT-LVL II: ICD-10-PCS | Mod: PBBFAC,,, | Performed by: OPTOMETRIST

## 2023-03-02 PROCEDURE — 92134 OCT, RETINA - OU - BOTH EYES: ICD-10-PCS | Mod: S$GLB,,, | Performed by: OPTOMETRIST

## 2023-03-02 PROCEDURE — 1126F PR PAIN SEVERITY QUANTIFIED, NO PAIN PRESENT: ICD-10-PCS | Mod: CPTII,S$GLB,, | Performed by: OPTOMETRIST

## 2023-03-02 PROCEDURE — 1101F PT FALLS ASSESS-DOCD LE1/YR: CPT | Mod: CPTII,S$GLB,, | Performed by: OPTOMETRIST

## 2023-03-03 ENCOUNTER — ANTI-COAG VISIT (OUTPATIENT)
Dept: CARDIOLOGY | Facility: CLINIC | Age: 73
End: 2023-03-03
Payer: MEDICARE

## 2023-03-03 DIAGNOSIS — Z86.79 HISTORY OF ATRIAL FIBRILLATION: Primary | ICD-10-CM

## 2023-03-03 DIAGNOSIS — Z79.01 LONG TERM (CURRENT) USE OF ANTICOAGULANTS: ICD-10-CM

## 2023-03-03 PROCEDURE — 93793 PR ANTICOAGULANT MGMT FOR PT TAKING WARFARIN: ICD-10-PCS | Mod: S$GLB,,,

## 2023-03-03 PROCEDURE — 93793 ANTICOAG MGMT PT WARFARIN: CPT | Mod: S$GLB,,,

## 2023-03-15 ENCOUNTER — LAB VISIT (OUTPATIENT)
Dept: LAB | Facility: HOSPITAL | Age: 73
End: 2023-03-15
Attending: INTERNAL MEDICINE
Payer: MEDICARE

## 2023-03-15 ENCOUNTER — OFFICE VISIT (OUTPATIENT)
Dept: FAMILY MEDICINE | Facility: CLINIC | Age: 73
End: 2023-03-15
Payer: MEDICARE

## 2023-03-15 VITALS
TEMPERATURE: 98 F | DIASTOLIC BLOOD PRESSURE: 74 MMHG | BODY MASS INDEX: 31.36 KG/M2 | OXYGEN SATURATION: 97 % | HEIGHT: 71 IN | HEART RATE: 70 BPM | RESPIRATION RATE: 16 BRPM | SYSTOLIC BLOOD PRESSURE: 128 MMHG | WEIGHT: 224 LBS

## 2023-03-15 DIAGNOSIS — I77.0 A-V FISTULA: ICD-10-CM

## 2023-03-15 DIAGNOSIS — Z11.59 ENCOUNTER FOR HEPATITIS C SCREENING TEST FOR LOW RISK PATIENT: ICD-10-CM

## 2023-03-15 DIAGNOSIS — Z00.00 ENCOUNTER FOR ANNUAL PHYSICAL EXAM: Primary | ICD-10-CM

## 2023-03-15 DIAGNOSIS — R51.9 NONINTRACTABLE HEADACHE, UNSPECIFIED CHRONICITY PATTERN, UNSPECIFIED HEADACHE TYPE: ICD-10-CM

## 2023-03-15 DIAGNOSIS — I10 HYPERTENSION, UNSPECIFIED TYPE: ICD-10-CM

## 2023-03-15 DIAGNOSIS — N18.4 CKD (CHRONIC KIDNEY DISEASE), STAGE IV: ICD-10-CM

## 2023-03-15 DIAGNOSIS — I48.0 PAF (PAROXYSMAL ATRIAL FIBRILLATION): ICD-10-CM

## 2023-03-15 LAB
ALBUMIN SERPL BCP-MCNC: 3.4 G/DL (ref 3.5–5.2)
ALP SERPL-CCNC: 76 U/L (ref 55–135)
ALT SERPL W/O P-5'-P-CCNC: 9 U/L (ref 10–44)
ANION GAP SERPL CALC-SCNC: 10 MMOL/L (ref 8–16)
AST SERPL-CCNC: 19 U/L (ref 10–40)
BASOPHILS # BLD AUTO: 0.03 K/UL (ref 0–0.2)
BASOPHILS NFR BLD: 0.4 % (ref 0–1.9)
BILIRUB SERPL-MCNC: 1.3 MG/DL (ref 0.1–1)
BUN SERPL-MCNC: 35 MG/DL (ref 8–23)
CALCIUM SERPL-MCNC: 9.1 MG/DL (ref 8.7–10.5)
CHLORIDE SERPL-SCNC: 106 MMOL/L (ref 95–110)
CO2 SERPL-SCNC: 21 MMOL/L (ref 23–29)
CREAT SERPL-MCNC: 2.4 MG/DL (ref 0.5–1.4)
DIFFERENTIAL METHOD: ABNORMAL
EOSINOPHIL # BLD AUTO: 0.2 K/UL (ref 0–0.5)
EOSINOPHIL NFR BLD: 3 % (ref 0–8)
ERYTHROCYTE [DISTWIDTH] IN BLOOD BY AUTOMATED COUNT: 14.2 % (ref 11.5–14.5)
EST. GFR  (NO RACE VARIABLE): 27.5 ML/MIN/1.73 M^2
GLUCOSE SERPL-MCNC: 80 MG/DL (ref 70–110)
HCT VFR BLD AUTO: 42.8 % (ref 40–54)
HGB BLD-MCNC: 13.4 G/DL (ref 14–18)
IMM GRANULOCYTES # BLD AUTO: 0.02 K/UL (ref 0–0.04)
IMM GRANULOCYTES NFR BLD AUTO: 0.3 % (ref 0–0.5)
LYMPHOCYTES # BLD AUTO: 1.7 K/UL (ref 1–4.8)
LYMPHOCYTES NFR BLD: 22.2 % (ref 18–48)
MCH RBC QN AUTO: 29.5 PG (ref 27–31)
MCHC RBC AUTO-ENTMCNC: 31.3 G/DL (ref 32–36)
MCV RBC AUTO: 94 FL (ref 82–98)
MONOCYTES # BLD AUTO: 0.8 K/UL (ref 0.3–1)
MONOCYTES NFR BLD: 10.4 % (ref 4–15)
NEUTROPHILS # BLD AUTO: 4.8 K/UL (ref 1.8–7.7)
NEUTROPHILS NFR BLD: 63.7 % (ref 38–73)
NRBC BLD-RTO: 0 /100 WBC
PLATELET # BLD AUTO: 172 K/UL (ref 150–450)
PMV BLD AUTO: 12.6 FL (ref 9.2–12.9)
POTASSIUM SERPL-SCNC: 3.9 MMOL/L (ref 3.5–5.1)
PROT SERPL-MCNC: 6.6 G/DL (ref 6–8.4)
RBC # BLD AUTO: 4.54 M/UL (ref 4.6–6.2)
SODIUM SERPL-SCNC: 137 MMOL/L (ref 136–145)
WBC # BLD AUTO: 7.57 K/UL (ref 3.9–12.7)

## 2023-03-15 PROCEDURE — 3288F PR FALLS RISK ASSESSMENT DOCUMENTED: ICD-10-PCS | Mod: CPTII,S$GLB,, | Performed by: INTERNAL MEDICINE

## 2023-03-15 PROCEDURE — 3078F DIAST BP <80 MM HG: CPT | Mod: CPTII,S$GLB,, | Performed by: INTERNAL MEDICINE

## 2023-03-15 PROCEDURE — 3078F PR MOST RECENT DIASTOLIC BLOOD PRESSURE < 80 MM HG: ICD-10-PCS | Mod: CPTII,S$GLB,, | Performed by: INTERNAL MEDICINE

## 2023-03-15 PROCEDURE — 86803 HEPATITIS C AB TEST: CPT | Performed by: INTERNAL MEDICINE

## 2023-03-15 PROCEDURE — 80053 COMPREHEN METABOLIC PANEL: CPT | Performed by: INTERNAL MEDICINE

## 2023-03-15 PROCEDURE — 36415 COLL VENOUS BLD VENIPUNCTURE: CPT | Mod: PO | Performed by: INTERNAL MEDICINE

## 2023-03-15 PROCEDURE — 1101F PT FALLS ASSESS-DOCD LE1/YR: CPT | Mod: CPTII,S$GLB,, | Performed by: INTERNAL MEDICINE

## 2023-03-15 PROCEDURE — 1159F PR MEDICATION LIST DOCUMENTED IN MEDICAL RECORD: ICD-10-PCS | Mod: CPTII,S$GLB,, | Performed by: INTERNAL MEDICINE

## 2023-03-15 PROCEDURE — 1160F PR REVIEW ALL MEDS BY PRESCRIBER/CLIN PHARMACIST DOCUMENTED: ICD-10-PCS | Mod: CPTII,S$GLB,, | Performed by: INTERNAL MEDICINE

## 2023-03-15 PROCEDURE — 99999 PR PBB SHADOW E&M-EST. PATIENT-LVL IV: ICD-10-PCS | Mod: PBBFAC,,, | Performed by: INTERNAL MEDICINE

## 2023-03-15 PROCEDURE — 3074F PR MOST RECENT SYSTOLIC BLOOD PRESSURE < 130 MM HG: ICD-10-PCS | Mod: CPTII,S$GLB,, | Performed by: INTERNAL MEDICINE

## 2023-03-15 PROCEDURE — 1101F PR PT FALLS ASSESS DOC 0-1 FALLS W/OUT INJ PAST YR: ICD-10-PCS | Mod: CPTII,S$GLB,, | Performed by: INTERNAL MEDICINE

## 2023-03-15 PROCEDURE — 1160F RVW MEDS BY RX/DR IN RCRD: CPT | Mod: CPTII,S$GLB,, | Performed by: INTERNAL MEDICINE

## 2023-03-15 PROCEDURE — 85025 COMPLETE CBC W/AUTO DIFF WBC: CPT | Performed by: INTERNAL MEDICINE

## 2023-03-15 PROCEDURE — 99397 PER PM REEVAL EST PAT 65+ YR: CPT | Mod: S$GLB,,, | Performed by: INTERNAL MEDICINE

## 2023-03-15 PROCEDURE — 3074F SYST BP LT 130 MM HG: CPT | Mod: CPTII,S$GLB,, | Performed by: INTERNAL MEDICINE

## 2023-03-15 PROCEDURE — 1159F MED LIST DOCD IN RCRD: CPT | Mod: CPTII,S$GLB,, | Performed by: INTERNAL MEDICINE

## 2023-03-15 PROCEDURE — 3288F FALL RISK ASSESSMENT DOCD: CPT | Mod: CPTII,S$GLB,, | Performed by: INTERNAL MEDICINE

## 2023-03-15 PROCEDURE — 99397 PR PREVENTIVE VISIT,EST,65 & OVER: ICD-10-PCS | Mod: S$GLB,,, | Performed by: INTERNAL MEDICINE

## 2023-03-15 PROCEDURE — 99999 PR PBB SHADOW E&M-EST. PATIENT-LVL IV: CPT | Mod: PBBFAC,,, | Performed by: INTERNAL MEDICINE

## 2023-03-15 RX ORDER — HYDROCODONE BITARTRATE AND ACETAMINOPHEN 7.5; 325 MG/1; MG/1
TABLET ORAL
Qty: 30 TABLET | Refills: 0 | Status: SHIPPED | OUTPATIENT
Start: 2023-03-15 | End: 2023-09-19 | Stop reason: SDUPTHER

## 2023-03-15 NOTE — PROGRESS NOTES
Subjective:       Patient ID: Vivek Lema Jr. is a pleasant 73 y.o. White male patient    Chief Complaint: Annual Exam      Patient is a pt I saw last on 09/28/2022, see my last notes and the list of problems below.    HPI     Patient with past medical history as per list of problems below coming today for an annual physical.    He is here on his own, his wife will also do her annual physical today, she is not here yet.    He reports feeling globally fine, he starts to get adjusted to [place], he was assessed in Ophthalmology and states he is going to need injections in the left eye.    He is very anxious regarding his kidney function, he is patient from Dr. Doe, he has a L AV fistula that has been implanted when he was still in [place], CKD is closely monitored, he is very concerned about this, he would like his kidney function to be checked each 3 months.    Patient Active Problem List   Diagnosis    Hypertension    CKD (chronic kidney disease), stage IV    A-V fistula    Coronary artery disease    History of atrial fibrillation    Pacemaker    Nonrheumatic mitral valve regurgitation    Class 1 obesity due to excess calories with serious comorbidity and body mass index (BMI) of 32.0 to 32.9 in adult    Gout    Long term (current) use of anticoagulants    COVID    PAF (paroxysmal atrial fibrillation)          ACTIVE MEDICAL ISSUES:  Documented in Problem List     PAST MEDICAL HISTORY  Documented     PAST SURGICAL HISTORY:  Documented     SOCIAL HISTORY:  Documented     FAMILY HISTORY:  Documented     ALLERGIES AND MEDICATIONS: updated and reviewed.  Documented    Review of Systems   Constitutional: Negative.    HENT: Negative.     Respiratory: Negative.     Cardiovascular: Negative.    Gastrointestinal: Negative.    Musculoskeletal: Negative.    Skin: Negative.    Neurological: Negative.    All other systems reviewed and are negative.    Objective:      Physical Exam  Vitals and nursing note reviewed.  "  Constitutional:       Appearance: Normal appearance. He is well-developed. He is obese.   HENT:      Right Ear: Tympanic membrane and external ear normal.      Left Ear: Tympanic membrane and external ear normal.   Eyes:      Conjunctiva/sclera: Conjunctivae normal.   Neck:      Thyroid: No thyromegaly.   Cardiovascular:      Rate and Rhythm: Normal rate and regular rhythm.      Pulses: Normal pulses.      Heart sounds: Normal heart sounds.      Comments: AV fistula L arm  Pulmonary:      Effort: Pulmonary effort is normal.      Breath sounds: Normal breath sounds. No wheezing.   Chest:      Chest wall: No tenderness.   Abdominal:      General: Bowel sounds are normal.      Palpations: Abdomen is soft. There is no mass.      Tenderness: There is no abdominal tenderness.   Musculoskeletal:         General: No tenderness or deformity. Normal range of motion.      Cervical back: Normal range of motion.   Lymphadenopathy:      Cervical: No cervical adenopathy.   Skin:     General: Skin is warm and dry.   Neurological:      Mental Status: He is alert and oriented to person, place, and time.   Psychiatric:         Mood and Affect: Mood normal.         Behavior: Behavior normal.         Thought Content: Thought content normal.         Judgment: Judgment normal.       Vitals:    03/15/23 1456   BP: 128/74   BP Location: Left arm   Patient Position: Sitting   BP Method: Large (Manual)   Pulse: 70   Resp: 16   Temp: 98 °F (36.7 °C)   TempSrc: Oral   SpO2: 97%   Weight: 101.6 kg (223 lb 15.8 oz)   Height: 5' 11" (1.803 m)     Body mass index is 31.24 kg/m².    RESULTS: Reviewed labs from last 12 months    Last Lab Results:     Lab Results   Component Value Date    WBC 7.57 03/15/2023    HGB 13.4 (L) 03/15/2023    HCT 42.8 03/15/2023     03/15/2023     03/15/2023    K 3.9 03/15/2023     03/15/2023    CO2 21 (L) 03/15/2023    BUN 35 (H) 03/15/2023    CREATININE 2.4 (H) 03/15/2023    CALCIUM 9.1 03/15/2023    " ALBUMIN 3.4 (L) 03/15/2023    AST 19 03/15/2023    ALT 9 (L) 03/15/2023    CHOL 154 02/02/2022    TRIG 171 (H) 02/02/2022    HDL 33 (L) 02/02/2022    LDLCALC 86.8 02/02/2022    HGBA1C 5.1 02/02/2022    TSH 1.782 02/02/2022    PSA <0.01 02/02/2022     Last Lab Results:     Lab Results   Component Value Date    WBC 7.57 03/15/2023    HGB 13.4 (L) 03/15/2023    HCT 42.8 03/15/2023     03/15/2023     03/15/2023    K 3.9 03/15/2023     03/15/2023    CO2 21 (L) 03/15/2023    BUN 35 (H) 03/15/2023    CREATININE 2.4 (H) 03/15/2023    CALCIUM 9.1 03/15/2023    ALBUMIN 3.4 (L) 03/15/2023    AST 19 03/15/2023    ALT 9 (L) 03/15/2023    CHOL 154 02/02/2022    TRIG 171 (H) 02/02/2022    HDL 33 (L) 02/02/2022    LDLCALC 86.8 02/02/2022    HGBA1C 5.1 02/02/2022    TSH 1.782 02/02/2022    PSA <0.01 02/02/2022         Assessment:       1. Encounter for annual physical exam    2. Hypertension, unspecified type    3. Nonintractable headache, unspecified chronicity pattern, unspecified headache type    4. PAF (paroxysmal atrial fibrillation)    5. CKD (chronic kidney disease), stage IV    6. A-V fistula    7. Encounter for hepatitis C screening test for low risk patient        Plan:   Vivek was seen today for annual exam.    Diagnoses and all orders for this visit:    Encounter for annual physical exam    Will do usual blood work, discussed preventative measures, discussed lifestyle.    Hypertension, unspecified type  -     Comprehensive Metabolic Panel; Future  -     CBC Auto Differential; Future    BP at goal, same treatment. Blood work.    Nonintractable headache, unspecified chronicity pattern, unspecified headache type  -     HYDROcodone-acetaminophen (NORCO) 7.5-325 mg per tablet; Take not more than one pill a day PRN as direct    Takes very rarely Norco for this, as per former PCP.    PAF (paroxysmal atrial fibrillation)    On blood thinners.    CKD (chronic kidney disease), stage IV    Will monitor. Most  recent value better! Pt informed.    A-V fistula    Encounter for hepatitis C screening test for low risk patient  -     Hepatitis C Antibody; Future      Follow up in about 3 months (around 6/15/2023) for f-up.    This note was created by combination of typed  and M-Modal dictation.  Transcription errors may be present.  If there are any questions, please contact me.

## 2023-03-16 ENCOUNTER — TELEPHONE (OUTPATIENT)
Dept: FAMILY MEDICINE | Facility: CLINIC | Age: 73
End: 2023-03-16
Payer: MEDICARE

## 2023-03-16 LAB — HCV AB SERPL QL IA: NORMAL

## 2023-03-16 NOTE — TELEPHONE ENCOUNTER
----- Message from Michelle Conway MD sent at 3/16/2023  8:27 AM CDT -----  Regarding: lab work results  Please tell Mr. Lema that his blood work shows that his kidney function is better. It is good news and I know he was concerned about this. Thanks

## 2023-03-17 ENCOUNTER — TELEPHONE (OUTPATIENT)
Dept: FAMILY MEDICINE | Facility: CLINIC | Age: 73
End: 2023-03-17
Payer: MEDICARE

## 2023-03-20 PROBLEM — I48.0 PAF (PAROXYSMAL ATRIAL FIBRILLATION): Status: ACTIVE | Noted: 2023-03-20

## 2023-03-20 NOTE — PROGRESS NOTES
HPI    72 Y/o male is here for routine eye exam with C/o pt states vision is very   blurry   Pt denies pain and discomfort   No f/f  Eye med: OTC gel OU PRN   Last edited by Gabbie Mccray, OD on 3/2/2023  3:30 PM.            Assessment /Plan     For exam results, see Encounter Report.    Exudative age-related macular degeneration of left eye with active choroidal neovascularization  -     OCT, Retina - OU - Both Eyes    Epiretinal membrane (ERM) of left eye      1-2. Educated pt on nature of condition and need for further retinal evaluation as problem is not a refractive issue. Pt voiced understanding and is scheduled to see Dr. Santiago 3/23/23.    RTC 3/23/23 for retinal eval

## 2023-03-21 ENCOUNTER — TELEPHONE (OUTPATIENT)
Dept: FAMILY MEDICINE | Facility: CLINIC | Age: 73
End: 2023-03-21
Payer: MEDICARE

## 2023-03-23 ENCOUNTER — OFFICE VISIT (OUTPATIENT)
Dept: OPHTHALMOLOGY | Facility: CLINIC | Age: 73
End: 2023-03-23
Payer: MEDICARE

## 2023-03-23 DIAGNOSIS — H35.372 EPIRETINAL MEMBRANE, LEFT: ICD-10-CM

## 2023-03-23 DIAGNOSIS — H35.722 SEROUS DETACHMENT OF RETINAL PIGMENT EPITHELIUM OF LEFT EYE: ICD-10-CM

## 2023-03-23 DIAGNOSIS — H35.3132 NONEXUDATIVE AGE-RELATED MACULAR DEGENERATION, BILATERAL, INTERMEDIATE DRY STAGE: Primary | ICD-10-CM

## 2023-03-23 DIAGNOSIS — Z96.1 PSEUDOPHAKIA OF BOTH EYES: ICD-10-CM

## 2023-03-23 PROBLEM — H35.371 EPIRETINAL MEMBRANE, RIGHT: Status: ACTIVE | Noted: 2023-03-23

## 2023-03-23 PROBLEM — H35.3221 EXUDATIVE AGE-RELATED MACULAR DEGENERATION, LEFT EYE, WITH ACTIVE CHOROIDAL NEOVASCULARIZATION: Status: ACTIVE | Noted: 2023-03-23

## 2023-03-23 PROBLEM — H35.3112 INTERMEDIATE STAGE DRY AGE-RELATED MACULAR DEGENERATION OF RIGHT EYE: Status: ACTIVE | Noted: 2023-03-23

## 2023-03-23 PROBLEM — H25.13 AGE-RELATED NUCLEAR CATARACT OF BOTH EYES: Status: ACTIVE | Noted: 2023-03-23

## 2023-03-23 PROCEDURE — 3288F FALL RISK ASSESSMENT DOCD: CPT | Mod: CPTII,S$GLB,, | Performed by: OPHTHALMOLOGY

## 2023-03-23 PROCEDURE — 99999 PR PBB SHADOW E&M-EST. PATIENT-LVL III: ICD-10-PCS | Mod: PBBFAC,,, | Performed by: OPHTHALMOLOGY

## 2023-03-23 PROCEDURE — 99214 OFFICE O/P EST MOD 30 MIN: CPT | Mod: S$GLB,,, | Performed by: OPHTHALMOLOGY

## 2023-03-23 PROCEDURE — 1159F MED LIST DOCD IN RCRD: CPT | Mod: CPTII,S$GLB,, | Performed by: OPHTHALMOLOGY

## 2023-03-23 PROCEDURE — 99999 PR PBB SHADOW E&M-EST. PATIENT-LVL III: CPT | Mod: PBBFAC,,, | Performed by: OPHTHALMOLOGY

## 2023-03-23 PROCEDURE — 1159F PR MEDICATION LIST DOCUMENTED IN MEDICAL RECORD: ICD-10-PCS | Mod: CPTII,S$GLB,, | Performed by: OPHTHALMOLOGY

## 2023-03-23 PROCEDURE — 1101F PR PT FALLS ASSESS DOC 0-1 FALLS W/OUT INJ PAST YR: ICD-10-PCS | Mod: CPTII,S$GLB,, | Performed by: OPHTHALMOLOGY

## 2023-03-23 PROCEDURE — 1160F RVW MEDS BY RX/DR IN RCRD: CPT | Mod: CPTII,S$GLB,, | Performed by: OPHTHALMOLOGY

## 2023-03-23 PROCEDURE — 92235 FLUORESCEIN ANGIOGRAPHY - OU - BOTH EYES: ICD-10-PCS | Mod: S$GLB,,, | Performed by: OPHTHALMOLOGY

## 2023-03-23 PROCEDURE — 1126F PR PAIN SEVERITY QUANTIFIED, NO PAIN PRESENT: ICD-10-PCS | Mod: CPTII,S$GLB,, | Performed by: OPHTHALMOLOGY

## 2023-03-23 PROCEDURE — 1126F AMNT PAIN NOTED NONE PRSNT: CPT | Mod: CPTII,S$GLB,, | Performed by: OPHTHALMOLOGY

## 2023-03-23 PROCEDURE — 99214 PR OFFICE/OUTPT VISIT, EST, LEVL IV, 30-39 MIN: ICD-10-PCS | Mod: S$GLB,,, | Performed by: OPHTHALMOLOGY

## 2023-03-23 PROCEDURE — 1101F PT FALLS ASSESS-DOCD LE1/YR: CPT | Mod: CPTII,S$GLB,, | Performed by: OPHTHALMOLOGY

## 2023-03-23 PROCEDURE — 92235 FLUORESCEIN ANGRPH MLTIFRAME: CPT | Mod: S$GLB,,, | Performed by: OPHTHALMOLOGY

## 2023-03-23 PROCEDURE — 3288F PR FALLS RISK ASSESSMENT DOCUMENTED: ICD-10-PCS | Mod: CPTII,S$GLB,, | Performed by: OPHTHALMOLOGY

## 2023-03-23 PROCEDURE — 1160F PR REVIEW ALL MEDS BY PRESCRIBER/CLIN PHARMACIST DOCUMENTED: ICD-10-PCS | Mod: CPTII,S$GLB,, | Performed by: OPHTHALMOLOGY

## 2023-03-23 RX ORDER — FLUORESCEIN 500 MG/ML
5 INJECTION INTRAVENOUS
Status: COMPLETED | OUTPATIENT
Start: 2023-03-23 | End: 2023-03-23

## 2023-03-23 RX ADMIN — FLUORESCEIN 500 MG: 500 INJECTION INTRAVENOUS at 09:03

## 2023-03-23 NOTE — PROGRESS NOTES
HPI    Referred by Dr Mccray    Refresh gel PRN OU  S/p phaco w/IOL OU    Pt here for retina eval per Dr Mccray.  Pt states decreased VA OS at   distance and near gradually x 3 months.  Pt states he has floaters OU but   appear stable.  Pt denies flashes OU.   Last edited by Amanda Molina MA on 3/23/2023  8:13 AM.         A/P    ICD-10-CM ICD-9-CM   1. Nonexudative age-related macular degeneration, bilateral, intermediate dry stage  H35.3132 362.51   2. 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 Epiretinal membrane, left  H35.372 362.56   4. Pseudophakia of both eyes  Z96.1 V43.1       1. Nonexudative age-related macular degeneration, bilateral, intermediate dry stage  2. 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 Mccray for AMD check  Hx of blurred vision for few months    Today OS 20/25, OD VA 20/60, exam notable for ?serous PED vs CNVM OS    FA 3/23/23 with staining, no leakage OU    Plan: Observation, likely serous drusenoid PED OS, observe, amsler precautions, consider antiVEGF if converting to wet  Amsler precautions  Recommend Antioxidants, lutein, omega 3, brown sunglasses (blue blockers).     3. Epiretinal membrane, left  Mod ERM, mixed mechanism component for decr VA as AMD also impacting vision  Plan: Observation for now, may need PPV/MP if worsening    4. Pseudophakia of both eyes  Good lens position OU  Plan: Observation, update Mrx prn      RTC 1 mo DFE/OCTm OU, likely RIVERA OS    I saw and examined the patient and reviewed in detail the findings documented. The final examination findings, image interpretations, and plan as documented in the record represent my personal judgment and conclusions.    Jaylen Santiago MD  Vitreoretinal Surgery   [facility]

## 2023-04-04 ENCOUNTER — ANTI-COAG VISIT (OUTPATIENT)
Dept: CARDIOLOGY | Facility: CLINIC | Age: 73
End: 2023-04-04
Payer: MEDICARE

## 2023-04-04 ENCOUNTER — LAB VISIT (OUTPATIENT)
Dept: LAB | Facility: HOSPITAL | Age: 73
End: 2023-04-04
Attending: INTERNAL MEDICINE
Payer: MEDICARE

## 2023-04-04 DIAGNOSIS — Z79.01 LONG TERM (CURRENT) USE OF ANTICOAGULANTS: ICD-10-CM

## 2023-04-04 DIAGNOSIS — Z86.79 HISTORY OF ATRIAL FIBRILLATION: ICD-10-CM

## 2023-04-04 DIAGNOSIS — Z86.79 HISTORY OF ATRIAL FIBRILLATION: Primary | ICD-10-CM

## 2023-04-04 LAB
INR PPP: 1.9 (ref 0.8–1.2)
PROTHROMBIN TIME: 18.8 SEC (ref 9–12.5)

## 2023-04-04 PROCEDURE — 85610 PROTHROMBIN TIME: CPT | Performed by: INTERNAL MEDICINE

## 2023-04-04 PROCEDURE — 93793 ANTICOAG MGMT PT WARFARIN: CPT | Mod: S$GLB,,,

## 2023-04-04 PROCEDURE — 36415 COLL VENOUS BLD VENIPUNCTURE: CPT | Mod: PO | Performed by: INTERNAL MEDICINE

## 2023-04-04 PROCEDURE — 93793 PR ANTICOAGULANT MGMT FOR PT TAKING WARFARIN: ICD-10-PCS | Mod: S$GLB,,,

## 2023-04-18 ENCOUNTER — ANTI-COAG VISIT (OUTPATIENT)
Dept: CARDIOLOGY | Facility: CLINIC | Age: 73
End: 2023-04-18
Payer: MEDICARE

## 2023-04-18 ENCOUNTER — LAB VISIT (OUTPATIENT)
Dept: LAB | Facility: HOSPITAL | Age: 73
End: 2023-04-18
Attending: INTERNAL MEDICINE
Payer: MEDICARE

## 2023-04-18 DIAGNOSIS — Z86.79 HISTORY OF ATRIAL FIBRILLATION: ICD-10-CM

## 2023-04-18 DIAGNOSIS — Z86.79 HISTORY OF ATRIAL FIBRILLATION: Primary | ICD-10-CM

## 2023-04-18 DIAGNOSIS — Z79.01 LONG TERM (CURRENT) USE OF ANTICOAGULANTS: ICD-10-CM

## 2023-04-18 LAB
INR PPP: 1.9 (ref 0.8–1.2)
PROTHROMBIN TIME: 19.5 SEC (ref 9–12.5)

## 2023-04-18 PROCEDURE — 93793 PR ANTICOAGULANT MGMT FOR PT TAKING WARFARIN: ICD-10-PCS | Mod: S$GLB,,,

## 2023-04-18 PROCEDURE — 93793 ANTICOAG MGMT PT WARFARIN: CPT | Mod: S$GLB,,,

## 2023-04-18 PROCEDURE — 36415 COLL VENOUS BLD VENIPUNCTURE: CPT | Mod: PO | Performed by: INTERNAL MEDICINE

## 2023-04-18 PROCEDURE — 85610 PROTHROMBIN TIME: CPT | Performed by: INTERNAL MEDICINE

## 2023-05-02 ENCOUNTER — LAB VISIT (OUTPATIENT)
Dept: LAB | Facility: HOSPITAL | Age: 73
End: 2023-05-02
Attending: INTERNAL MEDICINE
Payer: MEDICARE

## 2023-05-02 DIAGNOSIS — Z79.01 LONG TERM (CURRENT) USE OF ANTICOAGULANTS: ICD-10-CM

## 2023-05-02 DIAGNOSIS — Z86.79 HISTORY OF ATRIAL FIBRILLATION: ICD-10-CM

## 2023-05-02 LAB
INR PPP: 2.9 (ref 0.8–1.2)
PROTHROMBIN TIME: 28.3 SEC (ref 9–12.5)

## 2023-05-02 PROCEDURE — 85610 PROTHROMBIN TIME: CPT | Performed by: INTERNAL MEDICINE

## 2023-05-02 PROCEDURE — 36415 COLL VENOUS BLD VENIPUNCTURE: CPT | Mod: PO | Performed by: INTERNAL MEDICINE

## 2023-05-03 ENCOUNTER — ANTI-COAG VISIT (OUTPATIENT)
Dept: CARDIOLOGY | Facility: CLINIC | Age: 73
End: 2023-05-03
Payer: MEDICARE

## 2023-05-03 DIAGNOSIS — Z79.01 LONG TERM (CURRENT) USE OF ANTICOAGULANTS: ICD-10-CM

## 2023-05-03 DIAGNOSIS — Z86.79 HISTORY OF ATRIAL FIBRILLATION: Primary | ICD-10-CM

## 2023-05-03 PROCEDURE — 93793 ANTICOAG MGMT PT WARFARIN: CPT | Mod: S$GLB,,,

## 2023-05-03 PROCEDURE — 93793 PR ANTICOAGULANT MGMT FOR PT TAKING WARFARIN: ICD-10-PCS | Mod: S$GLB,,,

## 2023-05-22 ENCOUNTER — LAB VISIT (OUTPATIENT)
Dept: LAB | Facility: HOSPITAL | Age: 73
End: 2023-05-22
Attending: INTERNAL MEDICINE
Payer: MEDICARE

## 2023-05-22 ENCOUNTER — ANTI-COAG VISIT (OUTPATIENT)
Dept: CARDIOLOGY | Facility: CLINIC | Age: 73
End: 2023-05-22
Payer: MEDICARE

## 2023-05-22 DIAGNOSIS — Z79.01 LONG TERM (CURRENT) USE OF ANTICOAGULANTS: ICD-10-CM

## 2023-05-22 DIAGNOSIS — Z86.79 HISTORY OF ATRIAL FIBRILLATION: ICD-10-CM

## 2023-05-22 DIAGNOSIS — Z86.79 HISTORY OF ATRIAL FIBRILLATION: Primary | ICD-10-CM

## 2023-05-22 LAB
INR PPP: 2.1 (ref 0.8–1.2)
PROTHROMBIN TIME: 20.7 SEC (ref 9–12.5)

## 2023-05-22 PROCEDURE — 85610 PROTHROMBIN TIME: CPT | Performed by: INTERNAL MEDICINE

## 2023-05-22 PROCEDURE — 93793 ANTICOAG MGMT PT WARFARIN: CPT | Mod: S$GLB,,,

## 2023-05-22 PROCEDURE — 93793 PR ANTICOAGULANT MGMT FOR PT TAKING WARFARIN: ICD-10-PCS | Mod: S$GLB,,,

## 2023-05-22 PROCEDURE — 36415 COLL VENOUS BLD VENIPUNCTURE: CPT | Mod: PO | Performed by: INTERNAL MEDICINE

## 2023-06-13 NOTE — PROGRESS NOTES
Subjective:       Patient ID: Vivek Lema Jr. is a pleasant 73 y.o. White male patient    Chief Complaint: Follow-up      Patient is a patient I saw last on the 15th of March, see my last notes.      HPI     Patient with past medical history as the list of problems below coming today for regular follow-up visit.  From our last visit:  - Ophthalmology.    He reports feeling globally fine, he has no specific issue to report.  He is as usually very concerned re:  his kidney function. He has an AV fistula on the left arm that was placed when CKD was stage IV.  The last creatinine was down to 2.6.    Otherwise, he is getting adjusted to live in [facility].   They will go back for a vacation to [place] this fall.    Patient Active Problem List   Diagnosis    Hypertension    CKD (chronic kidney disease), stage IV    A-V fistula    Coronary artery disease    History of atrial fibrillation    Pacemaker    Nonrheumatic mitral valve regurgitation    Class 1 obesity due to excess calories with serious comorbidity and body mass index (BMI) of 32.0 to 32.9 in adult    Gout    Long term (current) use of anticoagulants    COVID    PAF (paroxysmal atrial fibrillation)    Exudative age-related macular degeneration, left eye, with active choroidal neovascularization    Intermediate stage dry age-related macular degeneration of right eye    Epiretinal membrane, right    Age-related nuclear cataract of both eyes    Pseudophakia of both eyes    History of prostate cancer    Anxiety and depression    Nonintractable headache          ACTIVE MEDICAL ISSUES:  Documented in Problem List     PAST MEDICAL HISTORY  Documented     PAST SURGICAL HISTORY:  Documented     SOCIAL HISTORY:  Documented     FAMILY HISTORY:  Documented     ALLERGIES AND MEDICATIONS: updated and reviewed.  Documented    Review of Systems   Constitutional: Negative.    HENT: Negative.     Respiratory: Negative.     Cardiovascular: Negative.    Gastrointestinal:  "Negative.    Musculoskeletal: Negative.    Skin: Negative.    Neurological: Negative.    All other systems reviewed and are negative.    Objective:      Physical Exam  Vitals and nursing note reviewed.   Constitutional:       Appearance: Normal appearance. He is well-developed. He is obese.   HENT:      Right Ear: Tympanic membrane and external ear normal.      Left Ear: Tympanic membrane and external ear normal.   Eyes:      Conjunctiva/sclera: Conjunctivae normal.   Neck:      Thyroid: No thyromegaly.   Cardiovascular:      Rate and Rhythm: Normal rate and regular rhythm.      Pulses: Normal pulses.      Heart sounds: Normal heart sounds.      Comments: AV fistula L arm  Pulmonary:      Effort: Pulmonary effort is normal.      Breath sounds: Normal breath sounds. No wheezing.   Chest:      Chest wall: No tenderness.   Abdominal:      General: Bowel sounds are normal.      Palpations: Abdomen is soft. There is no mass.      Tenderness: There is no abdominal tenderness.   Musculoskeletal:         General: No tenderness or deformity. Normal range of motion.      Cervical back: Normal range of motion.   Lymphadenopathy:      Cervical: No cervical adenopathy.   Skin:     General: Skin is warm and dry.   Neurological:      Mental Status: He is alert and oriented to person, place, and time.   Psychiatric:         Mood and Affect: Mood normal.         Behavior: Behavior normal.         Thought Content: Thought content normal.         Judgment: Judgment normal.       Vitals:    06/14/23 0748   BP: 120/68   BP Location: Right arm   Patient Position: Sitting   BP Method: Large (Manual)   Pulse: 72   Resp: 16   Temp: 98.1 °F (36.7 °C)   TempSrc: Oral   SpO2: 96%   Weight: 103.5 kg (228 lb 2.8 oz)   Height: 5' 11" (1.803 m)     Body mass index is 31.82 kg/m².    RESULTS: Reviewed labs from last 12 months    Last Lab Results:     Lab Results   Component Value Date    WBC 6.77 06/14/2023    HGB 13.7 (L) 06/14/2023    HCT 42.1 " 2023     2023     2023    K 3.9 2023     2023    CO2 26 2023    BUN 37 (H) 2023    CREATININE 2.8 (H) 2023    CALCIUM 9.6 2023    ALBUMIN 3.6 2023    AST 19 2023    ALT 12 2023    CHOL 154 2022    TRIG 171 (H) 2022    HDL 33 (L) 2022    LDLCALC 86.8 2022    HGBA1C 5.1 2022    TSH 1.782 2022    PSA <0.01 2022     Assessment:       1. Hypertension, unspecified type    2. Class 1 obesity due to excess calories with serious comorbidity and body mass index (BMI) of 31.0 to 31.9 in adult    3. CKD (chronic kidney disease), stage IV    4. Nonintractable headache, unspecified chronicity pattern, unspecified headache type    5. PAF (paroxysmal atrial fibrillation)    6. A-V fistula    7. Anxiety and depression    8. Pacemaker    9. History of prostate cancer    10. Special screening for malignant neoplasm of prostate    11. History of COVID-19    12. Need for pneumococcal 20-valent conjugate vaccination        Plan:   Vivek was seen today for follow-up.    Diagnoses and all orders for this visit:    Hypertension, unspecified type  -     Comprehensive Metabolic Panel; Future  -     CBC Auto Differential; Future    BP at goal, same treatment. Blood work.    Class 1 obesity due to excess calories with serious comorbidity and body mass index (BMI) of 31.0 to 31.9 in adult    We discussed weight issues and safe, effective ways of losing pounds, includin) diet:  low carbohydrate, low fat diet, stay away from fast food, fried and processed food, use whole grain, lot of fruits and vegetables, use healthy fat such as avocado, nuts and olive oil in reasonable quantity, stay away from sodas. Regular meals with lean proteins.  2) physical activity: ideally 150 min a week, with cardiovascular and resistance activity.  Patient was encouraged to set realistic attainable goals for weight loss, and we  will follow up periodically.    CKD (chronic kidney disease), stage IV    Monitored, creat was down to  2.4 at last control. Had AV fistula that has been placed in case he would need dialysis, for now monitoring each 3 months.     Nonintractable headache, unspecified chronicity pattern, unspecified headache type    Not an issue reported.    PAF (paroxysmal atrial fibrillation)    On blood thinner, rate controlled. Has PM.    A-V fistula    See above.    Anxiety and depression    Doing  rather well, adjusting to [place].    Pacemaker    See above.    History of prostate cancer    Monitored.    Special screening for malignant neoplasm of prostate  -     PSA, Screening; Future    History of COVID-19    Need for pneumococcal 20-valent conjugate vaccination  -     (In Office Administered) Pneumococcal Conjugate Vaccine (20 Valent) (IM)      Follow up in about 3 months (around 9/14/2023) for f-up.    This note was created by combination of typed  and M-Modal dictation.  Transcription errors may be present.  If there are any questions, please contact me.

## 2023-06-14 ENCOUNTER — ANTI-COAG VISIT (OUTPATIENT)
Dept: CARDIOLOGY | Facility: CLINIC | Age: 73
End: 2023-06-14
Payer: MEDICARE

## 2023-06-14 ENCOUNTER — LAB VISIT (OUTPATIENT)
Dept: LAB | Facility: HOSPITAL | Age: 73
End: 2023-06-14
Attending: INTERNAL MEDICINE
Payer: MEDICARE

## 2023-06-14 ENCOUNTER — OFFICE VISIT (OUTPATIENT)
Dept: FAMILY MEDICINE | Facility: CLINIC | Age: 73
End: 2023-06-14
Payer: MEDICARE

## 2023-06-14 VITALS
SYSTOLIC BLOOD PRESSURE: 120 MMHG | BODY MASS INDEX: 31.95 KG/M2 | WEIGHT: 228.19 LBS | RESPIRATION RATE: 16 BRPM | OXYGEN SATURATION: 96 % | HEIGHT: 71 IN | TEMPERATURE: 98 F | HEART RATE: 72 BPM | DIASTOLIC BLOOD PRESSURE: 68 MMHG

## 2023-06-14 DIAGNOSIS — Z12.5 SPECIAL SCREENING FOR MALIGNANT NEOPLASM OF PROSTATE: ICD-10-CM

## 2023-06-14 DIAGNOSIS — Z79.01 LONG TERM (CURRENT) USE OF ANTICOAGULANTS: ICD-10-CM

## 2023-06-14 DIAGNOSIS — I77.0 A-V FISTULA: ICD-10-CM

## 2023-06-14 DIAGNOSIS — I48.0 PAF (PAROXYSMAL ATRIAL FIBRILLATION): ICD-10-CM

## 2023-06-14 DIAGNOSIS — Z95.0 PACEMAKER: ICD-10-CM

## 2023-06-14 DIAGNOSIS — Z86.16 HISTORY OF COVID-19: ICD-10-CM

## 2023-06-14 DIAGNOSIS — I10 HYPERTENSION, UNSPECIFIED TYPE: Primary | ICD-10-CM

## 2023-06-14 DIAGNOSIS — Z23 NEED FOR PNEUMOCOCCAL 20-VALENT CONJUGATE VACCINATION: ICD-10-CM

## 2023-06-14 DIAGNOSIS — I10 HYPERTENSION, UNSPECIFIED TYPE: ICD-10-CM

## 2023-06-14 DIAGNOSIS — E66.09 CLASS 1 OBESITY DUE TO EXCESS CALORIES WITH SERIOUS COMORBIDITY AND BODY MASS INDEX (BMI) OF 31.0 TO 31.9 IN ADULT: ICD-10-CM

## 2023-06-14 DIAGNOSIS — Z85.46 HISTORY OF PROSTATE CANCER: ICD-10-CM

## 2023-06-14 DIAGNOSIS — N18.4 CKD (CHRONIC KIDNEY DISEASE), STAGE IV: ICD-10-CM

## 2023-06-14 DIAGNOSIS — F41.9 ANXIETY AND DEPRESSION: ICD-10-CM

## 2023-06-14 DIAGNOSIS — R51.9 NONINTRACTABLE HEADACHE, UNSPECIFIED CHRONICITY PATTERN, UNSPECIFIED HEADACHE TYPE: ICD-10-CM

## 2023-06-14 DIAGNOSIS — F32.A ANXIETY AND DEPRESSION: ICD-10-CM

## 2023-06-14 DIAGNOSIS — Z86.79 HISTORY OF ATRIAL FIBRILLATION: Primary | ICD-10-CM

## 2023-06-14 DIAGNOSIS — Z86.79 HISTORY OF ATRIAL FIBRILLATION: ICD-10-CM

## 2023-06-14 LAB
ALBUMIN SERPL BCP-MCNC: 3.6 G/DL (ref 3.5–5.2)
ALP SERPL-CCNC: 79 U/L (ref 55–135)
ALT SERPL W/O P-5'-P-CCNC: 12 U/L (ref 10–44)
ANION GAP SERPL CALC-SCNC: 9 MMOL/L (ref 8–16)
AST SERPL-CCNC: 19 U/L (ref 10–40)
BASOPHILS # BLD AUTO: 0.04 K/UL (ref 0–0.2)
BASOPHILS NFR BLD: 0.6 % (ref 0–1.9)
BILIRUB SERPL-MCNC: 1.2 MG/DL (ref 0.1–1)
BUN SERPL-MCNC: 37 MG/DL (ref 8–23)
CALCIUM SERPL-MCNC: 9.6 MG/DL (ref 8.7–10.5)
CHLORIDE SERPL-SCNC: 106 MMOL/L (ref 95–110)
CO2 SERPL-SCNC: 26 MMOL/L (ref 23–29)
COMPLEXED PSA SERPL-MCNC: <0.01 NG/ML (ref 0–4)
CREAT SERPL-MCNC: 2.8 MG/DL (ref 0.5–1.4)
DIFFERENTIAL METHOD: ABNORMAL
EOSINOPHIL # BLD AUTO: 0.3 K/UL (ref 0–0.5)
EOSINOPHIL NFR BLD: 3.8 % (ref 0–8)
ERYTHROCYTE [DISTWIDTH] IN BLOOD BY AUTOMATED COUNT: 13.6 % (ref 11.5–14.5)
EST. GFR  (NO RACE VARIABLE): 23 ML/MIN/1.73 M^2
GLUCOSE SERPL-MCNC: 125 MG/DL (ref 70–110)
HCT VFR BLD AUTO: 42.1 % (ref 40–54)
HGB BLD-MCNC: 13.7 G/DL (ref 14–18)
IMM GRANULOCYTES # BLD AUTO: 0.01 K/UL (ref 0–0.04)
IMM GRANULOCYTES NFR BLD AUTO: 0.1 % (ref 0–0.5)
INR PPP: 3.2 (ref 0.8–1.2)
LYMPHOCYTES # BLD AUTO: 2 K/UL (ref 1–4.8)
LYMPHOCYTES NFR BLD: 29.1 % (ref 18–48)
MCH RBC QN AUTO: 29.4 PG (ref 27–31)
MCHC RBC AUTO-ENTMCNC: 32.5 G/DL (ref 32–36)
MCV RBC AUTO: 90 FL (ref 82–98)
MONOCYTES # BLD AUTO: 0.9 K/UL (ref 0.3–1)
MONOCYTES NFR BLD: 13.9 % (ref 4–15)
NEUTROPHILS # BLD AUTO: 3.6 K/UL (ref 1.8–7.7)
NEUTROPHILS NFR BLD: 52.5 % (ref 38–73)
NRBC BLD-RTO: 0 /100 WBC
PLATELET # BLD AUTO: 165 K/UL (ref 150–450)
PMV BLD AUTO: 12.2 FL (ref 9.2–12.9)
POTASSIUM SERPL-SCNC: 3.9 MMOL/L (ref 3.5–5.1)
PROT SERPL-MCNC: 6.9 G/DL (ref 6–8.4)
PROTHROMBIN TIME: 31.6 SEC (ref 9–12.5)
RBC # BLD AUTO: 4.66 M/UL (ref 4.6–6.2)
SODIUM SERPL-SCNC: 141 MMOL/L (ref 136–145)
WBC # BLD AUTO: 6.77 K/UL (ref 3.9–12.7)

## 2023-06-14 PROCEDURE — 3288F FALL RISK ASSESSMENT DOCD: CPT | Mod: CPTII,S$GLB,, | Performed by: INTERNAL MEDICINE

## 2023-06-14 PROCEDURE — 1159F MED LIST DOCD IN RCRD: CPT | Mod: CPTII,S$GLB,, | Performed by: INTERNAL MEDICINE

## 2023-06-14 PROCEDURE — 80053 COMPREHEN METABOLIC PANEL: CPT | Performed by: INTERNAL MEDICINE

## 2023-06-14 PROCEDURE — 1101F PR PT FALLS ASSESS DOC 0-1 FALLS W/OUT INJ PAST YR: ICD-10-PCS | Mod: CPTII,S$GLB,, | Performed by: INTERNAL MEDICINE

## 2023-06-14 PROCEDURE — 3074F SYST BP LT 130 MM HG: CPT | Mod: CPTII,S$GLB,, | Performed by: INTERNAL MEDICINE

## 2023-06-14 PROCEDURE — G0009 PNEUMOCOCCAL CONJUGATE VACCINE 20-VALENT: ICD-10-PCS | Mod: S$GLB,,, | Performed by: INTERNAL MEDICINE

## 2023-06-14 PROCEDURE — 90677 PCV20 VACCINE IM: CPT | Mod: S$GLB,,, | Performed by: INTERNAL MEDICINE

## 2023-06-14 PROCEDURE — 1160F PR REVIEW ALL MEDS BY PRESCRIBER/CLIN PHARMACIST DOCUMENTED: ICD-10-PCS | Mod: CPTII,S$GLB,, | Performed by: INTERNAL MEDICINE

## 2023-06-14 PROCEDURE — 90677 PNEUMOCOCCAL CONJUGATE VACCINE 20-VALENT: ICD-10-PCS | Mod: S$GLB,,, | Performed by: INTERNAL MEDICINE

## 2023-06-14 PROCEDURE — 99214 OFFICE O/P EST MOD 30 MIN: CPT | Mod: 25,S$GLB,, | Performed by: INTERNAL MEDICINE

## 2023-06-14 PROCEDURE — 3078F DIAST BP <80 MM HG: CPT | Mod: CPTII,S$GLB,, | Performed by: INTERNAL MEDICINE

## 2023-06-14 PROCEDURE — G0009 ADMIN PNEUMOCOCCAL VACCINE: HCPCS | Mod: S$GLB,,, | Performed by: INTERNAL MEDICINE

## 2023-06-14 PROCEDURE — 3074F PR MOST RECENT SYSTOLIC BLOOD PRESSURE < 130 MM HG: ICD-10-PCS | Mod: CPTII,S$GLB,, | Performed by: INTERNAL MEDICINE

## 2023-06-14 PROCEDURE — 3078F PR MOST RECENT DIASTOLIC BLOOD PRESSURE < 80 MM HG: ICD-10-PCS | Mod: CPTII,S$GLB,, | Performed by: INTERNAL MEDICINE

## 2023-06-14 PROCEDURE — 1101F PT FALLS ASSESS-DOCD LE1/YR: CPT | Mod: CPTII,S$GLB,, | Performed by: INTERNAL MEDICINE

## 2023-06-14 PROCEDURE — 36415 COLL VENOUS BLD VENIPUNCTURE: CPT | Mod: PO | Performed by: INTERNAL MEDICINE

## 2023-06-14 PROCEDURE — 3288F PR FALLS RISK ASSESSMENT DOCUMENTED: ICD-10-PCS | Mod: CPTII,S$GLB,, | Performed by: INTERNAL MEDICINE

## 2023-06-14 PROCEDURE — 4010F ACE/ARB THERAPY RXD/TAKEN: CPT | Mod: CPTII,S$GLB,, | Performed by: INTERNAL MEDICINE

## 2023-06-14 PROCEDURE — 85025 COMPLETE CBC W/AUTO DIFF WBC: CPT | Performed by: INTERNAL MEDICINE

## 2023-06-14 PROCEDURE — 84153 ASSAY OF PSA TOTAL: CPT | Performed by: INTERNAL MEDICINE

## 2023-06-14 PROCEDURE — 4010F PR ACE/ARB THEARPY RXD/TAKEN: ICD-10-PCS | Mod: CPTII,S$GLB,, | Performed by: INTERNAL MEDICINE

## 2023-06-14 PROCEDURE — 99999 PR PBB SHADOW E&M-EST. PATIENT-LVL IV: CPT | Mod: PBBFAC,,, | Performed by: INTERNAL MEDICINE

## 2023-06-14 PROCEDURE — 99214 PR OFFICE/OUTPT VISIT, EST, LEVL IV, 30-39 MIN: ICD-10-PCS | Mod: 25,S$GLB,, | Performed by: INTERNAL MEDICINE

## 2023-06-14 PROCEDURE — 1159F PR MEDICATION LIST DOCUMENTED IN MEDICAL RECORD: ICD-10-PCS | Mod: CPTII,S$GLB,, | Performed by: INTERNAL MEDICINE

## 2023-06-14 PROCEDURE — 1160F RVW MEDS BY RX/DR IN RCRD: CPT | Mod: CPTII,S$GLB,, | Performed by: INTERNAL MEDICINE

## 2023-06-14 PROCEDURE — 99999 PR PBB SHADOW E&M-EST. PATIENT-LVL IV: ICD-10-PCS | Mod: PBBFAC,,, | Performed by: INTERNAL MEDICINE

## 2023-06-14 PROCEDURE — 85610 PROTHROMBIN TIME: CPT | Performed by: INTERNAL MEDICINE

## 2023-06-14 NOTE — PROGRESS NOTES
Health Maintenance Due   Topic     Shingles Vaccine (1 of 2) hx chickenpox ; inform pt can get vaccine at pharmacy.    Pneumococcal Vaccines (Age 65+) (1 - PCV)     COVID-19 Vaccine (4 - Pfizer series)

## 2023-06-28 DIAGNOSIS — I10 HYPERTENSION, UNSPECIFIED TYPE: ICD-10-CM

## 2023-06-28 NOTE — TELEPHONE ENCOUNTER
No care due was identified.  [facility] Embedded Care Due Messages. Reference number: [id number].   6/28/2023 3:43:37 AM CDT

## 2023-06-28 NOTE — TELEPHONE ENCOUNTER
Refill Routing Note   Medication(s) are not appropriate for processing by [facility] for the following reason(s):      Required labs abnormal    [facility] action(s):  Defer None identified          Appointments  past 12m or future 3m with PCP    Date Provider   Last Visit   6/14/2023 Michelle Conway MD   Next Visit   9/19/2023 Michelle Conway MD   ED visits in past 90 days: 0        Note composed:9:51 AM 06/28/2023

## 2023-06-29 RX ORDER — VALSARTAN 80 MG/1
TABLET ORAL
Qty: 90 TABLET | Refills: 0 | Status: SHIPPED | OUTPATIENT
Start: 2023-06-29 | End: 2023-12-07 | Stop reason: SDUPTHER

## 2023-07-05 NOTE — PROGRESS NOTES
HPI     4 MONTH F/U AMD ou     Additional comments: Pt states VA slightly blurred ou     AMD OU     DRYNESS OU   SEROUS DETACHMENT  RETINAL PIGMENT EPITHELIUM OS     ERM OS   PSEUDO  OU             Comments    DLS    03/23/2023      PT ALSO C/O  COLOR CHANGES IN OS  VERY YELLOW IN TINT           Last edited by Kaycee Meza on 7/6/2023  8:37 AM.         A/P    ICD-10-CM ICD-9-CM   1. Intermediate stage dry age-related macular degeneration of right eye  H35.3112 362.51   2. Exudative age-related macular degeneration of left eye with active choroidal neovascularization  H35.3221 362.52     362.16   3. Choroidal neovascularization, left eye  H35.052 362.16   4. Epiretinal membrane, left  H35.372 362.56   5. Pseudophakia of both eyes  Z96.1 V43.1     1. Intermediate stage dry age-related macular degeneration of right eye  Referral from Dr. Mccray for AMD check  Hx of blurred vision for few months    Today dry OD VA 20/20 (was 20/60), stable dry  Plan: Observation   Amsler precautions  Recommend Antioxidants, lutein, omega 3, brown sunglasses (blue blockers).     2. Exudative age-related macular degeneration of left eye with active choroidal neovascularization  3. Choroidal neovascularization, left eye  Incr symptoms sinece last visit  VA 20/40 (was 20/60), incr IRF  Plan: recommend RIVERA OS for active CNVM  Based on todays exam, diagnostic studies, and review of records, the determination was made for treatment today.  Schedule Avastin Injection today Left Eye Patient chooses to proceed with injection R/B/A discussed include infection retinal detachment and stroke    Patient identified.  Timeout performed.    Risks, benefits, and alternatives to treatment were discussed in detail with the patient, including bleeding/infection (endophthalmitis)/etc.  The patient voiced understanding and wished to proceed with the procedure.  See separate consent form.    Injection Procedure Note:  Diagnosis: CNVM Left Eye    Topical  Proparacaine drop placed then topical 5% Betadine and then subconjunctival lidocaine 2% injection  Sterile gloves used, and sterile lid speculum placed.  5% Betadine placed at injection site again prior to injection.  Avastin 1.25mg in 0.05cc Injected inferotemporally 3.5-4mm posterior to the limbus.  Complications: None  Va at least CF at 5 feet post injection.  Retina, ONH, IOP normal after injection.    Followup as below.  Patient should return immediately PRN.  Retinal Detachment and Endophthalmitis precautions given.     4. Epiretinal membrane, left  Mod ERM, mixed mechanism component for decr VA as AMD also impacting vision  Plan: Observation for now, may need PPV/MP if worsening    5. Pseudophakia of both eyes  Good lens position OU  Plan: Observation, update Mrx prn      RTC 1 mo DFE/OCTm OU, likely RIVERA OS    I saw and examined the patient and reviewed in detail the findings documented. The final examination findings, image interpretations, and plan as documented in the record represent my personal judgment and conclusions.    Jaylen Santiago MD  Vitreoretinal Surgery   [facility]

## 2023-07-06 ENCOUNTER — ANTI-COAG VISIT (OUTPATIENT)
Dept: CARDIOLOGY | Facility: CLINIC | Age: 73
End: 2023-07-06
Payer: MEDICARE

## 2023-07-06 ENCOUNTER — OFFICE VISIT (OUTPATIENT)
Dept: OPHTHALMOLOGY | Facility: CLINIC | Age: 73
End: 2023-07-06
Payer: MEDICARE

## 2023-07-06 ENCOUNTER — LAB VISIT (OUTPATIENT)
Dept: LAB | Facility: HOSPITAL | Age: 73
End: 2023-07-06
Payer: MEDICARE

## 2023-07-06 DIAGNOSIS — H35.3221 EXUDATIVE AGE-RELATED MACULAR DEGENERATION OF LEFT EYE WITH ACTIVE CHOROIDAL NEOVASCULARIZATION: ICD-10-CM

## 2023-07-06 DIAGNOSIS — Z86.79 HISTORY OF ATRIAL FIBRILLATION: Primary | ICD-10-CM

## 2023-07-06 DIAGNOSIS — Z96.1 PSEUDOPHAKIA OF BOTH EYES: ICD-10-CM

## 2023-07-06 DIAGNOSIS — Z86.79 HISTORY OF ATRIAL FIBRILLATION: ICD-10-CM

## 2023-07-06 DIAGNOSIS — H35.3112 INTERMEDIATE STAGE DRY AGE-RELATED MACULAR DEGENERATION OF RIGHT EYE: Primary | ICD-10-CM

## 2023-07-06 DIAGNOSIS — Z79.01 LONG TERM (CURRENT) USE OF ANTICOAGULANTS: ICD-10-CM

## 2023-07-06 DIAGNOSIS — H35.372 EPIRETINAL MEMBRANE, LEFT: ICD-10-CM

## 2023-07-06 DIAGNOSIS — H35.052 CHOROIDAL NEOVASCULARIZATION, LEFT EYE: ICD-10-CM

## 2023-07-06 LAB
INR PPP: 1.7 (ref 0.8–1.2)
PROTHROMBIN TIME: 17.6 SEC (ref 9–12.5)

## 2023-07-06 PROCEDURE — 1160F PR REVIEW ALL MEDS BY PRESCRIBER/CLIN PHARMACIST DOCUMENTED: ICD-10-PCS | Mod: CPTII,S$GLB,, | Performed by: OPHTHALMOLOGY

## 2023-07-06 PROCEDURE — 1101F PT FALLS ASSESS-DOCD LE1/YR: CPT | Mod: CPTII,S$GLB,, | Performed by: OPHTHALMOLOGY

## 2023-07-06 PROCEDURE — 93793 PR ANTICOAGULANT MGMT FOR PT TAKING WARFARIN: ICD-10-PCS | Mod: S$GLB,,,

## 2023-07-06 PROCEDURE — 1159F PR MEDICATION LIST DOCUMENTED IN MEDICAL RECORD: ICD-10-PCS | Mod: CPTII,S$GLB,, | Performed by: OPHTHALMOLOGY

## 2023-07-06 PROCEDURE — 99214 PR OFFICE/OUTPT VISIT, EST, LEVL IV, 30-39 MIN: ICD-10-PCS | Mod: 25,S$GLB,, | Performed by: OPHTHALMOLOGY

## 2023-07-06 PROCEDURE — 1159F MED LIST DOCD IN RCRD: CPT | Mod: CPTII,S$GLB,, | Performed by: OPHTHALMOLOGY

## 2023-07-06 PROCEDURE — 4010F PR ACE/ARB THEARPY RXD/TAKEN: ICD-10-PCS | Mod: CPTII,S$GLB,, | Performed by: OPHTHALMOLOGY

## 2023-07-06 PROCEDURE — 85610 PROTHROMBIN TIME: CPT | Performed by: INTERNAL MEDICINE

## 2023-07-06 PROCEDURE — 99999 PR PBB SHADOW E&M-EST. PATIENT-LVL II: CPT | Mod: PBBFAC,,, | Performed by: OPHTHALMOLOGY

## 2023-07-06 PROCEDURE — 4010F ACE/ARB THERAPY RXD/TAKEN: CPT | Mod: CPTII,S$GLB,, | Performed by: OPHTHALMOLOGY

## 2023-07-06 PROCEDURE — 1101F PR PT FALLS ASSESS DOC 0-1 FALLS W/OUT INJ PAST YR: ICD-10-PCS | Mod: CPTII,S$GLB,, | Performed by: OPHTHALMOLOGY

## 2023-07-06 PROCEDURE — 99999 PR PBB SHADOW E&M-EST. PATIENT-LVL II: ICD-10-PCS | Mod: PBBFAC,,, | Performed by: OPHTHALMOLOGY

## 2023-07-06 PROCEDURE — 3288F FALL RISK ASSESSMENT DOCD: CPT | Mod: CPTII,S$GLB,, | Performed by: OPHTHALMOLOGY

## 2023-07-06 PROCEDURE — 67028 PR INJECT INTRAVITREAL PHARMCOLOGIC: ICD-10-PCS | Mod: LT,S$GLB,, | Performed by: OPHTHALMOLOGY

## 2023-07-06 PROCEDURE — 92134 OCT, RETINA - OU - BOTH EYES: ICD-10-PCS | Mod: S$GLB,,, | Performed by: OPHTHALMOLOGY

## 2023-07-06 PROCEDURE — 3288F PR FALLS RISK ASSESSMENT DOCUMENTED: ICD-10-PCS | Mod: CPTII,S$GLB,, | Performed by: OPHTHALMOLOGY

## 2023-07-06 PROCEDURE — 93793 ANTICOAG MGMT PT WARFARIN: CPT | Mod: S$GLB,,,

## 2023-07-06 PROCEDURE — 67028 INJECTION EYE DRUG: CPT | Mod: LT,S$GLB,, | Performed by: OPHTHALMOLOGY

## 2023-07-06 PROCEDURE — 36415 COLL VENOUS BLD VENIPUNCTURE: CPT | Performed by: INTERNAL MEDICINE

## 2023-07-06 PROCEDURE — 92134 CPTRZ OPH DX IMG PST SGM RTA: CPT | Mod: S$GLB,,, | Performed by: OPHTHALMOLOGY

## 2023-07-06 PROCEDURE — 1160F RVW MEDS BY RX/DR IN RCRD: CPT | Mod: CPTII,S$GLB,, | Performed by: OPHTHALMOLOGY

## 2023-07-06 PROCEDURE — 99214 OFFICE O/P EST MOD 30 MIN: CPT | Mod: 25,S$GLB,, | Performed by: OPHTHALMOLOGY

## 2023-07-06 RX ADMIN — Medication 1.25 MG: at 09:07

## 2023-07-20 ENCOUNTER — ANTI-COAG VISIT (OUTPATIENT)
Dept: CARDIOLOGY | Facility: CLINIC | Age: 73
End: 2023-07-20
Payer: MEDICARE

## 2023-07-20 ENCOUNTER — LAB VISIT (OUTPATIENT)
Dept: LAB | Facility: HOSPITAL | Age: 73
End: 2023-07-20
Attending: INTERNAL MEDICINE
Payer: MEDICARE

## 2023-07-20 DIAGNOSIS — Z86.79 HISTORY OF ATRIAL FIBRILLATION: Primary | ICD-10-CM

## 2023-07-20 DIAGNOSIS — Z86.79 HISTORY OF ATRIAL FIBRILLATION: ICD-10-CM

## 2023-07-20 DIAGNOSIS — Z79.01 LONG TERM (CURRENT) USE OF ANTICOAGULANTS: ICD-10-CM

## 2023-07-20 LAB
INR PPP: 2.8 (ref 0.8–1.2)
PROTHROMBIN TIME: 28.2 SEC (ref 9–12.5)

## 2023-07-20 PROCEDURE — 36415 COLL VENOUS BLD VENIPUNCTURE: CPT | Performed by: INTERNAL MEDICINE

## 2023-07-20 PROCEDURE — 93793 PR ANTICOAGULANT MGMT FOR PT TAKING WARFARIN: ICD-10-PCS | Mod: S$GLB,,,

## 2023-07-20 PROCEDURE — 85610 PROTHROMBIN TIME: CPT | Performed by: INTERNAL MEDICINE

## 2023-07-20 PROCEDURE — 93793 ANTICOAG MGMT PT WARFARIN: CPT | Mod: S$GLB,,,

## 2023-07-20 NOTE — PROGRESS NOTES
[facility]    2023 3:34 PM    Assessment/Plan:    Patient presents today with therapeutic INR.    Assessment of patient findings and chart review: no significant findings     Recommendation for patient's warfarin regimen: Continue current maintenance dose    Recommend repeat INR in 2 weeks  _________________________________________________________________    Vivek Lema Jr. (73 y.o.) is followed by the [facility].    Anticoagulation Summary  As of 2023      INR goal:  2.0-3.0   TTR:  73.6 % (1.4 y)   INR used for dosin.8 (2023)   Warfarin maintenance plan:  4 mg (2 mg x 2) every e, Thu, Sat; 2 mg (2 mg x 1) all other days   Weekly warfarin total:  20 mg   Plan last modified:  Mihaela Ortega, PharmD (2023)   Next INR check:  8/3/2023   Target end date:      Indications    History of atrial fibrillation [Z86.79]  Long term (current) use of anticoagulants [Z79.01]                 Anticoagulation Episode Summary       INR check location:      Preferred lab:      Send INR reminders to:  [facility] COUMADIN MONITORING POOL    Comments:  [place] only Mon - Thur and Hospital Lab on           Anticoagulation Care Providers       Provider Role Specialty Phone number    Michelle Conway MD [facility] [phone]

## 2023-08-03 NOTE — PROGRESS NOTES
8/03/23 Patient called to reschedule today's lab appointment due to not feeling well, states tired a lot, reports no bleeding/bruising, no new meds/antibiotics, appointment was rescheduled to 8/09/23

## 2023-08-05 RX ORDER — TORSEMIDE 20 MG/1
60 TABLET ORAL
Qty: 90 TABLET | Refills: 3 | Status: SHIPPED | OUTPATIENT
Start: 2023-08-05 | End: 2023-11-06

## 2023-08-09 ENCOUNTER — LAB VISIT (OUTPATIENT)
Dept: LAB | Facility: HOSPITAL | Age: 73
End: 2023-08-09
Attending: INTERNAL MEDICINE
Payer: MEDICARE

## 2023-08-09 ENCOUNTER — ANTI-COAG VISIT (OUTPATIENT)
Dept: CARDIOLOGY | Facility: CLINIC | Age: 73
End: 2023-08-09
Payer: MEDICARE

## 2023-08-09 DIAGNOSIS — Z79.01 LONG TERM (CURRENT) USE OF ANTICOAGULANTS: ICD-10-CM

## 2023-08-09 DIAGNOSIS — Z86.79 HISTORY OF ATRIAL FIBRILLATION: Primary | ICD-10-CM

## 2023-08-09 DIAGNOSIS — Z86.79 HISTORY OF ATRIAL FIBRILLATION: ICD-10-CM

## 2023-08-09 LAB
INR PPP: 1.9 (ref 0.8–1.2)
PROTHROMBIN TIME: 19.5 SEC (ref 9–12.5)

## 2023-08-09 PROCEDURE — 93793 PR ANTICOAGULANT MGMT FOR PT TAKING WARFARIN: ICD-10-PCS | Mod: S$GLB,,,

## 2023-08-09 PROCEDURE — 36415 COLL VENOUS BLD VENIPUNCTURE: CPT | Mod: PO | Performed by: INTERNAL MEDICINE

## 2023-08-09 PROCEDURE — 93793 ANTICOAG MGMT PT WARFARIN: CPT | Mod: S$GLB,,,

## 2023-08-09 PROCEDURE — 85610 PROTHROMBIN TIME: CPT | Performed by: INTERNAL MEDICINE

## 2023-08-09 NOTE — PROGRESS NOTES
[facility]    2023 10:17 AM    Assessment/Plan:    Patient presents today with subtherapeutic  INR.    Assessment of patient findings and chart review: no significant findings     Recommendation for patient's warfarin regimen: Boost dose today to 3mg then resume current maintenance dose    Recommend repeat INR in 2 weeks  _________________________________________________________________    Vivek Lema Jr. (73 y.o.) is followed by the [facility].    Anticoagulation Summary  As of 2023      INR goal:  2.0-3.0   TTR:  74.2 % (1.5 y)   INR used for dosin.9 (2023)   Warfarin maintenance plan:  4 mg (2 mg x 2) every Tue, Thu, Sat; 2 mg (2 mg x 1) all other days   Weekly warfarin total:  20 mg   Plan last modified:  Mihaela Ortega, PharmD (2023)   Next INR check:  2023   Target end date:      Indications    History of atrial fibrillation [Z86.79]  Long term (current) use of anticoagulants [Z79.01]                 Anticoagulation Episode Summary       INR check location:      Preferred lab:      Send INR reminders to:  [facility] COUMADIN MONITORING POOL    Comments:  [place] only Mon - Thur and Hospital Lab on           Anticoagulation Care Providers       Provider Role Specialty Phone number    Michelle Conway MD [facility] [phone]

## 2023-08-10 ENCOUNTER — OFFICE VISIT (OUTPATIENT)
Dept: OPHTHALMOLOGY | Facility: CLINIC | Age: 73
End: 2023-08-10
Payer: MEDICARE

## 2023-08-10 DIAGNOSIS — H35.3112 INTERMEDIATE STAGE DRY AGE-RELATED MACULAR DEGENERATION OF RIGHT EYE: Primary | ICD-10-CM

## 2023-08-10 DIAGNOSIS — Z96.1 PSEUDOPHAKIA OF BOTH EYES: ICD-10-CM

## 2023-08-10 DIAGNOSIS — H35.3221 EXUDATIVE AGE-RELATED MACULAR DEGENERATION OF LEFT EYE WITH ACTIVE CHOROIDAL NEOVASCULARIZATION: ICD-10-CM

## 2023-08-10 DIAGNOSIS — H35.372 EPIRETINAL MEMBRANE, LEFT: ICD-10-CM

## 2023-08-10 DIAGNOSIS — H35.052 CHOROIDAL NEOVASCULARIZATION, LEFT EYE: ICD-10-CM

## 2023-08-10 PROCEDURE — 99214 OFFICE O/P EST MOD 30 MIN: CPT | Mod: 25,S$GLB,, | Performed by: OPHTHALMOLOGY

## 2023-08-10 PROCEDURE — 1160F RVW MEDS BY RX/DR IN RCRD: CPT | Mod: CPTII,S$GLB,, | Performed by: OPHTHALMOLOGY

## 2023-08-10 PROCEDURE — 1160F PR REVIEW ALL MEDS BY PRESCRIBER/CLIN PHARMACIST DOCUMENTED: ICD-10-PCS | Mod: CPTII,S$GLB,, | Performed by: OPHTHALMOLOGY

## 2023-08-10 PROCEDURE — 99999 PR PBB SHADOW E&M-EST. PATIENT-LVL II: CPT | Mod: PBBFAC,,, | Performed by: OPHTHALMOLOGY

## 2023-08-10 PROCEDURE — 99999 PR PBB SHADOW E&M-EST. PATIENT-LVL II: ICD-10-PCS | Mod: PBBFAC,,, | Performed by: OPHTHALMOLOGY

## 2023-08-10 PROCEDURE — 1159F PR MEDICATION LIST DOCUMENTED IN MEDICAL RECORD: ICD-10-PCS | Mod: CPTII,S$GLB,, | Performed by: OPHTHALMOLOGY

## 2023-08-10 PROCEDURE — 67028 PR INJECT INTRAVITREAL PHARMCOLOGIC: ICD-10-PCS | Mod: LT,S$GLB,, | Performed by: OPHTHALMOLOGY

## 2023-08-10 PROCEDURE — 4010F ACE/ARB THERAPY RXD/TAKEN: CPT | Mod: CPTII,S$GLB,, | Performed by: OPHTHALMOLOGY

## 2023-08-10 PROCEDURE — 1159F MED LIST DOCD IN RCRD: CPT | Mod: CPTII,S$GLB,, | Performed by: OPHTHALMOLOGY

## 2023-08-10 PROCEDURE — 92134 OCT, RETINA - OU - BOTH EYES: ICD-10-PCS | Mod: S$GLB,,, | Performed by: OPHTHALMOLOGY

## 2023-08-10 PROCEDURE — 67028 INJECTION EYE DRUG: CPT | Mod: LT,S$GLB,, | Performed by: OPHTHALMOLOGY

## 2023-08-10 PROCEDURE — 92134 CPTRZ OPH DX IMG PST SGM RTA: CPT | Mod: S$GLB,,, | Performed by: OPHTHALMOLOGY

## 2023-08-10 PROCEDURE — 1126F PR PAIN SEVERITY QUANTIFIED, NO PAIN PRESENT: ICD-10-PCS | Mod: CPTII,S$GLB,, | Performed by: OPHTHALMOLOGY

## 2023-08-10 PROCEDURE — 1126F AMNT PAIN NOTED NONE PRSNT: CPT | Mod: CPTII,S$GLB,, | Performed by: OPHTHALMOLOGY

## 2023-08-10 PROCEDURE — 99214 PR OFFICE/OUTPT VISIT, EST, LEVL IV, 30-39 MIN: ICD-10-PCS | Mod: 25,S$GLB,, | Performed by: OPHTHALMOLOGY

## 2023-08-10 PROCEDURE — 4010F PR ACE/ARB THEARPY RXD/TAKEN: ICD-10-PCS | Mod: CPTII,S$GLB,, | Performed by: OPHTHALMOLOGY

## 2023-08-10 RX ADMIN — Medication 1.25 MG: at 09:08

## 2023-08-10 NOTE — PROGRESS NOTES
HPI    1 mo DFE OU / OCTm/ Avastin OS    DLS 07/06/2023 by Dr. BRYCE Santiago MD    Cc: pt states : eyes tear a lot and vision is stable.    -blurred Va  -eye pain  -flashes/floaters  -headaches  -curtains/shadow/veils    Eye Meds: Refresh OU prn     POHx:   1. NSC OU  S/p phaco w/IOL OU     2. Exudative ARMD OS w/Active Choroidal NVO  S/P Avastin OS ( 07/09/2023)    3. ERM OS  4. Choroidal  NVO OS  5. Pseudophakia OU    Last edited by Lupe Almanza MA on 8/10/2023  8:29 AM.           A/P    ICD-10-CM ICD-9-CM   1. Intermediate stage dry age-related macular degeneration of right eye  H35.3112 362.51   2. Exudative age-related macular degeneration of left eye with active choroidal neovascularization  H35.3221 362.52     362.16   3. Choroidal neovascularization, left eye  H35.052 362.16   4. Epiretinal membrane, left  H35.372 362.56   5. Pseudophakia of both eyes  Z96.1 V43.1       1. Intermediate stage dry age-related macular degeneration of right eye  Referral from Dr. Mccray for AMD check  Hx of blurred vision for few months    Today dry OD VA 20/25 (was 20/20), dry  Plan: Observation no injection   Amsler precautions  Recommend Antioxidants, lutein, omega 3, brown sunglasses (blue blockers).     2. Exudative age-related macular degeneration of left eye with active choroidal neovascularization  3. Choroidal neovascularization, left eye    S/p RIVERA 7/6/23    VA 20/40 (stable), improving SRF, pt feels vision getting better   Plan: recommend RIVERA OS for active CNVM  Based on todays exam, diagnostic studies, and review of records, the determination was made for treatment today.  Schedule Avastin Injection today Left Eye Patient chooses to proceed with injection R/B/A discussed include infection retinal detachment and stroke    Patient identified.  Timeout performed.    Risks, benefits, and alternatives to treatment were discussed in detail with the patient, including bleeding/infection (endophthalmitis)/etc.  The  patient voiced understanding and wished to proceed with the procedure.  See separate consent form.    Injection Procedure Note:  Diagnosis: CNVM Left Eye    Topical Proparacaine drop placed then topical 5% Betadine and then subconjunctival lidocaine 2% injection  Sterile gloves used, and sterile lid speculum placed.  5% Betadine placed at injection site again prior to injection.  Avastin 1.25mg in 0.05cc Injected inferotemporally 3.5-4mm posterior to the limbus.  Complications: None  Va at least CF at 5 feet post injection.  Retina, ONH, IOP normal after injection.    Followup as below.  Patient should return immediately PRN.  Retinal Detachment and Endophthalmitis precautions given.     4. Epiretinal membrane, left  Mod ERM, mixed mechanism component for decr VA as AMD also impacting vision  Plan: Observation for now, may need PPV/MP     5. Pseudophakia of both eyes  Good lens position OU  Plan: Observation, update Mrx prn      RTC 1 mo DFE/OCTm OU, possible RIVERA OS    I saw and examined the patient and reviewed in detail the findings documented. The final examination findings, image interpretations, and plan as documented in the record represent my personal judgment and conclusions.    Jaylen Santiago MD  Vitreoretinal Surgery   [facility]

## 2023-08-10 NOTE — PATIENT INSTRUCTIONS

## 2023-08-23 ENCOUNTER — ANTI-COAG VISIT (OUTPATIENT)
Dept: CARDIOLOGY | Facility: CLINIC | Age: 73
End: 2023-08-23
Payer: MEDICARE

## 2023-08-23 ENCOUNTER — LAB VISIT (OUTPATIENT)
Dept: LAB | Facility: HOSPITAL | Age: 73
End: 2023-08-23
Attending: INTERNAL MEDICINE
Payer: MEDICARE

## 2023-08-23 DIAGNOSIS — Z86.79 HISTORY OF ATRIAL FIBRILLATION: ICD-10-CM

## 2023-08-23 DIAGNOSIS — Z79.01 LONG TERM (CURRENT) USE OF ANTICOAGULANTS: ICD-10-CM

## 2023-08-23 DIAGNOSIS — Z86.79 HISTORY OF ATRIAL FIBRILLATION: Primary | ICD-10-CM

## 2023-08-23 LAB
INR PPP: 3.5 (ref 0.8–1.2)
PROTHROMBIN TIME: 34 SEC (ref 9–12.5)

## 2023-08-23 PROCEDURE — 93793 PR ANTICOAGULANT MGMT FOR PT TAKING WARFARIN: ICD-10-PCS | Mod: S$GLB,,,

## 2023-08-23 PROCEDURE — 36415 COLL VENOUS BLD VENIPUNCTURE: CPT | Mod: PO | Performed by: INTERNAL MEDICINE

## 2023-08-23 PROCEDURE — 85610 PROTHROMBIN TIME: CPT | Performed by: INTERNAL MEDICINE

## 2023-08-23 PROCEDURE — 93793 ANTICOAG MGMT PT WARFARIN: CPT | Mod: S$GLB,,,

## 2023-08-23 NOTE — PROGRESS NOTES
[facility]    08/23/2023 10:25 AM    Assessment/Plan:    Patient presents today with supratherapeutic INR.    Assessment of patient findings and chart review: reports drinking 1-2 beers daily    Recommendation for patient's warfarin regimen: Lower dose today to 1mg then decrease maintenance dose    Recommend repeat INR in 2 weeks  _________________________________________________________________    Vivek Reyes Pita Leigh (73 y.o.) is followed by the [facility].    Anticoagulation Summary  As of 8/23/2023      INR goal:  2.0-3.0   TTR:  73.9 % (1.5 y)   INR used for dosing:  3.5 (8/23/2023)   Warfarin maintenance plan:  4 mg (2 mg x 2) every Thu, Sat; 2 mg (2 mg x 1) all other days   Weekly warfarin total:  18 mg   Plan last modified:  Mihaela Ortega, PharmD (8/23/2023)   Next INR check:     Target end date:      Indications    History of atrial fibrillation [Z86.79]  Long term (current) use of anticoagulants [Z79.01]                 Anticoagulation Episode Summary       INR check location:      Preferred lab:      Send INR reminders to:  [facility] COUMADIN MONITORING POOL    Comments:  [place] only Mon - Thur and Hospital Lab on Fridays          Anticoagulation Care Providers       Provider Role Specialty Phone number    Michelle Conway MD [facility] [phone]            Patient Findings       Positives:  Change in alcohol use    Negatives:  Signs/symptoms of thrombosis, Signs/symptoms of bleeding, Laboratory test error suspected, Change in health, Change in activity, Upcoming invasive procedure, Emergency department visit, Upcoming dental procedure, Missed doses, Extra doses, Change in medications, Change in diet/appetite, Hospital admission, Bruising, Other complaints    Comments:  Confirmed taking correct dose  Reports drinking 1-2 beers daily consistently  No other changes

## 2023-09-01 ENCOUNTER — TELEPHONE (OUTPATIENT)
Dept: CARDIOLOGY | Facility: CLINIC | Age: 73
End: 2023-09-01
Payer: MEDICARE

## 2023-09-01 NOTE — TELEPHONE ENCOUNTER
Left message for pt to call office to reschedule appointment due to provider being out of office. Anu

## 2023-09-04 DIAGNOSIS — I10 HYPERTENSION, UNSPECIFIED TYPE: ICD-10-CM

## 2023-09-04 NOTE — TELEPHONE ENCOUNTER
Refill Routing Note   Medication(s) are not appropriate for processing by [facility] for the following reason(s):      Required labs outdated    [facility] action(s):  Defer Care Due:  None identified            Appointments  past 12m or future 3m with PCP    Date Provider   Last Visit   6/14/2023 Michelle Conway MD   Next Visit   9/19/2023 Michelle Conway MD   ED visits in past 90 days: 0        Note composed:7:07 AM 09/04/2023

## 2023-09-04 NOTE — TELEPHONE ENCOUNTER
No care due was identified.  [facility] Embedded Care Due Messages. Reference number: [id number].   9/04/2023 3:44:43 AM CDT

## 2023-09-05 RX ORDER — ATORVASTATIN CALCIUM 40 MG/1
TABLET, FILM COATED ORAL
Qty: 90 TABLET | Refills: 0 | Status: SHIPPED | OUTPATIENT
Start: 2023-09-05 | End: 2023-12-12

## 2023-09-07 ENCOUNTER — OFFICE VISIT (OUTPATIENT)
Dept: OPHTHALMOLOGY | Facility: CLINIC | Age: 73
End: 2023-09-07
Payer: MEDICARE

## 2023-09-07 DIAGNOSIS — H35.3221 EXUDATIVE AGE-RELATED MACULAR DEGENERATION OF LEFT EYE WITH ACTIVE CHOROIDAL NEOVASCULARIZATION: ICD-10-CM

## 2023-09-07 DIAGNOSIS — H35.052 CHOROIDAL NEOVASCULARIZATION, LEFT EYE: ICD-10-CM

## 2023-09-07 DIAGNOSIS — H35.372 EPIRETINAL MEMBRANE, LEFT: ICD-10-CM

## 2023-09-07 DIAGNOSIS — H35.3112 INTERMEDIATE STAGE DRY AGE-RELATED MACULAR DEGENERATION OF RIGHT EYE: Primary | ICD-10-CM

## 2023-09-07 PROCEDURE — 99214 OFFICE O/P EST MOD 30 MIN: CPT | Mod: S$GLB,,, | Performed by: OPHTHALMOLOGY

## 2023-09-07 PROCEDURE — 1160F RVW MEDS BY RX/DR IN RCRD: CPT | Mod: CPTII,S$GLB,, | Performed by: OPHTHALMOLOGY

## 2023-09-07 PROCEDURE — 1160F PR REVIEW ALL MEDS BY PRESCRIBER/CLIN PHARMACIST DOCUMENTED: ICD-10-PCS | Mod: CPTII,S$GLB,, | Performed by: OPHTHALMOLOGY

## 2023-09-07 PROCEDURE — 1159F PR MEDICATION LIST DOCUMENTED IN MEDICAL RECORD: ICD-10-PCS | Mod: CPTII,S$GLB,, | Performed by: OPHTHALMOLOGY

## 2023-09-07 PROCEDURE — 92134 CPTRZ OPH DX IMG PST SGM RTA: CPT | Mod: S$GLB,,, | Performed by: OPHTHALMOLOGY

## 2023-09-07 PROCEDURE — 1126F AMNT PAIN NOTED NONE PRSNT: CPT | Mod: CPTII,S$GLB,, | Performed by: OPHTHALMOLOGY

## 2023-09-07 PROCEDURE — 4010F PR ACE/ARB THEARPY RXD/TAKEN: ICD-10-PCS | Mod: CPTII,S$GLB,, | Performed by: OPHTHALMOLOGY

## 2023-09-07 PROCEDURE — 1126F PR PAIN SEVERITY QUANTIFIED, NO PAIN PRESENT: ICD-10-PCS | Mod: CPTII,S$GLB,, | Performed by: OPHTHALMOLOGY

## 2023-09-07 PROCEDURE — 92134 OCT, RETINA - OU - BOTH EYES: ICD-10-PCS | Mod: S$GLB,,, | Performed by: OPHTHALMOLOGY

## 2023-09-07 PROCEDURE — 1101F PT FALLS ASSESS-DOCD LE1/YR: CPT | Mod: CPTII,S$GLB,, | Performed by: OPHTHALMOLOGY

## 2023-09-07 PROCEDURE — 3288F FALL RISK ASSESSMENT DOCD: CPT | Mod: CPTII,S$GLB,, | Performed by: OPHTHALMOLOGY

## 2023-09-07 PROCEDURE — 1101F PR PT FALLS ASSESS DOC 0-1 FALLS W/OUT INJ PAST YR: ICD-10-PCS | Mod: CPTII,S$GLB,, | Performed by: OPHTHALMOLOGY

## 2023-09-07 PROCEDURE — 4010F ACE/ARB THERAPY RXD/TAKEN: CPT | Mod: CPTII,S$GLB,, | Performed by: OPHTHALMOLOGY

## 2023-09-07 PROCEDURE — 99214 PR OFFICE/OUTPT VISIT, EST, LEVL IV, 30-39 MIN: ICD-10-PCS | Mod: S$GLB,,, | Performed by: OPHTHALMOLOGY

## 2023-09-07 PROCEDURE — 99999 PR PBB SHADOW E&M-EST. PATIENT-LVL III: CPT | Mod: PBBFAC,,, | Performed by: OPHTHALMOLOGY

## 2023-09-07 PROCEDURE — 99999 PR PBB SHADOW E&M-EST. PATIENT-LVL III: ICD-10-PCS | Mod: PBBFAC,,, | Performed by: OPHTHALMOLOGY

## 2023-09-07 PROCEDURE — 1159F MED LIST DOCD IN RCRD: CPT | Mod: CPTII,S$GLB,, | Performed by: OPHTHALMOLOGY

## 2023-09-07 PROCEDURE — 3288F PR FALLS RISK ASSESSMENT DOCUMENTED: ICD-10-PCS | Mod: CPTII,S$GLB,, | Performed by: OPHTHALMOLOGY

## 2023-09-07 NOTE — PROGRESS NOTES
HPI    DLS: 08/10/2023      1 months DFE/OCTm Gilma OS. Occasional eye pain, left eye. Vision has slight   ly improve since last visit. Denies f/f.     Eye Med's: AT's gel prn OU   Last edited by Anuradha Garcia OA on 9/7/2023  8:13 AM.          A/P    ICD-10-CM ICD-9-CM   1. Intermediate stage dry age-related macular degeneration of right eye  H35.3112 362.51   2. Exudative age-related macular degeneration of left eye with active choroidal neovascularization  H35.3221 362.52     362.16   3. Choroidal neovascularization, left eye  H35.052 362.16   4. Epiretinal membrane, left  H35.372 362.56         1. Intermediate stage dry age-related macular degeneration of right eye  Referral from Dr. Mccray for AMD check  Hx of blurred vision for few months    Today dry OD VA 20/30 (was 20/25), small serous PED appearance   Plan: Observation no injection   Amsler precautions  Recommend Antioxidants, lutein, omega 3, brown sunglasses (blue blockers).     2. Exudative age-related macular degeneration of left eye with active choroidal neovascularization  3. Choroidal neovascularization, left eye    S/p RIVERA x2 8/10/23    VA 20/40 (stable), stable SRF, pt feels Avastin helped but likely moreso serous PED appearance  Plan: observe today, will bnring back in 2 weeks consider Avastin then if worsening       4. Epiretinal membrane, left  Mod ERM, mixed mechanism component for decr VA as AMD also impacting vision  Plan: Observation for now, may need PPV/MP     5. Pseudophakia of both eyes  Good lens position OU  Plan: Observation, update Mrx prn      RTC 2 weeks DFE/OCTm OU, possible RIVERA OS    I saw and examined the patient and reviewed in detail the findings documented. The final examination findings, image interpretations, and plan as documented in the record represent my personal judgment and conclusions.    Jaylen Santiago MD  Vitreoretinal Surgery   [facility]

## 2023-09-19 ENCOUNTER — OFFICE VISIT (OUTPATIENT)
Dept: CARDIOLOGY | Facility: CLINIC | Age: 73
End: 2023-09-19
Payer: MEDICARE

## 2023-09-19 ENCOUNTER — HOSPITAL ENCOUNTER (OUTPATIENT)
Dept: RADIOLOGY | Facility: HOSPITAL | Age: 73
Discharge: HOME | End: 2023-09-19
Attending: INTERNAL MEDICINE
Payer: MEDICARE

## 2023-09-19 ENCOUNTER — ANTI-COAG VISIT (OUTPATIENT)
Dept: CARDIOLOGY | Facility: CLINIC | Age: 73
End: 2023-09-19
Payer: MEDICARE

## 2023-09-19 ENCOUNTER — OFFICE VISIT (OUTPATIENT)
Dept: FAMILY MEDICINE | Facility: CLINIC | Age: 73
End: 2023-09-19
Payer: MEDICARE

## 2023-09-19 VITALS
HEIGHT: 71 IN | RESPIRATION RATE: 18 BRPM | BODY MASS INDEX: 31.48 KG/M2 | DIASTOLIC BLOOD PRESSURE: 78 MMHG | WEIGHT: 224.88 LBS | SYSTOLIC BLOOD PRESSURE: 132 MMHG | HEART RATE: 60 BPM | OXYGEN SATURATION: 97 %

## 2023-09-19 VITALS
BODY MASS INDEX: 31.42 KG/M2 | OXYGEN SATURATION: 96 % | HEIGHT: 71 IN | TEMPERATURE: 98 F | DIASTOLIC BLOOD PRESSURE: 72 MMHG | SYSTOLIC BLOOD PRESSURE: 140 MMHG | HEART RATE: 63 BPM | RESPIRATION RATE: 16 BRPM | WEIGHT: 224.44 LBS

## 2023-09-19 DIAGNOSIS — R06.02 SHORTNESS OF BREATH: ICD-10-CM

## 2023-09-19 DIAGNOSIS — R53.83 FATIGUE, UNSPECIFIED TYPE: ICD-10-CM

## 2023-09-19 DIAGNOSIS — R51.9 NONINTRACTABLE HEADACHE, UNSPECIFIED CHRONICITY PATTERN, UNSPECIFIED HEADACHE TYPE: ICD-10-CM

## 2023-09-19 DIAGNOSIS — I48.0 PAF (PAROXYSMAL ATRIAL FIBRILLATION): ICD-10-CM

## 2023-09-19 DIAGNOSIS — I10 HYPERTENSION, UNSPECIFIED TYPE: ICD-10-CM

## 2023-09-19 DIAGNOSIS — R07.9 CHEST PAIN, UNSPECIFIED TYPE: Primary | ICD-10-CM

## 2023-09-19 DIAGNOSIS — Z85.46 HISTORY OF PROSTATE CANCER: ICD-10-CM

## 2023-09-19 DIAGNOSIS — Z95.0 PACEMAKER: ICD-10-CM

## 2023-09-19 DIAGNOSIS — E66.09 CLASS 1 OBESITY DUE TO EXCESS CALORIES WITH SERIOUS COMORBIDITY AND BODY MASS INDEX (BMI) OF 31.0 TO 31.9 IN ADULT: ICD-10-CM

## 2023-09-19 DIAGNOSIS — Z86.79 HISTORY OF ATRIAL FIBRILLATION: Primary | ICD-10-CM

## 2023-09-19 DIAGNOSIS — I10 PRIMARY HYPERTENSION: ICD-10-CM

## 2023-09-19 DIAGNOSIS — R73.9 ELEVATED BLOOD SUGAR LEVEL: ICD-10-CM

## 2023-09-19 DIAGNOSIS — I25.119 CORONARY ARTERY DISEASE INVOLVING NATIVE CORONARY ARTERY OF NATIVE HEART WITH ANGINA PECTORIS: ICD-10-CM

## 2023-09-19 DIAGNOSIS — Z79.01 LONG TERM (CURRENT) USE OF ANTICOAGULANTS: ICD-10-CM

## 2023-09-19 DIAGNOSIS — Z13.6 SCREENING FOR AAA (ABDOMINAL AORTIC ANEURYSM): ICD-10-CM

## 2023-09-19 DIAGNOSIS — E66.09 CLASS 1 OBESITY DUE TO EXCESS CALORIES WITH SERIOUS COMORBIDITY AND BODY MASS INDEX (BMI) OF 33.0 TO 33.9 IN ADULT: ICD-10-CM

## 2023-09-19 DIAGNOSIS — I25.10 CORONARY ARTERY DISEASE, UNSPECIFIED VESSEL OR LESION TYPE, UNSPECIFIED WHETHER ANGINA PRESENT, UNSPECIFIED WHETHER NATIVE OR TRANSPLANTED HEART: ICD-10-CM

## 2023-09-19 DIAGNOSIS — R94.31 ABNORMAL EKG: ICD-10-CM

## 2023-09-19 DIAGNOSIS — M10.9 GOUT, UNSPECIFIED CAUSE, UNSPECIFIED CHRONICITY, UNSPECIFIED SITE: ICD-10-CM

## 2023-09-19 DIAGNOSIS — I34.0 MITRAL VALVE INSUFFICIENCY, UNSPECIFIED ETIOLOGY: ICD-10-CM

## 2023-09-19 DIAGNOSIS — R06.09 DOE (DYSPNEA ON EXERTION): Primary | ICD-10-CM

## 2023-09-19 DIAGNOSIS — Z78.9 PATIENT UNABLE TO EXERCISE: ICD-10-CM

## 2023-09-19 DIAGNOSIS — N18.4 CKD (CHRONIC KIDNEY DISEASE), STAGE IV: ICD-10-CM

## 2023-09-19 DIAGNOSIS — E78.49 OTHER HYPERLIPIDEMIA: ICD-10-CM

## 2023-09-19 DIAGNOSIS — F41.9 ANXIETY AND DEPRESSION: ICD-10-CM

## 2023-09-19 DIAGNOSIS — Z95.2 S/P AVR (AORTIC VALVE REPLACEMENT) AND AORTOPLASTY: ICD-10-CM

## 2023-09-19 DIAGNOSIS — J90 PLEURAL EFFUSION, RIGHT: ICD-10-CM

## 2023-09-19 DIAGNOSIS — F32.A ANXIETY AND DEPRESSION: ICD-10-CM

## 2023-09-19 DIAGNOSIS — I77.0 A-V FISTULA: ICD-10-CM

## 2023-09-19 PROCEDURE — 99999 PR PBB SHADOW E&M-EST. PATIENT-LVL IV: CPT | Mod: PBBFAC,,, | Performed by: INTERNAL MEDICINE

## 2023-09-19 PROCEDURE — 4010F PR ACE/ARB THEARPY RXD/TAKEN: ICD-10-PCS | Mod: CPTII,S$GLB,, | Performed by: INTERNAL MEDICINE

## 2023-09-19 PROCEDURE — 3075F SYST BP GE 130 - 139MM HG: CPT | Mod: CPTII,S$GLB,, | Performed by: INTERNAL MEDICINE

## 2023-09-19 PROCEDURE — 3078F DIAST BP <80 MM HG: CPT | Mod: CPTII,S$GLB,, | Performed by: INTERNAL MEDICINE

## 2023-09-19 PROCEDURE — 99999 PR PBB SHADOW E&M-EST. PATIENT-LVL IV: ICD-10-PCS | Mod: PBBFAC,,, | Performed by: INTERNAL MEDICINE

## 2023-09-19 PROCEDURE — 1159F PR MEDICATION LIST DOCUMENTED IN MEDICAL RECORD: ICD-10-PCS | Mod: CPTII,S$GLB,, | Performed by: INTERNAL MEDICINE

## 2023-09-19 PROCEDURE — 1101F PT FALLS ASSESS-DOCD LE1/YR: CPT | Mod: CPTII,S$GLB,, | Performed by: INTERNAL MEDICINE

## 2023-09-19 PROCEDURE — 71046 X-RAY EXAM CHEST 2 VIEWS: CPT | Mod: 26,,, | Performed by: RADIOLOGY

## 2023-09-19 PROCEDURE — 1160F RVW MEDS BY RX/DR IN RCRD: CPT | Mod: CPTII,S$GLB,, | Performed by: INTERNAL MEDICINE

## 2023-09-19 PROCEDURE — 3075F PR MOST RECENT SYSTOLIC BLOOD PRESS GE 130-139MM HG: ICD-10-PCS | Mod: CPTII,S$GLB,, | Performed by: INTERNAL MEDICINE

## 2023-09-19 PROCEDURE — 1101F PR PT FALLS ASSESS DOC 0-1 FALLS W/OUT INJ PAST YR: ICD-10-PCS | Mod: CPTII,S$GLB,, | Performed by: INTERNAL MEDICINE

## 2023-09-19 PROCEDURE — 3077F PR MOST RECENT SYSTOLIC BLOOD PRESSURE >= 140 MM HG: ICD-10-PCS | Mod: CPTII,S$GLB,, | Performed by: INTERNAL MEDICINE

## 2023-09-19 PROCEDURE — 4010F ACE/ARB THERAPY RXD/TAKEN: CPT | Mod: CPTII,S$GLB,, | Performed by: INTERNAL MEDICINE

## 2023-09-19 PROCEDURE — 99214 PR OFFICE/OUTPT VISIT, EST, LEVL IV, 30-39 MIN: ICD-10-PCS | Mod: S$GLB,,, | Performed by: INTERNAL MEDICINE

## 2023-09-19 PROCEDURE — 1159F MED LIST DOCD IN RCRD: CPT | Mod: CPTII,S$GLB,, | Performed by: INTERNAL MEDICINE

## 2023-09-19 PROCEDURE — 1160F PR REVIEW ALL MEDS BY PRESCRIBER/CLIN PHARMACIST DOCUMENTED: ICD-10-PCS | Mod: CPTII,S$GLB,, | Performed by: INTERNAL MEDICINE

## 2023-09-19 PROCEDURE — 3288F FALL RISK ASSESSMENT DOCD: CPT | Mod: CPTII,S$GLB,, | Performed by: INTERNAL MEDICINE

## 2023-09-19 PROCEDURE — 93000 ELECTROCARDIOGRAM COMPLETE: CPT | Mod: S$GLB,,, | Performed by: INTERNAL MEDICINE

## 2023-09-19 PROCEDURE — 3078F PR MOST RECENT DIASTOLIC BLOOD PRESSURE < 80 MM HG: ICD-10-PCS | Mod: CPTII,S$GLB,, | Performed by: INTERNAL MEDICINE

## 2023-09-19 PROCEDURE — 71046 X-RAY EXAM CHEST 2 VIEWS: CPT | Mod: TC,FY

## 2023-09-19 PROCEDURE — 1126F AMNT PAIN NOTED NONE PRSNT: CPT | Mod: CPTII,S$GLB,, | Performed by: INTERNAL MEDICINE

## 2023-09-19 PROCEDURE — 99215 OFFICE O/P EST HI 40 MIN: CPT | Mod: S$GLB,,, | Performed by: INTERNAL MEDICINE

## 2023-09-19 PROCEDURE — 3288F PR FALLS RISK ASSESSMENT DOCUMENTED: ICD-10-PCS | Mod: CPTII,S$GLB,, | Performed by: INTERNAL MEDICINE

## 2023-09-19 PROCEDURE — 1126F PR PAIN SEVERITY QUANTIFIED, NO PAIN PRESENT: ICD-10-PCS | Mod: CPTII,S$GLB,, | Performed by: INTERNAL MEDICINE

## 2023-09-19 PROCEDURE — 3077F SYST BP >= 140 MM HG: CPT | Mod: CPTII,S$GLB,, | Performed by: INTERNAL MEDICINE

## 2023-09-19 PROCEDURE — 99214 OFFICE O/P EST MOD 30 MIN: CPT | Mod: S$GLB,,, | Performed by: INTERNAL MEDICINE

## 2023-09-19 PROCEDURE — 93000 EKG 12-LEAD: ICD-10-PCS | Mod: S$GLB,,, | Performed by: INTERNAL MEDICINE

## 2023-09-19 PROCEDURE — 99215 PR OFFICE/OUTPT VISIT, EST, LEVL V, 40-54 MIN: ICD-10-PCS | Mod: S$GLB,,, | Performed by: INTERNAL MEDICINE

## 2023-09-19 PROCEDURE — 71046 XR CHEST PA AND LATERAL: ICD-10-PCS | Mod: 26,,, | Performed by: RADIOLOGY

## 2023-09-19 RX ORDER — ASPIRIN 81 MG/1
81 TABLET ORAL DAILY
Qty: 90 TABLET | Refills: 1 | Status: SHIPPED | OUTPATIENT
Start: 2023-09-19 | End: 2024-03-19 | Stop reason: SDUPTHER

## 2023-09-19 RX ORDER — HYDROCODONE BITARTRATE AND ACETAMINOPHEN 7.5; 325 MG/1; MG/1
TABLET ORAL
Qty: 30 TABLET | Refills: 0 | Status: SHIPPED | OUTPATIENT
Start: 2023-09-19

## 2023-09-19 RX ORDER — NALOXONE HYDROCHLORIDE 4 MG/.1ML
SPRAY NASAL
Qty: 1 EACH | Refills: 11 | Status: CANCELLED | OUTPATIENT
Start: 2023-09-19

## 2023-09-19 RX ORDER — FEBUXOSTAT 40 MG/1
40 TABLET, FILM COATED ORAL DAILY
Qty: 90 TABLET | Refills: 1 | Status: SHIPPED | OUTPATIENT
Start: 2023-09-19 | End: 2024-03-19 | Stop reason: SDUPTHER

## 2023-09-19 RX ORDER — ESCITALOPRAM OXALATE 20 MG/1
20 TABLET ORAL DAILY
Qty: 90 TABLET | Refills: 3 | Status: SHIPPED | OUTPATIENT
Start: 2023-09-19

## 2023-09-19 NOTE — PROGRESS NOTES
CARDIOVASCULAR PROGRESS NOTE    REASON FOR CONSULT:   Vivek Lema Jr. is a 73 y.o. male who presents for follow up of AVR/CHF/PAF.    PCP: Anwar  HISTORY OF PRESENT ILLNESS:   Last seen by Dr. Hannon December 2022.    In today for evaluation of dyspnea on exertion.  He is accompanied by his daughter and wife.  He reports several weeks of progressive dyspnea on exertion and associated fatigue.  He also describes some chest discomfort which seems to be atypical and sharp/short-lived.  There has been no palpitations or syncope.  He denies any PND, orthopnea melena, hematuria, or claudication symptoms.    The patient is somewhat of a poor historian.  He does not know what kind of pacemaker he has, and does not have the card.  I was able to call the [employer] rep and confirm that it is a [employer] dual-chamber device.    CARDIOVASCULAR HISTORY:   SSS s/p [employer]eng PPM 2018  AVR with FEDERICO ligation 1/2018 (27mm Magna Ease bioprosthesis, hx Ao root dil/AI)  MR s/p failed mitraclip  PAF on coumadin    PAST MEDICAL HISTORY:     Past Medical History:   Diagnosis Date    A-fib     CAD (coronary artery disease)     CKD (chronic kidney disease)     Disorder of kidney and ureter     HTN (hypertension)     Macular degeneration     Mitral regurgitation     Pacemaker        PAST SURGICAL HISTORY:     Past Surgical History:   Procedure Laterality Date    AV FISTULA PLACEMENT Left     CATARACT EXTRACTION      COLONOSCOPY      COLONOSCOPY N/A 01/17/2023    Procedure: COLONOSCOPY;  Surgeon: Todd Oviedo MD;  Location: [facility] ([address] [place] [address]);  Service: Endoscopy;  Laterality: N/A;  from referral / Pt with trouble clearing secretions at times - 2nd floor / prep ins. mailed and reviewed with Pt and Pt wife/ Pt reports last colon in [place], Hx polyps / Coumadin - per coumadin clinic ok to hold coumadin x 5 days prior- see telephone encounter dated 11/2/22 / pa    CORONARY ARTERY BYPASS GRAFT      with valve repair (aortic?)        ALLERGIES AND MEDICATION:   Review of patient's allergies indicates:  No Known Allergies     Medication List            Accurate as of September 19, 2023  2:32 PM. If you have any questions, ask your nurse or doctor.                CHANGE how you take these medications      aspirin 81 MG EC tablet  Commonly known as: ECOTRIN  Take 1 tablet (81 mg total) by mouth once daily.  What changed: when to take this  Changed by: Michelle Conway MD     EScitalopram oxalate 20 MG tablet  Commonly known as: LEXAPRO  Take 1 tablet (20 mg total) by mouth once daily.  What changed: when to take this  Changed by: Michelle Conway MD     febuxostat 40 mg Tab  Commonly known as: ULORIC  Take 1 tablet (40 mg total) by mouth once daily.  What changed: when to take this  Changed by: Michelle Conway MD            CONTINUE taking these medications      atorvastatin 40 MG tablet  Commonly known as: LIPITOR  TAKE 1 TABLET(40 MG) BY MOUTH EVERY DAY     calcitRIOL 0.25 MCG Cap  Commonly known as: ROCALTROL  TAKE 1 CAPSULE(0.25 MCG) BY MOUTH EVERY DAY     cholecalciferol (vitamin D3) 50 mcg (2,000 unit) Cap capsule  Commonly known as: VITAMIN D3     HYDROcodone-acetaminophen 7.5-325 mg per tablet  Commonly known as: NORCO  Take not more than one pill a day PRN as direct     nitroGLYCERIN 0.4 MG SL tablet  Commonly known as: NITROSTAT     torsemide 20 MG Tab  Commonly known as: DEMADEX  TAKE 3 TABLETS BY MOUTH EVERY DAY     valsartan 80 MG tablet  Commonly known as: DIOVAN  TAKE 1 TABLET(80 MG) BY MOUTH EVERY DAY     warfarin 2 MG tablet  Commonly known as: COUMADIN  TAKE 2 TABLETS BY MOUTH MONDAY, WEDNESDAY AND FRIDAY. TAKE 1 TABLET BY MOUTH ON ALL OTHER DAYS               Where to Get Your Medications        These medications were sent to [id number] - [place] [address] [place] AT [facility] OF [address] [place] [address]      Phone: [phone]   aspirin 81 MG EC tablet  EScitalopram  "oxalate 20 MG tablet  febuxostat 40 mg Tab  HYDROcodone-acetaminophen 7.5-325 mg per tablet         SOCIAL HISTORY:     Social History     Socioeconomic History    Marital status:    Tobacco Use    Smoking status: Never    Smokeless tobacco: Never   Substance and Sexual Activity    Alcohol use: Yes     Alcohol/week: 6.0 standard drinks of alcohol     Types: 6 Cans of beer per week     Comment: occ    Drug use: Never       FAMILY HISTORY:     Family History   Problem Relation Age of Onset    Amblyopia Neg Hx     Blindness Neg Hx     Cataracts Neg Hx     Glaucoma Neg Hx     Macular degeneration Neg Hx     Retinal detachment Neg Hx     Strabismus Neg Hx        REVIEW OF SYSTEMS:   Review of Systems   Constitutional:  Positive for malaise/fatigue. Negative for chills, diaphoresis and fever.   HENT:  Negative for nosebleeds.    Eyes:  Negative for blurred vision, double vision and photophobia.   Respiratory:  Positive for shortness of breath. Negative for hemoptysis and wheezing.    Cardiovascular:  Positive for chest pain. Negative for palpitations, orthopnea, claudication, leg swelling and PND.   Gastrointestinal:  Negative for abdominal pain, blood in stool, heartburn, melena, nausea and vomiting.   Genitourinary:  Negative for flank pain and hematuria.   Musculoskeletal:  Negative for falls, myalgias and neck pain.   Skin:  Negative for rash.   Neurological:  Negative for dizziness, seizures, loss of consciousness, weakness and headaches.   Endo/Heme/Allergies:  Negative for polydipsia. Does not bruise/bleed easily.   Psychiatric/Behavioral:  Negative for depression and memory loss. The patient is not nervous/anxious.        PHYSICAL EXAM:     Vitals:    09/19/23 1407   BP: 132/78   Pulse: 60   Resp: 18    Body mass index is 31.36 kg/m².  Weight: 102 kg (224 lb 13.9 oz)   Height: 5' 11" (180.3 cm)     Physical Exam  Vitals reviewed.   Constitutional:       General: He is not in acute distress.     Appearance: " He is well-developed. He is obese. He is not ill-appearing, toxic-appearing or diaphoretic.   HENT:      Head: Normocephalic and atraumatic.   Eyes:      General: No scleral icterus.     Extraocular Movements: Extraocular movements intact.      Conjunctiva/sclera: Conjunctivae normal.      Pupils: Pupils are equal, round, and reactive to light.   Neck:      Thyroid: No thyromegaly.      Vascular: Normal carotid pulses. No carotid bruit or JVD.      Trachea: Trachea normal.   Cardiovascular:      Rate and Rhythm: Regular rhythm. Bradycardia present.      Pulses:           Carotid pulses are 2+ on the right side and 2+ on the left side.     Heart sounds: S1 normal and S2 normal. No murmur heard.     No friction rub. No gallop.   Pulmonary:      Effort: Pulmonary effort is normal. No respiratory distress.      Breath sounds: Normal breath sounds. No stridor. No wheezing, rhonchi or rales.   Chest:      Chest wall: No tenderness.   Abdominal:      General: There is no distension.      Palpations: Abdomen is soft.   Musculoskeletal:         General: No swelling or tenderness. Normal range of motion.      Cervical back: Normal range of motion and neck supple. No edema or rigidity.      Right lower leg: No edema.      Left lower leg: No edema.   Feet:      Right foot:      Skin integrity: No ulcer.      Left foot:      Skin integrity: No ulcer.   Skin:     General: Skin is warm and dry.      Coloration: Skin is not jaundiced.   Neurological:      General: No focal deficit present.      Mental Status: He is alert and oriented to person, place, and time.      Cranial Nerves: No cranial nerve deficit.   Psychiatric:         Mood and Affect: Mood normal.         Speech: Speech normal.         Behavior: Behavior normal. Behavior is cooperative.         DATA:   EKG: (personally reviewed tracing)  9/18/23 SR 60, IVCD, lat TWI ?isch    Laboratory:  CBC:  Recent Labs   Lab 03/15/23  1526 06/14/23  0845 09/19/23  0921   WBC 7.57  6.77 7.83   Hemoglobin 13.4 L 13.7 L 14.4   Hematocrit 42.8 42.1 45.7   Platelets 172 165 166       CHEMISTRIES:  Recent Labs   Lab 02/23/22  1213 03/13/22  0755 03/17/22  1419 03/21/22  1110 05/10/22  1413 06/27/22  1405 08/29/22  0855 11/15/22  1349 03/15/23  1526 06/14/23  0846 09/19/23  0921   Glucose 95 94 106 101 93 85 93 93 80 125 H 91   Sodium 144 137 136 140 139 141 142 145 137 141 142   Potassium 3.9 3.6 4.5 4.7 4.1 4.0 4.1 4.4 3.9 3.9 4.7   BUN 53 H 49 H 76 H 78 H 45 H 41 H 31 H 55 H 35 H 37 H 59 H   Creatinine 3.2 H 3.1 H 4.0 H 3.3 H 2.7 H 2.6 H 2.7 H 3.2 H 2.4 H 2.8 H 3.3 H   eGFR if non  18.1 A 19 A 13.8 A 17.4 A 22.2 A 23 A  --   --   --   --   --    eGFR  --   --   --   --   --   --  24.0 A 19.6 A 27.5 A 23 A 19 A   Calcium 9.4 9.2 9.6 9.6 9.3 9.0 9.1 9.3 9.1 9.6 9.5   Magnesium 2.2 2.3  --   --   --   --   --   --   --   --   --        CARDIAC BIOMARKERS:  Recent Labs   Lab 03/13/22  0755    H       COAGS:  Recent Labs   Lab 07/20/23  1423 08/09/23  0709 08/23/23  0836   INR 2.8 H 1.9 H 3.5 H       LIPIDS/LFTS:  Recent Labs   Lab 02/02/22  0945 02/23/22  1213 03/15/23  1526 06/14/23  0846 09/19/23  0921   Cholesterol 154  --   --   --   --    Triglycerides 171 H  --   --   --   --    HDL 33 L  --   --   --   --    LDL Cholesterol 86.8  --   --   --   --    Non-HDL Cholesterol 121  --   --   --   --    AST 15   < > 19 19 20   ALT 11   < > 9 L 12 12    < > = values in this interval not displayed.       Cardiovascular Testing:  Echo 3/2/22  The left ventricle is normal in size with mild concentric hypertrophy and normal systolic function.  The estimated ejection fraction is 55%.  Normal left ventricular diastolic function.  Severe left atrial enlargement.  Mild right atrial enlargement.  Normal right ventricular size with normal right ventricular systolic function.  Normal central venous pressure (3 mmHg).  The estimated PA systolic pressure is 9 mmHg.    Right/left heart  catheterization 07/16/2021: (Riddhi note 2/8/22)  Nonobstructive coronary artery disease with the exception of a ramus intermedius that was of small caliber.  Mild pulmonary hypertension.  Elevated V-wave in setting of severe mitral regurgitation.  V-wave of 43 mm Hg.  Left anterior descending artery with a mid to distal 30-40% stenosis.  Ramus intermedius with a mid 70 80% stenosis.  Small caliber vessel.  Left circumflex artery:  Non dominant.  Ectatic and aneurysmal.  Mid 40% stenosis in the AV groove followed by a 40-50% stenosis prior to the bifurcation of OM 2.   Right coronary artery is ectatic and aneurysmal with a mid 20-30% stenosis.  Superior branch of the PDA has a 50% stenosis proximally in the inferior branch has luminal irregularities.  PLV branch with a proximal 30-40% stenosis followed by mid 40-50% stenosis.     Echocardiogram 05/24/2021:  Ejection fraction 45-50%.  Severe left atrial enlargement.  Bioprosthetic valve in aortic position (27 mm magna ease bioprosthesis).  Moderate to severe mitral regurgitation     Echocardiogram 03/31/2020:  Ejection fraction 54%.  Aortic bioprosthesis.  No perivalvular regurgitation.        ASSESSMENT:   # FREY/fatigue  # HTN, controlled  # HLP on atorva 40mg  # CAD, nonobst by cath 7/2021  # PAF in SR on coumadin. Hx FEDERICO ligation 1/2018.  # SSS s/p [employer] dual chamber PPM  # Bio AVR 1/2018 (27mm Magna Ease) for Ao root dil/AI.  # MR s/p failed mitraclip    # CKD5, not on HD.  LUE AVF present  # aortic atherosclerosis (CXR 9/19/23)    PLAN:   Cont med rx  Cont coumadin for goal INR 2.0-3.0, consider switch to NOAC.  Itzel MPI  Echo  Surveillance aortic US  RTC 1 month with St[employer] Meng PPM interrogation (Oct 2023)    Complex OV, mult records reviewed      Angel Olivier MD, FACC

## 2023-09-19 NOTE — PROGRESS NOTES
Health Maintenance Due   Topic hx chickenpox ; inform pt can get vaccine at pharmacy.    Shingles Vaccine (1 of 2)     COVID-19 Vaccine (4 - Pfizer series)

## 2023-09-19 NOTE — PROGRESS NOTES
[facility]    2023 2:48 PM    Assessment/Plan:    Patient presents today with therapeutic INR.    Assessment of patient findings and chart review: no significant findings     Recommendation for patient's warfarin regimen: Continue current maintenance dose    Recommend repeat INR in 3 weeks  _________________________________________________________________    Vivek Lema Jr. (73 y.o.) is followed by the [employer] [facility].    Anticoagulation Summary  As of 2023      INR goal:  2.0-3.0   TTR:  73.0 % (1.6 y)   INR used for dosin.4 (2023)   Warfarin maintenance plan:  4 mg (2 mg x 2) every Thu, Sat; 2 mg (2 mg x 1) all other days   Weekly warfarin total:  18 mg   Plan last modified:  Mihaela Ortega, PharmD (2023)   Next INR check:  10/10/2023   Target end date:      Indications    History of atrial fibrillation [Z86.79]  Long term (current) use of anticoagulants [Z79.01]                 Anticoagulation Episode Summary       INR check location:      Preferred lab:      Send INR reminders to:  [facility] COUMADIN MONITORING POOL    Comments:  [place] only Mon - Thur and Hospital Lab on           Anticoagulation Care Providers       Provider Role Specialty Phone number    Michelle Conway MD [facility] Medicine [phone]

## 2023-09-19 NOTE — PROGRESS NOTES
Subjective:       Patient ID: Vivek Lema Jr. is a pleasant 73 y.o. White male patient    Chief Complaint: Follow-up      Patient is a pt I saw last on 06/14/2023, see my last notes.    HPI     Interval visits:  - Ophthalmology  Patient comes today reporting a 2 month history of short episodes of CP that is left-sided, can go in the  L arm, he reports that he feels tired and short winded after an effort or walking, can walk 1 block but slowly.  He denies any shortness of breath  at rest, nor having to use more pillows at night.    He also reports struggling with the heat.    He denies any cough, feeling palpitations, no leg swelling.  He takes his medications as  directed, on blood thinners.  He lost 4 pounds from last visit.  Drinks 2 beers a day.  He did not seek for medical attention, did not contact me. Was to see his Cardiologist, Dr. Hannon, who is not back in the Clinic yet, and he will see Dr. Olivier this afternoon due to above mentioned symptoms.    Patient Active Problem List   Diagnosis    Hypertension    CKD (chronic kidney disease), stage IV    A-V fistula    Coronary artery disease    History of atrial fibrillation    Pacemaker    Nonrheumatic mitral valve regurgitation    Class 1 obesity due to excess calories with serious comorbidity and body mass index (BMI) of 32.0 to 32.9 in adult    Gout    Long term (current) use of anticoagulants    COVID    PAF (paroxysmal atrial fibrillation)    Exudative age-related macular degeneration of left eye with active choroidal neovascularization    Intermediate stage dry age-related macular degeneration of right eye    Epiretinal membrane, right    Age-related nuclear cataract of both eyes    Pseudophakia of both eyes    History of prostate cancer    Anxiety and depression    Nonintractable headache    Choroidal neovascularization, left eye    Epiretinal membrane, left          ACTIVE MEDICAL ISSUES:  Documented in Problem List     PAST MEDICAL  HISTORY  Documented     PAST SURGICAL HISTORY:  Documented     SOCIAL HISTORY:  Documented     FAMILY HISTORY:  Documented     ALLERGIES AND MEDICATIONS: updated and reviewed.  Documented    Review of Systems   Constitutional:  Positive for fatigue.   HENT: Negative.     Respiratory:  Positive for shortness of breath.    Cardiovascular: Negative.    Gastrointestinal: Negative.    Musculoskeletal: Negative.    Skin: Negative.    Neurological: Negative.    All other systems reviewed and are negative.      Objective:      Physical Exam  Vitals and nursing note reviewed.   Constitutional:       Appearance: Normal appearance. He is well-developed. He is obese.   HENT:      Right Ear: Tympanic membrane and external ear normal.      Left Ear: Tympanic membrane and external ear normal.   Eyes:      Conjunctiva/sclera: Conjunctivae normal.   Neck:      Thyroid: No thyromegaly.   Cardiovascular:      Rate and Rhythm: Normal rate. Rhythm irregular.      Pulses: Normal pulses.      Heart sounds: Normal heart sounds.      Comments: AV fistula L arm with good thrill  Pulmonary:      Effort: Pulmonary effort is normal.      Breath sounds: No wheezing.      Comments: Possible R sided effusion?  Chest:      Chest wall: No tenderness.   Abdominal:      General: Bowel sounds are normal.      Palpations: Abdomen is soft. There is no mass.      Tenderness: There is no abdominal tenderness.   Musculoskeletal:         General: No tenderness or deformity. Normal range of motion.      Cervical back: Normal range of motion.   Lymphadenopathy:      Cervical: No cervical adenopathy.   Skin:     General: Skin is warm and dry.   Neurological:      Mental Status: He is alert and oriented to person, place, and time.   Psychiatric:         Mood and Affect: Mood normal.         Behavior: Behavior normal.         Thought Content: Thought content normal.         Judgment: Judgment normal.         Vitals:    09/19/23 0831   BP: (!) 140/72   BP  "Location: Left arm   Patient Position: Sitting   BP Method: Large (Manual)   Pulse: 63   Resp: 16   Temp: 97.9 °F (36.6 °C)   TempSrc: Oral   SpO2: 96%   Weight: 101.8 kg (224 lb 6.9 oz)   Height: 5' 11" (1.803 m)     Body mass index is 31.3 kg/m².    RESULTS: Reviewed labs from last 12 months    Last Lab Results:     Lab Results   Component Value Date    WBC 6.77 06/14/2023    HGB 13.7 (L) 06/14/2023    HCT 42.1 06/14/2023     06/14/2023     06/14/2023    K 3.9 06/14/2023     06/14/2023    CO2 26 06/14/2023    BUN 37 (H) 06/14/2023    CREATININE 2.8 (H) 06/14/2023    CALCIUM 9.6 06/14/2023    ALBUMIN 3.6 06/14/2023    AST 19 06/14/2023    ALT 12 06/14/2023    CHOL 154 02/02/2022    TRIG 171 (H) 02/02/2022    HDL 33 (L) 02/02/2022    LDLCALC 86.8 02/02/2022    HGBA1C 5.1 02/02/2022    TSH 1.782 02/02/2022    PSA <0.01 06/14/2023     XR CHEST PA AND LATERAL 09/19/2023:     CLINICAL HISTORY:  Shortness of breath     TECHNIQUE:  PA and lateral views of the chest were performed.     COMPARISON:  None     FINDINGS:  RIGHT-sided pleural effusion/pleural reaction.  Bipolar pacer.  Sternotomy.  Atherosclerotic change within the aorta.Consolidative change RIGHT lung base likely atelectasis.  LEFT lung is clear.  CT could further evaluate for increased specificity.  No pneumothorax.     The cardiac silhouette is normal in size. The hilar and mediastinal contours are unremarkable.     Bones are intact.     Impression:     Pleural reaction RIGHT hemithorax with consolidative atelectasis RIGHT lung base.  No prior studies available for comparison.    Assessment:       1. Chest pain, unspecified type    2. Shortness of breath    3. Fatigue, unspecified type    4. Hypertension, unspecified type    5. Class 1 obesity due to excess calories with serious comorbidity and body mass index (BMI) of 31.0 to 31.9 in adult    6. CKD (chronic kidney disease), stage IV    7. A-V fistula    8. PAF (paroxysmal atrial " fibrillation)    9. Pacemaker    10. Anxiety and depression    11. Nonintractable headache, unspecified chronicity pattern, unspecified headache type    12. History of prostate cancer    13. Elevated blood sugar level        Plan:   Vivek was seen today for follow-up.    Diagnoses and all orders for this visit:    Chest pain, unspecified type    See HPI. Will see Cardiologist this PM. Will do blood work and chest X-Ray this AM for my colleague's review.    Shortness of breath  -     TSH; Future  -     BNP; Future  -     X-Ray Chest PA And Lateral; Future    See above. At PE, concerned re: possible effusion R side. See results of X-Ray. Will need CT? Will wait until results of blood work and visit with Dr. Olivier.    Fatigue, unspecified type    See above.    Hypertension, unspecified type  -     CBC Auto Differential; Future  -     Comprehensive Metabolic Panel; Future  -     TSH; Future    Monitored and treated.    Class 1 obesity due to excess calories with serious comorbidity and body mass index (BMI) of 31.0 to 31.9 in adult    CKD (chronic kidney disease), stage IV    Will check blood work. No NSAIDs.    A-V fistula    Good thrill. Never had to use it.    PAF (paroxysmal atrial fibrillation)    On Coumadin.    Pacemaker    Anxiety and depression    Will monitor closely.    Nonintractable headache, unspecified chronicity pattern, unspecified headache type  -     HYDROcodone-acetaminophen (NORCO) 7.5-325 mg per tablet; Take not more than one pill a day PRN as direct    History of prostate cancer    Elevated blood sugar level  -     Hemoglobin A1C; Future        No follow-ups on file.    This note was created by combination of typed  and M-Modal dictation.  Transcription errors may be present.  If there are any questions, please contact me.

## 2023-09-21 ENCOUNTER — OFFICE VISIT (OUTPATIENT)
Dept: OPHTHALMOLOGY | Facility: CLINIC | Age: 73
End: 2023-09-21
Payer: MEDICARE

## 2023-09-21 ENCOUNTER — E-CONSULT (OUTPATIENT)
Dept: PULMONOLOGY | Facility: CLINIC | Age: 73
End: 2023-09-21
Payer: MEDICARE

## 2023-09-21 DIAGNOSIS — H35.3112 INTERMEDIATE STAGE DRY AGE-RELATED MACULAR DEGENERATION OF RIGHT EYE: Primary | ICD-10-CM

## 2023-09-21 DIAGNOSIS — H35.372 EPIRETINAL MEMBRANE, LEFT: ICD-10-CM

## 2023-09-21 DIAGNOSIS — H35.052 CHOROIDAL NEOVASCULARIZATION, LEFT EYE: ICD-10-CM

## 2023-09-21 DIAGNOSIS — H35.3221 EXUDATIVE AGE-RELATED MACULAR DEGENERATION OF LEFT EYE WITH ACTIVE CHOROIDAL NEOVASCULARIZATION: ICD-10-CM

## 2023-09-21 DIAGNOSIS — Z96.1 PSEUDOPHAKIA OF BOTH EYES: ICD-10-CM

## 2023-09-21 DIAGNOSIS — J90 PLEURAL EFFUSION: Primary | ICD-10-CM

## 2023-09-21 PROCEDURE — 1126F PR PAIN SEVERITY QUANTIFIED, NO PAIN PRESENT: ICD-10-PCS | Mod: CPTII,S$GLB,, | Performed by: OPHTHALMOLOGY

## 2023-09-21 PROCEDURE — 3288F FALL RISK ASSESSMENT DOCD: CPT | Mod: CPTII,S$GLB,, | Performed by: OPHTHALMOLOGY

## 2023-09-21 PROCEDURE — 1160F PR REVIEW ALL MEDS BY PRESCRIBER/CLIN PHARMACIST DOCUMENTED: ICD-10-PCS | Mod: CPTII,S$GLB,, | Performed by: OPHTHALMOLOGY

## 2023-09-21 PROCEDURE — 99213 PR OFFICE/OUTPT VISIT, EST, LEVL III, 20-29 MIN: ICD-10-PCS | Mod: S$GLB,,, | Performed by: OPHTHALMOLOGY

## 2023-09-21 PROCEDURE — 4010F PR ACE/ARB THEARPY RXD/TAKEN: ICD-10-PCS | Mod: CPTII,S$GLB,, | Performed by: OPHTHALMOLOGY

## 2023-09-21 PROCEDURE — 3288F PR FALLS RISK ASSESSMENT DOCUMENTED: ICD-10-PCS | Mod: CPTII,S$GLB,, | Performed by: OPHTHALMOLOGY

## 2023-09-21 PROCEDURE — 92202 PR OPHTHALMOSCOPY, EXT, W/DRAW OPTIC NERVE/MACULA, I&R, UNI/BI: ICD-10-PCS | Mod: 59,S$GLB,, | Performed by: OPHTHALMOLOGY

## 2023-09-21 PROCEDURE — 92134 CPTRZ OPH DX IMG PST SGM RTA: CPT | Mod: S$GLB,,, | Performed by: OPHTHALMOLOGY

## 2023-09-21 PROCEDURE — 92134 OCT, RETINA - OU - BOTH EYES: ICD-10-PCS | Mod: S$GLB,,, | Performed by: OPHTHALMOLOGY

## 2023-09-21 PROCEDURE — 3044F PR MOST RECENT HEMOGLOBIN A1C LEVEL <7.0%: ICD-10-PCS | Mod: CPTII,S$GLB,, | Performed by: OPHTHALMOLOGY

## 2023-09-21 PROCEDURE — 99451 NTRPROF PH1/NTRNET/EHR 5/>: CPT | Mod: S$GLB,,, | Performed by: INTERNAL MEDICINE

## 2023-09-21 PROCEDURE — 1159F MED LIST DOCD IN RCRD: CPT | Mod: CPTII,S$GLB,, | Performed by: OPHTHALMOLOGY

## 2023-09-21 PROCEDURE — 1159F PR MEDICATION LIST DOCUMENTED IN MEDICAL RECORD: ICD-10-PCS | Mod: CPTII,S$GLB,, | Performed by: OPHTHALMOLOGY

## 2023-09-21 PROCEDURE — 1126F AMNT PAIN NOTED NONE PRSNT: CPT | Mod: CPTII,S$GLB,, | Performed by: OPHTHALMOLOGY

## 2023-09-21 PROCEDURE — 1101F PR PT FALLS ASSESS DOC 0-1 FALLS W/OUT INJ PAST YR: ICD-10-PCS | Mod: CPTII,S$GLB,, | Performed by: OPHTHALMOLOGY

## 2023-09-21 PROCEDURE — 4010F ACE/ARB THERAPY RXD/TAKEN: CPT | Mod: CPTII,S$GLB,, | Performed by: OPHTHALMOLOGY

## 2023-09-21 PROCEDURE — 99999 PR PBB SHADOW E&M-EST. PATIENT-LVL III: ICD-10-PCS | Mod: PBBFAC,,, | Performed by: OPHTHALMOLOGY

## 2023-09-21 PROCEDURE — 99213 OFFICE O/P EST LOW 20 MIN: CPT | Mod: S$GLB,,, | Performed by: OPHTHALMOLOGY

## 2023-09-21 PROCEDURE — 3044F HG A1C LEVEL LT 7.0%: CPT | Mod: CPTII,S$GLB,, | Performed by: OPHTHALMOLOGY

## 2023-09-21 PROCEDURE — 1160F RVW MEDS BY RX/DR IN RCRD: CPT | Mod: CPTII,S$GLB,, | Performed by: OPHTHALMOLOGY

## 2023-09-21 PROCEDURE — 99451 PR INTERPROF, PHONE/INTERNET/EHR, CONSULT, >= 5MINS: ICD-10-PCS | Mod: S$GLB,,, | Performed by: INTERNAL MEDICINE

## 2023-09-21 PROCEDURE — 99999 PR PBB SHADOW E&M-EST. PATIENT-LVL III: CPT | Mod: PBBFAC,,, | Performed by: OPHTHALMOLOGY

## 2023-09-21 PROCEDURE — 92202 OPSCPY EXTND ON/MAC DRAW: CPT | Mod: 59,S$GLB,, | Performed by: OPHTHALMOLOGY

## 2023-09-21 PROCEDURE — 1101F PT FALLS ASSESS-DOCD LE1/YR: CPT | Mod: CPTII,S$GLB,, | Performed by: OPHTHALMOLOGY

## 2023-09-21 NOTE — CONSULTS
[facility] [place] [facility]  Response for E-Consult     Patient Name: Vivek Lema Jr.  MRN: [id number]  Primary Care Provider: Michelle Conway MD   Requesting Provider: Michelle Conway MD  Consults 72 y/o with history of heart disease (mitral valve, with several weeks of progressive dyspnea on exertion and associated fatigue.  He also describes some chest discomfort which seems to be atypical and sharp/short-lived.  There has been no palpitations or syncope.  He denies any PND, orthopnea melena, hematuria, or claudication symptoms.    Noted to have abnormal Chest X Ray on 9/19/2023 with right sided pleural effusion that appears partially loculated. No old Chest X Ray's are available    No older CT of chest or Chest X Ray reports are available    Recommendation: obtain old medical records and old Chest X Ray and CT of chest reports. Obtain CT of chest (non contrast) to evaluate extent of pleural effusion     Contingency: Suspect old Chest X Rays probably demonstrated a pleural effusion.  Would need a CT of chest before thoracentesis was attempted. Also depends upon patient history - this right sided pleural effusion may be a longstanding finding related to prior pneumonia and /or Congestive Heart failure .    Total time of Consultation: 22 minute    I did not speak to the requesting provider verbally about this.     *This eConsult is based on the clinical data available to me and is furnished without benefit of a physical examination. The eConsult will need to be interpreted in light of any clinical issues or changes in patient status not available to me at the time of filing this eConsults. Significant changes in patient condition or level of acuity should result in immediate formal consultation and reevaluation. Please alert me if you have further questions.    Thank you for this eConsult referral.     Quentin Zarate MD  [facility]

## 2023-09-21 NOTE — PROGRESS NOTES
HPI    2 wk  DFE OU / OCTm/ Possible Avastin OS    DLS 09/07/2023  by Dr. BRYCE Santiago MD    Cc: pt states : eyes tear a lot and vision is stable.    -blurred Va  -eye pain  -flashes/floaters  -headaches  -curtains/shadow/veils    Eye Meds: Refresh OU prn     POHx:   1. NSC OU  S/p phaco w/IOL OU     2. Exudative ARMD OS w/Active Choroidal NVO  S/P Avastin OS ( 07/09/2023)    3. ERM OS  4. Choroidal  NVO OS  5. Pseudophakia OU    Last edited by Lupe Almanza MA on 9/21/2023  9:03 AM.          A/P    ICD-10-CM ICD-9-CM   1. Intermediate stage dry age-related macular degeneration of right eye  H35.3112 362.51   2. Exudative age-related macular degeneration of left eye with active choroidal neovascularization  H35.3221 362.52     362.16   3. Choroidal neovascularization, left eye  H35.052 362.16   4. Epiretinal membrane, left  H35.372 362.56   5. Pseudophakia of both eyes  Z96.1 V43.1       The notes and records from prior visit date 9/7/23 have been reviewed.     1. Intermediate stage dry age-related macular degeneration of right eye  Here for AMD check  Hx of blurred vision for few months    Today dry OD VA 20/20 (was 20/30), stable serous PED appearance   Plan: Observation no injection dry  Amsler precautions  Recommend Antioxidants, lutein, omega 3, brown sunglasses (blue blockers).     2. Exudative age-related macular degeneration of left eye with active choroidal neovascularization  3. Choroidal neovascularization, left eye    S/p RIVERA x2 8/10/23    VA 20/40 (stable), stable serous PED appearance, likely no active CNVM    Plan: observe today, no injection, recheck in 1 mo, if worsening can consider Avastin    4. Epiretinal membrane, left  Mod ERM, mixed mechanism component for decr VA    Plan: Observation for now, may need PPV/MP if worsening   Amsler precautions discussed     5. Pseudophakia of both eyes  Good lens position OU  Plan: Observation, update Mrx prn      RTC 8-10 weeks DFE/OCTm OU, possible RIVERA OS if  worsening SRF    I saw and examined the patient and reviewed in detail the findings documented. The final examination findings, image interpretations which have been independently interpreted, and plan as documented in the record represent my personal judgment and conclusions.    Jaylen Santiago MD  Vitreoretinal Surgery   [facility]

## 2023-09-22 ENCOUNTER — HOSPITAL ENCOUNTER (OUTPATIENT)
Dept: CARDIOLOGY | Facility: HOSPITAL | Age: 73
Discharge: HOME | End: 2023-09-22
Attending: INTERNAL MEDICINE
Payer: MEDICARE

## 2023-09-22 ENCOUNTER — HOSPITAL ENCOUNTER (OUTPATIENT)
Dept: RADIOLOGY | Facility: HOSPITAL | Age: 73
Discharge: HOME | End: 2023-09-22
Attending: INTERNAL MEDICINE
Payer: MEDICARE

## 2023-09-22 DIAGNOSIS — Z78.9 PATIENT UNABLE TO EXERCISE: ICD-10-CM

## 2023-09-22 DIAGNOSIS — I34.0 MITRAL VALVE INSUFFICIENCY, UNSPECIFIED ETIOLOGY: ICD-10-CM

## 2023-09-22 DIAGNOSIS — R06.09 DOE (DYSPNEA ON EXERTION): ICD-10-CM

## 2023-09-22 DIAGNOSIS — I10 PRIMARY HYPERTENSION: ICD-10-CM

## 2023-09-22 DIAGNOSIS — R94.31 ABNORMAL EKG: ICD-10-CM

## 2023-09-22 DIAGNOSIS — I25.119 CORONARY ARTERY DISEASE INVOLVING NATIVE CORONARY ARTERY OF NATIVE HEART WITH ANGINA PECTORIS: ICD-10-CM

## 2023-09-22 DIAGNOSIS — I48.0 PAF (PAROXYSMAL ATRIAL FIBRILLATION): ICD-10-CM

## 2023-09-22 DIAGNOSIS — Z95.2 S/P AVR (AORTIC VALVE REPLACEMENT) AND AORTOPLASTY: ICD-10-CM

## 2023-09-22 DIAGNOSIS — Z95.0 PACEMAKER: ICD-10-CM

## 2023-09-22 LAB
ASCENDING AORTA: 3.23 CM
AV INDEX (PROSTH): 0.43
AV MEAN GRADIENT: 13 MMHG
AV PEAK GRADIENT: 24 MMHG
AV VALVE AREA BY VELOCITY RATIO: 1.93 CM²
AV VALVE AREA: 1.88 CM²
AV VELOCITY RATIO: 0.44
CV ECHO LV RWT: 0.52 CM
CV STRESS BASE HR: 60 BPM
DIASTOLIC BLOOD PRESSURE: 75 MMHG
DOP CALC AO PEAK VEL: 2.43 M/S
DOP CALC AO VTI: 54.1 CM
DOP CALC LVOT AREA: 4.3 CM2
DOP CALC LVOT DIAMETER: 2.35 CM
DOP CALC LVOT PEAK VEL: 1.08 M/S
DOP CALC LVOT STROKE VOLUME: 101.44 CM3
DOP CALCLVOT PEAK VEL VTI: 23.4 CM
E WAVE DECELERATION TIME: 233.75 MSEC
E/A RATIO: 1.31
E/E' RATIO: 10.67 M/S
ECHO LV POSTERIOR WALL: 1.38 CM (ref 0.6–1.1)
FRACTIONAL SHORTENING: 38 % (ref 28–44)
INTERVENTRICULAR SEPTUM: 1.36 CM (ref 0.6–1.1)
IVRT: 127.5 MSEC
LA MAJOR: 6.2 CM
LA MINOR: 5.14 CM
LA WIDTH: 5.1 CM
LEFT ATRIUM SIZE: 4.78 CM
LEFT ATRIUM VOLUME: 116.46 CM3
LEFT INTERNAL DIMENSION IN SYSTOLE: 3.29 CM (ref 2.1–4)
LEFT VENTRICLE DIASTOLIC VOLUME: 137.94 ML
LEFT VENTRICLE SYSTOLIC VOLUME: 43.95 ML
LEFT VENTRICULAR INTERNAL DIMENSION IN DIASTOLE: 5.34 CM (ref 3.5–6)
LEFT VENTRICULAR MASS: 312.8 G
LV LATERAL E/E' RATIO: 8.89 M/S
LV SEPTAL E/E' RATIO: 13.33 M/S
LVOT MG: 2.69 MMHG
LVOT MV: 0.76 CM/S
MV PEAK A VEL: 0.61 M/S
MV PEAK E VEL: 0.8 M/S
MV STENOSIS PRESSURE HALF TIME: 67.79 MS
MV VALVE AREA P 1/2 METHOD: 3.25 CM2
NUC STRESS EJECTION FRACTION: 55 %
OHS CV CPX 85 PERCENT MAX PREDICTED HEART RATE MALE: 123
OHS CV CPX MAX PREDICTED HEART RATE: 145
OHS CV CPX PATIENT IS FEMALE: 0
OHS CV CPX PATIENT IS MALE: 1
OHS CV CPX PEAK DIASTOLIC BLOOD PRESSURE: 61 MMHG
OHS CV CPX PEAK HEAR RATE: 78 BPM
OHS CV CPX PEAK RATE PRESSURE PRODUCT: 6006
OHS CV CPX PEAK SYSTOLIC BLOOD PRESSURE: 77 MMHG
OHS CV CPX PERCENT MAX PREDICTED HEART RATE ACHIEVED: 54
OHS CV CPX RATE PRESSURE PRODUCT PRESENTING: 6120
PISA TR MAX VEL: 1.74 M/S
PULM VEIN S/D RATIO: 0.69
PV PEAK D VEL: 0.36 M/S
PV PEAK GRADIENT: 4 MMHG
PV PEAK S VEL: 0.25 M/S
PV PEAK VELOCITY: 0.99 M/S
RA MAJOR: 6.18 CM
RA PRESSURE ESTIMATED: 3 MMHG
RA WIDTH: 4.93 CM
RIGHT VENTRICULAR END-DIASTOLIC DIMENSION: 5.21 CM
RV TB RVSP: 5 MMHG
SYSTOLIC BLOOD PRESSURE: 102 MMHG
TDI LATERAL: 0.09 M/S
TDI SEPTAL: 0.06 M/S
TDI: 0.08 M/S
TR MAX PG: 12 MMHG
TRICUSPID ANNULAR PLANE SYSTOLIC EXCURSION: 2.02 CM
TV REST PULMONARY ARTERY PRESSURE: 15 MMHG

## 2023-09-22 PROCEDURE — 93306 TTE W/DOPPLER COMPLETE: CPT

## 2023-09-22 PROCEDURE — 78452 HT MUSCLE IMAGE SPECT MULT: CPT | Mod: 26,,, | Performed by: INTERNAL MEDICINE

## 2023-09-22 PROCEDURE — 78452 HT MUSCLE IMAGE SPECT MULT: CPT

## 2023-09-22 PROCEDURE — 63600175 PHARM REV CODE 636 W HCPCS: Performed by: INTERNAL MEDICINE

## 2023-09-22 PROCEDURE — 93018 CV STRESS TEST I&R ONLY: CPT | Mod: ,,, | Performed by: INTERNAL MEDICINE

## 2023-09-22 PROCEDURE — 93306 ECHO (CUPID ONLY): ICD-10-PCS | Mod: 26,,, | Performed by: INTERNAL MEDICINE

## 2023-09-22 PROCEDURE — 93016 NUCLEAR STRESS - CARDIOLOGY INTERPRETED (CUPID ONLY): ICD-10-PCS | Mod: ,,, | Performed by: INTERNAL MEDICINE

## 2023-09-22 PROCEDURE — 93016 CV STRESS TEST SUPVJ ONLY: CPT | Mod: ,,, | Performed by: INTERNAL MEDICINE

## 2023-09-22 PROCEDURE — 78452 NUCLEAR STRESS - CARDIOLOGY INTERPRETED (CUPID ONLY): ICD-10-PCS | Mod: 26,,, | Performed by: INTERNAL MEDICINE

## 2023-09-22 PROCEDURE — 93017 CV STRESS TEST TRACING ONLY: CPT

## 2023-09-22 PROCEDURE — 93018 NUCLEAR STRESS - CARDIOLOGY INTERPRETED (CUPID ONLY): ICD-10-PCS | Mod: ,,, | Performed by: INTERNAL MEDICINE

## 2023-09-22 PROCEDURE — 93306 TTE W/DOPPLER COMPLETE: CPT | Mod: 26,,, | Performed by: INTERNAL MEDICINE

## 2023-09-22 RX ORDER — REGADENOSON 0.08 MG/ML
0.4 INJECTION, SOLUTION INTRAVENOUS ONCE
Status: COMPLETED | OUTPATIENT
Start: 2023-09-22 | End: 2023-09-22

## 2023-09-22 RX ADMIN — REGADENOSON 0.4 MG: 0.08 INJECTION, SOLUTION INTRAVENOUS at 08:09

## 2023-10-16 ENCOUNTER — ANTI-COAG VISIT (OUTPATIENT)
Dept: CARDIOLOGY | Facility: CLINIC | Age: 73
End: 2023-10-16
Payer: MEDICARE

## 2023-10-16 ENCOUNTER — HOSPITAL ENCOUNTER (OUTPATIENT)
Dept: CARDIOLOGY | Facility: HOSPITAL | Age: 73
Discharge: HOME | End: 2023-10-16
Attending: INTERNAL MEDICINE
Payer: MEDICARE

## 2023-10-16 DIAGNOSIS — Z86.79 HISTORY OF ATRIAL FIBRILLATION: Primary | ICD-10-CM

## 2023-10-16 DIAGNOSIS — Z79.01 LONG TERM (CURRENT) USE OF ANTICOAGULANTS: ICD-10-CM

## 2023-10-16 DIAGNOSIS — Z13.6 SCREENING FOR AAA (ABDOMINAL AORTIC ANEURYSM): ICD-10-CM

## 2023-10-16 LAB
ABDOMINAL IMA AP: 1.8 CM
ABDOMINAL IMA ED VEL: 8 CM/S
ABDOMINAL IMA PS VEL: 65 CM/S
ABDOMINAL IMA TRANS: 1.9 CM
ABDOMINAL INFRARENAL AORTA AP: 2.2 CM
ABDOMINAL INFRARENAL AORTA ED VEL: 7 CM/S
ABDOMINAL INFRARENAL AORTA PS VEL: 46 CM/S
ABDOMINAL INFRARENAL AORTA TRANS: 2.2 CM
ABDOMINAL JUXTARENAL AORTA AP: 2.2 CM
ABDOMINAL JUXTARENAL AORTA ED VEL: 27 CM/S
ABDOMINAL JUXTARENAL AORTA PS VEL: 151 CM/S
ABDOMINAL JUXTARENAL AORTA TRANS: 2.2 CM
ABDOMINAL LT COM ILIAC AP: 1.1 CM
ABDOMINAL LT COM ILIAC TRANS: 1.2 CM
ABDOMINAL LT COM ILIAC VEL: 93 CM/S
ABDOMINAL LT COM ILLIAC ED VEL: 2 CM/S
ABDOMINAL RT COM ILIAC AP: 0.9 CM
ABDOMINAL RT COM ILIAC TRANS: 1.1 CM
ABDOMINAL RT COM ILIAC VEL: 86 CM/S
ABDOMINAL RT COM ILLIAC ED VEL: 1 CM/S
ABDOMINAL SUPRARENAL AORTA AP: 2.6 CM
ABDOMINAL SUPRARENAL AORTA ED VEL: 26 CM/S
ABDOMINAL SUPRARENAL AORTA PS VEL: 154 CM/S
ABDOMINAL SUPRARENAL AORTA TRANS: 2.6 CM
OHS CV US ABDOMINAL AORTA PSV HIGHEST VALUE: 154 CM/S

## 2023-10-16 PROCEDURE — 93978 VASCULAR STUDY: CPT | Mod: 26,,, | Performed by: INTERNAL MEDICINE

## 2023-10-16 PROCEDURE — 93793 ANTICOAG MGMT PT WARFARIN: CPT | Mod: S$GLB,,,

## 2023-10-16 PROCEDURE — 93793 PR ANTICOAGULANT MGMT FOR PT TAKING WARFARIN: ICD-10-PCS | Mod: S$GLB,,,

## 2023-10-16 PROCEDURE — 93978 VASCULAR STUDY: CPT

## 2023-10-16 PROCEDURE — 93978 CV US ABDOMINAL AORTA EVALUATION (CUPID ONLY): ICD-10-PCS | Mod: 26,,, | Performed by: INTERNAL MEDICINE

## 2023-10-23 ENCOUNTER — OFFICE VISIT (OUTPATIENT)
Dept: CARDIOLOGY | Facility: CLINIC | Age: 73
End: 2023-10-23
Payer: MEDICARE

## 2023-10-23 VITALS
OXYGEN SATURATION: 98 % | RESPIRATION RATE: 18 BRPM | SYSTOLIC BLOOD PRESSURE: 128 MMHG | HEIGHT: 71 IN | HEART RATE: 62 BPM | BODY MASS INDEX: 31.36 KG/M2 | DIASTOLIC BLOOD PRESSURE: 68 MMHG | WEIGHT: 224 LBS

## 2023-10-23 DIAGNOSIS — R94.31 ABNORMAL EKG: ICD-10-CM

## 2023-10-23 DIAGNOSIS — Z95.2 S/P AVR (AORTIC VALVE REPLACEMENT) AND AORTOPLASTY: ICD-10-CM

## 2023-10-23 DIAGNOSIS — N18.4 CKD (CHRONIC KIDNEY DISEASE), STAGE IV: ICD-10-CM

## 2023-10-23 DIAGNOSIS — E66.09 CLASS 1 OBESITY DUE TO EXCESS CALORIES WITH SERIOUS COMORBIDITY AND BODY MASS INDEX (BMI) OF 33.0 TO 33.9 IN ADULT: ICD-10-CM

## 2023-10-23 DIAGNOSIS — Z79.01 LONG TERM (CURRENT) USE OF ANTICOAGULANTS: ICD-10-CM

## 2023-10-23 DIAGNOSIS — I10 PRIMARY HYPERTENSION: ICD-10-CM

## 2023-10-23 DIAGNOSIS — Z95.0 PACEMAKER: ICD-10-CM

## 2023-10-23 DIAGNOSIS — I34.0 MITRAL VALVE INSUFFICIENCY, UNSPECIFIED ETIOLOGY: ICD-10-CM

## 2023-10-23 DIAGNOSIS — Z86.79 HISTORY OF ATRIAL FIBRILLATION: Primary | ICD-10-CM

## 2023-10-23 DIAGNOSIS — I48.0 PAF (PAROXYSMAL ATRIAL FIBRILLATION): ICD-10-CM

## 2023-10-23 DIAGNOSIS — E78.49 OTHER HYPERLIPIDEMIA: ICD-10-CM

## 2023-10-23 PROCEDURE — 3078F PR MOST RECENT DIASTOLIC BLOOD PRESSURE < 80 MM HG: ICD-10-PCS | Mod: CPTII,S$GLB,, | Performed by: INTERNAL MEDICINE

## 2023-10-23 PROCEDURE — 3074F SYST BP LT 130 MM HG: CPT | Mod: CPTII,S$GLB,, | Performed by: INTERNAL MEDICINE

## 2023-10-23 PROCEDURE — 1125F PR PAIN SEVERITY QUANTIFIED, PAIN PRESENT: ICD-10-PCS | Mod: CPTII,S$GLB,, | Performed by: INTERNAL MEDICINE

## 2023-10-23 PROCEDURE — 3288F PR FALLS RISK ASSESSMENT DOCUMENTED: ICD-10-PCS | Mod: CPTII,S$GLB,, | Performed by: INTERNAL MEDICINE

## 2023-10-23 PROCEDURE — 1160F RVW MEDS BY RX/DR IN RCRD: CPT | Mod: CPTII,S$GLB,, | Performed by: INTERNAL MEDICINE

## 2023-10-23 PROCEDURE — 1101F PR PT FALLS ASSESS DOC 0-1 FALLS W/OUT INJ PAST YR: ICD-10-PCS | Mod: CPTII,S$GLB,, | Performed by: INTERNAL MEDICINE

## 2023-10-23 PROCEDURE — 4010F PR ACE/ARB THEARPY RXD/TAKEN: ICD-10-PCS | Mod: CPTII,S$GLB,, | Performed by: INTERNAL MEDICINE

## 2023-10-23 PROCEDURE — 1160F PR REVIEW ALL MEDS BY PRESCRIBER/CLIN PHARMACIST DOCUMENTED: ICD-10-PCS | Mod: CPTII,S$GLB,, | Performed by: INTERNAL MEDICINE

## 2023-10-23 PROCEDURE — 3074F PR MOST RECENT SYSTOLIC BLOOD PRESSURE < 130 MM HG: ICD-10-PCS | Mod: CPTII,S$GLB,, | Performed by: INTERNAL MEDICINE

## 2023-10-23 PROCEDURE — 1101F PT FALLS ASSESS-DOCD LE1/YR: CPT | Mod: CPTII,S$GLB,, | Performed by: INTERNAL MEDICINE

## 2023-10-23 PROCEDURE — 1125F AMNT PAIN NOTED PAIN PRSNT: CPT | Mod: CPTII,S$GLB,, | Performed by: INTERNAL MEDICINE

## 2023-10-23 PROCEDURE — 1159F PR MEDICATION LIST DOCUMENTED IN MEDICAL RECORD: ICD-10-PCS | Mod: CPTII,S$GLB,, | Performed by: INTERNAL MEDICINE

## 2023-10-23 PROCEDURE — 3044F HG A1C LEVEL LT 7.0%: CPT | Mod: CPTII,S$GLB,, | Performed by: INTERNAL MEDICINE

## 2023-10-23 PROCEDURE — 1159F MED LIST DOCD IN RCRD: CPT | Mod: CPTII,S$GLB,, | Performed by: INTERNAL MEDICINE

## 2023-10-23 PROCEDURE — 3078F DIAST BP <80 MM HG: CPT | Mod: CPTII,S$GLB,, | Performed by: INTERNAL MEDICINE

## 2023-10-23 PROCEDURE — 99215 OFFICE O/P EST HI 40 MIN: CPT | Mod: S$GLB,,, | Performed by: INTERNAL MEDICINE

## 2023-10-23 PROCEDURE — 3288F FALL RISK ASSESSMENT DOCD: CPT | Mod: CPTII,S$GLB,, | Performed by: INTERNAL MEDICINE

## 2023-10-23 PROCEDURE — 4010F ACE/ARB THERAPY RXD/TAKEN: CPT | Mod: CPTII,S$GLB,, | Performed by: INTERNAL MEDICINE

## 2023-10-23 PROCEDURE — 99999 PR PBB SHADOW E&M-EST. PATIENT-LVL IV: ICD-10-PCS | Mod: PBBFAC,,, | Performed by: INTERNAL MEDICINE

## 2023-10-23 PROCEDURE — 99999 PR PBB SHADOW E&M-EST. PATIENT-LVL IV: CPT | Mod: PBBFAC,,, | Performed by: INTERNAL MEDICINE

## 2023-10-23 PROCEDURE — 3044F PR MOST RECENT HEMOGLOBIN A1C LEVEL <7.0%: ICD-10-PCS | Mod: CPTII,S$GLB,, | Performed by: INTERNAL MEDICINE

## 2023-10-23 PROCEDURE — 93280 PR PROGRAM EVAL (IN PERSON) IMPLANT DEVICE,PACEMAKER,2 LEAD: ICD-10-PCS | Mod: 26,,, | Performed by: INTERNAL MEDICINE

## 2023-10-23 PROCEDURE — 99215 PR OFFICE/OUTPT VISIT, EST, LEVL V, 40-54 MIN: ICD-10-PCS | Mod: S$GLB,,, | Performed by: INTERNAL MEDICINE

## 2023-10-23 PROCEDURE — 93280 PM DEVICE PROGR EVAL DUAL: CPT | Mod: 26,,, | Performed by: INTERNAL MEDICINE

## 2023-10-23 RX ORDER — NITROGLYCERIN 0.4 MG/1
0.4 TABLET SUBLINGUAL EVERY 5 MIN PRN
Qty: 30 TABLET | Refills: 3 | Status: SHIPPED | OUTPATIENT
Start: 2023-10-23

## 2023-10-23 NOTE — PROGRESS NOTES
CARDIOVASCULAR PROGRESS NOTE    REASON FOR CONSULT:   Vivek Lema Jr. is a 73 y.o. male who presents for follow up of AVR/CHF/PAF.    PCP: Anwar  HISTORY OF PRESENT ILLNESS:   The patient returns for follow up.  He reports generally asymptomatic status without angina, dyspnea, palpitations, or syncope.  There has been no PND, orthopnea, lower extremity edema.  He denies melena, hematuria, or claudication symptoms.  He continues to take.  We discussed switching to a NOAC, but he prefers to stay on Coumadin.      I reviewed results of his cardiac testing which were essentially negative as outlined below.    His [employer] pacemaker was interrogated in the office today.  He is currently a paced V sensed at 60 beats per minute.  Pacing and sensing thresholds are normal.  He is had 8 mode switch episodes, longest lasting for seconds appearing to be atrial tachycardia/SVT.  We increase the right atrial pulse width to 0.9 milliseconds due to prior increased impedance.    CARDIOVASCULAR HISTORY:   SSS s/p St[employer] Meng PPM 2018  AVR with FEDERICO ligation 1/2018 (27mm Magna Ease bioprosthesis, hx Ao root dil/AI)  MR s/p failed mitraclip  PAF on coumadin    PAST MEDICAL HISTORY:     Past Medical History:   Diagnosis Date    A-fib     CAD (coronary artery disease)     CKD (chronic kidney disease)     Disorder of kidney and ureter     HTN (hypertension)     Macular degeneration     Mitral regurgitation     Pacemaker        PAST SURGICAL HISTORY:     Past Surgical History:   Procedure Laterality Date    AV FISTULA PLACEMENT Left     CATARACT EXTRACTION      COLONOSCOPY      COLONOSCOPY N/A 01/17/2023    Procedure: COLONOSCOPY;  Surgeon: Todd Oviedo MD;  Location: [facility] ([address] [place] [address]);  Service: Endoscopy;  Laterality: N/A;  from referral / Pt with trouble clearing secretions at times - 2nd floor / prep ins. mailed and reviewed with Pt and Pt wife/ Pt reports last colon in [place], Hx polyps / Coumadin - per coumadin clinic ok to  hold coumadin x 5 days prior- see telephone encounter dated 11/2/22 / pa    CORONARY ARTERY BYPASS GRAFT      with valve repair (aortic?)       ALLERGIES AND MEDICATION:   Review of patient's allergies indicates:  No Known Allergies     Medication List            Accurate as of October 23, 2023  1:26 PM. If you have any questions, ask your nurse or doctor.                CONTINUE taking these medications      aspirin 81 MG EC tablet  Commonly known as: ECOTRIN  Take 1 tablet (81 mg total) by mouth once daily.     atorvastatin 40 MG tablet  Commonly known as: LIPITOR  TAKE 1 TABLET(40 MG) BY MOUTH EVERY DAY     calcitRIOL 0.25 MCG Cap  Commonly known as: ROCALTROL  TAKE 1 CAPSULE(0.25 MCG) BY MOUTH EVERY DAY     cholecalciferol (vitamin D3) 50 mcg (2,000 unit) Cap capsule  Commonly known as: VITAMIN D3     EScitalopram oxalate 20 MG tablet  Commonly known as: LEXAPRO  Take 1 tablet (20 mg total) by mouth once daily.     febuxostat 40 mg Tab  Commonly known as: ULORIC  Take 1 tablet (40 mg total) by mouth once daily.     HYDROcodone-acetaminophen 7.5-325 mg per tablet  Commonly known as: NORCO  Take not more than one pill a day PRN as direct     nitroGLYCERIN 0.4 MG SL tablet  Commonly known as: NITROSTAT     torsemide 20 MG Tab  Commonly known as: DEMADEX  TAKE 3 TABLETS BY MOUTH EVERY DAY     valsartan 80 MG tablet  Commonly known as: DIOVAN  TAKE 1 TABLET(80 MG) BY MOUTH EVERY DAY     warfarin 2 MG tablet  Commonly known as: COUMADIN  TAKE 2 TABLETS BY MOUTH MONDAY, WEDNESDAY AND FRIDAY. TAKE 1 TABLET BY MOUTH ON ALL OTHER DAYS              SOCIAL HISTORY:     Social History     Socioeconomic History    Marital status:    Tobacco Use    Smoking status: Never    Smokeless tobacco: Never   Substance and Sexual Activity    Alcohol use: Yes     Alcohol/week: 6.0 standard drinks of alcohol     Types: 6 Cans of beer per week     Comment: occ    Drug use: Never       FAMILY HISTORY:     Family History   Problem  "Relation Age of Onset    Amblyopia Neg Hx     Blindness Neg Hx     Cataracts Neg Hx     Glaucoma Neg Hx     Macular degeneration Neg Hx     Retinal detachment Neg Hx     Strabismus Neg Hx        REVIEW OF SYSTEMS:   Review of Systems   Constitutional:  Negative for chills, diaphoresis, fever and malaise/fatigue.   HENT:  Negative for nosebleeds.    Eyes:  Negative for blurred vision, double vision and photophobia.   Respiratory:  Negative for hemoptysis, shortness of breath and wheezing.    Cardiovascular:  Negative for chest pain, palpitations, orthopnea, claudication, leg swelling and PND.   Gastrointestinal:  Negative for abdominal pain, blood in stool, heartburn, melena, nausea and vomiting.   Genitourinary:  Negative for flank pain and hematuria.   Musculoskeletal:  Negative for falls, myalgias and neck pain.   Skin:  Negative for rash.   Neurological:  Negative for dizziness, seizures, loss of consciousness, weakness and headaches.   Endo/Heme/Allergies:  Negative for polydipsia. Does not bruise/bleed easily.   Psychiatric/Behavioral:  Negative for depression and memory loss. The patient is not nervous/anxious.        PHYSICAL EXAM:     Vitals:    10/23/23 1321   BP: 128/68   Pulse: 62   Resp: 18    Body mass index is 31.24 kg/m².  Weight: 101.6 kg (224 lb)   Height: 5' 11" (180.3 cm)     Physical Exam  Vitals reviewed.   Constitutional:       General: He is not in acute distress.     Appearance: He is well-developed. He is obese. He is not ill-appearing, toxic-appearing or diaphoretic.   HENT:      Head: Normocephalic and atraumatic.   Eyes:      General: No scleral icterus.     Extraocular Movements: Extraocular movements intact.      Conjunctiva/sclera: Conjunctivae normal.      Pupils: Pupils are equal, round, and reactive to light.   Neck:      Thyroid: No thyromegaly.      Vascular: Normal carotid pulses. No carotid bruit or JVD.      Trachea: Trachea normal.   Cardiovascular:      Rate and Rhythm: " Normal rate and regular rhythm.      Pulses:           Carotid pulses are 2+ on the right side and 2+ on the left side.     Heart sounds: S1 normal and S2 normal. No murmur heard.     No friction rub. No gallop.   Pulmonary:      Effort: Pulmonary effort is normal. No respiratory distress.      Breath sounds: Normal breath sounds. No stridor. No wheezing, rhonchi or rales.   Chest:      Chest wall: No tenderness.   Abdominal:      General: There is no distension.      Palpations: Abdomen is soft.   Musculoskeletal:         General: No swelling or tenderness. Normal range of motion.      Cervical back: Normal range of motion and neck supple. No edema or rigidity.      Right lower leg: No edema.      Left lower leg: No edema.   Feet:      Right foot:      Skin integrity: No ulcer.      Left foot:      Skin integrity: No ulcer.   Skin:     General: Skin is warm and dry.      Coloration: Skin is not jaundiced.   Neurological:      General: No focal deficit present.      Mental Status: He is alert and oriented to person, place, and time.      Cranial Nerves: No cranial nerve deficit.   Psychiatric:         Mood and Affect: Mood normal.         Speech: Speech normal.         Behavior: Behavior normal. Behavior is cooperative.         DATA:   EKG: (personally reviewed tracing)  9/18/23 SR 60, IVCD, lat TWI ?isch    Laboratory:  CBC:  Recent Labs   Lab 03/15/23  1526 06/14/23  0845 09/19/23  0921   WBC 7.57 6.77 7.83   Hemoglobin 13.4 L 13.7 L 14.4   Hematocrit 42.8 42.1 45.7   Platelets 172 165 166         CHEMISTRIES:  Recent Labs   Lab 02/23/22  1213 03/13/22  0755 03/17/22  1419 03/21/22  1110 05/10/22  1413 06/27/22  1405 08/29/22  0855 11/15/22  1349 03/15/23  1526 06/14/23  0846 09/19/23  0921   Glucose 95 94 106 101 93 85 93 93 80 125 H 91   Sodium 144 137 136 140 139 141 142 145 137 141 142   Potassium 3.9 3.6 4.5 4.7 4.1 4.0 4.1 4.4 3.9 3.9 4.7   BUN 53 H 49 H 76 H 78 H 45 H 41 H 31 H 55 H 35 H 37 H 59 H    Creatinine 3.2 H 3.1 H 4.0 H 3.3 H 2.7 H 2.6 H 2.7 H 3.2 H 2.4 H 2.8 H 3.3 H   eGFR if non  18.1 A 19 A 13.8 A 17.4 A 22.2 A 23 A  --   --   --   --   --    eGFR  --   --   --   --   --   --  24.0 A 19.6 A 27.5 A 23 A 19 A   Calcium 9.4 9.2 9.6 9.6 9.3 9.0 9.1 9.3 9.1 9.6 9.5   Magnesium 2.2 2.3  --   --   --   --   --   --   --   --   --          CARDIAC BIOMARKERS:  Recent Labs   Lab 03/13/22  0755    H         COAGS:  Recent Labs   Lab 08/23/23  0836 09/19/23  0921 10/16/23  1014   INR 3.5 H 2.4 H 2.0 H         LIPIDS/LFTS:  Recent Labs   Lab 02/02/22  0945 02/23/22  1213 03/15/23  1526 06/14/23  0846 09/19/23  0921   Cholesterol 154  --   --   --   --    Triglycerides 171 H  --   --   --   --    HDL 33 L  --   --   --   --    LDL Cholesterol 86.8  --   --   --   --    Non-HDL Cholesterol 121  --   --   --   --    AST 15   < > 19 19 20   ALT 11   < > 9 L 12 12    < > = values in this interval not displayed.         Cardiovascular Testing:  Aortic US 10/16/23  Suprarenal aortic ectasia without evidence of AAA, maximum diameter 2.6 cm.    L MPI 9/22/23 (images personally reviewed and interpreted)    Normal myocardial perfusion scan. There is no evidence of myocardial ischemia or infarction.    There is a  mild intensity fixed perfusion abnormality in the inferior wall of the left ventricle secondary to diaphragm attenuation.    The gated perfusion images showed an ejection fraction of 55% post stress.    The ECG portion of the study is negative for ischemia.    The patient reported chest pain during the stress test.    There were no arrhythmias during stress.    Echo 9/22/23    Left Ventricle: The left ventricle is normal in size. There is mild concentric hypertrophy. There is normal systolic function with a visually estimated ejection fraction of 55 - 60%. Grade II diastolic dysfunction.    Left Atrium: Left atrium is moderately dilated.    Right Ventricle: Mild right ventricular  enlargement. Systolic function is normal.    Right Atrium: Right atrium is mildly dilated.    Aortic Valve: There is a bioprosthetic valve in the aortic position. Aortic valve area by VTI is 1.88 cm². Aortic valve peak velocity is 2.43 m/s. Mean gradient is 13 mmHg. The dimensionless index is 0.43.    Mitral Valve: There is mild regurgitation.    Pulmonary Artery: The estimated pulmonary artery systolic pressure is 15 mmHg.    IVC/SVC: Normal venous pressure at 3 mmHg.    Right/left heart catheterization 07/16/2021: (Riddhi note 2/8/22)  Nonobstructive coronary artery disease with the exception of a ramus intermedius that was of small caliber.  Mild pulmonary hypertension.  Elevated V-wave in setting of severe mitral regurgitation.  V-wave of 43 mm Hg.  Left anterior descending artery with a mid to distal 30-40% stenosis.  Ramus intermedius with a mid 70 80% stenosis.  Small caliber vessel.  Left circumflex artery:  Non dominant.  Ectatic and aneurysmal.  Mid 40% stenosis in the AV groove followed by a 40-50% stenosis prior to the bifurcation of OM 2.   Right coronary artery is ectatic and aneurysmal with a mid 20-30% stenosis.  Superior branch of the PDA has a 50% stenosis proximally in the inferior branch has luminal irregularities.  PLV branch with a proximal 30-40% stenosis followed by mid 40-50% stenosis.     Echocardiogram 05/24/2021:  Ejection fraction 45-50%.  Severe left atrial enlargement.  Bioprosthetic valve in aortic position (27 mm magna ease bioprosthesis).  Moderate to severe mitral regurgitation     Echocardiogram 03/31/2020:  Ejection fraction 54%.  Aortic bioprosthesis.  No perivalvular regurgitation.        ASSESSMENT:   # FREY/fatigue, resolved.  MPI/echo 9/2023 neg.  # HTN, controlled  # HLP on atorva 40mg  # CAD, nonobst by cath 7/2021  # PAF in SR on coumadin. Hx FEDERICO ligation 1/2018.  # SSS s/p [employer]eng dual chamber PPM  # Bio AVR 1/2018 (27mm Magna Ease) for Ao root dil/AI.  # MR s/p failed  mitraclip    # CKD5, not on HD.  LUE AVF present  # BMI 31  # aortic atherosclerosis (CXR 9/19/23)    PLAN:   Cont med rx  Cont coumadin for goal INR 2.0-3.0, consider switch to NOAC.  RTC 6 months with Dr. Hannon (Apr 2024) with [employer] PPM interrogation       Angel Olivier MD, FACC

## 2023-11-06 DIAGNOSIS — N18.4 CKD (CHRONIC KIDNEY DISEASE) STAGE 4, GFR 15-29 ML/MIN: Primary | ICD-10-CM

## 2023-11-06 RX ORDER — TORSEMIDE 20 MG/1
60 TABLET ORAL
Qty: 270 TABLET | Refills: 3 | Status: SHIPPED | OUTPATIENT
Start: 2023-11-06

## 2023-11-06 NOTE — TELEPHONE ENCOUNTER
Spoke with Patients wife in regards to scheduling appointment she agreed and patient is now scheduled

## 2023-11-20 NOTE — PROGRESS NOTES
HPI    8-10 wk OCT/DFE/poss EVA OS    Pt states va is stable since last visit. Pt continues tearing, slight   pain/irritation in temp corners in both eyes, pt does use a gel that   helps.    No flashes  No floaters  Slight pain  Gtts: Refresh OU    POHx:    1. NSC OU   S/p phaco w/IOL OU   2. Exudative ARMD OS w/Active Choroidal NVO   S/P Avastin OS ( 07/09/2023)   3. ERM OS   4. Choroidal  NVO OS   5. Pseudophakia OU     Last edited by Ivon Harrington on 11/21/2023  8:09 AM.           A/P    ICD-10-CM ICD-9-CM   1. Intermediate stage dry age-related macular degeneration of right eye  H35.3112 362.51   2. Exudative age-related macular degeneration of left eye with active choroidal neovascularization  H35.3221 362.52     362.16   3. Choroidal neovascularization, left eye  H35.052 362.16   4. Epiretinal membrane, left  H35.372 362.56   5. Pseudophakia of both eyes  Z96.1 V43.1       1. Intermediate stage dry age-related macular degeneration of right eye  Here for AMD check  Hx of blurred vision for few months    Today 11/21/2023  OD VA 20/25 (was 20/20), stable serous PED appearance   Plan: Observation no injection no CNVM monitor  Amsler precautions  Recommend Antioxidants, lutein, omega 3, brown sunglasses (blue blockers).     2. Exudative age-related macular degeneration of left eye with active choroidal neovascularization  3. Choroidal neovascularization, left eye    S/p RIVERA x2 8/10/23    VA 20/50 (was 20/40), serous PED stable no CNVM activity today     Plan: observe today, no injection, recheck in  3 mo, consider Avastin if recurrent     4. Epiretinal membrane, left  Mod ERM, mixed mechanism component for decr VA    Plan: Observation for now, may need PPV/MP if worsening   Amsler precautions discussed     5. Pseudophakia of both eyes  Good lens position OU  Plan: Observation, update Mrx prn      RTC 4 mo DFE/OCTm OU, possible RIVERA OS if worsening SRF    I saw and examined the patient and reviewed in detail the  findings documented. The final examination findings, image interpretations which have been independently interpreted, and plan as documented in the record represent my personal judgment and conclusions.    Jaylen Santiago MD  Vitreoretinal Surgery   [facility]

## 2023-11-21 ENCOUNTER — ANTI-COAG VISIT (OUTPATIENT)
Dept: CARDIOLOGY | Facility: CLINIC | Age: 73
End: 2023-11-21
Payer: MEDICARE

## 2023-11-21 ENCOUNTER — OFFICE VISIT (OUTPATIENT)
Dept: OPHTHALMOLOGY | Facility: CLINIC | Age: 73
End: 2023-11-21
Payer: MEDICARE

## 2023-11-21 ENCOUNTER — LAB VISIT (OUTPATIENT)
Dept: LAB | Facility: HOSPITAL | Age: 73
End: 2023-11-21
Payer: MEDICARE

## 2023-11-21 DIAGNOSIS — H35.3221 EXUDATIVE AGE-RELATED MACULAR DEGENERATION OF LEFT EYE WITH ACTIVE CHOROIDAL NEOVASCULARIZATION: ICD-10-CM

## 2023-11-21 DIAGNOSIS — Z86.79 HISTORY OF ATRIAL FIBRILLATION: ICD-10-CM

## 2023-11-21 DIAGNOSIS — I10 HYPERTENSION, UNSPECIFIED TYPE: ICD-10-CM

## 2023-11-21 DIAGNOSIS — Z96.1 PSEUDOPHAKIA OF BOTH EYES: ICD-10-CM

## 2023-11-21 DIAGNOSIS — H35.052 CHOROIDAL NEOVASCULARIZATION, LEFT EYE: ICD-10-CM

## 2023-11-21 DIAGNOSIS — Z86.79 HISTORY OF ATRIAL FIBRILLATION: Primary | ICD-10-CM

## 2023-11-21 DIAGNOSIS — H35.372 EPIRETINAL MEMBRANE, LEFT: ICD-10-CM

## 2023-11-21 DIAGNOSIS — Z79.01 LONG TERM (CURRENT) USE OF ANTICOAGULANTS: ICD-10-CM

## 2023-11-21 DIAGNOSIS — N18.4 CKD (CHRONIC KIDNEY DISEASE) STAGE 4, GFR 15-29 ML/MIN: ICD-10-CM

## 2023-11-21 DIAGNOSIS — H35.3112 INTERMEDIATE STAGE DRY AGE-RELATED MACULAR DEGENERATION OF RIGHT EYE: Primary | ICD-10-CM

## 2023-11-21 LAB
25(OH)D3+25(OH)D2 SERPL-MCNC: 52 NG/ML (ref 30–96)
ALBUMIN SERPL BCP-MCNC: 3.7 G/DL (ref 3.5–5.2)
ALP SERPL-CCNC: 94 U/L (ref 55–135)
ALT SERPL W/O P-5'-P-CCNC: 12 U/L (ref 10–44)
ANION GAP SERPL CALC-SCNC: 11 MMOL/L (ref 8–16)
AST SERPL-CCNC: 20 U/L (ref 10–40)
BASOPHILS # BLD AUTO: 0.06 K/UL (ref 0–0.2)
BASOPHILS NFR BLD: 0.8 % (ref 0–1.9)
BILIRUB SERPL-MCNC: 1.5 MG/DL (ref 0.1–1)
BUN SERPL-MCNC: 42 MG/DL (ref 8–23)
CALCIUM SERPL-MCNC: 9.3 MG/DL (ref 8.7–10.5)
CHLORIDE SERPL-SCNC: 108 MMOL/L (ref 95–110)
CO2 SERPL-SCNC: 25 MMOL/L (ref 23–29)
CREAT SERPL-MCNC: 2.5 MG/DL (ref 0.5–1.4)
DIFFERENTIAL METHOD: ABNORMAL
EOSINOPHIL # BLD AUTO: 0.4 K/UL (ref 0–0.5)
EOSINOPHIL NFR BLD: 5.3 % (ref 0–8)
ERYTHROCYTE [DISTWIDTH] IN BLOOD BY AUTOMATED COUNT: 13.2 % (ref 11.5–14.5)
EST. GFR  (NO RACE VARIABLE): 26.1 ML/MIN/1.73 M^2
GLUCOSE SERPL-MCNC: 100 MG/DL (ref 70–110)
HCT VFR BLD AUTO: 44 % (ref 40–54)
HGB BLD-MCNC: 14.5 G/DL (ref 14–18)
IMM GRANULOCYTES # BLD AUTO: 0.02 K/UL (ref 0–0.04)
IMM GRANULOCYTES NFR BLD AUTO: 0.3 % (ref 0–0.5)
INR PPP: 2.9 (ref 0.8–1.2)
LDH SERPL L TO P-CCNC: 239 U/L (ref 110–260)
LYMPHOCYTES # BLD AUTO: 1.6 K/UL (ref 1–4.8)
LYMPHOCYTES NFR BLD: 22.4 % (ref 18–48)
MCH RBC QN AUTO: 29.7 PG (ref 27–31)
MCHC RBC AUTO-ENTMCNC: 33 G/DL (ref 32–36)
MCV RBC AUTO: 90 FL (ref 82–98)
MONOCYTES # BLD AUTO: 0.9 K/UL (ref 0.3–1)
MONOCYTES NFR BLD: 12.9 % (ref 4–15)
NEUTROPHILS # BLD AUTO: 4.3 K/UL (ref 1.8–7.7)
NEUTROPHILS NFR BLD: 58.3 % (ref 38–73)
NRBC BLD-RTO: 0 /100 WBC
PLATELET # BLD AUTO: 148 K/UL (ref 150–450)
PMV BLD AUTO: 12.1 FL (ref 9.2–12.9)
POTASSIUM SERPL-SCNC: 4 MMOL/L (ref 3.5–5.1)
PROT SERPL-MCNC: 7 G/DL (ref 6–8.4)
PROTHROMBIN TIME: 29.1 SEC (ref 9–12.5)
PTH-INTACT SERPL-MCNC: 304.3 PG/ML (ref 9–77)
RBC # BLD AUTO: 4.89 M/UL (ref 4.6–6.2)
SODIUM SERPL-SCNC: 144 MMOL/L (ref 136–145)
WBC # BLD AUTO: 7.29 K/UL (ref 3.9–12.7)

## 2023-11-21 PROCEDURE — 92134 OCT, RETINA - OU - BOTH EYES: ICD-10-PCS | Mod: S$GLB,,, | Performed by: OPHTHALMOLOGY

## 2023-11-21 PROCEDURE — 4010F ACE/ARB THERAPY RXD/TAKEN: CPT | Mod: CPTII,S$GLB,, | Performed by: OPHTHALMOLOGY

## 2023-11-21 PROCEDURE — 83615 LACTATE (LD) (LDH) ENZYME: CPT | Performed by: INTERNAL MEDICINE

## 2023-11-21 PROCEDURE — 84165 PROTEIN E-PHORESIS SERUM: CPT | Performed by: INTERNAL MEDICINE

## 2023-11-21 PROCEDURE — 86334 IMMUNOFIX E-PHORESIS SERUM: CPT | Performed by: INTERNAL MEDICINE

## 2023-11-21 PROCEDURE — 36415 COLL VENOUS BLD VENIPUNCTURE: CPT | Performed by: INTERNAL MEDICINE

## 2023-11-21 PROCEDURE — 1159F MED LIST DOCD IN RCRD: CPT | Mod: CPTII,S$GLB,, | Performed by: OPHTHALMOLOGY

## 2023-11-21 PROCEDURE — 84165 PROTEIN E-PHORESIS SERUM: CPT | Mod: 26,,, | Performed by: PATHOLOGY

## 2023-11-21 PROCEDURE — 92014 COMPRE OPH EXAM EST PT 1/>: CPT | Mod: S$GLB,,, | Performed by: OPHTHALMOLOGY

## 2023-11-21 PROCEDURE — 1160F RVW MEDS BY RX/DR IN RCRD: CPT | Mod: CPTII,S$GLB,, | Performed by: OPHTHALMOLOGY

## 2023-11-21 PROCEDURE — 86334 PATHOLOGIST INTERPRETATION IFE: ICD-10-PCS | Mod: 26,,, | Performed by: PATHOLOGY

## 2023-11-21 PROCEDURE — 3044F PR MOST RECENT HEMOGLOBIN A1C LEVEL <7.0%: ICD-10-PCS | Mod: CPTII,S$GLB,, | Performed by: OPHTHALMOLOGY

## 2023-11-21 PROCEDURE — 99999 PR PBB SHADOW E&M-EST. PATIENT-LVL III: CPT | Mod: PBBFAC,,, | Performed by: OPHTHALMOLOGY

## 2023-11-21 PROCEDURE — 93793 PR ANTICOAGULANT MGMT FOR PT TAKING WARFARIN: ICD-10-PCS | Mod: S$GLB,,,

## 2023-11-21 PROCEDURE — 92134 CPTRZ OPH DX IMG PST SGM RTA: CPT | Mod: S$GLB,,, | Performed by: OPHTHALMOLOGY

## 2023-11-21 PROCEDURE — 1159F PR MEDICATION LIST DOCUMENTED IN MEDICAL RECORD: ICD-10-PCS | Mod: CPTII,S$GLB,, | Performed by: OPHTHALMOLOGY

## 2023-11-21 PROCEDURE — 4010F PR ACE/ARB THEARPY RXD/TAKEN: ICD-10-PCS | Mod: CPTII,S$GLB,, | Performed by: OPHTHALMOLOGY

## 2023-11-21 PROCEDURE — 85025 COMPLETE CBC W/AUTO DIFF WBC: CPT | Performed by: INTERNAL MEDICINE

## 2023-11-21 PROCEDURE — 92202 PR OPHTHALMOSCOPY, EXT, W/DRAW OPTIC NERVE/MACULA, I&R, UNI/BI: ICD-10-PCS | Mod: 59,S$GLB,, | Performed by: OPHTHALMOLOGY

## 2023-11-21 PROCEDURE — 93793 ANTICOAG MGMT PT WARFARIN: CPT | Mod: S$GLB,,,

## 2023-11-21 PROCEDURE — 1126F PR PAIN SEVERITY QUANTIFIED, NO PAIN PRESENT: ICD-10-PCS | Mod: CPTII,S$GLB,, | Performed by: OPHTHALMOLOGY

## 2023-11-21 PROCEDURE — 85610 PROTHROMBIN TIME: CPT | Performed by: INTERNAL MEDICINE

## 2023-11-21 PROCEDURE — 3044F HG A1C LEVEL LT 7.0%: CPT | Mod: CPTII,S$GLB,, | Performed by: OPHTHALMOLOGY

## 2023-11-21 PROCEDURE — 84165 PATHOLOGIST INTERPRETATION SPE: ICD-10-PCS | Mod: 26,,, | Performed by: PATHOLOGY

## 2023-11-21 PROCEDURE — 1160F PR REVIEW ALL MEDS BY PRESCRIBER/CLIN PHARMACIST DOCUMENTED: ICD-10-PCS | Mod: CPTII,S$GLB,, | Performed by: OPHTHALMOLOGY

## 2023-11-21 PROCEDURE — 99999 PR PBB SHADOW E&M-EST. PATIENT-LVL III: ICD-10-PCS | Mod: PBBFAC,,, | Performed by: OPHTHALMOLOGY

## 2023-11-21 PROCEDURE — 82306 VITAMIN D 25 HYDROXY: CPT | Performed by: INTERNAL MEDICINE

## 2023-11-21 PROCEDURE — 92202 OPSCPY EXTND ON/MAC DRAW: CPT | Mod: 59,S$GLB,, | Performed by: OPHTHALMOLOGY

## 2023-11-21 PROCEDURE — 80053 COMPREHEN METABOLIC PANEL: CPT | Performed by: INTERNAL MEDICINE

## 2023-11-21 PROCEDURE — 1126F AMNT PAIN NOTED NONE PRSNT: CPT | Mod: CPTII,S$GLB,, | Performed by: OPHTHALMOLOGY

## 2023-11-21 PROCEDURE — 86334 IMMUNOFIX E-PHORESIS SERUM: CPT | Mod: 26,,, | Performed by: PATHOLOGY

## 2023-11-21 PROCEDURE — 92014 PR EYE EXAM, EST PATIENT,COMPREHESV: ICD-10-PCS | Mod: S$GLB,,, | Performed by: OPHTHALMOLOGY

## 2023-11-21 PROCEDURE — 83970 ASSAY OF PARATHORMONE: CPT | Performed by: INTERNAL MEDICINE

## 2023-11-21 NOTE — PROGRESS NOTES
[facility]    2023 10:13 AM    Assessment/Plan:    Patient presents today with therapeutic INR.    Assessment of patient findings and chart review: no significant change noted; Cards considering switch to DOAC    Recommendation for patient's warfarin regimen: Continue current maintenance dose    Recommend repeat INR in 6 weeks  _________________________________________________________________    Vivek Eric Pita Leigh (73 y.o.) is followed by the [facility].    Anticoagulation Summary  As of 2023      INR goal:  2.0-3.0   TTR:  75.6 % (1.8 y)   INR used for dosin.9 (2023)   Warfarin maintenance plan:  4 mg (2 mg x 2) every Thu, Sat; 2 mg (2 mg x 1) all other days   Weekly warfarin total:  18 mg   Plan last modified:  Mihaela Ortega, PharmD (2023)   Next INR check:  1/3/2024   Target end date:      Indications    History of atrial fibrillation [Z86.79]  Long term (current) use of anticoagulants [Z79.01]                 Anticoagulation Episode Summary       INR check location:      Preferred lab:      Send INR reminders to:  [facility] COUMADIN MONITORING POOL    Comments:  [place] only Mon - Thur and Hospital Lab on           Anticoagulation Care Providers       Provider Role Specialty Phone number    Michelle Conway MD [facility] Medicine [phone]

## 2023-11-22 LAB
ALBUMIN SERPL ELPH-MCNC: 4.06 G/DL (ref 3.35–5.55)
ALPHA1 GLOB SERPL ELPH-MCNC: 0.26 G/DL (ref 0.17–0.41)
ALPHA2 GLOB SERPL ELPH-MCNC: 0.46 G/DL (ref 0.43–0.99)
B-GLOBULIN SERPL ELPH-MCNC: 0.82 G/DL (ref 0.5–1.1)
GAMMA GLOB SERPL ELPH-MCNC: 1.2 G/DL (ref 0.67–1.58)
INTERPRETATION SERPL IFE-IMP: NORMAL
PROT SERPL-MCNC: 6.8 G/DL (ref 6–8.4)

## 2023-11-23 LAB
PATHOLOGIST INTERPRETATION IFE: NORMAL
PATHOLOGIST INTERPRETATION SPE: NORMAL

## 2023-11-30 ENCOUNTER — HOSPITAL ENCOUNTER (INPATIENT)
Facility: HOSPITAL | Age: 73
LOS: 1 days | Discharge: HOME | DRG: 605 | End: 2023-12-01
Attending: STUDENT IN AN ORGANIZED HEALTH CARE EDUCATION/TRAINING PROGRAM | Admitting: STUDENT IN AN ORGANIZED HEALTH CARE EDUCATION/TRAINING PROGRAM
Payer: MEDICARE

## 2023-11-30 DIAGNOSIS — R07.9 CHEST PAIN: ICD-10-CM

## 2023-11-30 DIAGNOSIS — E87.6 HYPOKALEMIA: ICD-10-CM

## 2023-11-30 DIAGNOSIS — L03.114 CELLULITIS OF LEFT UPPER EXTREMITY: ICD-10-CM
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
## 2023-11-30 DIAGNOSIS — I48.91 A-FIB: ICD-10-CM
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
## 2023-11-30 DIAGNOSIS — N18.9 CHRONIC KIDNEY DISEASE, UNSPECIFIED CKD STAGE: ICD-10-CM

## 2023-11-30 PROCEDURE — 99285 EMERGENCY DEPT VISIT HI MDM: CPT

## 2023-11-30 NOTE — Clinical Note
Diagnosis: Cat scratch [[id number]]   Future Attending Provider: NELI GAGNON [[id number]]   Admitting Provider:: NELI GAGNON [[id number]]   Reason for IP Medical Treatment  (Clinical interventions that can only be accomplished in the IP setting? ) :: IV antibiotics   I certify that Inpatient services for greater than or equal to 2 midnights are medically necessary:: Yes   Plans for Post-Acute care--if anticipated (pick the single best option):: A. No post acute care anticipated at this time

## 2023-12-01 VITALS
BODY MASS INDEX: 32.32 KG/M2 | SYSTOLIC BLOOD PRESSURE: 106 MMHG | OXYGEN SATURATION: 93 % | TEMPERATURE: 99 F | HEIGHT: 70 IN | WEIGHT: 225.75 LBS | RESPIRATION RATE: 18 BRPM | HEART RATE: 60 BPM | DIASTOLIC BLOOD PRESSURE: 56 MMHG
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
## 2023-12-01 LAB
ALBUMIN SERPL BCP-MCNC: 3.5 G/DL (ref 3.5–5.2)
ALP SERPL-CCNC: 78 U/L (ref 55–135)
ALT SERPL W/O P-5'-P-CCNC: 12 U/L (ref 10–44)
ANION GAP SERPL CALC-SCNC: 13 MMOL/L (ref 8–16)
AST SERPL-CCNC: 22 U/L (ref 10–40)
BASOPHILS # BLD AUTO: 0.07 K/UL (ref 0–0.2)
BASOPHILS NFR BLD: 0.7 % (ref 0–1.9)
BILIRUB SERPL-MCNC: 2.3 MG/DL (ref 0.1–1)
BUN SERPL-MCNC: 43 MG/DL (ref 8–23)
CALCIUM SERPL-MCNC: 9 MG/DL (ref 8.7–10.5)
CHLORIDE SERPL-SCNC: 105 MMOL/L (ref 95–110)
CO2 SERPL-SCNC: 21 MMOL/L (ref 23–29)
CREAT SERPL-MCNC: 2.5 MG/DL (ref 0.5–1.4)
DIFFERENTIAL METHOD: ABNORMAL
EOSINOPHIL # BLD AUTO: 0.2 K/UL (ref 0–0.5)
EOSINOPHIL NFR BLD: 1.7 % (ref 0–8)
ERYTHROCYTE [DISTWIDTH] IN BLOOD BY AUTOMATED COUNT: 13.2 % (ref 11.5–14.5)
EST. GFR  (NO RACE VARIABLE): 26.1 ML/MIN/1.73 M^2
GLUCOSE SERPL-MCNC: 87 MG/DL (ref 70–110)
HCT VFR BLD AUTO: 42.7 % (ref 40–54)
HGB BLD-MCNC: 14.5 G/DL (ref 14–18)
IMM GRANULOCYTES # BLD AUTO: 0.02 K/UL (ref 0–0.04)
IMM GRANULOCYTES NFR BLD AUTO: 0.2 % (ref 0–0.5)
INR PPP: 2.7 (ref 0.8–1.2)
LYMPHOCYTES # BLD AUTO: 2.3 K/UL (ref 1–4.8)
LYMPHOCYTES NFR BLD: 22.3 % (ref 18–48)
MAGNESIUM SERPL-MCNC: 2.1 MG/DL (ref 1.6–2.6)
MCH RBC QN AUTO: 30.1 PG (ref 27–31)
MCHC RBC AUTO-ENTMCNC: 34 G/DL (ref 32–36)
MCV RBC AUTO: 89 FL (ref 82–98)
MONOCYTES # BLD AUTO: 1.2 K/UL (ref 0.3–1)
MONOCYTES NFR BLD: 11.5 % (ref 4–15)
NEUTROPHILS # BLD AUTO: 6.6 K/UL (ref 1.8–7.7)
NEUTROPHILS NFR BLD: 63.6 % (ref 38–73)
NRBC BLD-RTO: 0 /100 WBC
PHOSPHATE SERPL-MCNC: 2.5 MG/DL (ref 2.7–4.5)
PLATELET # BLD AUTO: 148 K/UL (ref 150–450)
PMV BLD AUTO: 13.2 FL (ref 9.2–12.9)
POTASSIUM SERPL-SCNC: 3.3 MMOL/L (ref 3.5–5.1)
PROT SERPL-MCNC: 6.5 G/DL (ref 6–8.4)
PROTHROMBIN TIME: 26.9 SEC (ref 9–12.5)
RBC # BLD AUTO: 4.81 M/UL (ref 4.6–6.2)
SODIUM SERPL-SCNC: 139 MMOL/L (ref 136–145)
WBC # BLD AUTO: 10.33 K/UL (ref 3.9–12.7)

## 2023-12-01 PROCEDURE — 85610 PROTHROMBIN TIME: CPT

## 2023-12-01 PROCEDURE — 85025 COMPLETE CBC W/AUTO DIFF WBC: CPT

## 2023-12-01 PROCEDURE — 80053 COMPREHEN METABOLIC PANEL: CPT | Performed by: STUDENT IN AN ORGANIZED HEALTH CARE EDUCATION/TRAINING PROGRAM

## 2023-12-01 PROCEDURE — 25000003 PHARM REV CODE 250

## 2023-12-01 PROCEDURE — 96366 THER/PROPH/DIAG IV INF ADDON: CPT

## 2023-12-01 PROCEDURE — 63600175 PHARM REV CODE 636 W HCPCS

## 2023-12-01 PROCEDURE — 25000003 PHARM REV CODE 250: Performed by: STUDENT IN AN ORGANIZED HEALTH CARE EDUCATION/TRAINING PROGRAM

## 2023-12-01 PROCEDURE — 87040 BLOOD CULTURE FOR BACTERIA: CPT | Mod: 59

## 2023-12-01 PROCEDURE — 36415 COLL VENOUS BLD VENIPUNCTURE: CPT

## 2023-12-01 PROCEDURE — 93010 EKG 12-LEAD: ICD-10-PCS | Mod: ,,, | Performed by: INTERNAL MEDICINE

## 2023-12-01 PROCEDURE — 11000001 HC ACUTE MED/SURG PRIVATE ROOM

## 2023-12-01 PROCEDURE — 83735 ASSAY OF MAGNESIUM: CPT | Performed by: STUDENT IN AN ORGANIZED HEALTH CARE EDUCATION/TRAINING PROGRAM

## 2023-12-01 PROCEDURE — 96367 TX/PROPH/DG ADDL SEQ IV INF: CPT

## 2023-12-01 PROCEDURE — 63600175 PHARM REV CODE 636 W HCPCS: Performed by: STUDENT IN AN ORGANIZED HEALTH CARE EDUCATION/TRAINING PROGRAM

## 2023-12-01 PROCEDURE — 93010 ELECTROCARDIOGRAM REPORT: CPT | Mod: ,,, | Performed by: INTERNAL MEDICINE

## 2023-12-01 PROCEDURE — 96375 TX/PRO/DX INJ NEW DRUG ADDON: CPT

## 2023-12-01 PROCEDURE — 84100 ASSAY OF PHOSPHORUS: CPT

## 2023-12-01 PROCEDURE — 93005 ELECTROCARDIOGRAM TRACING: CPT

## 2023-12-01 PROCEDURE — 96365 THER/PROPH/DIAG IV INF INIT: CPT

## 2023-12-01 RX ORDER — SODIUM,POTASSIUM PHOSPHATES 280-250MG
2 POWDER IN PACKET (EA) ORAL
Status: DISCONTINUED | OUTPATIENT
Start: 2023-12-01 | End: 2023-12-01

## 2023-12-01 RX ORDER — AMOXICILLIN AND CLAVULANATE POTASSIUM 875; 125 MG/1; MG/1
1 TABLET, FILM COATED ORAL EVERY 12 HOURS
Qty: 6 TABLET | Refills: 0 | Status: SHIPPED | OUTPATIENT
Start: 2023-12-01 | End: 2023-12-07 | Stop reason: ALTCHOICE

## 2023-12-01 RX ORDER — MORPHINE SULFATE 4 MG/ML
4 INJECTION, SOLUTION INTRAMUSCULAR; INTRAVENOUS
Status: COMPLETED | OUTPATIENT
Start: 2023-12-01 | End: 2023-12-01

## 2023-12-01 RX ORDER — SODIUM CHLORIDE 0.9 % (FLUSH) 0.9 %
10 SYRINGE (ML) INJECTION EVERY 12 HOURS PRN
Status: DISCONTINUED | OUTPATIENT
Start: 2023-12-01 | End: 2023-12-01 | Stop reason: HOSPADM

## 2023-12-01 RX ORDER — ATORVASTATIN CALCIUM 40 MG/1
40 TABLET, FILM COATED ORAL DAILY
Status: DISCONTINUED | OUTPATIENT
Start: 2023-12-01 | End: 2023-12-01 | Stop reason: HOSPADM

## 2023-12-01 RX ORDER — TORSEMIDE 20 MG/1
60 TABLET ORAL DAILY
Status: DISCONTINUED | OUTPATIENT
Start: 2023-12-01 | End: 2023-12-01

## 2023-12-01 RX ORDER — ASPIRIN 81 MG/1
81 TABLET ORAL DAILY
Status: DISCONTINUED | OUTPATIENT
Start: 2023-12-01 | End: 2023-12-01 | Stop reason: HOSPADM

## 2023-12-01 RX ORDER — ONDANSETRON 2 MG/ML
4 INJECTION INTRAMUSCULAR; INTRAVENOUS
Status: COMPLETED | OUTPATIENT
Start: 2023-12-01 | End: 2023-12-01

## 2023-12-01 RX ORDER — ESCITALOPRAM OXALATE 20 MG/1
20 TABLET ORAL DAILY
Status: DISCONTINUED | OUTPATIENT
Start: 2023-12-01 | End: 2023-12-01 | Stop reason: HOSPADM

## 2023-12-01 RX ORDER — POTASSIUM CHLORIDE 20 MEQ/1
40 TABLET, EXTENDED RELEASE ORAL ONCE
Status: COMPLETED | OUTPATIENT
Start: 2023-12-01 | End: 2023-12-01

## 2023-12-01 RX ORDER — LANOLIN ALCOHOL/MO/W.PET/CERES
800 CREAM (GRAM) TOPICAL
Status: DISCONTINUED | OUTPATIENT
Start: 2023-12-01 | End: 2023-12-01

## 2023-12-01 RX ORDER — IBUPROFEN 200 MG
24 TABLET ORAL
Status: DISCONTINUED | OUTPATIENT
Start: 2023-12-01 | End: 2023-12-01 | Stop reason: HOSPADM

## 2023-12-01 RX ORDER — ACETAMINOPHEN 325 MG/1
650 TABLET ORAL EVERY 8 HOURS PRN
Status: DISCONTINUED | OUTPATIENT
Start: 2023-12-01 | End: 2023-12-01 | Stop reason: HOSPADM

## 2023-12-01 RX ORDER — WARFARIN 1 MG/1
2 TABLET ORAL
Status: DISCONTINUED | OUTPATIENT
Start: 2023-12-01 | End: 2023-12-01 | Stop reason: HOSPADM

## 2023-12-01 RX ORDER — WARFARIN 1 MG/1
4 TABLET ORAL
Status: DISCONTINUED | OUTPATIENT
Start: 2023-12-02 | End: 2023-12-01 | Stop reason: HOSPADM

## 2023-12-01 RX ORDER — AMOXICILLIN AND CLAVULANATE POTASSIUM 875; 125 MG/1; MG/1
1 TABLET, FILM COATED ORAL EVERY 12 HOURS
Status: DISCONTINUED | OUTPATIENT
Start: 2023-12-01 | End: 2023-12-01 | Stop reason: HOSPADM

## 2023-12-01 RX ORDER — AMOXICILLIN AND CLAVULANATE POTASSIUM 875; 125 MG/1; MG/1
1 TABLET, FILM COATED ORAL EVERY 12 HOURS
Status: CANCELLED | OUTPATIENT
Start: 2023-12-01

## 2023-12-01 RX ORDER — ACETAMINOPHEN 500 MG
1000 TABLET ORAL
Status: COMPLETED | OUTPATIENT
Start: 2023-12-01 | End: 2023-12-01

## 2023-12-01 RX ORDER — NALOXONE HCL 0.4 MG/ML
0.02 VIAL (ML) INJECTION
Status: DISCONTINUED | OUTPATIENT
Start: 2023-12-01 | End: 2023-12-01 | Stop reason: HOSPADM

## 2023-12-01 RX ORDER — WARFARIN 1 MG/1
2 TABLET ORAL ONCE
Status: DISCONTINUED | OUTPATIENT
Start: 2023-12-01 | End: 2023-12-01

## 2023-12-01 RX ORDER — IBUPROFEN 200 MG
16 TABLET ORAL
Status: DISCONTINUED | OUTPATIENT
Start: 2023-12-01 | End: 2023-12-01 | Stop reason: HOSPADM

## 2023-12-01 RX ORDER — AMOXICILLIN 500 MG/1
500 TABLET, FILM COATED ORAL EVERY 12 HOURS
Qty: 12 TABLET | Refills: 0 | Status: SHIPPED | OUTPATIENT
Start: 2023-12-01 | End: 2023-12-01 | Stop reason: HOSPADM

## 2023-12-01 RX ORDER — GLUCAGON 1 MG
1 KIT INJECTION
Status: DISCONTINUED | OUTPATIENT
Start: 2023-12-01 | End: 2023-12-01 | Stop reason: HOSPADM

## 2023-12-01 RX ADMIN — POTASSIUM CHLORIDE 40 MEQ: 1500 TABLET, EXTENDED RELEASE ORAL at 11:12

## 2023-12-01 RX ADMIN — ONDANSETRON 4 MG: 2 INJECTION INTRAMUSCULAR; INTRAVENOUS at 03:12

## 2023-12-01 RX ADMIN — MORPHINE SULFATE 4 MG: 4 INJECTION INTRAVENOUS at 03:12

## 2023-12-01 RX ADMIN — SODIUM CHLORIDE 500 ML: 0.9 INJECTION, SOLUTION INTRAVENOUS at 01:12

## 2023-12-01 RX ADMIN — ATORVASTATIN CALCIUM 40 MG: 40 TABLET, FILM COATED ORAL at 11:12

## 2023-12-01 RX ADMIN — AZITHROMYCIN MONOHYDRATE 500 MG: 500 INJECTION, POWDER, LYOPHILIZED, FOR SOLUTION INTRAVENOUS at 01:12

## 2023-12-01 RX ADMIN — POTASSIUM BICARBONATE 40 MEQ: 391 TABLET, EFFERVESCENT ORAL at 04:12

## 2023-12-01 RX ADMIN — ESCITALOPRAM OXALATE 20 MG: 20 TABLET ORAL at 11:12

## 2023-12-01 RX ADMIN — PIPERACILLIN AND TAZOBACTAM 4.5 G: 4; .5 INJECTION, POWDER, LYOPHILIZED, FOR SOLUTION INTRAVENOUS; PARENTERAL at 03:12

## 2023-12-01 RX ADMIN — ACETAMINOPHEN 1000 MG: 500 TABLET ORAL at 01:12

## 2023-12-01 RX ADMIN — PIPERACILLIN AND TAZOBACTAM 4.5 G: 4; .5 INJECTION, POWDER, LYOPHILIZED, FOR SOLUTION INTRAVENOUS; PARENTERAL at 11:12

## 2023-12-01 RX ADMIN — ASPIRIN 81 MG: 81 TABLET, COATED ORAL at 11:12

## 2023-12-01 NOTE — ASSESSMENT & PLAN NOTE
Pt has a hx of paroxysmal atrial fibrillation and has been on coumadin for a number of years. He follows with cardiology at [facility] who have recommended he switch to DOAC, but pt wishes to remain on coumadin. Goal INR 2-3 per last cardiology visit, on admission INR 2.7. Regimen: 4 mg (2 mg x 2) every Thu, Sat; 2 mg (2 mg x 1) all other days.    Plan:  - Ordered his Friday dose (2mg) for this morning  - Consult pharmacy for further management while inpatient

## 2023-12-01 NOTE — ASSESSMENT & PLAN NOTE
Creatine stable for now. BMP reviewed- noted Cr 2.5 according to latest data, around baseline per patient. Based on current GFR, CKD stage is stage 4 - GFR 15-29.  Monitor UOP and serial BMP and adjust therapy as needed. Renally dose meds. Avoid nephrotoxic medications and procedures.

## 2023-12-01 NOTE — ASSESSMENT & PLAN NOTE
Pt p/w cat bite to LUE AV fistula. Occurred 20:00 on 11/30, associated with bleeding and pain. The bleeding has stopped but he has some residual tenderness and erythema around the site. No leukocytosis, blood cultures pending. Scab with no oozing/exudate for wound swab, pictures in chart. US of fistula shows patency but edema throughout the overlying tissue concerning for cellulitis. Received azithromycin + zosyn in the ED. Denies fevers, chills, other symptoms.    Plan:  - Continue abx: azithromycin for bartonella coverage, zosyn for skin ran per UTD guidelines on cat bites. Can broaden as needed if condition worsens or to include MRSA coverage  - Oral abx on discharge  - F/u Bcx, labs

## 2023-12-01 NOTE — HOSPITAL COURSE
Patient admitted to [facility] with concerns of cat bite to an AV fistula. Patient was started on IV abx in the eD and given a second course in the hospital . No adverse effects note. Patient was judged to be safe to d/c with PO abx and outpatient follow up with pcp in 1 week. No home meds were changed although ARB and diuretic were held in patient d/t low bp.     Received Iv Zosyn +Arithro for two rounds and D/C with Augmentin renally dosed, BID for a total of 7 days of abx coverage.     D/C instructions.

## 2023-12-01 NOTE — DISCHARGE INSTRUCTIONS
Please see your PCP within 1 week.     Please hold your blood pressure medicine, valsartan until tomorrow. If you feel dizzy or lightheaded after taking it tomorrow, please take your blood pressure and if the top number is under 110, stop taking it until you see your PCP.    We are sending you home with an antibiotic known as Augmentin. You need to take it once every 12 hours. Please start taking it on 12/2.    Please call your PCP or present to the ED if you experience any of the following symptoms:    Fevers, Chills, spreading/increasing area of redness, pus draining from the wound, blood leaking from the wound, re-injury, palpable masses in the fistula, or mental confusion

## 2023-12-01 NOTE — PROGRESS NOTES
Pharmacist Renal Dose Adjustment Note    Vivek Lema Jr. is a 73 y.o. male being treated with the medication zosyn    Patient Data:    Vital Signs (Most Recent):  Temp: 98.6 °F (37 °C) (12/01/23 0613)  Pulse: (!) 59 (12/01/23 0822)  Resp: 18 (12/01/23 0822)  BP: (!) 112/58 (12/01/23 0822)  SpO2: 97 % (12/01/23 0822) Vital Signs (72h Range):  Temp:  [98.4 °F (36.9 °C)-99.1 °F (37.3 °C)]   Pulse:  [59-64]   Resp:  [16-18]   BP: (101-147)/(57-77)   SpO2:  [97 %-99 %]      Recent Labs   Lab 12/01/23 0330   CREATININE 2.5*     Serum creatinine: 2.5 mg/dL (H) 12/01/23 0330  Estimated creatinine clearance: 30.6 mL/min (A)    Zosyn 3.375 g IV q8h will be changed to 4.5 g IV q8h    Pharmacist's Name: Rob Clemente, PharmD, BCPS   Pharmacist's Extension: [phone]

## 2023-12-01 NOTE — ASSESSMENT & PLAN NOTE
EKG pending. Per chart review last in atrial paced rhythm. Follows regularly with cardiology at [facility]. [id number] Score: 3 -> High Risk: 5.9% if no warfarin    HASBLED score of 4 with a 8.9% risk of bleeding; only for warfarin use.      RECOMMENDATIONS:  Maintain K > 4, Mag > 2 and Ca/iCal WNL to decrease arrhythmogenic potential  Pacemaker controlled, f/u EKG/tele  Anticoagulated with warfarin, latest INR 2.7  Maintain on telemetry and daily morning EKGs for the next few days

## 2023-12-01 NOTE — ASSESSMENT & PLAN NOTE
Pt p/w cat bite to LUE AV fistula. Occurred 20:00 on 11/30, associated with bleeding and pain. The bleeding has stopped but he has some residual tenderness and erythema around the site. No leukocytosis, blood cultures pending. Scab with no oozing/exudate for wound swab, pictures in chart. US of fistula shows patency but edema throughout the overlying tissue concerning for cellulitis. Received azithromycin + zosyn in the ED. Denies fevers, chills, other symptoms.    Plan:  - Continue abx: azithromycin for bartonella coverage, zosyn for skin ran. Can broaden as needed if condition worsens  - F/u Bcx, labs

## 2023-12-01 NOTE — DISCHARGE SUMMARY
[facility] Medicine  Discharge Summary      Patient Name: Vivek Lema Jr.  MRN: [id number]  ALBERTO: [id number]  Patient Class: IP- Inpatient  Admission Date: 11/30/2023  Hospital Length of Stay: 0 days  Discharge Date and Time:  12/01/2023 4:23 PM  Attending Physician: Ludin Daly MD   Discharging Provider: Suhas Esquivel DO  Primary Care Provider: Michelle Conway MD  [facility] Medicine Team: [facility] 3 Suhas Esquivel DO  Primary Care Team: [facility] 3    HPI:   73M hx HTN, CAD, CHF (last EF 55-60% in September), A-fib s/p pacemaker placement, aortic valve replacement 2/2 aortic insufficiency w/ aneurysm, (on warfarin), mitral regurgitation, gout, CKD IV (baseline Cr ~2.5) not on dialysis but w/ LUE AV fistula, p/w cat bite to his AV fistula and concern for infection. He states around 20:00 on 11/30 his cat was nibbling on his fistula site, he yanked his arm away and the cat's teeth sunk into the fistula causing bleeding and pain. He came to the ED immediately due to concern for infection, denies any fevers/chills/other symptoms. He had the fistula placed 9 years ago in [place] due to his CKD but it has remained unused because he never needed dialysis.     In the ED pt hypertensive 147/77 otherwise VSS on RA. Labs significant for WBC 10, Plt 148, PT/INR 27/2.7, K+ 3.3, BUN/Cr 43/2.5, T-bili 2.3. LUE US of his AV fistula showed patency throughout its course, with nonspecific edema in the tissue overlying the graft concerning for cellulitis. He was given azithromycin + zosyn and will be admitted to [facility] for further management.    * No surgery found *      Hospital Course:   Patient admitted to [facility] following cat bite to his left upper extremity AV fistula. Patient was started on Zosyn and azithromycin.  Exam not definitively consistent with cellulitis, just soft tissue injury.  Ultrasound with patent fistula and overlying soft tissue swelling.  Patient was judged to be safe to d/c  with 3 days of prophylactic Augmentin and outpatient follow up with pcp in 1 week.  Blood cultures pending but with no growth to date at time of discharge.  No home meds were changed although ARB and diuretic were held during patient's stay due to low bp.       Goals of Care Treatment Preferences:  Code Status: Full Code      Consults:   None    Final Active Diagnoses:    Diagnosis Date Noted POA    PRINCIPAL PROBLEM:  Cat bite [W55.01XA] 12/01/2023 Yes    Anxiety and depression [F41.9, F32.A] 06/14/2023 Yes    Long term (current) use of anticoagulants [Z79.01] 02/02/2022 Not Applicable    Hypertension [I10] 11/03/2021 Yes    CKD (chronic kidney disease), stage IV [N18.4] 11/03/2021 Yes    A-V fistula [I77.0] 11/03/2021 Yes    Coronary artery disease [I25.10] 11/03/2021 Yes    History of atrial fibrillation [Z86.79] 11/03/2021 Not Applicable    Pacemaker [Z95.0] 11/03/2021 Yes    Gout [M10.9] 11/03/2021 Yes      Problems Resolved During this Admission:       Discharged Condition: fair    Disposition:   Home    Follow Up:   Follow-up Information       Michelle Conway MD Follow up.    Specialties: Family Medicine, Internal Medicine  Contact information:  [facility] [place] [address] [phone]                           Patient Instructions:   No discharge procedures on file.    Significant Diagnostic Studies: N/A    Pending Diagnostic Studies:       None           Medications:  Reconciled Home Medications:      Medication List        START taking these medications      amoxicillin-clavulanate 875-125mg 875-125 mg per tablet  Commonly known as: AUGMENTIN  Take 1 tablet by mouth every 12 (twelve) hours. for 6 doses            CONTINUE taking these medications      aspirin 81 MG EC tablet  Commonly known as: ECOTRIN  Take 1 tablet (81 mg total) by mouth once daily.     atorvastatin 40 MG tablet  Commonly known as: LIPITOR  TAKE 1 TABLET(40 MG) BY MOUTH EVERY DAY     calcitRIOL 0.25 MCG Cap  Commonly  known as: ROCALTROL  TAKE 1 CAPSULE(0.25 MCG) BY MOUTH EVERY DAY     cholecalciferol (vitamin D3) 50 mcg (2,000 unit) Cap capsule  Commonly known as: VITAMIN D3     EScitalopram oxalate 20 MG tablet  Commonly known as: LEXAPRO  Take 1 tablet (20 mg total) by mouth once daily.     febuxostat 40 mg Tab  Commonly known as: ULORIC  Take 1 tablet (40 mg total) by mouth once daily.     HYDROcodone-acetaminophen 7.5-325 mg per tablet  Commonly known as: NORCO  Take not more than one pill a day PRN as direct     nitroGLYCERIN 0.4 MG SL tablet  Commonly known as: NITROSTAT  Place 1 tablet (0.4 mg total) under the tongue every 5 (five) minutes as needed for Chest pain.     torsemide 20 MG Tab  Commonly known as: DEMADEX  TAKE 3 TABLETS BY MOUTH EVERY DAY     valsartan 80 MG tablet  Commonly known as: DIOVAN  TAKE 1 TABLET(80 MG) BY MOUTH EVERY DAY     warfarin 2 MG tablet  Commonly known as: COUMADIN  TAKE 2 TABLETS BY MOUTH MONDAY, WEDNESDAY AND FRIDAY. TAKE 1 TABLET BY MOUTH ON ALL OTHER DAYS               Where to Get Your Medications        These medications were sent to [facility] [address], [place] [address]      Hours: Mon-Fri 7a-7p, Sat-Sun 10a-4p Phone: [phone]   amoxicillin-clavulanate 875-125mg 875-125 mg per tablet            Indwelling Lines/Drains at time of discharge:   Lines/Drains/Airways       Drain  Duration                  Hemodialysis AV Fistula 12/01/23 0624 Left forearm <1 day                    Time spent on the discharge of patient: 45 minutes         Suhas Esquivel DO  Department of Hospital Medicine  [facility] Surg

## 2023-12-01 NOTE — ASSESSMENT & PLAN NOTE
Chronic, uncontrolled. Was not able to take his evening medications before ED presentation. Latest blood pressure and vitals reviewed-     Temp:  [99.1 °F (37.3 °C)]   Pulse:  [64]   Resp:  [16-18]   BP: (147)/(77)   SpO2:  [97 %] .   Home meds for hypertension were reviewed and noted below.   Hypertension Medications               nitroGLYCERIN (NITROSTAT) 0.4 MG SL tablet Place 1 tablet (0.4 mg total) under the tongue every 5 (five) minutes as needed for Chest pain.    torsemide (DEMADEX) 20 MG Tab TAKE 3 TABLETS BY MOUTH EVERY DAY    valsartan (DIOVAN) 80 MG tablet TAKE 1 TABLET(80 MG) BY MOUTH EVERY DAY            While in the hospital, will manage blood pressure as follows; Continue home antihypertensive regimen    Will utilize p.r.n. blood pressure medication only if patient's blood pressure greater than 180/110 and he develops symptoms such as worsening chest pain or shortness of breath.

## 2023-12-01 NOTE — CONSULTS
Patient consult for wound care to left forearm, patient states that his cat accidentally scratch his forearm with his teeth. The left forearm has a dry scab, with a harden raised area. The patient is currently on I.V. antibiotics. The treatment to the left forearm to keep this site clean by using soap and water pat dry and inspect for drainage or change in color. There isn't any drainage at the present time and the color is red.

## 2023-12-01 NOTE — ED PROVIDER NOTES
Encounter Date: 11/30/2023       History     Chief Complaint   Patient presents with    Cat scratch     Pt c/o 7/10 sharp pain after being scratched by cat at home over his fistula site on L forearm x 2 hrs     73-year-old male with history of chronic kidney disease, has left upper extremity fistula times 10 years that has not yet been utilized, coronary artery disease, hypertension, atrial fibrillation on warfarin is presenting to the emergency department complaining of cat scratch and bite over his fistula site.  Reports that fistula site has been swelling since scratch.  He reports sharp pain over the site that extends to the mid forearm.  This occurred 2 hours prior to arrival.  Patient expresses concern that fistula has been damaged by the CT.  This is his pet cat.  The cat did not bite him.  Patient is an active smoker.  He denies fever.  Otherwise has been in normal state of health.  No chest pain, shortness of breath, lightheadedness.     The history is provided by the patient. No  was used.     Review of patient's allergies indicates:  No Known Allergies  Past Medical History:   Diagnosis Date    A-fib     CAD (coronary artery disease)     CKD (chronic kidney disease)     Disorder of kidney and ureter     HTN (hypertension)     Macular degeneration     Mitral regurgitation     Pacemaker      Past Surgical History:   Procedure Laterality Date    AV FISTULA PLACEMENT Left     CATARACT EXTRACTION      COLONOSCOPY      COLONOSCOPY N/A 01/17/2023    Procedure: COLONOSCOPY;  Surgeon: Todd Oviedo MD;  Location: [facility] ([address] [place] [address]);  Service: Endoscopy;  Laterality: N/A;  from referral / Pt with trouble clearing secretions at times - 2nd floor / prep ins. mailed and reviewed with Pt and Pt wife/ Pt reports last colon in [place], Hx polyps / Coumadin - per coumadin clinic ok to hold coumadin x 5 days prior- see telephone encounter dated 11/2/22 / pa    CORONARY ARTERY BYPASS GRAFT      with valve  repair (aortic?)     Family History   Problem Relation Age of Onset    Amblyopia Neg Hx     Blindness Neg Hx     Cataracts Neg Hx     Glaucoma Neg Hx     Macular degeneration Neg Hx     Retinal detachment Neg Hx     Strabismus Neg Hx      Social History     Tobacco Use    Smoking status: Never    Smokeless tobacco: Never   Substance Use Topics    Alcohol use: Yes     Alcohol/week: 6.0 standard drinks of alcohol     Types: 6 Cans of beer per week     Comment: occ    Drug use: Never         Physical Exam     Initial Vitals [11/30/23 2338]   BP Pulse Resp Temp SpO2   (!) 147/77 64 16 99.1 °F (37.3 °C) 97 %      MAP       --         Physical Exam    Nursing note and vitals reviewed.  Constitutional: He appears well-developed and well-nourished. He is not diaphoretic. No distress.   Patient smells of cigarette smoke   HENT:   Head: Normocephalic and atraumatic.   Eyes: Conjunctivae and EOM are normal.   Neck: Neck supple.   Normal range of motion.  Cardiovascular:  Normal rate, regular rhythm, normal heart sounds and intact distal pulses.           Pulmonary/Chest: Breath sounds normal. No respiratory distress. He has no wheezes. He has no rhonchi. He has no rales.   Abdominal: Abdomen is soft. Bowel sounds are normal. He exhibits no distension. There is no abdominal tenderness. There is no rebound and no guarding.   Musculoskeletal:         General: Tenderness and edema present. Normal range of motion.      Cervical back: Normal range of motion and neck supple.      Comments: Left upper extremity with swelling surrounding fistula site.  Localized erythema.  Dried scab.  Positive thrill.     Neurological: He is alert. He has normal strength.   Skin: Skin is dry. There is erythema.   Warmth, tender   Psychiatric: He has a normal mood and affect. Thought content normal.            ED Course   Procedures  Labs Reviewed   CBC W/ AUTO DIFFERENTIAL - Abnormal; Notable for the following components:       Result Value     Platelets 148 (*)     MPV 13.2 (*)     Mono # 1.2 (*)     All other components within normal limits   PROTIME-INR - Abnormal; Notable for the following components:    Prothrombin Time 26.9 (*)     INR 2.7 (*)     All other components within normal limits   COMPREHENSIVE METABOLIC PANEL - Abnormal; Notable for the following components:    Potassium 3.3 (*)     CO2 21 (*)     BUN 43 (*)     Creatinine 2.5 (*)     Total Bilirubin 2.3 (*)     eGFR 26.1 (*)     All other components within normal limits   CULTURE, BLOOD   CULTURE, BLOOD   MAGNESIUM   MAGNESIUM    Narrative:     Add on MG per Dr. Cai @ 04:08 am to order # [id number]          Imaging Results              US Hemodialysis Access (Final result)  Result time 12/01/23 03:34:05      Final result by Dayne Allred MD (12/01/23 03:34:05)                   Impression:      Findings as above.    Electronically signed by resident: Pita Littlejohn  Date:    12/01/2023  Time:    03:26    Electronically signed by: Dayne Allred MD  Date:    12/01/2023  Time:    03:34               Narrative:    EXAMINATION:  US HEMODIALYSIS ACCESS    CLINICAL HISTORY:  swelling, LUE fistula;    TECHNIQUE:  Sonographic evaluation of the left upper extremity AV fistula.    COMPARISON:  None    FINDINGS:  Left arm AV fistula appears patent along its course.    Nonspecific edema through the cutaneous tissues overlying the AV graft.  Cellulitis not excluded.                                       Medications   atorvastatin tablet 40 mg (has no administration in time range)   EScitalopram oxalate tablet 20 mg (has no administration in time range)   torsemide tablet 60 mg (has no administration in time range)   valsartan tablet 80 mg (has no administration in time range)   aspirin EC tablet 81 mg (has no administration in time range)   sodium chloride 0.9% flush 10 mL (has no administration in time range)   naloxone 0.4 mg/mL injection 0.02 mg (has no administration in time range)    glucose chewable tablet 16 g (has no administration in time range)   glucose chewable tablet 24 g (has no administration in time range)   glucagon (human recombinant) injection 1 mg (has no administration in time range)   potassium bicarbonate disintegrating tablet 50 mEq (has no administration in time range)   potassium bicarbonate disintegrating tablet 35 mEq (has no administration in time range)   potassium bicarbonate disintegrating tablet 60 mEq (has no administration in time range)   magnesium oxide tablet 800 mg (has no administration in time range)   potassium, sodium phosphates 280-160-250 mg packet 2 packet (has no administration in time range)   potassium, sodium phosphates 280-160-250 mg packet 2 packet (has no administration in time range)   acetaminophen tablet 650 mg (has no administration in time range)   dextrose 10% bolus 125 mL 125 mL (has no administration in time range)   dextrose 10% bolus 250 mL 250 mL (has no administration in time range)   azithromycin (ZITHROMAX) 500 mg in dextrose 5 % (D5W) 250 mL IVPB (Vial-Mate) (0 mg Intravenous Stopped 12/1/23 0312)   piperacillin-tazobactam (ZOSYN) 4.5 g in dextrose 5 % in water (D5W) 100 mL IVPB (MB+) (0 g Intravenous Stopped 12/1/23 0419)   sodium chloride 0.9% bolus 500 mL 500 mL (0 mLs Intravenous Stopped 12/1/23 0312)   acetaminophen tablet 1,000 mg (1,000 mg Oral Given 12/1/23 0114)   morphine injection 4 mg (4 mg Intravenous Given 12/1/23 0330)   ondansetron injection 4 mg (4 mg Intravenous Given 12/1/23 0330)   potassium bicarbonate disintegrating tablet 40 mEq (40 mEq Oral Given 12/1/23 0420)     Medical Decision Making  73-year-old male with history of chronic kidney disease, has left upper extremity fistula times 10 years that has not yet been utilized, coronary artery disease, hypertension, atrial fibrillation on warfarin is presenting to the emergency department complaining of cat scratch and bite over his fistula site.  Ultrasound of  fistula was ordered..  Covered with Zosyn and azithromycin.  Plan to bring in for admission for continued antibiotic treatment and monitoring.    Amount and/or Complexity of Data Reviewed  Independent Historian: spouse  Labs: ordered. Decision-making details documented in ED Course.  Radiology: ordered. Decision-making details documented in ED Course.  Discussion of management or test interpretation with external provider(s): Discussed with hospital medicine plan for admission to the hospital.    Risk  OTC drugs.  Prescription drug management.  Decision regarding hospitalization.               ED Course as of 12/01/23 0529   Fri Dec 01, 2023   0023 Temp: 99.1 °F (37.3 °C)  Low grade temp.  [KL]   0103 WBC: 10.33  No leukocytosis [KL]   0209 Hemoglobin: 14.5  Hemoglobin stable.  No anemia [KL]   0351 US Hemodialysis Access  Ultrasound of patient's fistula shows nonspecific edema through the cutaneous tissues overlying the AV graft, concerning for cellulitis.  Antibiotics have been ordered.  We will admit the patient to the hospital for continued monitoring and treatment.   [KL]   0401 Discussed results and plan for observation in the hospital with patient and wife.  Patient reports arm pain has improved since medications in the emergency department.  They are happy with this plan. [KL]   0408 BUN(!): 43 [KL]   0408 Creatinine(!): 2.5  Patient with chronic Kidney disease.  This is his baseline. [KL]      ED Course User Index  [KL] Cecilia Cai MD                           Clinical Impression:  Final diagnoses:  [W55.03XA] Cat scratch (Primary)  [N18.9] Chronic kidney disease, unspecified CKD stage  [W55.01XA] Cat bite, initial encounter  [E87.6] Hypokalemia  [L03.114] Cellulitis of left upper extremity          ED Disposition Condition    Admit Stable                Cecilia Cai MD  Resident  12/01/23 0516

## 2023-12-01 NOTE — ASSESSMENT & PLAN NOTE
Per the most recent EKG/telemetry, pt is currently in sinus rhythm.  Their AFib is  atrial paced . [id number] Score: 3 -> High Risk: 5.9% if no warfarin    HASBLED score of 4 with a 8.9% risk of bleeding; only for warfarin use.      RECOMMENDATIONS:  Maintain K > 4, Mag > 2 and Ca/iCal WNL to decrease arrhythmogenic potential  Pacemaker controlled  Anticoagulated with warfarin, latest INR 2.7  Maintain on telemetry and daily morning EKGs for the next few days

## 2023-12-01 NOTE — H&P
[facility] - Emergency Dept  [facility] Medicine  History & Physical    Patient Name: Vivek Lema Jr.  MRN: [id number]  Patient Class: IP- Inpatient  Admission Date: 11/30/2023  Attending Physician: Ludin Daly MD   Primary Care Provider: Michelle Conway MD         Patient information was obtained from patient and ER records.     Subjective:     Principal Problem:Cat bite    Chief Complaint:   Chief Complaint   Patient presents with    Cat scratch     Pt c/o 7/10 sharp pain after being scratched by cat at home over his fistula site on L forearm x 2 hrs        HPI: 73M hx HTN, CAD, CHF (last EF 55-60% in September), A-fib s/p pacemaker placement, aortic valve replacement 2/2 aortic insufficiency w/ aneurysm, (on warfarin), mitral regurgitation, gout, CKD IV (baseline Cr ~2.5) not on dialysis but w/ LUE AV fistula, p/w cat bite to his AV fistula and concern for infection. He states around 20:00 on 11/30 his cat was nibbling on his fistula site, he yanked his arm away and the cat's teeth sunk into the fistula causing bleeding and pain. He came to the ED immediately due to concern for infection, denies any fevers/chills/other symptoms. He had the fistula placed 9 years ago in [place] due to his CKD but it has remained unused because he never needed dialysis.     In the ED pt hypertensive 147/77 otherwise VSS on RA. Labs significant for WBC 10, Plt 148, PT/INR 27/2.7, K+ 3.3, BUN/Cr 43/2.5, T-bili 2.3. LUE US of his AV fistula showed patency throughout its course, with nonspecific edema in the tissue overlying the graft concerning for cellulitis. He was given azithromycin + zosyn and will be admitted to [facility] for further management.    Past Medical History:   Diagnosis Date    A-fib     CAD (coronary artery disease)     CKD (chronic kidney disease)     Disorder of kidney and ureter     HTN (hypertension)     Macular degeneration     Mitral regurgitation     Pacemaker        Past Surgical History:   Procedure  Laterality Date    AV FISTULA PLACEMENT Left     CATARACT EXTRACTION      COLONOSCOPY      COLONOSCOPY N/A 01/17/2023    Procedure: COLONOSCOPY;  Surgeon: Todd Oviedo MD;  Location: [facility] ([address] [place] [address]);  Service: Endoscopy;  Laterality: N/A;  from referral / Pt with trouble clearing secretions at times - 2nd floor / prep ins. mailed and reviewed with Pt and Pt wife/ Pt reports last colon in [place], Hx polyps / Coumadin - per coumadin clinic ok to hold coumadin x 5 days prior- see telephone encounter dated 11/2/22 / pa    CORONARY ARTERY BYPASS GRAFT      with valve repair (aortic?)       Review of patient's allergies indicates:  No Known Allergies    No current facility-administered medications on file prior to encounter.     Current Outpatient Medications on File Prior to Encounter   Medication Sig    aspirin (ECOTRIN) 81 MG EC tablet Take 1 tablet (81 mg total) by mouth once daily.    atorvastatin (LIPITOR) 40 MG tablet TAKE 1 TABLET(40 MG) BY MOUTH EVERY DAY    calcitRIOL (ROCALTROL) 0.25 MCG Cap TAKE 1 CAPSULE(0.25 MCG) BY MOUTH EVERY DAY    cholecalciferol, vitamin D3, (VITAMIN D3) 50 mcg (2,000 unit) Cap Take 1 capsule by mouth once daily.    EScitalopram oxalate (LEXAPRO) 20 MG tablet Take 1 tablet (20 mg total) by mouth once daily.    febuxostat (ULORIC) 40 mg Tab Take 1 tablet (40 mg total) by mouth once daily.    HYDROcodone-acetaminophen (NORCO) 7.5-325 mg per tablet Take not more than one pill a day PRN as direct    nitroGLYCERIN (NITROSTAT) 0.4 MG SL tablet Place 1 tablet (0.4 mg total) under the tongue every 5 (five) minutes as needed for Chest pain.    torsemide (DEMADEX) 20 MG Tab TAKE 3 TABLETS BY MOUTH EVERY DAY    valsartan (DIOVAN) 80 MG tablet TAKE 1 TABLET(80 MG) BY MOUTH EVERY DAY    warfarin (COUMADIN) 2 MG tablet TAKE 2 TABLETS BY MOUTH MONDAY, WEDNESDAY AND FRIDAY. TAKE 1 TABLET BY MOUTH ON ALL OTHER DAYS     Family History    None       Tobacco Use    Smoking status: Never    Smokeless  tobacco: Never   Substance and Sexual Activity    Alcohol use: Yes     Alcohol/week: 6.0 standard drinks of alcohol     Types: 6 Cans of beer per week     Comment: occ    Drug use: Never    Sexual activity: Not on file       Objective:     Vital Signs (Most Recent):  Temp: 99.1 °F (37.3 °C) (11/30/23 2338)  Pulse: 64 (11/30/23 2338)  Resp: 18 (12/01/23 0330)  BP: (!) 147/77 (11/30/23 2338)  SpO2: 97 % (11/30/23 2338) Vital Signs (24h Range):  Temp:  [99.1 °F (37.3 °C)] 99.1 °F (37.3 °C)  Pulse:  [64] 64  Resp:  [16-18] 18  SpO2:  [97 %] 97 %  BP: (147)/(77) 147/77        There is no height or weight on file to calculate BMI.     Physical Exam  Constitutional:       General: He is not in acute distress.     Appearance: Normal appearance.   HENT:      Head: Normocephalic and atraumatic.      Right Ear: External ear normal.      Left Ear: External ear normal.      Nose: Nose normal.      Mouth/Throat:      Mouth: Mucous membranes are moist.   Eyes:      Extraocular Movements: Extraocular movements intact.      Pupils: Pupils are equal, round, and reactive to light.   Cardiovascular:      Rate and Rhythm: Normal rate and regular rhythm.      Pulses: Normal pulses.      Heart sounds: Normal heart sounds.   Pulmonary:      Effort: Pulmonary effort is normal. No respiratory distress.   Abdominal:      General: Abdomen is flat.      Palpations: Abdomen is soft.   Musculoskeletal:         General: Signs of injury present. Normal range of motion.      Cervical back: Normal range of motion.      Comments: Scab + swelling + erythema around LUE fistula site, pictures in chart. Palpable thrill   Skin:     General: Skin is warm and dry.      Findings: Erythema present.   Neurological:      General: No focal deficit present.      Mental Status: He is alert and oriented to person, place, and time. Mental status is at baseline.   Psychiatric:         Mood and Affect: Mood normal.         Behavior: Behavior normal.               CRANIAL NERVES     CN III, IV, VI   Pupils are equal, round, and reactive to light.       Significant Labs: All pertinent labs within the past 24 hours have been reviewed.    Significant Imaging: I have reviewed all pertinent imaging results/findings within the past 24 hours.  Assessment/Plan:     * Cat bite  Pt p/w cat bite to LUE AV fistula. Occurred 20:00 on 11/30, associated with bleeding and pain. The bleeding has stopped but he has some residual tenderness and erythema around the site. No leukocytosis, blood cultures pending. Scab with no oozing/exudate for wound swab, pictures in chart. US of fistula shows patency but edema throughout the overlying tissue concerning for cellulitis. Received azithromycin + zosyn in the ED. Denies fevers, chills, other symptoms.    Plan:  - Continue abx: azithromycin for bartonella coverage, zosyn for skin ran per UTD guidelines on cat bites. Can broaden as needed if condition worsens or to include MRSA coverage  - Oral abx on discharge  - F/u Bcx, labs      Anxiety and depression  Controlled, continue home antidepressant      Long term (current) use of anticoagulants  Pt has a hx of paroxysmal atrial fibrillation and has been on coumadin for a number of years. He follows with cardiology at [facility] who have recommended he switch to DOAC, but pt wishes to remain on coumadin. Goal INR 2-3 per last cardiology visit, on admission INR 2.7. Regimen: 4 mg (2 mg x 2) every Thu, Sat; 2 mg (2 mg x 1) all other days.    Plan:  - Ordered his Friday dose (2mg) for this morning  - Consult pharmacy for further management while inpatient    Gout  Pt on febuxostat 40mg QD at home, restart as indicated    Pacemaker  Has [employer] dual-chamber device placed 2018.      History of atrial fibrillation  EKG pending. Per chart review last in atrial paced rhythm. Follows regularly with cardiology at [facility]. [id number] Score: 3 -> High Risk: 5.9% if no warfarin    HASBLED score of 4 with a  8.9% risk of bleeding; only for warfarin use.      RECOMMENDATIONS:  Maintain K > 4, Mag > 2 and Ca/iCal WNL to decrease arrhythmogenic potential  Pacemaker controlled, f/u EKG/tele  Anticoagulated with warfarin, latest INR 2.7  Maintain on telemetry and daily morning EKGs for the next few days            Coronary artery disease  Coronary angiogram in 7/2021 with non obstructive CAD, no intervention needed at the time per last cardiology office visit    Plan:  - Continue aspirin 81mg PO daily  - Cont statin, goal LDL <70    A-V fistula  Placed 9y ago in [place] d/t concern for future need of dialysis. Has never needed dialysis. Moved to [place] 2y ago, follows with nephrology clinic at [facility]      CKD (chronic kidney disease), stage IV  Creatine stable for now. BMP reviewed- noted Cr 2.5 according to latest data, around baseline per patient. Based on current GFR, CKD stage is stage 4 - GFR 15-29.  Monitor UOP and serial BMP and adjust therapy as needed. Renally dose meds. Avoid nephrotoxic medications and procedures.    Hypertension  Chronic, uncontrolled. Was not able to take his evening medications before ED presentation. Latest blood pressure and vitals reviewed-     Temp:  [99.1 °F (37.3 °C)]   Pulse:  [64]   Resp:  [16-18]   BP: (147)/(77)   SpO2:  [97 %] .   Home meds for hypertension were reviewed and noted below.   Hypertension Medications               nitroGLYCERIN (NITROSTAT) 0.4 MG SL tablet Place 1 tablet (0.4 mg total) under the tongue every 5 (five) minutes as needed for Chest pain.    torsemide (DEMADEX) 20 MG Tab TAKE 3 TABLETS BY MOUTH EVERY DAY    valsartan (DIOVAN) 80 MG tablet TAKE 1 TABLET(80 MG) BY MOUTH EVERY DAY            While in the hospital, will manage blood pressure as follows; Continue home antihypertensive regimen    Will utilize p.r.n. blood pressure medication only if patient's blood pressure greater than 180/110 and he develops symptoms such as worsening chest pain or  shortness of breath.      VTE Risk Mitigation (From admission, onward)           Ordered     IP VTE HIGH RISK PATIENT  Once         12/01/23 0450     Place sequential compression device  Until discontinued         12/01/23 0450                                    Cortez Costa MD  Department of Hospital Medicine  [facility] - Emergency Dept

## 2023-12-01 NOTE — PLAN OF CARE
APPOINTMENT:    Patient Appointment(s) scheduled with   Leigh Damon  Thursday Dec 7, 2023 9:30 AM

## 2023-12-01 NOTE — ASSESSMENT & PLAN NOTE
Placed 9y ago in [place] d/t concern for future need of dialysis. Has never needed dialysis. Moved to [place] 2y ago, follows with nephrology clinic at [facility]

## 2023-12-01 NOTE — PLAN OF CARE
[facility]yn - Med Surg  Initial Discharge Assessment       Primary Care Provider: Michelle Conway MD    Admission Diagnosis: Hypokalemia [E87.6]  Cat scratch [W55.03XA]  A-fib [I48.91]  Cellulitis of left upper extremity [L03.114]  Chest pain [R07.9]  Cat bite, initial encounter [W55.01XA]  Chronic kidney disease, unspecified CKD stage [N18.9]    Admission Date: 11/30/2023  Expected Discharge Date: 12/1/2023    Transition of Care Barriers: (P) None    Payor: [employer] MANAGED [employer] / Plan: [employer] MEDICARE ADVANTAGE / Product Type: Medicare Advantage /     Extended Emergency Contact Information  Primary Emergency Contact: Quirino Rooney  Address: [address] [place] [address] [id number] [place] [address]  Mobile Phone: [phone]  Relation: Daughter  Preferred language: English   needed? No  Secondary Emergency Contact: Mere Lema  Mobile Phone: [phone]  Relation: Spouse   needed? No    Discharge Plan A: (P) Home  Discharge Plan B: (P) Home with family      [id number] - [place], [address] AT [address]  [place] [address]  Phone: [phone] Fax: [phone]      Initial Assessment (most recent)       Adult Discharge Assessment - 12/01/23 1047          Discharge Assessment    Assessment Type Discharge Planning Assessment     Confirmed/corrected address, phone number and insurance Yes     Confirmed Demographics Correct on Facesheet     Source of Information patient;family     When was your last doctors appointment? 09/13/23     Does patient/caregiver understand observation status No     Communicated SONIA with patient/caregiver Yes     Reason For Admission Cat bite on my fistula     People in Home spouse     Do you expect to return to your current living situation? Yes     Do you have help at home or someone to help you manage your care at home? Yes     Prior to hospitilization cognitive  status: Alert/Oriented (P)      Current cognitive status: Alert/Oriented (P)      Home Accessibility not wheelchair accessible (P)      Home Layout Able to live on 1st floor (P)      Equipment Currently Used at Home cane, straight (P)      Readmission within 30 days? No (P)      Patient currently being followed by outpatient case management? No (P)      Do you currently have service(s) that help you manage your care at home? No (P)      Do you take prescription medications? Yes (P)      Do you have prescription coverage? Yes (P)      Do you have any problems affording any of your prescribed medications? No (P)      Is the patient taking medications as prescribed? yes (P)      Who is going to help you get home at discharge? Spouse (P)      How do you get to doctors appointments? car, drives self (P)      Are you on dialysis? No (P)      Do you take coumadin? Yes (P)      Who monitors your labs? PCP (P)      DME Needed Upon Discharge  none (P)      Discharge Plan discussed with: Spouse/sig other;Patient (P)      Transition of Care Barriers None (P)      Discharge Plan A Home (P)      Discharge Plan B Home with family (P)         Physical Activity    On average, how many days per week do you engage in moderate to strenuous exercise (like a brisk walk)? 2 days (P)      On average, how many minutes do you engage in exercise at this level? 30 min (P)         Financial Resource Strain    How hard is it for you to pay for the very basics like food, housing, medical care, and heating? Not hard at all (P)         Housing Stability    In the last 12 months, was there a time when you were not able to pay the mortgage or rent on time? No (P)      In the last 12 months, was there a time when you did not have a steady place to sleep or slept in a shelter (including now)? No (P)         Transportation Needs    In the past 12 months, has lack of transportation kept you from medical appointments or from getting medications? No (P)       In the past 12 months, has lack of transportation kept you from meetings, work, or from getting things needed for daily living? No (P)         Food Insecurity    Within the past 12 months, you worried that your food would run out before you got the money to buy more. Never true (P)      Within the past 12 months, the food you bought just didn't last and you didn't have money to get more. Never true (P)         Stress    Do you feel stress - tense, restless, nervous, or anxious, or unable to sleep at night because your mind is troubled all the time - these days? Not at all (P)         Social Connections    In a typical week, how many times do you talk on the phone with family, friends, or neighbors? More than three times a week (P)      How often do you get together with friends or relatives? Once a week (P)      How often do you attend Mormonism or Advent services? Never (P)      Do you belong to any clubs or organizations such as Mormonism groups, unions, fraternal or athletic groups, or school groups? No (P)      How often do you attend meetings of the clubs or organizations you belong to? Never (P)      Are you , , , , never , or living with a partner?  (P)         Alcohol Use    Q1: How often do you have a drink containing alcohol? 2-3 times a week (P)      Q3: How often do you have six or more drinks on one occasion? Never (P)                         Discharge Plan A and Plan B have been determined by review of patient's clinical status, future medical and therapeutic needs, and coverage/benefits for post-acute care in coordination with multidisciplinary team members.

## 2023-12-01 NOTE — ASSESSMENT & PLAN NOTE
Pt has a hx of paroxysmal atrial fibrillation and has been on coumadin for a number of years. He follows with cardiology at [facility] who have recommended he switch to DOAC, but pt wishes to remain on coumadin. Goal INR 2-3 per last cardiology visit, on admission INR 2.7.

## 2023-12-01 NOTE — ED TRIAGE NOTES
"Pt presents to the ED with complaints of L. Forearm pain r/t his "cats tooth getting caught in his skin" over his fistula site. Patient reports having to pull the cats mouth off of his arm. Patient reports pain, swelling, and bruising to the area.  "

## 2023-12-01 NOTE — ASSESSMENT & PLAN NOTE
Coronary angiogram in 7/2021 with non obstructive CAD, no intervention needed at the time per last cardiology office visit    Plan:  - Continue aspirin 81mg PO daily  - Cont statin, goal LDL <70

## 2023-12-01 NOTE — HPI
73M hx HTN, CAD, CHF (last EF 55-60% in September), A-fib s/p pacemaker placement, aortic valve replacement 2/2 aortic insufficiency w/ aneurysm, (on warfarin), mitral regurgitation, gout, CKD IV (baseline Cr ~2.5) not on dialysis but w/ LUE AV fistula, p/w cat bite to his AV fistula and concern for infection. He states around 20:00 on 11/30 his cat was nibbling on his fistula site, he yanked his arm away and the cat's teeth sunk into the fistula causing bleeding and pain. He came to the ED immediately due to concern for infection, denies any fevers/chills/other symptoms. He had the fistula placed 9 years ago in [place] due to his CKD but it has remained unused because he never needed dialysis.     In the ED pt hypertensive 147/77 otherwise VSS on RA. Labs significant for WBC 10, Plt 148, PT/INR 27/2.7, K+ 3.3, BUN/Cr 43/2.5, T-bili 2.3. LUE US of his AV fistula showed patency throughout its course, with nonspecific edema in the tissue overlying the graft concerning for cellulitis. He was given azithromycin + zosyn and will be admitted to [facility] for further management.

## 2023-12-01 NOTE — SUBJECTIVE & OBJECTIVE
Past Medical History:   Diagnosis Date    A-fib     CAD (coronary artery disease)     CKD (chronic kidney disease)     Disorder of kidney and ureter     HTN (hypertension)     Macular degeneration     Mitral regurgitation     Pacemaker        Past Surgical History:   Procedure Laterality Date    AV FISTULA PLACEMENT Left     CATARACT EXTRACTION      COLONOSCOPY      COLONOSCOPY N/A 01/17/2023    Procedure: COLONOSCOPY;  Surgeon: Todd Oviedo MD;  Location: [facility] (2ND FLR);  Service: Endoscopy;  Laterality: N/A;  from referral / Pt with trouble clearing secretions at times - 2nd floor / prep ins. mailed and reviewed with Pt and Pt wife/ Pt reports last colon in [place], Hx polyps / Coumadin - per coumadin clinic ok to hold coumadin x 5 days prior- see telephone encounter dated 11/2/22 / pa    CORONARY ARTERY BYPASS GRAFT      with valve repair (aortic?)       Review of patient's allergies indicates:  No Known Allergies    No current facility-administered medications on file prior to encounter.     Current Outpatient Medications on File Prior to Encounter   Medication Sig    aspirin (ECOTRIN) 81 MG EC tablet Take 1 tablet (81 mg total) by mouth once daily.    atorvastatin (LIPITOR) 40 MG tablet TAKE 1 TABLET(40 MG) BY MOUTH EVERY DAY    calcitRIOL (ROCALTROL) 0.25 MCG Cap TAKE 1 CAPSULE(0.25 MCG) BY MOUTH EVERY DAY    cholecalciferol, vitamin D3, (VITAMIN D3) 50 mcg (2,000 unit) Cap Take 1 capsule by mouth once daily.    EScitalopram oxalate (LEXAPRO) 20 MG tablet Take 1 tablet (20 mg total) by mouth once daily.    febuxostat (ULORIC) 40 mg Tab Take 1 tablet (40 mg total) by mouth once daily.    HYDROcodone-acetaminophen (NORCO) 7.5-325 mg per tablet Take not more than one pill a day PRN as direct    nitroGLYCERIN (NITROSTAT) 0.4 MG SL tablet Place 1 tablet (0.4 mg total) under the tongue every 5 (five) minutes as needed for Chest pain.    torsemide (DEMADEX) 20 MG Tab TAKE 3 TABLETS BY MOUTH EVERY DAY    valsartan  (DIOVAN) 80 MG tablet TAKE 1 TABLET(80 MG) BY MOUTH EVERY DAY    warfarin (COUMADIN) 2 MG tablet TAKE 2 TABLETS BY MOUTH MONDAY, WEDNESDAY AND FRIDAY. TAKE 1 TABLET BY MOUTH ON ALL OTHER DAYS     Family History    None       Tobacco Use    Smoking status: Never    Smokeless tobacco: Never   Substance and Sexual Activity    Alcohol use: Yes     Alcohol/week: 6.0 standard drinks of alcohol     Types: 6 Cans of beer per week     Comment: occ    Drug use: Never    Sexual activity: Not on file       Objective:     Vital Signs (Most Recent):  Temp: 99.1 °F (37.3 °C) (11/30/23 2338)  Pulse: 64 (11/30/23 2338)  Resp: 18 (12/01/23 0330)  BP: (!) 147/77 (11/30/23 2338)  SpO2: 97 % (11/30/23 2338) Vital Signs (24h Range):  Temp:  [99.1 °F (37.3 °C)] 99.1 °F (37.3 °C)  Pulse:  [64] 64  Resp:  [16-18] 18  SpO2:  [97 %] 97 %  BP: (147)/(77) 147/77        There is no height or weight on file to calculate BMI.     Physical Exam  Constitutional:       General: He is not in acute distress.     Appearance: Normal appearance.   HENT:      Head: Normocephalic and atraumatic.      Right Ear: External ear normal.      Left Ear: External ear normal.      Nose: Nose normal.      Mouth/Throat:      Mouth: Mucous membranes are moist.   Eyes:      Extraocular Movements: Extraocular movements intact.      Pupils: Pupils are equal, round, and reactive to light.   Cardiovascular:      Rate and Rhythm: Normal rate and regular rhythm.      Pulses: Normal pulses.      Heart sounds: Normal heart sounds.   Pulmonary:      Effort: Pulmonary effort is normal. No respiratory distress.   Abdominal:      General: Abdomen is flat.      Palpations: Abdomen is soft.   Musculoskeletal:         General: Signs of injury present. Normal range of motion.      Cervical back: Normal range of motion.      Comments: Scab + swelling + erythema around LUE fistula site, pictures in chart. Palpable thrill   Skin:     General: Skin is warm and dry.      Findings: Erythema  present.   Neurological:      General: No focal deficit present.      Mental Status: He is alert and oriented to person, place, and time. Mental status is at baseline.   Psychiatric:         Mood and Affect: Mood normal.         Behavior: Behavior normal.              CRANIAL NERVES     CN III, IV, VI   Pupils are equal, round, and reactive to light.       Significant Labs: All pertinent labs within the past 24 hours have been reviewed.    Significant Imaging: I have reviewed all pertinent imaging results/findings within the past 24 hours.

## 2023-12-02 NOTE — PLAN OF CARE
Problem: Adult Inpatient Plan of Care  Goal: Plan of Care Review  Outcome: Met  Goal: Patient-Specific Goal (Individualized)  Outcome: Met  Goal: Absence of Hospital-Acquired Illness or Injury  Outcome: Met  Goal: Optimal Comfort and Wellbeing  Outcome: Met  Goal: Readiness for Transition of Care  Outcome: Met     Problem: Impaired Wound Healing  Goal: Optimal Wound Healing  Outcome: Met     Problem: Fall Injury Risk  Goal: Absence of Fall and Fall-Related Injury  Outcome: Met       PT discharged home via wheelchair with wife, meds, and belongings. IV removed. Discharge instructions given and reviewed with pt- questions and concerns answered,   Contraindicated

## 2023-12-06 ENCOUNTER — TELEPHONE (OUTPATIENT)
Dept: FAMILY MEDICINE | Facility: CLINIC | Age: 73
End: 2023-12-06
Payer: MEDICARE

## 2023-12-06 LAB
BACTERIA BLD CULT: NORMAL
BACTERIA BLD CULT: NORMAL

## 2023-12-07 ENCOUNTER — OFFICE VISIT (OUTPATIENT)
Dept: FAMILY MEDICINE | Facility: CLINIC | Age: 73
End: 2023-12-07
Payer: MEDICARE

## 2023-12-07 VITALS
SYSTOLIC BLOOD PRESSURE: 106 MMHG | OXYGEN SATURATION: 98 % | WEIGHT: 221.81 LBS | TEMPERATURE: 98 F | HEART RATE: 60 BPM | DIASTOLIC BLOOD PRESSURE: 58 MMHG | BODY MASS INDEX: 31.82 KG/M2

## 2023-12-07 DIAGNOSIS — I10 HYPERTENSION, UNSPECIFIED TYPE: ICD-10-CM
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
## 2023-12-07 PROCEDURE — 1159F PR MEDICATION LIST DOCUMENTED IN MEDICAL RECORD: ICD-10-PCS | Mod: CPTII,S$GLB,, | Performed by: INTERNAL MEDICINE

## 2023-12-07 PROCEDURE — 1101F PR PT FALLS ASSESS DOC 0-1 FALLS W/OUT INJ PAST YR: ICD-10-PCS | Mod: CPTII,S$GLB,, | Performed by: INTERNAL MEDICINE

## 2023-12-07 PROCEDURE — 4010F PR ACE/ARB THEARPY RXD/TAKEN: ICD-10-PCS | Mod: CPTII,S$GLB,, | Performed by: INTERNAL MEDICINE

## 2023-12-07 PROCEDURE — 99214 PR OFFICE/OUTPT VISIT, EST, LEVL IV, 30-39 MIN: ICD-10-PCS | Mod: S$GLB,,, | Performed by: INTERNAL MEDICINE

## 2023-12-07 PROCEDURE — 1126F PR PAIN SEVERITY QUANTIFIED, NO PAIN PRESENT: ICD-10-PCS | Mod: CPTII,S$GLB,, | Performed by: INTERNAL MEDICINE

## 2023-12-07 PROCEDURE — 3078F PR MOST RECENT DIASTOLIC BLOOD PRESSURE < 80 MM HG: ICD-10-PCS | Mod: CPTII,S$GLB,, | Performed by: INTERNAL MEDICINE

## 2023-12-07 PROCEDURE — 1126F AMNT PAIN NOTED NONE PRSNT: CPT | Mod: CPTII,S$GLB,, | Performed by: INTERNAL MEDICINE

## 2023-12-07 PROCEDURE — 3044F HG A1C LEVEL LT 7.0%: CPT | Mod: CPTII,S$GLB,, | Performed by: INTERNAL MEDICINE

## 2023-12-07 PROCEDURE — 3074F SYST BP LT 130 MM HG: CPT | Mod: CPTII,S$GLB,, | Performed by: INTERNAL MEDICINE

## 2023-12-07 PROCEDURE — 1101F PT FALLS ASSESS-DOCD LE1/YR: CPT | Mod: CPTII,S$GLB,, | Performed by: INTERNAL MEDICINE

## 2023-12-07 PROCEDURE — 3288F FALL RISK ASSESSMENT DOCD: CPT | Mod: CPTII,S$GLB,, | Performed by: INTERNAL MEDICINE

## 2023-12-07 PROCEDURE — 1111F DSCHRG MED/CURRENT MED MERGE: CPT | Mod: CPTII,S$GLB,, | Performed by: INTERNAL MEDICINE

## 2023-12-07 PROCEDURE — 99999 PR PBB SHADOW E&M-EST. PATIENT-LVL III: ICD-10-PCS | Mod: PBBFAC,,, | Performed by: INTERNAL MEDICINE

## 2023-12-07 PROCEDURE — 3078F DIAST BP <80 MM HG: CPT | Mod: CPTII,S$GLB,, | Performed by: INTERNAL MEDICINE

## 2023-12-07 PROCEDURE — 1159F MED LIST DOCD IN RCRD: CPT | Mod: CPTII,S$GLB,, | Performed by: INTERNAL MEDICINE

## 2023-12-07 PROCEDURE — 1111F PR DISCHARGE MEDS RECONCILED W/ CURRENT OUTPATIENT MED LIST: ICD-10-PCS | Mod: CPTII,S$GLB,, | Performed by: INTERNAL MEDICINE

## 2023-12-07 PROCEDURE — 3074F PR MOST RECENT SYSTOLIC BLOOD PRESSURE < 130 MM HG: ICD-10-PCS | Mod: CPTII,S$GLB,, | Performed by: INTERNAL MEDICINE

## 2023-12-07 PROCEDURE — 99999 PR PBB SHADOW E&M-EST. PATIENT-LVL III: CPT | Mod: PBBFAC,,, | Performed by: INTERNAL MEDICINE

## 2023-12-07 PROCEDURE — 99214 OFFICE O/P EST MOD 30 MIN: CPT | Mod: S$GLB,,, | Performed by: INTERNAL MEDICINE

## 2023-12-07 PROCEDURE — 4010F ACE/ARB THERAPY RXD/TAKEN: CPT | Mod: CPTII,S$GLB,, | Performed by: INTERNAL MEDICINE

## 2023-12-07 PROCEDURE — 3288F PR FALLS RISK ASSESSMENT DOCUMENTED: ICD-10-PCS | Mod: CPTII,S$GLB,, | Performed by: INTERNAL MEDICINE

## 2023-12-07 PROCEDURE — 3044F PR MOST RECENT HEMOGLOBIN A1C LEVEL <7.0%: ICD-10-PCS | Mod: CPTII,S$GLB,, | Performed by: INTERNAL MEDICINE

## 2023-12-07 RX ORDER — VALSARTAN 40 MG/1
40 TABLET ORAL DAILY
Qty: 30 TABLET | Refills: 0 | Status: SHIPPED | OUTPATIENT
Start: 2023-12-07

## 2023-12-07 NOTE — ASSESSMENT & PLAN NOTE
Acute, improving. Refer to HPI     - advised to use neosporin BID  - avoid touching wound or picking at the scab   - cover with gauze and ace wrap when doing more physical activity to avoid disturbance of the scab  - has follow up with dr. Conway in two weeks

## 2023-12-07 NOTE — PROGRESS NOTES
Chief Complaint: Hospital Follow Up (Pt is coming for a follow up at hospital from a cat bite that is swollen)      Vivek Lema Jr.  is a 73 y.o. year old patient who presents today for hospital follow up     On 11/30 patient was bit on his fistula site on the left arm by his cat. Had extreme swelling and bleeding. Went to the ED and received azithromycin and zosyn. D/c with augmentin which he completed. Reports improvement in swelling significantly. Wound has scabbed over.     Past Surgical History:   Procedure Laterality Date    AV FISTULA PLACEMENT Left     CATARACT EXTRACTION      COLONOSCOPY      COLONOSCOPY N/A 01/17/2023    Procedure: COLONOSCOPY;  Surgeon: Todd Oviedo MD;  Location: [facility] ([address] [place] [address]);  Service: Endoscopy;  Laterality: N/A;  from referral / Pt with trouble clearing secretions at times - 2nd floor / prep ins. mailed and reviewed with Pt and Pt wife/ Pt reports last colon in [place], Hx polyps / Coumadin - per coumadin clinic ok to hold coumadin x 5 days prior- see telephone encounter dated 11/2/22 / pa    CORONARY ARTERY BYPASS GRAFT      with valve repair (aortic?)        Family History   Problem Relation Age of Onset    Amblyopia Neg Hx     Blindness Neg Hx     Cataracts Neg Hx     Glaucoma Neg Hx     Macular degeneration Neg Hx     Retinal detachment Neg Hx     Strabismus Neg Hx         Social History     Socioeconomic History    Marital status:    Tobacco Use    Smoking status: Never    Smokeless tobacco: Never   Substance and Sexual Activity    Alcohol use: Yes     Alcohol/week: 6.0 standard drinks of alcohol     Types: 6 Cans of beer per week     Comment: occ    Drug use: Never     Social Determinants of Health     Financial Resource Strain: Low Risk  (12/1/2023)    Overall Financial Resource Strain (CARDIA)     Difficulty of Paying Living Expenses: Not hard at all   Food Insecurity: No Food Insecurity (12/1/2023)    Hunger Vital Sign     Worried About Running Out of Food  in the Last Year: Never true     Ran Out of Food in the Last Year: Never true   Transportation Needs: No Transportation Needs (12/1/2023)    PRAPARE - Transportation     Lack of Transportation (Medical): No     Lack of Transportation (Non-Medical): No   Physical Activity: Insufficiently Active (12/1/2023)    Exercise Vital Sign     Days of Exercise per Week: 2 days     Minutes of Exercise per Session: 30 min   Stress: No Stress Concern Present (12/1/2023)    [employer] [place] [employer] - Occupational Stress Questionnaire     Feeling of Stress : Not at all   Social Connections: Moderately Isolated (12/1/2023)    Social Connection and Isolation Panel [NHANES]     Frequency of Communication with Friends and Family: More than three times a week     Frequency of Social Gatherings with Friends and Family: Once a week     Attends Zoroastrian Services: Never     Active Member of Clubs or Organizations: No     Attends Club or Organization Meetings: Never     Marital Status:    Housing Stability: Unknown (12/1/2023)    Housing Stability Vital Sign     Unable to Pay for Housing in the Last Year: No     Unstable Housing in the Last Year: No         Current Outpatient Medications:     aspirin (ECOTRIN) 81 MG EC tablet, Take 1 tablet (81 mg total) by mouth once daily., Disp: 90 tablet, Rfl: 1    atorvastatin (LIPITOR) 40 MG tablet, TAKE 1 TABLET(40 MG) BY MOUTH EVERY DAY, Disp: 90 tablet, Rfl: 0    calcitRIOL (ROCALTROL) 0.25 MCG Cap, TAKE 1 CAPSULE(0.25 MCG) BY MOUTH EVERY DAY, Disp: 90 capsule, Rfl: 1    cholecalciferol, vitamin D3, (VITAMIN D3) 50 mcg (2,000 unit) Cap, Take 1 capsule by mouth once daily., Disp: , Rfl:     EScitalopram oxalate (LEXAPRO) 20 MG tablet, Take 1 tablet (20 mg total) by mouth once daily., Disp: 90 tablet, Rfl: 3    febuxostat (ULORIC) 40 mg Tab, Take 1 tablet (40 mg total) by mouth once daily., Disp: 90 tablet, Rfl: 1    HYDROcodone-acetaminophen (NORCO) 7.5-325 mg per tablet, Take  not more than one pill a day PRN as direct, Disp: 30 tablet, Rfl: 0    nitroGLYCERIN (NITROSTAT) 0.4 MG SL tablet, Place 1 tablet (0.4 mg total) under the tongue every 5 (five) minutes as needed for Chest pain., Disp: 30 tablet, Rfl: 3    torsemide (DEMADEX) 20 MG Tab, TAKE 3 TABLETS BY MOUTH EVERY DAY, Disp: 270 tablet, Rfl: 3    warfarin (COUMADIN) 2 MG tablet, TAKE 2 TABLETS BY MOUTH MONDAY, WEDNESDAY AND FRIDAY. TAKE 1 TABLET BY MOUTH ON ALL OTHER DAYS, Disp: 128 tablet, Rfl: 1    valsartan (DIOVAN) 40 MG tablet, Take 1 tablet (40 mg total) by mouth once daily., Disp: 30 tablet, Rfl: 0     Review of Systems   Constitutional:  Negative for chills and fever.   Musculoskeletal:  Negative for joint pain and myalgias.        Objective:      Vitals:    12/07/23 0904   BP: (!) 106/58   Pulse: 60   Temp: 98 °F (36.7 °C)       Physical Exam  Constitutional:       Appearance: Normal appearance.   Musculoskeletal:         General: Swelling present. No tenderness. Normal range of motion.   Neurological:      Mental Status: He is alert.          Assessment:       1. Cat bite, subsequent encounter    2. Hypertension, unspecified type          Plan:   1. Cat bite, subsequent encounter  Assessment & Plan:  Acute, improving. Refer to HPI     - advised to use neosporin BID  - avoid touching wound or picking at the scab   - cover with gauze and ace wrap when doing more physical activity to avoid disturbance of the scab  - has follow up with dr. Conway in two weeks      2. Hypertension, unspecified type  Assessment & Plan:  BP has been low.     - advised patient to start 40mg valsartan   - follow up in two weeks     Orders:  -     valsartan (DIOVAN) 40 MG tablet; Take 1 tablet (40 mg total) by mouth once daily.  Dispense: 30 tablet; Refill: 0         No follow-ups on file.

## 2023-12-07 NOTE — PROGRESS NOTES
Health Maintenance Due   Topic     Shingles Vaccine (1 of 2)  hx chicken pox. Notified pt can get vaccine at pharmacy    RSV Vaccine (Age 60+ and Pregnant patients) (1 - 1-dose 60+ series) Not offered at this office    COVID-19 Vaccine (4 - 2023-24 season) Not offered at this office

## 2023-12-11 DIAGNOSIS — I10 HYPERTENSION, UNSPECIFIED TYPE: ICD-10-CM

## 2023-12-11 NOTE — TELEPHONE ENCOUNTER
Care Due:                  Date            Visit Type   Department     Provider  --------------------------------------------------------------------------------                                EP -                              PRIMARY      [facility]  Last Visit: 09-      CARE ([facility])   JF Conway                              EP -                              PRIMARY      [facility]  Next Visit: 12-      CARE ([facility])   JF Conway                                                            Last  Test          Frequency    Reason                     Performed    Due Date  --------------------------------------------------------------------------------    Lipid Panel.  12 months..  atorvastatin.............  02- 01-    Uric Acid...  12 months..  febuxostat...............  Not Found    Overdue    [employer] Embedded Care Due Messages. Reference number: [id number].   12/11/2023 3:43:46 AM CST

## 2023-12-12 RX ORDER — ATORVASTATIN CALCIUM 40 MG/1
TABLET, FILM COATED ORAL
Qty: 90 TABLET | Refills: 0 | Status: SHIPPED | OUTPATIENT
Start: 2023-12-12 | End: 2024-03-19 | Stop reason: SDUPTHER

## 2023-12-14 ENCOUNTER — NURSE TRIAGE (OUTPATIENT)
Dept: ADMINISTRATIVE | Facility: CLINIC | Age: 73
End: 2023-12-14
Payer: MEDICARE

## 2023-12-14 NOTE — TELEPHONE ENCOUNTER
Patient states c/o cat bite approximately 20 ( twenty) minutes ago to his right arm. Patient also states history of recent cat bites from his cat. Patient states his cat is up to date with immunizations and bite broke his skin. Patient states request for new prescription for an antibiotic to treat cat bite. Patient states refusal to complete triage/assessment, states he will treat cat bite at home and will visit ED if he needs treatment for cat bite. Patient ended call at this time.       Additional Information   Negative: Major bleeding (actively dripping or spurting) that can't be stopped   Negative: Sounds like a life-threatening emergency to the triager   Negative: Any break in skin from BITE (e.g., cut, puncture, or scratch) and WILD animal at risk for RABIES (e.g., bat, raccoon, lopez, skunk, coyote, other carnivores; see Background for list)   Negative: Any break in skin from BITE (e.g., cut, puncture, or scratch) and PET animal (e.g., dog, cat, or ferret) at risk for RABIES (e.g., sick, stray, unprovoked bite, developing country)   Negative: Any break in skin from BITE (e.g., cut, puncture, or scratch) and monkey    Protocols used: Animal Bite-A-[place]

## 2023-12-15 ENCOUNTER — PATIENT OUTREACH (OUTPATIENT)
Dept: ADMINISTRATIVE | Facility: OTHER | Age: 73
End: 2023-12-15
Payer: MEDICARE

## 2023-12-15 ENCOUNTER — OFFICE VISIT (OUTPATIENT)
Dept: FAMILY MEDICINE | Facility: CLINIC | Age: 73
End: 2023-12-15
Payer: MEDICARE

## 2023-12-15 ENCOUNTER — HOSPITAL ENCOUNTER (EMERGENCY)
Facility: HOSPITAL | Age: 73
Discharge: HOME | End: 2023-12-15
Attending: EMERGENCY MEDICINE
Payer: MEDICARE

## 2023-12-15 VITALS
DIASTOLIC BLOOD PRESSURE: 62 MMHG | BODY MASS INDEX: 32.28 KG/M2 | HEART RATE: 60 BPM | TEMPERATURE: 98 F | OXYGEN SATURATION: 97 % | HEIGHT: 70 IN | SYSTOLIC BLOOD PRESSURE: 98 MMHG | RESPIRATION RATE: 18 BRPM | WEIGHT: 225.5 LBS

## 2023-12-15 VITALS
BODY MASS INDEX: 31.5 KG/M2 | SYSTOLIC BLOOD PRESSURE: 140 MMHG | TEMPERATURE: 98 F | OXYGEN SATURATION: 95 % | HEIGHT: 71 IN | RESPIRATION RATE: 18 BRPM | DIASTOLIC BLOOD PRESSURE: 74 MMHG | WEIGHT: 225 LBS | HEART RATE: 60 BPM

## 2023-12-15 DIAGNOSIS — E21.3 HYPERPARATHYROIDISM: ICD-10-CM
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
## 2023-12-15 DIAGNOSIS — S59.912A FOREARM INJURY, LEFT, INITIAL ENCOUNTER: ICD-10-CM

## 2023-12-15 DIAGNOSIS — I10 HYPERTENSION, UNSPECIFIED TYPE: ICD-10-CM

## 2023-12-15 DIAGNOSIS — D69.6 THROMBOCYTOPENIA, UNSPECIFIED: ICD-10-CM

## 2023-12-15 DIAGNOSIS — N18.4 CKD (CHRONIC KIDNEY DISEASE), STAGE IV: ICD-10-CM

## 2023-12-15 DIAGNOSIS — I77.0 A-V FISTULA: ICD-10-CM

## 2023-12-15 LAB
ALBUMIN SERPL BCP-MCNC: 3.6 G/DL (ref 3.5–5.2)
ALP SERPL-CCNC: 81 U/L (ref 55–135)
ALT SERPL W/O P-5'-P-CCNC: 12 U/L (ref 10–44)
ANION GAP SERPL CALC-SCNC: 7 MMOL/L (ref 8–16)
AST SERPL-CCNC: 22 U/L (ref 10–40)
BASOPHILS # BLD AUTO: 0.05 K/UL (ref 0–0.2)
BASOPHILS NFR BLD: 0.5 % (ref 0–1.9)
BILIRUB SERPL-MCNC: 1.1 MG/DL (ref 0.1–1)
BUN SERPL-MCNC: 45 MG/DL (ref 8–23)
CALCIUM SERPL-MCNC: 8.8 MG/DL (ref 8.7–10.5)
CHLORIDE SERPL-SCNC: 109 MMOL/L (ref 95–110)
CO2 SERPL-SCNC: 22 MMOL/L (ref 23–29)
CREAT SERPL-MCNC: 2.7 MG/DL (ref 0.5–1.4)
CRP SERPL-MCNC: 4.6 MG/L (ref 0–8.2)
DIFFERENTIAL METHOD: ABNORMAL
EOSINOPHIL # BLD AUTO: 0.3 K/UL (ref 0–0.5)
EOSINOPHIL NFR BLD: 2.5 % (ref 0–8)
ERYTHROCYTE [DISTWIDTH] IN BLOOD BY AUTOMATED COUNT: 13.2 % (ref 11.5–14.5)
ERYTHROCYTE [SEDIMENTATION RATE] IN BLOOD BY WESTERGREN METHOD: 10 MM/HR (ref 0–10)
EST. GFR  (NO RACE VARIABLE): 24 ML/MIN/1.73 M^2
GLUCOSE SERPL-MCNC: 93 MG/DL (ref 70–110)
HCT VFR BLD AUTO: 40.6 % (ref 40–54)
HGB BLD-MCNC: 13.1 G/DL (ref 14–18)
IMM GRANULOCYTES # BLD AUTO: 0.02 K/UL (ref 0–0.04)
IMM GRANULOCYTES NFR BLD AUTO: 0.2 % (ref 0–0.5)
INR PPP: 2.7 (ref 0.8–1.2)
LACTATE SERPL-SCNC: 0.7 MMOL/L (ref 0.5–2.2)
LYMPHOCYTES # BLD AUTO: 1.4 K/UL (ref 1–4.8)
LYMPHOCYTES NFR BLD: 14 % (ref 18–48)
MCH RBC QN AUTO: 28.9 PG (ref 27–31)
MCHC RBC AUTO-ENTMCNC: 32.3 G/DL (ref 32–36)
MCV RBC AUTO: 90 FL (ref 82–98)
MONOCYTES # BLD AUTO: 1 K/UL (ref 0.3–1)
MONOCYTES NFR BLD: 9.4 % (ref 4–15)
NEUTROPHILS # BLD AUTO: 7.4 K/UL (ref 1.8–7.7)
NEUTROPHILS NFR BLD: 73.4 % (ref 38–73)
NRBC BLD-RTO: 0 /100 WBC
PLATELET # BLD AUTO: 160 K/UL (ref 150–450)
PMV BLD AUTO: 12.2 FL (ref 9.2–12.9)
POTASSIUM SERPL-SCNC: 4.5 MMOL/L (ref 3.5–5.1)
PROT SERPL-MCNC: 6.8 G/DL (ref 6–8.4)
PROTHROMBIN TIME: 27 SEC (ref 9–12.5)
RBC # BLD AUTO: 4.53 M/UL (ref 4.6–6.2)
SODIUM SERPL-SCNC: 138 MMOL/L (ref 136–145)
WBC # BLD AUTO: 10.09 K/UL (ref 3.9–12.7)

## 2023-12-15 PROCEDURE — 1125F PR PAIN SEVERITY QUANTIFIED, PAIN PRESENT: ICD-10-PCS | Mod: CPTII,S$GLB,, | Performed by: INTERNAL MEDICINE

## 2023-12-15 PROCEDURE — 3288F FALL RISK ASSESSMENT DOCD: CPT | Mod: CPTII,S$GLB,, | Performed by: INTERNAL MEDICINE

## 2023-12-15 PROCEDURE — 3044F HG A1C LEVEL LT 7.0%: CPT | Mod: CPTII,S$GLB,, | Performed by: INTERNAL MEDICINE

## 2023-12-15 PROCEDURE — 25000003 PHARM REV CODE 250: Performed by: EMERGENCY MEDICINE

## 2023-12-15 PROCEDURE — 85610 PROTHROMBIN TIME: CPT | Performed by: EMERGENCY MEDICINE

## 2023-12-15 PROCEDURE — 99214 OFFICE O/P EST MOD 30 MIN: CPT | Mod: S$GLB,,, | Performed by: INTERNAL MEDICINE

## 2023-12-15 PROCEDURE — 1159F MED LIST DOCD IN RCRD: CPT | Mod: CPTII,S$GLB,, | Performed by: INTERNAL MEDICINE

## 2023-12-15 PROCEDURE — 1160F RVW MEDS BY RX/DR IN RCRD: CPT | Mod: CPTII,S$GLB,, | Performed by: INTERNAL MEDICINE

## 2023-12-15 PROCEDURE — 63600175 PHARM REV CODE 636 W HCPCS: Performed by: EMERGENCY MEDICINE

## 2023-12-15 PROCEDURE — 3078F DIAST BP <80 MM HG: CPT | Mod: CPTII,S$GLB,, | Performed by: INTERNAL MEDICINE

## 2023-12-15 PROCEDURE — 1101F PT FALLS ASSESS-DOCD LE1/YR: CPT | Mod: CPTII,S$GLB,, | Performed by: INTERNAL MEDICINE

## 2023-12-15 PROCEDURE — 86140 C-REACTIVE PROTEIN: CPT | Performed by: EMERGENCY MEDICINE

## 2023-12-15 PROCEDURE — 1111F DSCHRG MED/CURRENT MED MERGE: CPT | Mod: CPTII,S$GLB,, | Performed by: INTERNAL MEDICINE

## 2023-12-15 PROCEDURE — 96365 THER/PROPH/DIAG IV INF INIT: CPT

## 2023-12-15 PROCEDURE — 1160F PR REVIEW ALL MEDS BY PRESCRIBER/CLIN PHARMACIST DOCUMENTED: ICD-10-PCS | Mod: CPTII,S$GLB,, | Performed by: INTERNAL MEDICINE

## 2023-12-15 PROCEDURE — 1111F PR DISCHARGE MEDS RECONCILED W/ CURRENT OUTPATIENT MED LIST: ICD-10-PCS | Mod: CPTII,S$GLB,, | Performed by: INTERNAL MEDICINE

## 2023-12-15 PROCEDURE — 4010F PR ACE/ARB THEARPY RXD/TAKEN: ICD-10-PCS | Mod: CPTII,S$GLB,, | Performed by: INTERNAL MEDICINE

## 2023-12-15 PROCEDURE — 1125F AMNT PAIN NOTED PAIN PRSNT: CPT | Mod: CPTII,S$GLB,, | Performed by: INTERNAL MEDICINE

## 2023-12-15 PROCEDURE — 1159F PR MEDICATION LIST DOCUMENTED IN MEDICAL RECORD: ICD-10-PCS | Mod: CPTII,S$GLB,, | Performed by: INTERNAL MEDICINE

## 2023-12-15 PROCEDURE — 1101F PR PT FALLS ASSESS DOC 0-1 FALLS W/OUT INJ PAST YR: ICD-10-PCS | Mod: CPTII,S$GLB,, | Performed by: INTERNAL MEDICINE

## 2023-12-15 PROCEDURE — 96366 THER/PROPH/DIAG IV INF ADDON: CPT

## 2023-12-15 PROCEDURE — 99999 PR PBB SHADOW E&M-EST. PATIENT-LVL IV: ICD-10-PCS | Mod: PBBFAC,,, | Performed by: INTERNAL MEDICINE

## 2023-12-15 PROCEDURE — 85652 RBC SED RATE AUTOMATED: CPT | Performed by: EMERGENCY MEDICINE

## 2023-12-15 PROCEDURE — 99999 PR PBB SHADOW E&M-EST. PATIENT-LVL IV: CPT | Mod: PBBFAC,,, | Performed by: INTERNAL MEDICINE

## 2023-12-15 PROCEDURE — 99214 PR OFFICE/OUTPT VISIT, EST, LEVL IV, 30-39 MIN: ICD-10-PCS | Mod: S$GLB,,, | Performed by: INTERNAL MEDICINE

## 2023-12-15 PROCEDURE — 85025 COMPLETE CBC W/AUTO DIFF WBC: CPT | Performed by: EMERGENCY MEDICINE

## 2023-12-15 PROCEDURE — 87040 BLOOD CULTURE FOR BACTERIA: CPT | Performed by: EMERGENCY MEDICINE

## 2023-12-15 PROCEDURE — 83605 ASSAY OF LACTIC ACID: CPT | Performed by: EMERGENCY MEDICINE

## 2023-12-15 PROCEDURE — 80053 COMPREHEN METABOLIC PANEL: CPT | Performed by: EMERGENCY MEDICINE

## 2023-12-15 PROCEDURE — 3044F PR MOST RECENT HEMOGLOBIN A1C LEVEL <7.0%: ICD-10-PCS | Mod: CPTII,S$GLB,, | Performed by: INTERNAL MEDICINE

## 2023-12-15 PROCEDURE — 3078F PR MOST RECENT DIASTOLIC BLOOD PRESSURE < 80 MM HG: ICD-10-PCS | Mod: CPTII,S$GLB,, | Performed by: INTERNAL MEDICINE

## 2023-12-15 PROCEDURE — 3074F PR MOST RECENT SYSTOLIC BLOOD PRESSURE < 130 MM HG: ICD-10-PCS | Mod: CPTII,S$GLB,, | Performed by: INTERNAL MEDICINE

## 2023-12-15 PROCEDURE — 3074F SYST BP LT 130 MM HG: CPT | Mod: CPTII,S$GLB,, | Performed by: INTERNAL MEDICINE

## 2023-12-15 PROCEDURE — 99285 EMERGENCY DEPT VISIT HI MDM: CPT | Mod: 25

## 2023-12-15 PROCEDURE — 4010F ACE/ARB THERAPY RXD/TAKEN: CPT | Mod: CPTII,S$GLB,, | Performed by: INTERNAL MEDICINE

## 2023-12-15 PROCEDURE — 3288F PR FALLS RISK ASSESSMENT DOCUMENTED: ICD-10-PCS | Mod: CPTII,S$GLB,, | Performed by: INTERNAL MEDICINE

## 2023-12-15 RX ORDER — CEPHALEXIN 500 MG/1
500 CAPSULE ORAL 4 TIMES DAILY
Qty: 20 CAPSULE | Refills: 0 | Status: SHIPPED | OUTPATIENT
Start: 2023-12-15 | End: 2023-12-15 | Stop reason: ALTCHOICE

## 2023-12-15 RX ORDER — CEPHALEXIN 500 MG/1
500 CAPSULE ORAL 4 TIMES DAILY
Qty: 20 CAPSULE | Refills: 0 | Status: SHIPPED | OUTPATIENT
Start: 2023-12-15 | End: 2023-12-15

## 2023-12-15 RX ORDER — AMOXICILLIN AND CLAVULANATE POTASSIUM 500; 125 MG/1; MG/1
1 TABLET, FILM COATED ORAL 2 TIMES DAILY
Qty: 14 TABLET | Refills: 0 | Status: SHIPPED | OUTPATIENT
Start: 2023-12-15 | End: 2023-12-22

## 2023-12-15 RX ORDER — DOXYCYCLINE 100 MG/1
100 CAPSULE ORAL 2 TIMES DAILY
Qty: 14 CAPSULE | Refills: 0 | Status: SHIPPED | OUTPATIENT
Start: 2023-12-15 | End: 2023-12-15

## 2023-12-15 RX ORDER — DOXYCYCLINE 100 MG/1
100 CAPSULE ORAL 2 TIMES DAILY
Qty: 14 CAPSULE | Refills: 0 | Status: SHIPPED | OUTPATIENT
Start: 2023-12-15 | End: 2023-12-22

## 2023-12-15 RX ADMIN — AMPICILLIN AND SULBACTAM 3 G: 2; 1 INJECTION, POWDER, FOR SOLUTION INTRAVENOUS at 12:12

## 2023-12-15 NOTE — ED TRIAGE NOTES
Pt arrived to ED with c/o animal bite to L forearm. Reports cat biting him yesterday. Redness and swelling noted to site. Denies fever, chills. Reports cat is up to date with shots. Denies any other complaints. NAD.

## 2023-12-15 NOTE — PROGRESS NOTES
CHW - Initial Contact    This Community Health Worker updated and verified the Social Determinant of Health questionnaire with patient  spouse/caregiver at bedside  today.    Pt identified barriers of most importance are: has no needs at this time.   Referrals to community agencies completed with patient/caregiver consent outside of [facility] include: no: none at this time.  Referrals were put through [facility] - no: none at this time.  Support and Services: has support with spouse/caregiver.  Other information discussed the patient needs / wants help with: Updated and verified SDOH with pt and spouse/caregiver at bedside today, pt has no needs at this time. Will follow up in one week to check pt's future needs or possible case closure.    Follow up required: yes.  Follow-up Outreach - Due: 12/21/2023

## 2023-12-15 NOTE — PROGRESS NOTES
"Subjective:       Patient ID: Vivek Lema Jr. is a pleasant 73 y.o. White male patient    Chief Complaint: Animal Bite (Pt states he was bitten by a cat yesterday on left arm, it is swollen)      Patient is a pt I saw last on  06/14/2023, see my last notes.    HPI     Pt with PMH as per list of problems below coming today dye to cat bite.  Of note, went to ED for same on 11/30/2023 and then was seen by Dr.. Damon for the same concern.  As per her notes:  "On 11/30 patient was bit on his fistula site on the left arm by his cat. Had extreme swelling and bleeding. Went to the ED and received azithromycin and zosyn. D/c with Augmentin which he completed. Reports improvement in swelling significantly. Wound has scabbed over. PE with affected area that was calm".  He called yesterday RN on call reporting new bite on L arm. Declined to go to ED even if I reinforces this message.  He comes today for this new cat bite. He states that his cat gets old and may bite more when playing.  He was bitten again probably yesterday morning based on RN on call notes. However states it was last night?  He would like to be prescribed antibiotics.  He is also concerned re: low BP.    Patient Active Problem List   Diagnosis    Hypertension    CKD (chronic kidney disease), stage IV    A-V fistula    Coronary artery disease    History of atrial fibrillation    Pacemaker    Nonrheumatic mitral valve regurgitation    Class 1 obesity due to excess calories with serious comorbidity and body mass index (BMI) of 32.0 to 32.9 in adult    Gout    Long term (current) use of anticoagulants    COVID    PAF (paroxysmal atrial fibrillation)    Exudative age-related macular degeneration of left eye with active choroidal neovascularization    Intermediate stage dry age-related macular degeneration of right eye    Epiretinal membrane, right    Age-related nuclear cataract of both eyes    Pseudophakia of both eyes    History of prostate cancer    " "Anxiety and depression    Nonintractable headache    Choroidal neovascularization, left eye    Epiretinal membrane, left    Cat bite    Hyperparathyroidism    Thrombocytopenia, unspecified          ACTIVE MEDICAL ISSUES:  Documented in Problem List     PAST MEDICAL HISTORY  Documented     PAST SURGICAL HISTORY:  Documented     SOCIAL HISTORY:  Documented     FAMILY HISTORY:  Documented     ALLERGIES AND MEDICATIONS: updated and reviewed.  Documented    Review of Systems   Constitutional:  Negative for fatigue.   HENT: Negative.     Respiratory:  Negative for shortness of breath.    Cardiovascular: Negative.    Gastrointestinal: Negative.    Musculoskeletal: Negative.    Skin: Negative.         Cat bite L forearm   Neurological: Negative.    All other systems reviewed and are negative.      Objective:      Physical Exam  Vitals and nursing note reviewed.   Constitutional:       Appearance: Normal appearance.   Cardiovascular:      Rate and Rhythm: Normal rate and regular rhythm.      Pulses: Normal pulses.      Heart sounds: Normal heart sounds.   Pulmonary:      Effort: Pulmonary effort is normal.      Breath sounds: Normal breath sounds.   Musculoskeletal:        Arms:    Neurological:      Mental Status: He is alert.         Vitals:    12/15/23 0943 12/15/23 0950   BP: 98/64 98/62   BP Location: Right arm Right arm   Patient Position: Sitting    BP Method: Small (Manual)    Pulse: 60    Resp: 18    Temp: 97.9 °F (36.6 °C)    TempSrc: Oral    SpO2: 97%    Weight: 102.3 kg (225 lb 8.5 oz)    Height: 5' 10" (1.778 m)      Body mass index is 32.36 kg/m².    RESULTS: Reviewed labs from last 12 months    Last Lab Results:     Lab Results   Component Value Date    WBC 10.33 12/01/2023    HGB 14.5 12/01/2023    HCT 42.7 12/01/2023     (L) 12/01/2023     12/01/2023    K 3.3 (L) 12/01/2023     12/01/2023    CO2 21 (L) 12/01/2023    BUN 43 (H) 12/01/2023    CREATININE 2.5 (H) 12/01/2023    CALCIUM 9.0 " 12/01/2023    ALBUMIN 3.5 12/01/2023    AST 22 12/01/2023    ALT 12 12/01/2023    CHOL 154 02/02/2022    TRIG 171 (H) 02/02/2022    HDL 33 (L) 02/02/2022    LDLCALC 86.8 02/02/2022    HGBA1C 5.2 09/19/2023    TSH 2.381 09/19/2023    PSA <0.01 06/14/2023       Assessment:       1. Cat bite, initial encounter    2. Hypertension, unspecified type    3. CKD (chronic kidney disease), stage IV    4. A-V fistula    5. Hyperparathyroidism    6. Thrombocytopenia, unspecified        Plan:   Vivek was seen today for animal bite.    Diagnoses and all orders for this visit:    Cat bite, initial encounter    Concerning due to rapid spread of redness, induration and warmth. I am concerned due to this and prefer to have the pt go to the ED as may need IV antibiotics. AV fistula is not so far from the new bite. Pt on blood thinners. HIGH RISK PT.     Hypertension, unspecified type    BP low today? Also a concern due to above.     CKD (chronic kidney disease), stage IV    Same.    A-V fistula    Hyperparathyroidism    In the context of the CKD.    Thrombocytopenia, unspecified    As per most recent blood work, monitoring.       No follow-ups on file.    This note was created by combination of typed  and M-Modal dictation.  Transcription errors may be present.  If there are any questions, please contact me.

## 2023-12-15 NOTE — ED PROVIDER NOTES
Encounter Date: 12/15/2023    SCRIBE #1 NOTE: I, Tk Hinojosa, am scribing for, and in the presence of,  Stephane Daiz MD. I have scribed the following portions of the note - Other sections scribed: HPI, ROS.       History     Chief Complaint   Patient presents with    Animal Bite     Patient reports bite to left forearm, next to dialysis site, is not currently on dialysis, area is red and warm tender to touch, was also to to have BP checked due to being low at PCP office today, denies symptoms     Vivek Lema Jr. is a 73 y.o. male, with a past medical history of HTN, who presents to the ED with animal bite. Patient reports bite to left forearm yesterday, next to his dialysis site. Patient says he is experiencing some pain on his left arm. Patient says he is currently not on dialysis. Patient says he has heart problems such as a leaky valve, artificial valve, and a pacemaker. No other exacerbating or alleviating factors. Patient denies fever, chills, or other associated symptoms.       The history is provided by the patient. No  was used.     Review of patient's allergies indicates:  No Known Allergies  Past Medical History:   Diagnosis Date    A-fib     CAD (coronary artery disease)     CKD (chronic kidney disease)     Disorder of kidney and ureter     HTN (hypertension)     Macular degeneration     Mitral regurgitation     Pacemaker      Past Surgical History:   Procedure Laterality Date    AV FISTULA PLACEMENT Left     CATARACT EXTRACTION      COLONOSCOPY      COLONOSCOPY N/A 01/17/2023    Procedure: COLONOSCOPY;  Surgeon: Todd Oviedo MD;  Location: [facility] ([address] [place] [address]);  Service: Endoscopy;  Laterality: N/A;  from referral / Pt with trouble clearing secretions at times - 2nd floor / prep ins. mailed and reviewed with Pt and Pt wife/ Pt reports last colon in [place], Hx polyps / Coumadin - per coumadin clinic ok to hold coumadin x 5 days prior- see telephone encounter dated  11/2/22 / pa    CORONARY ARTERY BYPASS GRAFT      with valve repair (aortic?)     Family History   Problem Relation Age of Onset    Amblyopia Neg Hx     Blindness Neg Hx     Cataracts Neg Hx     Glaucoma Neg Hx     Macular degeneration Neg Hx     Retinal detachment Neg Hx     Strabismus Neg Hx      Social History     Tobacco Use    Smoking status: Never    Smokeless tobacco: Never   Substance Use Topics    Alcohol use: Yes     Alcohol/week: 6.0 standard drinks of alcohol     Types: 6 Cans of beer per week     Comment: occ    Drug use: Never     Review of Systems   Constitutional: Negative.    HENT: Negative.     Eyes: Negative.    Respiratory: Negative.     Cardiovascular: Negative.    Gastrointestinal: Negative.    Genitourinary: Negative.    Musculoskeletal:  Positive for myalgias.   Skin: Negative.    Neurological: Negative.        Physical Exam     Initial Vitals   BP Pulse Resp Temp SpO2   12/15/23 1114 12/15/23 1114 12/15/23 1114 12/15/23 1116 12/15/23 1114   94/60 60 18 98.3 °F (36.8 °C) 97 %      MAP       --                Physical Exam    Nursing note and vitals reviewed.  Constitutional: He appears well-developed and well-nourished. He is not diaphoretic. No distress.   HENT:   Head: Normocephalic and atraumatic.   Nose: Nose normal.   Eyes: EOM are normal. Pupils are equal, round, and reactive to light.   Neck: Neck supple. No tracheal deviation present. No JVD present.   Normal range of motion.  Cardiovascular:  Normal rate and regular rhythm.           Pulmonary/Chest: Breath sounds normal. No respiratory distress. He has no wheezes. He has no rhonchi. He has no rales.   Abdominal: Abdomen is soft. Bowel sounds are normal. He exhibits no distension. There is no abdominal tenderness. There is no rebound and no guarding.   Musculoskeletal:         General: Tenderness and edema present.      Cervical back: Normal range of motion and neck supple.      Comments: Left forearm AV fistula with palpable thrill,  some noted scab overlying this distal portion, more proximally over the mid volar forearm patient has evidence of 2 punctate marks consistent with a bite, mild ecchymosis surrounding this and some spreading erythema.  Slight induration, no crepitus or fluctuance present.  He has no axillary lymphadenopathy.     Neurological: He is alert and oriented to person, place, and time. He has normal strength.   Skin: Skin is warm and dry. Capillary refill takes less than 2 seconds. No rash noted. No erythema.         ED Course   Procedures  Labs Reviewed   CBC W/ AUTO DIFFERENTIAL - Abnormal; Notable for the following components:       Result Value    RBC 4.53 (*)     Hemoglobin 13.1 (*)     Gran % 73.4 (*)     Lymph % 14.0 (*)     All other components within normal limits   COMPREHENSIVE METABOLIC PANEL - Abnormal; Notable for the following components:    CO2 22 (*)     BUN 45 (*)     Creatinine 2.7 (*)     Total Bilirubin 1.1 (*)     eGFR 24 (*)     Anion Gap 7 (*)     All other components within normal limits   PROTIME-INR - Abnormal; Notable for the following components:    Prothrombin Time 27.0 (*)     INR 2.7 (*)     All other components within normal limits   CULTURE, BLOOD   CULTURE, BLOOD   LACTIC ACID, PLASMA   C-REACTIVE PROTEIN   SEDIMENTATION RATE          Imaging Results              US Extremity Non Vascular Complete Left (Final result)  Result time 12/15/23 15:18:56      Final result by Nelson Magaña MD (12/15/23 15:18:56)                   Impression:      No focal sonographic abnormality.  See above comments.      Electronically signed by: Nelson Magaña  Date:    12/15/2023  Time:    15:18               Narrative:    EXAMINATION:  US EXTREMITY NON VASCULAR COMPLETE LEFT    CLINICAL HISTORY:  Unspecified injury of left forearm, initial encounter    TECHNIQUE:  Limited left upper extremity ultrasound.  Grayscale and color flow images are submitted of the left lower  arm.    COMPARISON:  None    FINDINGS:  No evidence of abscess or hematoma.  Mild superficial edema of the left forearm.    Incidental note made of a patent AV fistula with color flow noted.    No mass is detected.                                       X-Ray Forearm Left (Final result)  Result time 12/15/23 12:25:11      Final result by Noé Saba MD (12/15/23 12:25:11)                   Impression:      Left forearm diffuse nonspecific soft tissue swelling from edema or cellulitis with suspected superimposed soft tissue swelling/contusion particularly along the ulnar and ventral aspect.    No acute displaced fracture-dislocation identified.      Electronically signed by: Noé Saba MD  Date:    12/15/2023  Time:    12:25               Narrative:    EXAMINATION:  XR FOREARM LEFT    CLINICAL HISTORY:  Unspecified injury of left forearm, initial encounter    TECHNIQUE:  AP and lateral views of the left forearm were performed.    COMPARISON:  None    FINDINGS:  There is diffuse nonspecific soft tissue swelling with subcutaneous stranding about the left forearm most prominent along the ulnar and ventral aspect.  Surgical clips about the distal radial shaft.  Scattered atherosclerotic vascular calcifications noted.  No subcutaneous emphysema.    Overall alignment is within normal limits.  No displaced fracture, dislocation or destructive osseous process.  Baseline minimal DJD.                                       Medications   ampicillin-sulbactam (UNASYN) 3 g in sodium chloride 0.9 % 100 mL IVPB (MB+) (0 g Intravenous Stopped 12/15/23 1534)     Medical Decision Making  Amount and/or Complexity of Data Reviewed  Labs: ordered.  Radiology: ordered.    Risk  Prescription drug management.      MDM:    73-year-old male with past medical history as noted above presenting after animal bite.  Physical exam as noted above.  ED workup notable for INR 2.7, CBC with hemoglobin 13.1, CMP with creatinine 2.7, BUN 45, lactate  0.7, blood cultures pending, CRP 4.6, ESR 10, ultrasound shows mild superficial edema of the left forearm no evidence of abscess or hematoma patent AV fistula, x-ray of left forearm shows no acute fracture or foreign body with surgical clips as noted above.  Differential Diagnosis includes:  Infected AV fistula, sepsis, abscess, foreign body.  Patient presentation consistent with cat bite with evidence of mild forearm cellulitis, given Unasyn here, discussed with patient desire to place him in the hospital overnight given the proximity and some mild overlying cellulitis extending towards his graft site.  Vascular evaluated currently also recommends the same thing admission for IV antibiotics and further observation patient however declines after multiple attempts will continue oral antibiotics, strong ED return precautions discussed, all questions were answered at this time with wife at bedside.  Patient discharged home improved and stable.        Note was created using voice recognition software. Note may have occasional typographical or grammatical errors, garbled syntax, and other bizarre constructions that may not have been identified and edited despite good ada initial review prior to signing.               Scribe Attestation:   Scribe #1: I performed the above scribed service and the documentation accurately describes the services I performed. I attest to the accuracy of the note.                               Clinical Impression:  Final diagnoses:  [S59.912A] Forearm injury, left, initial encounter  [W55.01XA] Cat bite, initial encounter (Primary)          ED Disposition Condition    Discharge Stable          ED Prescriptions       Medication Sig Dispense Start Date End Date Auth. Provider    doxycycline (VIBRAMYCIN) 100 MG Cap  (Status: Discontinued) Take 1 capsule (100 mg total) by mouth 2 (two) times daily. for 7 days 14 capsule 12/15/2023 12/15/2023 Stephane Diaz MD    cephALEXin (KEFLEX) 500 MG  capsule  (Status: Discontinued) Take 1 capsule (500 mg total) by mouth 4 (four) times daily. for 5 days 20 capsule 12/15/2023 12/15/2023 Stephane Diaz MD    cephALEXin (KEFLEX) 500 MG capsule  (Status: Discontinued) Take 1 capsule (500 mg total) by mouth 4 (four) times daily. for 5 days 20 capsule 12/15/2023 12/15/2023 Stephane Diaz MD    doxycycline (VIBRAMYCIN) 100 MG Cap Take 1 capsule (100 mg total) by mouth 2 (two) times daily. for 7 days 14 capsule 12/15/2023 12/22/2023 Stephane Diaz MD    amoxicillin-clavulanate 500-125mg (AUGMENTIN) 500-125 mg Tab Take 1 tablet (500 mg total) by mouth 2 (two) times daily. for 7 days 14 tablet 12/15/2023 12/22/2023 Stephane Diaz MD          Follow-up Information       Follow up With Specialties Details Why Contact [facility] Emergency Dept Emergency Medicine Go to  If symptoms worsen [address] [facility] [place] [address] [phone]    Michelle Conway MD Family Medicine, Internal Medicine Schedule an appointment as soon as possible for a visit in 3 days  [facility] [place] [address]  [phone]          IStephane M.D., personally performed the services described in this documentation. All medical record entries made by the scribe were at my direction and in my presence. I have reviewed the chart and agree that the record reflects my personal performance and is accurate and complete.       Stephane Diaz MD  12/16/23 3701

## 2023-12-19 ENCOUNTER — OFFICE VISIT (OUTPATIENT)
Dept: FAMILY MEDICINE | Facility: CLINIC | Age: 73
End: 2023-12-19
Payer: MEDICARE

## 2023-12-19 VITALS
HEIGHT: 71 IN | OXYGEN SATURATION: 97 % | RESPIRATION RATE: 16 BRPM | DIASTOLIC BLOOD PRESSURE: 64 MMHG | SYSTOLIC BLOOD PRESSURE: 110 MMHG | HEART RATE: 63 BPM | WEIGHT: 231.69 LBS | TEMPERATURE: 98 F | BODY MASS INDEX: 32.44 KG/M2

## 2023-12-19 DIAGNOSIS — N18.4 CKD (CHRONIC KIDNEY DISEASE), STAGE IV: ICD-10-CM

## 2023-12-19 DIAGNOSIS — Z95.0 PACEMAKER: ICD-10-CM

## 2023-12-19 DIAGNOSIS — I10 HYPERTENSION, UNSPECIFIED TYPE: ICD-10-CM

## 2023-12-19 DIAGNOSIS — I48.0 PAF (PAROXYSMAL ATRIAL FIBRILLATION): ICD-10-CM

## 2023-12-19 DIAGNOSIS — I77.0 A-V FISTULA: ICD-10-CM
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
## 2023-12-19 LAB
BACTERIA BLD CULT: NORMAL
BACTERIA BLD CULT: NORMAL

## 2023-12-19 PROCEDURE — 1101F PT FALLS ASSESS-DOCD LE1/YR: CPT | Mod: CPTII,S$GLB,, | Performed by: INTERNAL MEDICINE

## 2023-12-19 PROCEDURE — 99999 PR PBB SHADOW E&M-EST. PATIENT-LVL V: CPT | Mod: PBBFAC,,, | Performed by: INTERNAL MEDICINE

## 2023-12-19 PROCEDURE — 3078F PR MOST RECENT DIASTOLIC BLOOD PRESSURE < 80 MM HG: ICD-10-PCS | Mod: CPTII,S$GLB,, | Performed by: INTERNAL MEDICINE

## 2023-12-19 PROCEDURE — 3044F HG A1C LEVEL LT 7.0%: CPT | Mod: CPTII,S$GLB,, | Performed by: INTERNAL MEDICINE

## 2023-12-19 PROCEDURE — 3044F PR MOST RECENT HEMOGLOBIN A1C LEVEL <7.0%: ICD-10-PCS | Mod: CPTII,S$GLB,, | Performed by: INTERNAL MEDICINE

## 2023-12-19 PROCEDURE — 1101F PR PT FALLS ASSESS DOC 0-1 FALLS W/OUT INJ PAST YR: ICD-10-PCS | Mod: CPTII,S$GLB,, | Performed by: INTERNAL MEDICINE

## 2023-12-19 PROCEDURE — 99214 OFFICE O/P EST MOD 30 MIN: CPT | Mod: S$GLB,,, | Performed by: INTERNAL MEDICINE

## 2023-12-19 PROCEDURE — 3288F PR FALLS RISK ASSESSMENT DOCUMENTED: ICD-10-PCS | Mod: CPTII,S$GLB,, | Performed by: INTERNAL MEDICINE

## 2023-12-19 PROCEDURE — 99214 PR OFFICE/OUTPT VISIT, EST, LEVL IV, 30-39 MIN: ICD-10-PCS | Mod: S$GLB,,, | Performed by: INTERNAL MEDICINE

## 2023-12-19 PROCEDURE — 1159F PR MEDICATION LIST DOCUMENTED IN MEDICAL RECORD: ICD-10-PCS | Mod: CPTII,S$GLB,, | Performed by: INTERNAL MEDICINE

## 2023-12-19 PROCEDURE — 3288F FALL RISK ASSESSMENT DOCD: CPT | Mod: CPTII,S$GLB,, | Performed by: INTERNAL MEDICINE

## 2023-12-19 PROCEDURE — 4010F ACE/ARB THERAPY RXD/TAKEN: CPT | Mod: CPTII,S$GLB,, | Performed by: INTERNAL MEDICINE

## 2023-12-19 PROCEDURE — 1111F DSCHRG MED/CURRENT MED MERGE: CPT | Mod: CPTII,S$GLB,, | Performed by: INTERNAL MEDICINE

## 2023-12-19 PROCEDURE — 3074F PR MOST RECENT SYSTOLIC BLOOD PRESSURE < 130 MM HG: ICD-10-PCS | Mod: CPTII,S$GLB,, | Performed by: INTERNAL MEDICINE

## 2023-12-19 PROCEDURE — 1160F RVW MEDS BY RX/DR IN RCRD: CPT | Mod: CPTII,S$GLB,, | Performed by: INTERNAL MEDICINE

## 2023-12-19 PROCEDURE — 3078F DIAST BP <80 MM HG: CPT | Mod: CPTII,S$GLB,, | Performed by: INTERNAL MEDICINE

## 2023-12-19 PROCEDURE — 1126F AMNT PAIN NOTED NONE PRSNT: CPT | Mod: CPTII,S$GLB,, | Performed by: INTERNAL MEDICINE

## 2023-12-19 PROCEDURE — 4010F PR ACE/ARB THEARPY RXD/TAKEN: ICD-10-PCS | Mod: CPTII,S$GLB,, | Performed by: INTERNAL MEDICINE

## 2023-12-19 PROCEDURE — 1111F PR DISCHARGE MEDS RECONCILED W/ CURRENT OUTPATIENT MED LIST: ICD-10-PCS | Mod: CPTII,S$GLB,, | Performed by: INTERNAL MEDICINE

## 2023-12-19 PROCEDURE — 1159F MED LIST DOCD IN RCRD: CPT | Mod: CPTII,S$GLB,, | Performed by: INTERNAL MEDICINE

## 2023-12-19 PROCEDURE — 99999 PR PBB SHADOW E&M-EST. PATIENT-LVL V: ICD-10-PCS | Mod: PBBFAC,,, | Performed by: INTERNAL MEDICINE

## 2023-12-19 PROCEDURE — 1160F PR REVIEW ALL MEDS BY PRESCRIBER/CLIN PHARMACIST DOCUMENTED: ICD-10-PCS | Mod: CPTII,S$GLB,, | Performed by: INTERNAL MEDICINE

## 2023-12-19 PROCEDURE — 3074F SYST BP LT 130 MM HG: CPT | Mod: CPTII,S$GLB,, | Performed by: INTERNAL MEDICINE

## 2023-12-19 PROCEDURE — 1126F PR PAIN SEVERITY QUANTIFIED, NO PAIN PRESENT: ICD-10-PCS | Mod: CPTII,S$GLB,, | Performed by: INTERNAL MEDICINE

## 2023-12-19 NOTE — PROGRESS NOTES
Subjective:       Patient ID: Vivek Lema Jr. is a pleasant 73 y.o. White male patient    Chief Complaint: Follow-up (Cat bite)      Patient is a pt I saw last on 12/15/2023, see my last notes.     HPI    Pt with long and complicated PMH who comes today for a regular f-up visit.  See my last notes.  At last visit, I sent him to the ED due to a new cat bite on L lower arm that was not looking good (second one in less than 2 weeks).  See notes from ED, pt declined to be admitted even though he was advised several times at this regard.   He reports feeling better now, still taking both antibiotics.  He has less swelling and pain.       Patient Active Problem List   Diagnosis    Hypertension    CKD (chronic kidney disease), stage IV    A-V fistula    Coronary artery disease    History of atrial fibrillation    Pacemaker    Nonrheumatic mitral valve regurgitation    Class 1 obesity due to excess calories with serious comorbidity and body mass index (BMI) of 32.0 to 32.9 in adult    Gout    Long term (current) use of anticoagulants    COVID    PAF (paroxysmal atrial fibrillation)    Exudative age-related macular degeneration of left eye with active choroidal neovascularization    Intermediate stage dry age-related macular degeneration of right eye    Epiretinal membrane, right    Age-related nuclear cataract of both eyes    Pseudophakia of both eyes    History of prostate cancer    Anxiety and depression    Nonintractable headache    Choroidal neovascularization, left eye    Epiretinal membrane, left    Cat bite    Hyperparathyroidism    Thrombocytopenia, unspecified          ACTIVE MEDICAL ISSUES:  Documented in Problem List     PAST MEDICAL HISTORY  Documented     PAST SURGICAL HISTORY:  Documented     SOCIAL HISTORY:  Documented     FAMILY HISTORY:  Documented     ALLERGIES AND MEDICATIONS: updated and reviewed.  Documented    Review of Systems   Constitutional:  Negative for fatigue.   HENT: Negative.    "  Respiratory: Negative.  Negative for shortness of breath.    Cardiovascular: Negative.    Gastrointestinal: Negative.    Musculoskeletal: Negative.    Skin: Negative.         Cat bite L lower arm x 2   Neurological: Negative.    All other systems reviewed and are negative.      Objective:      Physical Exam  Constitutional:       Appearance: Normal appearance.   Cardiovascular:      Rate and Rhythm: Normal rate and regular rhythm.      Pulses: Normal pulses.      Heart sounds: Normal heart sounds.   Pulmonary:      Effort: Pulmonary effort is normal.      Breath sounds: Normal breath sounds.   Musculoskeletal:         General: No swelling or tenderness. Normal range of motion.   Skin:     General: Skin is warm and dry.      Comments: Swelling and redness better on L forearm. See pics in media. Scab on AV fistula calm with good thrill, area that is indurated in regard of recent bite, no fluctuation.   Neurological:      General: No focal deficit present.      Mental Status: He is alert.         Vitals:    12/19/23 1419   BP: 110/64   BP Location: Right arm   Patient Position: Sitting   BP Method: Large (Manual)   Pulse: 63   Resp: 16   Temp: 98 °F (36.7 °C)   TempSrc: Oral   SpO2: 97%   Weight: 105.1 kg (231 lb 11.3 oz)   Height: 5' 11" (1.803 m)     Body mass index is 32.32 kg/m².    RESULTS: Reviewed labs from last 12 months    Last Lab Results:     Lab Results   Component Value Date    WBC 10.09 12/15/2023    HGB 13.1 (L) 12/15/2023    HCT 40.6 12/15/2023     12/15/2023     12/15/2023    K 4.5 12/15/2023     12/15/2023    CO2 22 (L) 12/15/2023    BUN 45 (H) 12/15/2023    CREATININE 2.7 (H) 12/15/2023    CALCIUM 8.8 12/15/2023    ALBUMIN 3.6 12/15/2023    AST 22 12/15/2023    ALT 12 12/15/2023    CHOL 154 02/02/2022    TRIG 171 (H) 02/02/2022    HDL 33 (L) 02/02/2022    LDLCALC 86.8 02/02/2022    HGBA1C 5.2 09/19/2023    TSH 2.381 09/19/2023    PSA <0.01 06/14/2023       Assessment:       1. " Cat bite, subsequent encounter    2. Hypertension, unspecified type    3. CKD (chronic kidney disease), stage IV    4. A-V fistula    5. PAF (paroxysmal atrial fibrillation)    6. Pacemaker        Plan:   Vivek was seen today for follow-up.    Diagnoses and all orders for this visit:    Cat bite, subsequent encounter    See HPI, looks better but still not totally fine. See notes from ED. Advised to take antibiotics as directed, to monitor area. Pt declined admission at last cat bite. Strongly advised to be careful with the cat!!!! Very risky as first bite was on the AV fistular area.     Hypertension, unspecified type    BP at goal today. Same treatment.    CKD (chronic kidney disease), stage IV    Close monitoring. Had fistula created in [place]. Good thrill. No NSAIDs.    A-V fistula    PAF (paroxysmal atrial fibrillation)    On blood thinners.    Pacemaker      Follow up in about 3 months (around 3/19/2024) for f-up.    This note was created by combination of typed  and M-Modal dictation.  Transcription errors may be present.  If there are any questions, please contact me.

## 2024-01-03 ENCOUNTER — ANTI-COAG VISIT (OUTPATIENT)
Dept: CARDIOLOGY | Facility: CLINIC | Age: 74
End: 2024-01-03
Payer: MEDICARE

## 2024-01-03 ENCOUNTER — LAB VISIT (OUTPATIENT)
Dept: LAB | Facility: HOSPITAL | Age: 74
End: 2024-01-03
Attending: INTERNAL MEDICINE
Payer: MEDICARE

## 2024-01-03 ENCOUNTER — TELEPHONE (OUTPATIENT)
Dept: FAMILY MEDICINE | Facility: CLINIC | Age: 74
End: 2024-01-03
Payer: MEDICARE

## 2024-01-03 DIAGNOSIS — Z79.01 LONG TERM (CURRENT) USE OF ANTICOAGULANTS: ICD-10-CM

## 2024-01-03 DIAGNOSIS — Z86.79 HISTORY OF ATRIAL FIBRILLATION: Primary | ICD-10-CM

## 2024-01-03 DIAGNOSIS — Z86.79 HISTORY OF ATRIAL FIBRILLATION: ICD-10-CM

## 2024-01-03 LAB
INR PPP: 2 (ref 0.8–1.2)
PROTHROMBIN TIME: 20.5 SEC (ref 9–12.5)

## 2024-01-03 PROCEDURE — 93793 ANTICOAG MGMT PT WARFARIN: CPT | Mod: S$GLB,,,

## 2024-01-03 PROCEDURE — 36415 COLL VENOUS BLD VENIPUNCTURE: CPT | Mod: PO | Performed by: INTERNAL MEDICINE

## 2024-01-03 PROCEDURE — 85610 PROTHROMBIN TIME: CPT | Performed by: INTERNAL MEDICINE

## 2024-01-03 NOTE — TELEPHONE ENCOUNTER
"Spoke with  and let her know to reach out to the [facility] to receive the results for     ----- Message from Viji Hugo sent at 1/3/2024  2:17 PM CST -----  Regarding: missed call  Contact: [phone]  Name Of Caller: Mere     Contact Preference?:  [phone]     What is the nature of the call?: returning a missed call and believes it's from this clinic about his results     Additional Notes:    "Thank you for all that you do for our patients"        "

## 2024-01-03 NOTE — PROGRESS NOTES
[facility]    2024 2:59 PM    Assessment/Plan:    Patient presents today with therapeutic INR.    Assessment of patient findings and chart review: INR at goal; since last INR patient was seen in office and ED for cat bite and received IV antibiotics and was d/c with doxycycline and amox/clav. We were not notified.    Recommendation for patient's warfarin regimen: Continue current maintenance dose    Recommend repeat INR in 4 weeks  _________________________________________________________________    Vivek Lema Jr. (73 y.o.) is followed by the [facility].    Anticoagulation Summary  As of 1/3/2024      INR goal:  2.0-3.0   TTR:  77.1 % (1.9 y)   INR used for dosin.0 (1/3/2024)   Warfarin maintenance plan:  4 mg (2 mg x 2) every Thu, Sat; 2 mg (2 mg x 1) all other days   Weekly warfarin total:  18 mg   Plan last modified:  Mihaela Ortega, PharmD (2023)   Next INR check:     Target end date:      Indications    History of atrial fibrillation [Z86.79]  Long term (current) use of anticoagulants [Z79.01]                 Anticoagulation Episode Summary       INR check location:      Preferred lab:      Send INR reminders to:  [facility] COUMADIN MONITORING POOL    Comments:  [place] only  and Hospital Lab on           Anticoagulation Care Providers       Provider Role Specialty Phone number    Michelle Conway MD [facility] [phone]

## 2024-01-13 DIAGNOSIS — Z86.79 HISTORY OF ATRIAL FIBRILLATION: ICD-10-CM

## 2024-01-17 RX ORDER — WARFARIN 2 MG/1
TABLET ORAL
Qty: 128 TABLET | Refills: 1 | Status: SHIPPED | OUTPATIENT
Start: 2024-01-17

## 2024-01-18 ENCOUNTER — PATIENT OUTREACH (OUTPATIENT)
Dept: ADMINISTRATIVE | Facility: OTHER | Age: 74
End: 2024-01-18

## 2024-01-18 NOTE — PROGRESS NOTES
CHW - Outreach Attempt    Community Health Worker left a voicemail message for 1st attempt to contact patient regardinst missed follow up visit attempt call.

## 2024-01-30 ENCOUNTER — ANTI-COAG VISIT (OUTPATIENT)
Dept: CARDIOLOGY | Facility: CLINIC | Age: 74
End: 2024-01-30
Payer: MEDICARE

## 2024-01-30 ENCOUNTER — LAB VISIT (OUTPATIENT)
Dept: LAB | Facility: HOSPITAL | Age: 74
End: 2024-01-30
Attending: INTERNAL MEDICINE
Payer: MEDICARE

## 2024-01-30 DIAGNOSIS — Z86.79 HISTORY OF ATRIAL FIBRILLATION: Primary | ICD-10-CM

## 2024-01-30 DIAGNOSIS — Z86.79 HISTORY OF ATRIAL FIBRILLATION: ICD-10-CM

## 2024-01-30 DIAGNOSIS — Z79.01 LONG TERM (CURRENT) USE OF ANTICOAGULANTS: ICD-10-CM

## 2024-01-30 LAB
INR PPP: 2.5 (ref 0.8–1.2)
PROTHROMBIN TIME: 26.3 SEC (ref 9–12.5)

## 2024-01-30 PROCEDURE — 93793 ANTICOAG MGMT PT WARFARIN: CPT | Mod: S$GLB,,,

## 2024-01-30 PROCEDURE — 85610 PROTHROMBIN TIME: CPT | Performed by: INTERNAL MEDICINE

## 2024-01-30 PROCEDURE — 36415 COLL VENOUS BLD VENIPUNCTURE: CPT | Mod: PO | Performed by: INTERNAL MEDICINE

## 2024-01-30 NOTE — PROGRESS NOTES
[facility]    2024 3:11 PM    Assessment/Plan:    Patient presents today with therapeutic INR.    Assessment of patient findings and chart review: no significant findings     Recommendation for patient's warfarin regimen: Continue current maintenance dose    Recommend repeat INR in 4 weeks  _________________________________________________________________    Vivek Lema Jr. (73 y.o.) is followed by the [facility].    Anticoagulation Summary  As of 2024      INR goal:  2.0-3.0   TTR:  78.0 % (2 y)   INR used for dosin.5 (2024)   Warfarin maintenance plan:  4 mg (2 mg x 2) every Thu, Sat; 2 mg (2 mg x 1) all other days   Weekly warfarin total:  18 mg   Plan last modified:  Mihaela Ortega, PharmD (2023)   Next INR check:  2024   Target end date:      Indications    History of atrial fibrillation [Z86.79]  Long term (current) use of anticoagulants [Z79.01]                 Anticoagulation Episode Summary       INR check location:      Preferred lab:      Send INR reminders to:  [facility] COUMADIN MONITORING POOL    Comments:  [place] only  and Hospital Lab on           Anticoagulation Care Providers       Provider Role Specialty Phone number    Michelle Conway MD [facility] Medicine [phone]

## 2024-02-29 ENCOUNTER — ANTI-COAG VISIT (OUTPATIENT)
Dept: CARDIOLOGY | Facility: CLINIC | Age: 74
End: 2024-02-29
Payer: MEDICARE

## 2024-02-29 ENCOUNTER — LAB VISIT (OUTPATIENT)
Dept: LAB | Facility: HOSPITAL | Age: 74
End: 2024-02-29
Attending: INTERNAL MEDICINE
Payer: MEDICARE

## 2024-02-29 DIAGNOSIS — Z86.79 HISTORY OF ATRIAL FIBRILLATION: Primary | ICD-10-CM

## 2024-02-29 DIAGNOSIS — Z86.79 HISTORY OF ATRIAL FIBRILLATION: ICD-10-CM

## 2024-02-29 DIAGNOSIS — Z79.01 LONG TERM (CURRENT) USE OF ANTICOAGULANTS: ICD-10-CM

## 2024-02-29 LAB
INR PPP: 2.5 (ref 0.8–1.2)
PROTHROMBIN TIME: 25.4 SEC (ref 9–12.5)

## 2024-02-29 PROCEDURE — 36415 COLL VENOUS BLD VENIPUNCTURE: CPT | Performed by: INTERNAL MEDICINE

## 2024-02-29 PROCEDURE — 93793 ANTICOAG MGMT PT WARFARIN: CPT | Mod: S$GLB,,,

## 2024-02-29 PROCEDURE — 85610 PROTHROMBIN TIME: CPT | Performed by: INTERNAL MEDICINE

## 2024-02-29 NOTE — PROGRESS NOTES
[facility]    2024 10:46 AM    Assessment/Plan:    Patient presents today with therapeutic INR.    Assessment of patient findings and chart review: no significant findings     Recommendation for patient's warfarin regimen: Continue current maintenance dose    Recommend repeat INR in 4 weeks  _________________________________________________________________    Vivek Lema Jr. (74 y.o.) is followed by the [facility].    Anticoagulation Summary  As of 2024      INR goal:  2.0-3.0   TTR:  78.9 % (2 y)   INR used for dosin.5 (2024)   Warfarin maintenance plan:  4 mg (2 mg x 2) every Thu, Sat; 2 mg (2 mg x 1) all other days   Weekly warfarin total:  18 mg   Plan last modified:  Mihaela Ortega, PharmD (2023)   Next INR check:  3/26/2024   Target end date:      Indications    History of atrial fibrillation [Z86.79]  Long term (current) use of anticoagulants [Z79.01]                 Anticoagulation Episode Summary       INR check location:      Preferred lab:      Send INR reminders to:  [facility] COUMADIN MONITORING POOL    Comments:  [place] only Mon - Thur and Hospital Lab on           Anticoagulation Care Providers       Provider Role Specialty Phone number    Michelle Conway MD [facility] Medicine [phone]

## 2024-03-19 ENCOUNTER — OFFICE VISIT (OUTPATIENT)
Dept: FAMILY MEDICINE | Facility: CLINIC | Age: 74
End: 2024-03-19
Payer: MEDICARE

## 2024-03-19 VITALS
SYSTOLIC BLOOD PRESSURE: 112 MMHG | HEART RATE: 64 BPM | HEIGHT: 71 IN | TEMPERATURE: 98 F | BODY MASS INDEX: 32.16 KG/M2 | DIASTOLIC BLOOD PRESSURE: 62 MMHG | RESPIRATION RATE: 16 BRPM | WEIGHT: 229.75 LBS | OXYGEN SATURATION: 97 %

## 2024-03-19 DIAGNOSIS — L02.32 BOIL OF BUTTOCK: ICD-10-CM

## 2024-03-19 DIAGNOSIS — Z23 NEED FOR IMMUNIZATION AGAINST INFLUENZA: ICD-10-CM

## 2024-03-19 DIAGNOSIS — M10.9 GOUT, UNSPECIFIED CAUSE, UNSPECIFIED CHRONICITY, UNSPECIFIED SITE: ICD-10-CM

## 2024-03-19 DIAGNOSIS — E21.3 HYPERPARATHYROIDISM: ICD-10-CM

## 2024-03-19 DIAGNOSIS — N18.4 CKD (CHRONIC KIDNEY DISEASE), STAGE IV: ICD-10-CM

## 2024-03-19 DIAGNOSIS — I10 HYPERTENSION, UNSPECIFIED TYPE: Primary | ICD-10-CM

## 2024-03-19 DIAGNOSIS — Z86.79 HISTORY OF ATRIAL FIBRILLATION: ICD-10-CM

## 2024-03-19 DIAGNOSIS — I25.10 CORONARY ARTERY DISEASE, UNSPECIFIED VESSEL OR LESION TYPE, UNSPECIFIED WHETHER ANGINA PRESENT, UNSPECIFIED WHETHER NATIVE OR TRANSPLANTED HEART: ICD-10-CM

## 2024-03-19 DIAGNOSIS — Z12.5 ENCOUNTER FOR SCREENING FOR MALIGNANT NEOPLASM OF PROSTATE: ICD-10-CM

## 2024-03-19 DIAGNOSIS — E66.09 CLASS 1 OBESITY DUE TO EXCESS CALORIES WITH SERIOUS COMORBIDITY AND BODY MASS INDEX (BMI) OF 32.0 TO 32.9 IN ADULT: ICD-10-CM

## 2024-03-19 PROCEDURE — 1160F RVW MEDS BY RX/DR IN RCRD: CPT | Mod: CPTII,S$GLB,, | Performed by: INTERNAL MEDICINE

## 2024-03-19 PROCEDURE — 3078F DIAST BP <80 MM HG: CPT | Mod: CPTII,S$GLB,, | Performed by: INTERNAL MEDICINE

## 2024-03-19 PROCEDURE — 1101F PT FALLS ASSESS-DOCD LE1/YR: CPT | Mod: CPTII,S$GLB,, | Performed by: INTERNAL MEDICINE

## 2024-03-19 PROCEDURE — 99214 OFFICE O/P EST MOD 30 MIN: CPT | Mod: S$GLB,,, | Performed by: INTERNAL MEDICINE

## 2024-03-19 PROCEDURE — 3288F FALL RISK ASSESSMENT DOCD: CPT | Mod: CPTII,S$GLB,, | Performed by: INTERNAL MEDICINE

## 2024-03-19 PROCEDURE — 99999 PR PBB SHADOW E&M-EST. PATIENT-LVL IV: CPT | Mod: PBBFAC,,, | Performed by: INTERNAL MEDICINE

## 2024-03-19 PROCEDURE — 1159F MED LIST DOCD IN RCRD: CPT | Mod: CPTII,S$GLB,, | Performed by: INTERNAL MEDICINE

## 2024-03-19 PROCEDURE — 1126F AMNT PAIN NOTED NONE PRSNT: CPT | Mod: CPTII,S$GLB,, | Performed by: INTERNAL MEDICINE

## 2024-03-19 PROCEDURE — G0008 ADMIN INFLUENZA VIRUS VAC: HCPCS | Mod: S$GLB,,, | Performed by: INTERNAL MEDICINE

## 2024-03-19 PROCEDURE — 90694 VACC AIIV4 NO PRSRV 0.5ML IM: CPT | Mod: S$GLB,,, | Performed by: INTERNAL MEDICINE

## 2024-03-19 PROCEDURE — 3074F SYST BP LT 130 MM HG: CPT | Mod: CPTII,S$GLB,, | Performed by: INTERNAL MEDICINE

## 2024-03-19 RX ORDER — ATORVASTATIN CALCIUM 40 MG/1
40 TABLET, FILM COATED ORAL DAILY
Qty: 90 TABLET | Refills: 3 | Status: SHIPPED | OUTPATIENT
Start: 2024-03-19

## 2024-03-19 RX ORDER — AMOXICILLIN AND CLAVULANATE POTASSIUM 875; 125 MG/1; MG/1
1 TABLET, FILM COATED ORAL 2 TIMES DAILY
Qty: 14 TABLET | Refills: 0 | Status: SHIPPED | OUTPATIENT
Start: 2024-03-19 | End: 2024-03-26

## 2024-03-19 RX ORDER — ASPIRIN 81 MG/1
81 TABLET ORAL DAILY
Qty: 90 TABLET | Refills: 3 | Status: SHIPPED | OUTPATIENT
Start: 2024-03-19

## 2024-03-19 RX ORDER — FEBUXOSTAT 40 MG/1
40 TABLET, FILM COATED ORAL DAILY
Qty: 90 TABLET | Refills: 3 | Status: SHIPPED | OUTPATIENT
Start: 2024-03-19 | End: 2024-05-07

## 2024-03-19 NOTE — PROGRESS NOTES
Subjective:       Patient ID: Vivek Lema Jr. is a pleasant 74 y.o. White male patient    Chief Complaint: Follow-up      Patient is a pt I saw last on 12/19/2023, see my last notes.    HPI     Patient with past medical history as per list of problems below coming today for a regular follow-up visit.    No interval visit.  Patient is here with his wife who is my next patient.  Reports feeling globally fine, he has no specific issue to report except probable boil in regard of his right buttock, that has been present for one week, he states it was rather painful on Sunday but much better today. No drainage.  His wife and his daughter who is a nurse advised for  him to discuss this with me.    No other issue reported today.  He would like to do blood work to check specially about his kidney function.      Patient Active Problem List   Diagnosis    Hypertension    CKD (chronic kidney disease), stage IV    A-V fistula    Coronary artery disease    History of atrial fibrillation    Pacemaker    Nonrheumatic mitral valve regurgitation    Class 1 obesity due to excess calories with serious comorbidity and body mass index (BMI) of 32.0 to 32.9 in adult    Gout    Long term (current) use of anticoagulants    COVID    PAF (paroxysmal atrial fibrillation)    Exudative age-related macular degeneration of left eye with active choroidal neovascularization    Intermediate stage dry age-related macular degeneration of right eye    Epiretinal membrane, right    Age-related nuclear cataract of both eyes    Pseudophakia of both eyes    History of prostate cancer    Anxiety and depression    Nonintractable headache    Choroidal neovascularization, left eye    Epiretinal membrane, left    Cat bite    Hyperparathyroidism    Thrombocytopenia, unspecified          ACTIVE MEDICAL ISSUES:  Documented in Problem List     PAST MEDICAL HISTORY  Documented     PAST SURGICAL HISTORY:  Documented     SOCIAL HISTORY:  Documented     FAMILY  "HISTORY:  Documented     ALLERGIES AND MEDICATIONS: updated and reviewed.  Documented    Review of Systems   Constitutional: Negative.  Negative for fatigue.   HENT: Negative.     Respiratory: Negative.  Negative for shortness of breath.    Cardiovascular: Negative.         AV fistula LUE   Gastrointestinal: Negative.    Musculoskeletal: Negative.    Skin: Negative.         Boil on R buttock   Neurological: Negative.    All other systems reviewed and are negative.      Objective:      Physical Exam  Vitals and nursing note reviewed.   Constitutional:       Appearance: Normal appearance.   HENT:      Right Ear: Tympanic membrane normal. There is no impacted cerumen.      Left Ear: Tympanic membrane normal. There is no impacted cerumen.   Eyes:      Conjunctiva/sclera: Conjunctivae normal.   Cardiovascular:      Rate and Rhythm: Normal rate and regular rhythm.      Pulses: Normal pulses.      Heart sounds: Normal heart sounds.      Comments: AV fistula LUE calm with good thrill  Pulmonary:      Effort: Pulmonary effort is normal.      Breath sounds: Normal breath sounds.   Abdominal:      General: Bowel sounds are normal.   Musculoskeletal:         General: No swelling or tenderness. Normal range of motion.   Skin:     General: Skin is warm and dry.          Neurological:      Mental Status: He is alert. Mental status is at baseline.   Psychiatric:         Mood and Affect: Mood normal.         Behavior: Behavior normal.         Thought Content: Thought content normal.         Judgment: Judgment normal.         Vitals:    03/19/24 1330   BP: 112/62   BP Location: Left arm   Patient Position: Sitting   BP Method: Large (Manual)   Pulse: 64   Resp: 16   Temp: 98 °F (36.7 °C)   TempSrc: Oral   SpO2: 97%   Weight: 104.2 kg (229 lb 11.5 oz)   Height: 5' 11" (1.803 m)     Body mass index is 32.04 kg/m².    RESULTS: Reviewed labs from last 12 months    Last Lab Results:     Lab Results   Component Value Date    WBC 10.09 " 12/15/2023    HGB 13.1 (L) 12/15/2023    HCT 40.6 12/15/2023     12/15/2023     12/15/2023    K 4.5 12/15/2023     12/15/2023    CO2 22 (L) 12/15/2023    BUN 45 (H) 12/15/2023    CREATININE 2.7 (H) 12/15/2023    CALCIUM 8.8 12/15/2023    ALBUMIN 3.6 12/15/2023    AST 22 12/15/2023    ALT 12 12/15/2023    CHOL 154 2022    TRIG 171 (H) 2022    HDL 33 (L) 2022    LDLCALC 86.8 2022    HGBA1C 5.2 2023    TSH 2.381 2023    PSA <0.01 2023       Assessment:       1. Hypertension, unspecified type    2. Class 1 obesity due to excess calories with serious comorbidity and body mass index (BMI) of 32.0 to 32.9 in adult    3. Boil of buttock    4. CKD (chronic kidney disease), stage IV    5. Hyperparathyroidism    6. Gout, unspecified cause, unspecified chronicity, unspecified site    7. Coronary artery disease, unspecified vessel or lesion type, unspecified whether angina present, unspecified whether native or transplanted heart    8. History of atrial fibrillation    9. Need for immunization against influenza    10. Encounter for screening for malignant neoplasm of prostate        Plan:   Vivek was seen today for follow-up.    Diagnoses and all orders for this visit:    Hypertension, unspecified type  -     atorvastatin (LIPITOR) 40 MG tablet; Take 1 tablet (40 mg total) by mouth once daily.  -     Comprehensive Metabolic Panel; Future  -     CBC Auto Differential; Future    BP at goal, same treatment and blood work.  Refill provided.    Class 1 obesity due to excess calories with serious comorbidity and body mass index (BMI) of 32.0 to 32.9 in adult    We discussed weight issues and safe, effective ways of losing pounds, includin) diet:  low carbohydrate, low fat diet, stay away from fast food, fried and processed food, use whole grain, lot of fruits and vegetables, use healthy fat such as avocado, nuts and olive oil in reasonable quantity, stay away from  sodas. Regular meals with lean proteins.  2) physical activity: ideally 150 min a week, with cardiovascular and resistance activity.  Patient was encouraged to set realistic attainable goals for weight loss, and we will follow up periodically.    Boil of buttock  -     amoxicillin-clavulanate 875-125mg (AUGMENTIN) 875-125 mg per tablet; Take 1 tablet by mouth 2 (two) times daily. for 7 days    See HPI and PE, no indication for I & D at this point, will order Augmentin due to CKD, close monitoring, may need I &D in the future.    CKD (chronic kidney disease), stage IV    Will monitor.    Hyperparathyroidism  -     PTH, intact; Future    Gout, unspecified cause, unspecified chronicity, unspecified site  -     febuxostat (ULORIC) 40 mg Tab; Take 1 tablet (40 mg total) by mouth once daily.    Refill provided     Coronary artery disease, unspecified vessel or lesion type, unspecified whether angina present, unspecified whether native or transplanted heart  -     aspirin (ECOTRIN) 81 MG EC tablet; Take 1 tablet (81 mg total) by mouth once daily.    No symptoms.    History of atrial fibrillation    On Coumadin     Need for immunization against influenza  -     Influenza (FLUAD) - Quadrivalent (Adjuvanted) *Preferred* (65+) (PF)    Encounter for screening for malignant neoplasm of prostate      Follow up in about 3 months (around 6/19/2024) for f-up .    This note was created by combination of typed  and M-Modal dictation.  Transcription errors may be present.  If there are any questions, please contact me.

## 2024-03-19 NOTE — PROGRESS NOTES
Health Maintenance Due   Topic     Shingles Vaccine (1 of 2)     RSV Vaccine (Age 60+ and Pregnant patients) (1 - 1-dose 60+ series) Not offered at this facility.     COVID-19 Vaccine (4 - 2023-24 season)

## 2024-04-04 ENCOUNTER — OFFICE VISIT (OUTPATIENT)
Dept: OPHTHALMOLOGY | Facility: CLINIC | Age: 74
End: 2024-04-04
Payer: MEDICARE

## 2024-04-04 ENCOUNTER — LAB VISIT (OUTPATIENT)
Dept: LAB | Facility: HOSPITAL | Age: 74
End: 2024-04-04
Payer: MEDICARE

## 2024-04-04 ENCOUNTER — ANTI-COAG VISIT (OUTPATIENT)
Dept: CARDIOLOGY | Facility: CLINIC | Age: 74
End: 2024-04-04
Payer: MEDICARE

## 2024-04-04 DIAGNOSIS — H35.052 CHOROIDAL NEOVASCULARIZATION, LEFT EYE: ICD-10-CM

## 2024-04-04 DIAGNOSIS — H35.3221 EXUDATIVE AGE-RELATED MACULAR DEGENERATION OF LEFT EYE WITH ACTIVE CHOROIDAL NEOVASCULARIZATION: ICD-10-CM

## 2024-04-04 DIAGNOSIS — H35.3112 INTERMEDIATE STAGE DRY AGE-RELATED MACULAR DEGENERATION OF RIGHT EYE: Primary | ICD-10-CM

## 2024-04-04 DIAGNOSIS — I10 HYPERTENSION, UNSPECIFIED TYPE: ICD-10-CM

## 2024-04-04 DIAGNOSIS — Z79.01 LONG TERM (CURRENT) USE OF ANTICOAGULANTS: ICD-10-CM

## 2024-04-04 DIAGNOSIS — Z86.79 HISTORY OF ATRIAL FIBRILLATION: ICD-10-CM

## 2024-04-04 DIAGNOSIS — H35.372 EPIRETINAL MEMBRANE, LEFT: ICD-10-CM

## 2024-04-04 DIAGNOSIS — E21.3 HYPERPARATHYROIDISM: ICD-10-CM

## 2024-04-04 DIAGNOSIS — Z86.79 HISTORY OF ATRIAL FIBRILLATION: Primary | ICD-10-CM

## 2024-04-04 DIAGNOSIS — Z96.1 PSEUDOPHAKIA OF BOTH EYES: ICD-10-CM

## 2024-04-04 LAB
ALBUMIN SERPL BCP-MCNC: 3.4 G/DL (ref 3.5–5.2)
ALP SERPL-CCNC: 77 U/L (ref 55–135)
ALT SERPL W/O P-5'-P-CCNC: 11 U/L (ref 10–44)
ANION GAP SERPL CALC-SCNC: 11 MMOL/L (ref 8–16)
AST SERPL-CCNC: 21 U/L (ref 10–40)
BASOPHILS # BLD AUTO: 0.06 K/UL (ref 0–0.2)
BASOPHILS NFR BLD: 0.9 % (ref 0–1.9)
BILIRUB SERPL-MCNC: 1.3 MG/DL (ref 0.1–1)
BUN SERPL-MCNC: 42 MG/DL (ref 8–23)
CALCIUM SERPL-MCNC: 9.2 MG/DL (ref 8.7–10.5)
CHLORIDE SERPL-SCNC: 106 MMOL/L (ref 95–110)
CO2 SERPL-SCNC: 23 MMOL/L (ref 23–29)
CREAT SERPL-MCNC: 2.8 MG/DL (ref 0.5–1.4)
DIFFERENTIAL METHOD BLD: NORMAL
EOSINOPHIL # BLD AUTO: 0.2 K/UL (ref 0–0.5)
EOSINOPHIL NFR BLD: 3.5 % (ref 0–8)
ERYTHROCYTE [DISTWIDTH] IN BLOOD BY AUTOMATED COUNT: 13.4 % (ref 11.5–14.5)
EST. GFR  (NO RACE VARIABLE): 22.7 ML/MIN/1.73 M^2
GLUCOSE SERPL-MCNC: 119 MG/DL (ref 70–110)
HCT VFR BLD AUTO: 45.6 % (ref 40–54)
HGB BLD-MCNC: 15 G/DL (ref 14–18)
IMM GRANULOCYTES # BLD AUTO: 0.03 K/UL (ref 0–0.04)
IMM GRANULOCYTES NFR BLD AUTO: 0.4 % (ref 0–0.5)
INR PPP: 1.5 (ref 0.8–1.2)
LYMPHOCYTES # BLD AUTO: 1.8 K/UL (ref 1–4.8)
LYMPHOCYTES NFR BLD: 26.6 % (ref 18–48)
MCH RBC QN AUTO: 30.1 PG (ref 27–31)
MCHC RBC AUTO-ENTMCNC: 32.9 G/DL (ref 32–36)
MCV RBC AUTO: 92 FL (ref 82–98)
MONOCYTES # BLD AUTO: 0.8 K/UL (ref 0.3–1)
MONOCYTES NFR BLD: 11.4 % (ref 4–15)
NEUTROPHILS # BLD AUTO: 3.9 K/UL (ref 1.8–7.7)
NEUTROPHILS NFR BLD: 57.2 % (ref 38–73)
NRBC BLD-RTO: 0 /100 WBC
PLATELET # BLD AUTO: 157 K/UL (ref 150–450)
PMV BLD AUTO: 12.8 FL (ref 9.2–12.9)
POTASSIUM SERPL-SCNC: 3.4 MMOL/L (ref 3.5–5.1)
PROT SERPL-MCNC: 6.6 G/DL (ref 6–8.4)
PROTHROMBIN TIME: 16.2 SEC (ref 9–12.5)
PTH-INTACT SERPL-MCNC: 224.9 PG/ML (ref 9–77)
RBC # BLD AUTO: 4.98 M/UL (ref 4.6–6.2)
SODIUM SERPL-SCNC: 140 MMOL/L (ref 136–145)
WBC # BLD AUTO: 6.84 K/UL (ref 3.9–12.7)

## 2024-04-04 PROCEDURE — 99999 PR PBB SHADOW E&M-EST. PATIENT-LVL III: CPT | Mod: PBBFAC,,, | Performed by: OPHTHALMOLOGY

## 2024-04-04 PROCEDURE — 92202 OPSCPY EXTND ON/MAC DRAW: CPT | Mod: 59,S$GLB,, | Performed by: OPHTHALMOLOGY

## 2024-04-04 PROCEDURE — 1101F PT FALLS ASSESS-DOCD LE1/YR: CPT | Mod: CPTII,S$GLB,, | Performed by: OPHTHALMOLOGY

## 2024-04-04 PROCEDURE — 85610 PROTHROMBIN TIME: CPT | Performed by: INTERNAL MEDICINE

## 2024-04-04 PROCEDURE — 92134 CPTRZ OPH DX IMG PST SGM RTA: CPT | Mod: S$GLB,,, | Performed by: OPHTHALMOLOGY

## 2024-04-04 PROCEDURE — 1126F AMNT PAIN NOTED NONE PRSNT: CPT | Mod: CPTII,S$GLB,, | Performed by: OPHTHALMOLOGY

## 2024-04-04 PROCEDURE — 36415 COLL VENOUS BLD VENIPUNCTURE: CPT | Performed by: INTERNAL MEDICINE

## 2024-04-04 PROCEDURE — G2211 COMPLEX E/M VISIT ADD ON: HCPCS | Mod: S$GLB,,, | Performed by: OPHTHALMOLOGY

## 2024-04-04 PROCEDURE — 83970 ASSAY OF PARATHORMONE: CPT | Performed by: INTERNAL MEDICINE

## 2024-04-04 PROCEDURE — 99213 OFFICE O/P EST LOW 20 MIN: CPT | Mod: S$GLB,,, | Performed by: OPHTHALMOLOGY

## 2024-04-04 PROCEDURE — 3288F FALL RISK ASSESSMENT DOCD: CPT | Mod: CPTII,S$GLB,, | Performed by: OPHTHALMOLOGY

## 2024-04-04 PROCEDURE — 1160F RVW MEDS BY RX/DR IN RCRD: CPT | Mod: CPTII,S$GLB,, | Performed by: OPHTHALMOLOGY

## 2024-04-04 PROCEDURE — 85025 COMPLETE CBC W/AUTO DIFF WBC: CPT | Performed by: INTERNAL MEDICINE

## 2024-04-04 PROCEDURE — 93793 ANTICOAG MGMT PT WARFARIN: CPT | Mod: S$GLB,,,

## 2024-04-04 PROCEDURE — 80053 COMPREHEN METABOLIC PANEL: CPT | Performed by: INTERNAL MEDICINE

## 2024-04-04 PROCEDURE — 1159F MED LIST DOCD IN RCRD: CPT | Mod: CPTII,S$GLB,, | Performed by: OPHTHALMOLOGY

## 2024-04-04 NOTE — PROGRESS NOTES
HPI    Overdue 4m OCT/DFE    Pt states va is worse OD, stable OS. Stable amsler. Occasional soreness.    No flashes  No floaters  No pain  Gtts: At's PRN OU    POHx:    1. NSC OU   S/p phaco w/IOL OU   2. Exudative ARMD OS w/Active Choroidal NVO   S/P Avastin OS ( 07/09/2023)   3. ERM OS   4. Choroidal  NVO OS   5. Pseudophakia OU     Last edited by Ivon Harrington on 4/4/2024  1:36 PM.            A/P    ICD-10-CM ICD-9-CM   1. Intermediate stage dry age-related macular degeneration of right eye  H35.3112 362.51   2. Exudative age-related macular degeneration of left eye with active choroidal neovascularization  H35.3221 362.52     362.16   3. Choroidal neovascularization, left eye  H35.052 362.16   4. Epiretinal membrane, left  H35.372 362.56   5. Pseudophakia of both eyes  Z96.1 V43.1       1. Intermediate stage dry age-related macular degeneration of right eye  Here for AMD check  Hx of blurred vision for few months    Today 4/4/2024  OD VA 20/25 (stable), serous PED appearance     Plan: Observation no injection no CNVM    Amsler precautions  Recommend Antioxidants, lutein, omega 3, brown sunglasses (blue blockers).     2. Exudative age-related macular degeneration of left eye with active choroidal neovascularization  3. Choroidal neovascularization, left eye    S/p RIVERA x2 8/10/23    VA 20/40 (was 20/50), stable serous PED no CNVM activity       Plan: observe today, no injection, consider Avastin if recurrent     Visit today is associated with current or anticipated ongoing medical care related to this patients single serious condition/complex condition (exudative macular degeneration left eye)     4. Epiretinal membrane, left  Mod ERM, mixed mechanism component for decr VA    Plan: Observation for now, may need PPV/MP   Amsler precautions discussed     5. Pseudophakia of both eyes  Good lens position OU  Plan: Observation, update Mrx prn      RTC 6 mo DFE/OCTm OU, possible RIVERA OS if worsening SRF    I saw and  examined the patient and reviewed in detail the findings documented. The final examination findings, image interpretations which have been independently interpreted, and plan as documented in the record represent my personal judgment and conclusions.    Jaylen Santiago MD  Vitreoretinal Surgery   [facility]

## 2024-04-05 NOTE — PROGRESS NOTES
[facility] [facility]    2024 3:16 PM    Assessment/Plan:    Patient presents today with subtherapeutic  INR.    Assessment of patient findings and chart review: pt reports has not consumed ETOH in the last 5 days    Recommendation for patient's warfarin regimen: Boost dose today to 5mg then resume current maintenance dose    Recommend repeat INR in 1 week  _________________________________________________________________    Vivek Eric Pita Leigh (74 y.o.) is followed by the [facility].    Anticoagulation Summary  As of 2024      INR goal:  2.0-3.0   TTR:  77.6 % (2.1 y)   INR used for dosin.5 (2024)   Warfarin maintenance plan:  4 mg (2 mg x 2) every Thu, Sat; 2 mg (2 mg x 1) all other days   Weekly warfarin total:  18 mg   Plan last modified:  Mihaela Ortega, PharmD (2023)   Next INR check:  2024   Target end date:      Indications    History of atrial fibrillation [Z86.79]  Long term (current) use of anticoagulants [Z79.01]                 Anticoagulation Episode Summary       INR check location:      Preferred lab:      Send INR reminders to:  [facility] COUMADIN MONITORING POOL    Comments:  [place] only Mon - Thur and Hospital Lab on           Anticoagulation Care Providers       Provider Role Specialty Phone number    Michelle Conway MD [facility] [phone]            Patient Findings       Positives:  Change in health, Change in alcohol use, Change in medications, Change in diet/appetite    Negatives:  Signs/symptoms of bleeding, Change in activity, Upcoming invasive procedure, Emergency department visit, Upcoming dental procedure, Missed doses, Extra doses, Hospital admission, Bruising, Other complaints    Comments:  Pt verified correct dose and correct tab size, took Amoxicillin-Clavulanate 875-125mg 1 qd for boil on buttock, (was supposed to take bid)--finished 4 days  ago, had cabbage 3 days, drinks beer but last 5 days has had none, has been tired a lot--sleeping a lot

## 2024-04-05 NOTE — PROGRESS NOTES
4/05/24 Patient called and was given day by day coumadin instructions and next lab date, he repeated instructions back correctly, verbalized understanding

## 2024-04-11 ENCOUNTER — LAB VISIT (OUTPATIENT)
Dept: LAB | Facility: HOSPITAL | Age: 74
End: 2024-04-11
Attending: INTERNAL MEDICINE
Payer: MEDICARE

## 2024-04-11 ENCOUNTER — ANTI-COAG VISIT (OUTPATIENT)
Dept: CARDIOLOGY | Facility: CLINIC | Age: 74
End: 2024-04-11
Payer: MEDICARE

## 2024-04-11 DIAGNOSIS — Z79.01 LONG TERM (CURRENT) USE OF ANTICOAGULANTS: ICD-10-CM

## 2024-04-11 DIAGNOSIS — Z86.79 HISTORY OF ATRIAL FIBRILLATION: Primary | ICD-10-CM

## 2024-04-11 DIAGNOSIS — Z86.79 HISTORY OF ATRIAL FIBRILLATION: ICD-10-CM

## 2024-04-11 LAB
INR PPP: 1.7 (ref 0.8–1.2)
PROTHROMBIN TIME: 17.9 SEC (ref 9–12.5)

## 2024-04-11 PROCEDURE — 36415 COLL VENOUS BLD VENIPUNCTURE: CPT | Mod: PO | Performed by: INTERNAL MEDICINE

## 2024-04-11 PROCEDURE — 93793 ANTICOAG MGMT PT WARFARIN: CPT | Mod: S$GLB,,,

## 2024-04-11 PROCEDURE — 85610 PROTHROMBIN TIME: CPT | Performed by: INTERNAL MEDICINE

## 2024-04-12 NOTE — PROGRESS NOTES
[facility]    2024 9:40 AM    Assessment/Plan:    Patient presents today with subtherapeutic  INR.    Assessment of patient findings and chart review: no significant findings     Recommendation for patient's warfarin regimen: Boost dose today to 4mg then increase maintenance dose    Recommend repeat INR in 2 weeks  _________________________________________________________________    Vivek Reyes Pita Leigh (74 y.o.) is followed by the [facility].    Anticoagulation Summary  As of 2024      INR goal:  2.0-3.0   TTR:  76.9 % (2.2 y)   INR used for dosin.7 (2024)   Warfarin maintenance plan:  4 mg (2 mg x 2) every Tue, Thu, Sat; 2 mg (2 mg x 1) all other days   Weekly warfarin total:  20 mg   Plan last modified:  Mihaela Ortega, PharmD (2024)   Next INR check:  2024   Target end date:      Indications    History of atrial fibrillation [Z86.79]  Long term (current) use of anticoagulants [Z79.01]                 Anticoagulation Episode Summary       INR check location:      Preferred lab:      Send INR reminders to:  [facility] COUMADIN MONITORING POOL    Comments:  [place] only Mon - Thur and Hospital Lab on           Anticoagulation Care Providers       Provider Role Specialty Phone number    Michelle Conway MD [facility] [phone]            Patient Findings       Negatives:  Signs/symptoms of thrombosis, Signs/symptoms of bleeding, Laboratory test error suspected, Change in health, Change in alcohol use, Change in activity, Upcoming invasive procedure, Emergency department visit, Upcoming dental procedure, Missed doses, Extra doses, Change in medications, Change in diet/appetite, Hospital admission, Bruising, Other complaints    Comments:  Confirmed taking correct dose  Only ate green peas and M&Ms w/ peanuts recently  NO other changes

## 2024-04-19 ENCOUNTER — OFFICE VISIT (OUTPATIENT)
Dept: OPHTHALMOLOGY | Facility: CLINIC | Age: 74
End: 2024-04-19
Payer: MEDICARE

## 2024-04-19 ENCOUNTER — TELEPHONE (OUTPATIENT)
Dept: OPHTHALMOLOGY | Facility: CLINIC | Age: 74
End: 2024-04-19
Payer: MEDICARE

## 2024-04-19 DIAGNOSIS — S05.01XA ABRASION OF RIGHT CORNEA, INITIAL ENCOUNTER: Primary | ICD-10-CM

## 2024-04-19 PROCEDURE — 1159F MED LIST DOCD IN RCRD: CPT | Mod: CPTII,S$GLB,, | Performed by: OPHTHALMOLOGY

## 2024-04-19 PROCEDURE — 1160F RVW MEDS BY RX/DR IN RCRD: CPT | Mod: CPTII,S$GLB,, | Performed by: OPHTHALMOLOGY

## 2024-04-19 PROCEDURE — 99999 PR PBB SHADOW E&M-EST. PATIENT-LVL III: CPT | Mod: PBBFAC,,, | Performed by: OPHTHALMOLOGY

## 2024-04-19 PROCEDURE — 99214 OFFICE O/P EST MOD 30 MIN: CPT | Mod: S$GLB,,, | Performed by: OPHTHALMOLOGY

## 2024-04-19 RX ORDER — MOXIFLOXACIN 5 MG/ML
1 SOLUTION/ DROPS OPHTHALMIC 3 TIMES DAILY
Qty: 3 ML | Refills: 0 | Status: SHIPPED | OUTPATIENT
Start: 2024-04-19

## 2024-04-19 NOTE — TELEPHONE ENCOUNTER
----- Message from Mami Aguilar sent at 4/19/2024  8:21 AM CDT -----  Regarding: URGENT  Consult/Advisory    Name Of Caller:Vivek       Contact Preference:[phone]    Nature of call: Ptn called stating he is in severe pain his right eye is swollen and he is not able to see out his left. He stated that Dr Santiago told him if he has any issues call. Looks like Dr Santiago is out today

## 2024-04-19 NOTE — PROGRESS NOTES
Subjective:       Patient ID: Vivek Lema Jr. is a 74 y.o. male      No chief complaint on file.    History of Present Illness  HPI    NP - Urgent     Pt states about 3 days ago he started having eye pain, vision is extremely   blurry.   He states his vision is bad this morning and rates his pain at a 9.   Denies trauma  Last edited by Landon Garrett MD on 4/19/2024  5:42 PM.          Assessment/Plan:     1. Abrasion of right cornea, initial encounter  Etiology unclear  Eye patched with erythromycin rodney  Keep patch on until bedtime then apply rodney tonight and nightly until runs out  [place] gtts 3/0  RTC if pain not resolved and vision not normal 1-2 days or if any worsening    Follow up in about 1 week (around 4/26/2024), or if symptoms worsen or fail to improve.

## 2024-04-26 ENCOUNTER — ANTI-COAG VISIT (OUTPATIENT)
Dept: CARDIOLOGY | Facility: CLINIC | Age: 74
End: 2024-04-26
Payer: MEDICARE

## 2024-04-26 ENCOUNTER — LAB VISIT (OUTPATIENT)
Dept: LAB | Facility: HOSPITAL | Age: 74
End: 2024-04-26
Attending: INTERNAL MEDICINE
Payer: MEDICARE

## 2024-04-26 ENCOUNTER — OFFICE VISIT (OUTPATIENT)
Dept: OPHTHALMOLOGY | Facility: CLINIC | Age: 74
End: 2024-04-26
Payer: MEDICARE

## 2024-04-26 DIAGNOSIS — S05.01XA ABRASION OF RIGHT CORNEA, INITIAL ENCOUNTER: ICD-10-CM

## 2024-04-26 DIAGNOSIS — Z96.1 PSEUDOPHAKIA OF BOTH EYES: ICD-10-CM

## 2024-04-26 DIAGNOSIS — Z79.01 LONG TERM (CURRENT) USE OF ANTICOAGULANTS: ICD-10-CM

## 2024-04-26 DIAGNOSIS — Z86.79 HISTORY OF ATRIAL FIBRILLATION: Primary | ICD-10-CM

## 2024-04-26 DIAGNOSIS — Z86.79 HISTORY OF ATRIAL FIBRILLATION: ICD-10-CM

## 2024-04-26 DIAGNOSIS — H35.372 EPIRETINAL MEMBRANE, LEFT: ICD-10-CM

## 2024-04-26 DIAGNOSIS — H35.052 CHOROIDAL NEOVASCULARIZATION, LEFT EYE: ICD-10-CM

## 2024-04-26 DIAGNOSIS — H35.3112 INTERMEDIATE STAGE DRY AGE-RELATED MACULAR DEGENERATION OF RIGHT EYE: Primary | ICD-10-CM

## 2024-04-26 DIAGNOSIS — H35.3221 EXUDATIVE AGE-RELATED MACULAR DEGENERATION OF LEFT EYE WITH ACTIVE CHOROIDAL NEOVASCULARIZATION: ICD-10-CM

## 2024-04-26 LAB
INR PPP: 2.2 (ref 0.8–1.2)
PROTHROMBIN TIME: 22.6 SEC (ref 9–12.5)

## 2024-04-26 PROCEDURE — 1125F AMNT PAIN NOTED PAIN PRSNT: CPT | Mod: CPTII,S$GLB,, | Performed by: OPHTHALMOLOGY

## 2024-04-26 PROCEDURE — 85610 PROTHROMBIN TIME: CPT | Performed by: INTERNAL MEDICINE

## 2024-04-26 PROCEDURE — G2211 COMPLEX E/M VISIT ADD ON: HCPCS | Mod: S$GLB,,, | Performed by: OPHTHALMOLOGY

## 2024-04-26 PROCEDURE — 36415 COLL VENOUS BLD VENIPUNCTURE: CPT | Performed by: INTERNAL MEDICINE

## 2024-04-26 PROCEDURE — 99214 OFFICE O/P EST MOD 30 MIN: CPT | Mod: S$GLB,,, | Performed by: OPHTHALMOLOGY

## 2024-04-26 PROCEDURE — 92134 CPTRZ OPH DX IMG PST SGM RTA: CPT | Mod: S$GLB,,, | Performed by: OPHTHALMOLOGY

## 2024-04-26 PROCEDURE — 1101F PT FALLS ASSESS-DOCD LE1/YR: CPT | Mod: CPTII,S$GLB,, | Performed by: OPHTHALMOLOGY

## 2024-04-26 PROCEDURE — 99999 PR PBB SHADOW E&M-EST. PATIENT-LVL III: CPT | Mod: PBBFAC,,, | Performed by: OPHTHALMOLOGY

## 2024-04-26 PROCEDURE — 92202 OPSCPY EXTND ON/MAC DRAW: CPT | Mod: 59,S$GLB,, | Performed by: OPHTHALMOLOGY

## 2024-04-26 PROCEDURE — 3288F FALL RISK ASSESSMENT DOCD: CPT | Mod: CPTII,S$GLB,, | Performed by: OPHTHALMOLOGY

## 2024-04-26 PROCEDURE — 93793 ANTICOAG MGMT PT WARFARIN: CPT | Mod: S$GLB,,,

## 2024-04-26 NOTE — PROGRESS NOTES
HPI     1 wk Corneal Abrasion Right Eye     Pt states : vision is much better OD since last visit.       No flashes// ++floaters OD   ++eye pain ( rating 3-4)   NO diplopia  No headahces  -No curtain/shadows/veils     Eye Meds: At's PRN OU          Moxifloxacin TID OD    POHx:    1. NSC OU   S/p phaco w/IOL OU     2. Exudative ARMD OS w/Active Choroidal NVO   S/P Avastin OS ( 07/09/2023)     3. ERM OS   4. Choroidal  NVO OS   5. Pseudophakia OU   6. Corneal abrasion OD  Last edited by Lupe Almanza MA on 4/26/2024  9:49 AM.           A/P    ICD-10-CM ICD-9-CM   1. Intermediate stage dry age-related macular degeneration of right eye  H35.3112 362.51   2. Exudative age-related macular degeneration of left eye with active choroidal neovascularization  H35.3221 362.52     362.16   3. Choroidal neovascularization, left eye  H35.052 362.16   4. Epiretinal membrane, left  H35.372 362.56   5. Pseudophakia of both eyes  Z96.1 V43.1   6. Abrasion of right cornea, initial encounter  S05.01XA 918.1       1. Intermediate stage dry age-related macular degeneration of right eye  Here for retina f/u, saw Dr Garrett for triage visit as below     Today 4/26/2024  OD VA 20/60 ( was 20/25 but had corneal abrasion), serous PED appearance     Plan: Observation no injection dry today   Amsler precautions  Recommend Antioxidants, lutein, omega 3, brown sunglasses (blue blockers).     2. Exudative age-related macular degeneration of left eye with active choroidal neovascularization  3. Choroidal neovascularization, left eye    S/p RIVERA x2 8/10/23    VA 20/40 (stable), stable serous PED no CNVM  no heme  Plan: observe today, no injection, consider Avastin if recurrent     Visit today is associated with current or anticipated ongoing medical care related to this patients single serious condition/complex condition (exudative macular degeneration left eye)     4. Epiretinal membrane, left  Mod ERM, mixed mechanism component for decr VA     Plan: Observation for now, may need PPV/MP   Amsler precautions discussed     5. Pseudophakia of both eyes  Good lens position OU  Plan: Observation, update Mrx prn    6. Abrasion of right cornea, initial encounter  Saw Dr Garrett for new abrasion few days ago - Recent notes reviewed  Doing better now, vision is 20/80 (was CF)  Plan:  can d/c emycin and abx drops. Can use PF AT QID, form provided       RTC 6 mo DFE/OCTm OU, possible RIVERA OS if worsening SRF    I saw and examined the patient and reviewed in detail the findings documented. The final examination findings, image interpretations which have been independently interpreted, and plan as documented in the record represent my personal judgment and conclusions.    Jaylen Santiago MD  Vitreoretinal Surgery   [facility]

## 2024-05-07 DIAGNOSIS — M10.9 GOUT, UNSPECIFIED CAUSE, UNSPECIFIED CHRONICITY, UNSPECIFIED SITE: ICD-10-CM

## 2024-05-07 RX ORDER — FEBUXOSTAT 40 MG/1
40 TABLET, FILM COATED ORAL
Qty: 90 TABLET | Refills: 0 | Status: SHIPPED | OUTPATIENT
Start: 2024-05-07

## 2024-05-07 NOTE — TELEPHONE ENCOUNTER
Refill Routing Note   Medication(s) are not appropriate for processing by [facility] for the following reason(s):        Required labs outdated  Required labs abnormal    [facility] action(s):  Defer     Requires labs : Yes             Appointments  past 12m or future 3m with PCP    Date Provider   Last Visit   3/19/2024 Michelle Conway MD   Next Visit   6/25/2024 Michelle Conway MD   ED visits in past 90 days: 0        Note composed:7:35 AM 05/07/2024

## 2024-05-07 NOTE — TELEPHONE ENCOUNTER
Care Due:                  Date            Visit Type   Department     Provider  --------------------------------------------------------------------------------                                EP -                              PRIMARY      [facility]  Last Visit: 03-      CARE ([facility])   JF Conway                              EP -                              PRIMARY      [facility]  Next Visit: 06-      CARE ([facility])   JF Conway                                                            Last  Test          Frequency    Reason                     Performed    Due Date  --------------------------------------------------------------------------------    Lipid Panel.  12 months..  atorvastatin.............  02- 01-    Uric Acid...  12 months..  febuxostat...............  Not Found    Overdue    [employer] Embedded Care Due Messages. Reference number: [id number].   5/07/2024 3:42:42 AM CDT

## 2024-05-09 ENCOUNTER — TELEPHONE (OUTPATIENT)
Dept: FAMILY MEDICINE | Facility: CLINIC | Age: 74
End: 2024-05-09
Payer: MEDICARE

## 2024-05-09 ENCOUNTER — LAB VISIT (OUTPATIENT)
Dept: LAB | Facility: HOSPITAL | Age: 74
End: 2024-05-09
Attending: INTERNAL MEDICINE
Payer: MEDICARE

## 2024-05-09 ENCOUNTER — ANTI-COAG VISIT (OUTPATIENT)
Dept: CARDIOLOGY | Facility: CLINIC | Age: 74
End: 2024-05-09
Payer: MEDICARE

## 2024-05-09 DIAGNOSIS — Z86.79 HISTORY OF ATRIAL FIBRILLATION: ICD-10-CM

## 2024-05-09 DIAGNOSIS — Z79.01 LONG TERM (CURRENT) USE OF ANTICOAGULANTS: ICD-10-CM

## 2024-05-09 DIAGNOSIS — Z86.79 HISTORY OF ATRIAL FIBRILLATION: Primary | ICD-10-CM

## 2024-05-09 LAB
INR PPP: 2 (ref 0.8–1.2)
PROTHROMBIN TIME: 21.4 SEC (ref 9–12.5)

## 2024-05-09 PROCEDURE — 93793 ANTICOAG MGMT PT WARFARIN: CPT | Mod: S$GLB,,,

## 2024-05-09 PROCEDURE — 85610 PROTHROMBIN TIME: CPT | Performed by: INTERNAL MEDICINE

## 2024-05-09 PROCEDURE — 36415 COLL VENOUS BLD VENIPUNCTURE: CPT | Mod: PO | Performed by: INTERNAL MEDICINE

## 2024-05-09 NOTE — TELEPHONE ENCOUNTER
----- Message from Pardeep Mino sent at 5/9/2024  3:41 PM CDT -----  Regarding: self  Type: Patient Call Back    Who called:self    What is the request in detail:pt is calling in regards of his lab results    Can the clinic reply by [facility]?no    Would the patient rather a call back or a response via [facility]? callback    Best call back number:[phone]    Additional Information:

## 2024-05-10 NOTE — PROGRESS NOTES
[facility]    05/10/2024 7:54 AM    Assessment/Plan:    Patient presents today with therapeutic INR.    Assessment of patient findings and chart review: no significant findings     Recommendation for patient's warfarin regimen: Continue current maintenance dose    Recommend repeat INR in 2 weeks  _________________________________________________________________    Vivek Lema Jr. (74 y.o.) is followed by the [email or web address].    Anticoagulation Summary  As of 2024      INR goal:  2.0-3.0   TTR:  76.6% (2.2 y)   INR used for dosin.0 (2024)   Warfarin maintenance plan:  4 mg (2 mg x 2) every e, Thu, Sat; 2 mg (2 mg x 1) all other days   Weekly warfarin total:  20 mg   Plan last modified:  Mihaela Ortega, PharmD (2024)   Next INR check:  2024   Target end date:      Indications    History of atrial fibrillation [Z86.79]  Long term (current) use of anticoagulants [Z79.01]                 Anticoagulation Episode Summary       INR check location:      Preferred lab:      Send INR reminders to:  [facility] COUMADIN MONITORING POOL    Comments:  [place] only Mon - Thur and Hospital Lab on           Anticoagulation Care Providers       Provider Role Specialty Phone number    Michelle Conway MD [facility] [phone]

## 2024-05-24 ENCOUNTER — LAB VISIT (OUTPATIENT)
Dept: LAB | Facility: HOSPITAL | Age: 74
End: 2024-05-24
Attending: INTERNAL MEDICINE
Payer: MEDICARE

## 2024-05-24 DIAGNOSIS — Z86.79 HISTORY OF ATRIAL FIBRILLATION: ICD-10-CM

## 2024-05-24 DIAGNOSIS — Z79.01 LONG TERM (CURRENT) USE OF ANTICOAGULANTS: ICD-10-CM

## 2024-05-24 PROCEDURE — 36415 COLL VENOUS BLD VENIPUNCTURE: CPT | Mod: PO | Performed by: INTERNAL MEDICINE

## 2024-05-24 PROCEDURE — 85610 PROTHROMBIN TIME: CPT | Performed by: INTERNAL MEDICINE

## 2024-05-25 LAB
INR PPP: 2 (ref 0.8–1.2)
PROTHROMBIN TIME: 20.9 SEC (ref 9–12.5)

## 2024-05-27 ENCOUNTER — ANTI-COAG VISIT (OUTPATIENT)
Dept: CARDIOLOGY | Facility: CLINIC | Age: 74
End: 2024-05-27
Payer: MEDICARE

## 2024-05-27 DIAGNOSIS — Z79.01 LONG TERM (CURRENT) USE OF ANTICOAGULANTS: ICD-10-CM

## 2024-05-27 DIAGNOSIS — Z86.79 HISTORY OF ATRIAL FIBRILLATION: Primary | ICD-10-CM

## 2024-05-27 PROCEDURE — 93793 ANTICOAG MGMT PT WARFARIN: CPT | Mod: S$GLB,,,

## 2024-05-27 NOTE — PROGRESS NOTES
[facility]    2024 8:02 AM    Assessment/Plan:    Patient presents today with therapeutic INR.    Assessment of patient findings and chart review: no significant findings     Recommendation for patient's warfarin regimen: Continue current maintenance dose    Recommend repeat INR in 3 weeks  _________________________________________________________________    Vivek Lema Jr. (74 y.o.) is followed by the [facility] [facility].    Anticoagulation Summary  As of 2024      INR goal:  2.0-3.0   TTR:  77.0% (2.3 y)   INR used for dosin.0 (2024)   Warfarin maintenance plan:  4 mg (2 mg x 2) every e, Thu, Sat; 2 mg (2 mg x 1) all other days   Weekly warfarin total:  20 mg   Plan last modified:  Mihaela Ortega, PharmD (2024)   Next INR check:  2024   Target end date:      Indications    History of atrial fibrillation [Z86.79]  Long term (current) use of anticoagulants [Z79.01]                 Anticoagulation Episode Summary       INR check location:      Preferred lab:      Send INR reminders to:  [facility] COUMADIN MONITORING POOL    Comments:  [place] only Mon - Thur and Hospital Lab on           Anticoagulation Care Providers       Provider Role Specialty Phone number    Michelle Conway MD [facility] Medicine [phone]

## 2024-06-13 ENCOUNTER — ANTI-COAG VISIT (OUTPATIENT)
Dept: CARDIOLOGY | Facility: CLINIC | Age: 74
End: 2024-06-13
Payer: MEDICARE

## 2024-06-13 ENCOUNTER — LAB VISIT (OUTPATIENT)
Dept: LAB | Facility: HOSPITAL | Age: 74
End: 2024-06-13
Attending: INTERNAL MEDICINE
Payer: MEDICARE

## 2024-06-13 DIAGNOSIS — Z86.79 HISTORY OF ATRIAL FIBRILLATION: Primary | ICD-10-CM

## 2024-06-13 DIAGNOSIS — Z79.01 LONG TERM (CURRENT) USE OF ANTICOAGULANTS: ICD-10-CM

## 2024-06-13 DIAGNOSIS — Z86.79 HISTORY OF ATRIAL FIBRILLATION: ICD-10-CM

## 2024-06-13 LAB
INR PPP: 2.7 (ref 0.8–1.2)
PROTHROMBIN TIME: 28 SEC (ref 9–12.5)

## 2024-06-13 PROCEDURE — 85610 PROTHROMBIN TIME: CPT | Performed by: INTERNAL MEDICINE

## 2024-06-13 PROCEDURE — 36415 COLL VENOUS BLD VENIPUNCTURE: CPT | Mod: PO | Performed by: INTERNAL MEDICINE

## 2024-06-13 PROCEDURE — 93793 ANTICOAG MGMT PT WARFARIN: CPT | Mod: S$GLB,,,

## 2024-06-13 NOTE — PROGRESS NOTES
[facility]    2024 3:12 PM    Assessment/Plan:    Patient presents today with therapeutic INR.    Assessment of patient findings and chart review: no significant findings     Recommendation for patient's warfarin regimen: Continue current maintenance dose    Recommend repeat INR in 4 weeks  _________________________________________________________________    Vivek Lema Jr. (74 y.o.) is followed by the [email or web address].    Anticoagulation Summary  As of 2024      INR goal:  2.0-3.0   TTR:  77.6% (2.3 y)   INR used for dosin.7 (2024)   Warfarin maintenance plan:  4 mg (2 mg x 2) every e, Thu, Sat; 2 mg (2 mg x 1) all other days   Weekly warfarin total:  20 mg   Plan last modified:  Mihaela Ortega, PharmD (2024)   Next INR check:  2024   Target end date:      Indications    History of atrial fibrillation [Z86.79]  Long term (current) use of anticoagulants [Z79.01]                 Anticoagulation Episode Summary       INR check location:      Preferred lab:      Send INR reminders to:  [facility] COUMADIN MONITORING POOL    Comments:  [place] only Mon - Thur and Hospital Lab on           Anticoagulation Care Providers       Provider Role Specialty Phone number    Michelle Conway MD [facility] [phone]

## 2024-06-22 DIAGNOSIS — I25.10 CORONARY ARTERY DISEASE, UNSPECIFIED VESSEL OR LESION TYPE, UNSPECIFIED WHETHER ANGINA PRESENT, UNSPECIFIED WHETHER NATIVE OR TRANSPLANTED HEART: ICD-10-CM

## 2024-06-24 RX ORDER — ASPIRIN 81 MG/1
81 TABLET ORAL
Qty: 90 TABLET | Refills: 3 | Status: SHIPPED | OUTPATIENT
Start: 2024-06-24

## 2024-06-25 ENCOUNTER — OFFICE VISIT (OUTPATIENT)
Dept: FAMILY MEDICINE | Facility: CLINIC | Age: 74
End: 2024-06-25
Payer: MEDICARE

## 2024-06-25 ENCOUNTER — LAB VISIT (OUTPATIENT)
Dept: LAB | Facility: HOSPITAL | Age: 74
End: 2024-06-25
Attending: INTERNAL MEDICINE
Payer: MEDICARE

## 2024-06-25 VITALS
OXYGEN SATURATION: 98 % | TEMPERATURE: 98 F | BODY MASS INDEX: 32.01 KG/M2 | HEART RATE: 60 BPM | SYSTOLIC BLOOD PRESSURE: 128 MMHG | RESPIRATION RATE: 16 BRPM | HEIGHT: 71 IN | WEIGHT: 228.63 LBS | DIASTOLIC BLOOD PRESSURE: 64 MMHG

## 2024-06-25 DIAGNOSIS — I25.10 CORONARY ARTERY DISEASE, UNSPECIFIED VESSEL OR LESION TYPE, UNSPECIFIED WHETHER ANGINA PRESENT, UNSPECIFIED WHETHER NATIVE OR TRANSPLANTED HEART: ICD-10-CM

## 2024-06-25 DIAGNOSIS — E21.3 HYPERPARATHYROIDISM: ICD-10-CM

## 2024-06-25 DIAGNOSIS — I10 HYPERTENSION, UNSPECIFIED TYPE: Primary | ICD-10-CM

## 2024-06-25 DIAGNOSIS — R51.9 NONINTRACTABLE HEADACHE, UNSPECIFIED CHRONICITY PATTERN, UNSPECIFIED HEADACHE TYPE: ICD-10-CM

## 2024-06-25 DIAGNOSIS — M10.9 GOUT, UNSPECIFIED CAUSE, UNSPECIFIED CHRONICITY, UNSPECIFIED SITE: ICD-10-CM

## 2024-06-25 DIAGNOSIS — I10 HYPERTENSION, UNSPECIFIED TYPE: ICD-10-CM

## 2024-06-25 DIAGNOSIS — N18.4 CKD (CHRONIC KIDNEY DISEASE), STAGE IV: ICD-10-CM

## 2024-06-25 DIAGNOSIS — E66.09 CLASS 1 OBESITY DUE TO EXCESS CALORIES WITH SERIOUS COMORBIDITY AND BODY MASS INDEX (BMI) OF 32.0 TO 32.9 IN ADULT: ICD-10-CM

## 2024-06-25 LAB
ALBUMIN SERPL BCP-MCNC: 3.7 G/DL (ref 3.5–5.2)
ALP SERPL-CCNC: 91 U/L (ref 55–135)
ALT SERPL W/O P-5'-P-CCNC: 14 U/L (ref 10–44)
ANION GAP SERPL CALC-SCNC: 11 MMOL/L (ref 8–16)
AST SERPL-CCNC: 23 U/L (ref 10–40)
BASOPHILS # BLD AUTO: 0.05 K/UL (ref 0–0.2)
BASOPHILS NFR BLD: 0.7 % (ref 0–1.9)
BILIRUB SERPL-MCNC: 1.4 MG/DL (ref 0.1–1)
BUN SERPL-MCNC: 35 MG/DL (ref 8–23)
CALCIUM SERPL-MCNC: 9.2 MG/DL (ref 8.7–10.5)
CHLORIDE SERPL-SCNC: 107 MMOL/L (ref 95–110)
CO2 SERPL-SCNC: 23 MMOL/L (ref 23–29)
CREAT SERPL-MCNC: 2.4 MG/DL (ref 0.5–1.4)
DIFFERENTIAL METHOD BLD: NORMAL
EOSINOPHIL # BLD AUTO: 0.3 K/UL (ref 0–0.5)
EOSINOPHIL NFR BLD: 4.3 % (ref 0–8)
ERYTHROCYTE [DISTWIDTH] IN BLOOD BY AUTOMATED COUNT: 14.2 % (ref 11.5–14.5)
EST. GFR  (NO RACE VARIABLE): 27.3 ML/MIN/1.73 M^2
GLUCOSE SERPL-MCNC: 98 MG/DL (ref 70–110)
HCT VFR BLD AUTO: 46.2 % (ref 40–54)
HGB BLD-MCNC: 15 G/DL (ref 14–18)
IMM GRANULOCYTES # BLD AUTO: 0.01 K/UL (ref 0–0.04)
IMM GRANULOCYTES NFR BLD AUTO: 0.1 % (ref 0–0.5)
LYMPHOCYTES # BLD AUTO: 1.8 K/UL (ref 1–4.8)
LYMPHOCYTES NFR BLD: 24.8 % (ref 18–48)
MCH RBC QN AUTO: 30.9 PG (ref 27–31)
MCHC RBC AUTO-ENTMCNC: 32.5 G/DL (ref 32–36)
MCV RBC AUTO: 95 FL (ref 82–98)
MONOCYTES # BLD AUTO: 1 K/UL (ref 0.3–1)
MONOCYTES NFR BLD: 13.3 % (ref 4–15)
NEUTROPHILS # BLD AUTO: 4.1 K/UL (ref 1.8–7.7)
NEUTROPHILS NFR BLD: 56.8 % (ref 38–73)
NRBC BLD-RTO: 0 /100 WBC
PLATELET # BLD AUTO: 166 K/UL (ref 150–450)
PMV BLD AUTO: 12.9 FL (ref 9.2–12.9)
POTASSIUM SERPL-SCNC: 4.2 MMOL/L (ref 3.5–5.1)
PROT SERPL-MCNC: 7.1 G/DL (ref 6–8.4)
PTH-INTACT SERPL-MCNC: 287.8 PG/ML (ref 9–77)
RBC # BLD AUTO: 4.86 M/UL (ref 4.6–6.2)
SODIUM SERPL-SCNC: 141 MMOL/L (ref 136–145)
URATE SERPL-MCNC: 4.8 MG/DL (ref 3.4–7)
WBC # BLD AUTO: 7.15 K/UL (ref 3.9–12.7)

## 2024-06-25 PROCEDURE — 3288F FALL RISK ASSESSMENT DOCD: CPT | Mod: CPTII,S$GLB,, | Performed by: INTERNAL MEDICINE

## 2024-06-25 PROCEDURE — 99214 OFFICE O/P EST MOD 30 MIN: CPT | Mod: S$GLB,,, | Performed by: INTERNAL MEDICINE

## 2024-06-25 PROCEDURE — 84550 ASSAY OF BLOOD/URIC ACID: CPT | Performed by: INTERNAL MEDICINE

## 2024-06-25 PROCEDURE — 1126F AMNT PAIN NOTED NONE PRSNT: CPT | Mod: CPTII,S$GLB,, | Performed by: INTERNAL MEDICINE

## 2024-06-25 PROCEDURE — 85025 COMPLETE CBC W/AUTO DIFF WBC: CPT | Performed by: INTERNAL MEDICINE

## 2024-06-25 PROCEDURE — 1159F MED LIST DOCD IN RCRD: CPT | Mod: CPTII,S$GLB,, | Performed by: INTERNAL MEDICINE

## 2024-06-25 PROCEDURE — 80053 COMPREHEN METABOLIC PANEL: CPT | Performed by: INTERNAL MEDICINE

## 2024-06-25 PROCEDURE — 36415 COLL VENOUS BLD VENIPUNCTURE: CPT | Mod: PO | Performed by: INTERNAL MEDICINE

## 2024-06-25 PROCEDURE — 1160F RVW MEDS BY RX/DR IN RCRD: CPT | Mod: CPTII,S$GLB,, | Performed by: INTERNAL MEDICINE

## 2024-06-25 PROCEDURE — 3074F SYST BP LT 130 MM HG: CPT | Mod: CPTII,S$GLB,, | Performed by: INTERNAL MEDICINE

## 2024-06-25 PROCEDURE — 83970 ASSAY OF PARATHORMONE: CPT | Performed by: INTERNAL MEDICINE

## 2024-06-25 PROCEDURE — 99999 PR PBB SHADOW E&M-EST. PATIENT-LVL IV: CPT | Mod: PBBFAC,,, | Performed by: INTERNAL MEDICINE

## 2024-06-25 PROCEDURE — 3078F DIAST BP <80 MM HG: CPT | Mod: CPTII,S$GLB,, | Performed by: INTERNAL MEDICINE

## 2024-06-25 PROCEDURE — 1101F PT FALLS ASSESS-DOCD LE1/YR: CPT | Mod: CPTII,S$GLB,, | Performed by: INTERNAL MEDICINE

## 2024-06-25 RX ORDER — HYDROCODONE BITARTRATE AND ACETAMINOPHEN 7.5; 325 MG/1; MG/1
TABLET ORAL
Qty: 30 TABLET | Refills: 0 | Status: SHIPPED | OUTPATIENT
Start: 2024-06-25

## 2024-06-25 NOTE — PROGRESS NOTES
Subjective:       Patient ID: Vivek Lema Jr. is a pleasant 74 y.o. White male patient    Chief Complaint: Follow-up      Patient is a pt I saw last on 03/19/2023. See my last notes.    HPI:      Patient with a long and complicated past medical history who comes today for regular follow-up visit.  From our last encounter:  - Ophthalmology   He reports feeling globally fine, he has no specific issue to report today.  He is still very concerned regarding his kidney function. He is very careful regarding drinking enough fluid and not taking NSAIDs.    He still has his AV fistula that is thrilling, see previous issues re: cat bites.  He is still on in Coumadin is going to see Dr. Fernandes next week.    Patient Active Problem List   Diagnosis    Hypertension    CKD (chronic kidney disease), stage IV    A-V fistula    Coronary artery disease    History of atrial fibrillation    Pacemaker    Nonrheumatic mitral valve regurgitation    Class 1 obesity due to excess calories with serious comorbidity and body mass index (BMI) of 32.0 to 32.9 in adult    Gout    Long term (current) use of anticoagulants    COVID    PAF (paroxysmal atrial fibrillation)    Exudative age-related macular degeneration of left eye with active choroidal neovascularization    Intermediate stage dry age-related macular degeneration of right eye    Epiretinal membrane, right    Age-related nuclear cataract of both eyes    Pseudophakia of both eyes    History of prostate cancer    Anxiety and depression    Nonintractable headache    Choroidal neovascularization, left eye    Epiretinal membrane, left    Cat bite    Hyperparathyroidism    Thrombocytopenia, unspecified         PAST MEDICAL HISTORY  Past Medical History:   Diagnosis Date    A-fib     CAD (coronary artery disease)     CKD (chronic kidney disease)     Disorder of kidney and ureter     HTN (hypertension)     Macular degeneration     Mitral regurgitation     Pacemaker         PAST  SURGICAL HISTORY:  Past Surgical History:   Procedure Laterality Date    AV FISTULA PLACEMENT Left     CATARACT EXTRACTION      COLONOSCOPY      COLONOSCOPY N/A 01/17/2023    Procedure: COLONOSCOPY;  Surgeon: Todd Oviedo MD;  Location: [facility] ([address] [place] [address]);  Service: Endoscopy;  Laterality: N/A;  from referral / Pt with trouble clearing secretions at times - 2nd floor / prep ins. mailed and reviewed with Pt and Pt wife/ Pt reports last colon in [place], Hx polyps / Coumadin - per coumadin clinic ok to hold coumadin x 5 days prior- see telephone encounter dated 11/2/22 / pa    CORONARY ARTERY BYPASS GRAFT      with valve repair (aortic?)        SOCIAL HISTORY:   reports that he has never smoked. He has never used smokeless tobacco. He reports current alcohol use of about 6.0 standard drinks of alcohol per week. He reports that he does not use drugs.     FAMILY HISTORY:  Family History   Problem Relation Name Age of Onset    Amblyopia Neg Hx      Blindness Neg Hx      Cataracts Neg Hx      Glaucoma Neg Hx      Macular degeneration Neg Hx      Retinal detachment Neg Hx      Strabismus Neg Hx          ALLERGIES:   Review of patient's allergies indicates:  No Known Allergies    MEDICATIONS:    Current Outpatient Medications:     aspirin (ECOTRIN) 81 MG EC tablet, TAKE 1 TABLET BY MOUTH EVERY DAY, Disp: 90 tablet, Rfl: 3    atorvastatin (LIPITOR) 40 MG tablet, Take 1 tablet (40 mg total) by mouth once daily., Disp: 90 tablet, Rfl: 3    calcitRIOL (ROCALTROL) 0.25 MCG Cap, TAKE 1 CAPSULE(0.25 MCG) BY MOUTH EVERY DAY, Disp: 90 capsule, Rfl: 1    cholecalciferol, vitamin D3, (VITAMIN D3) 50 mcg (2,000 unit) Cap, Take 1 capsule by mouth once daily., Disp: , Rfl:     EScitalopram oxalate (LEXAPRO) 20 MG tablet, Take 1 tablet (20 mg total) by mouth once daily., Disp: 90 tablet, Rfl: 3    febuxostat (ULORIC) 40 mg Tab, TAKE 1 TABLET(40 MG) BY MOUTH EVERY DAY, Disp: 90 tablet, Rfl: 0    moxifloxacin (VIGAMOX) 0.5 % ophthalmic  solution, Place 1 drop into the right eye 3 (three) times daily., Disp: 3 mL, Rfl: 0    nitroGLYCERIN (NITROSTAT) 0.4 MG SL tablet, Place 1 tablet (0.4 mg total) under the tongue every 5 (five) minutes as needed for Chest pain., Disp: 30 tablet, Rfl: 3    torsemide (DEMADEX) 20 MG Tab, TAKE 3 TABLETS BY MOUTH EVERY DAY, Disp: 270 tablet, Rfl: 3    valsartan (DIOVAN) 40 MG tablet, Take 1 tablet (40 mg total) by mouth once daily., Disp: 30 tablet, Rfl: 0    warfarin (COUMADIN) 2 MG tablet, TAKE 2 TABLETS BY MOUTH MONDAY, WEDNESDAY, FRIDAY. TAKE 1 TABLET BY MOUTH ON ALL OTHER DAYS, Disp: 128 tablet, Rfl: 1    HYDROcodone-acetaminophen (NORCO) 7.5-325 mg per tablet, Take not more than one pill a day PRN as direct, Disp: 30 tablet, Rfl: 0    Review of Systems   Constitutional: Negative.  Negative for fatigue.   HENT: Negative.     Respiratory: Negative.  Negative for shortness of breath.    Cardiovascular: Negative.         AV fistula LUE   Gastrointestinal: Negative.    Musculoskeletal: Negative.    Skin: Negative.    Neurological: Negative.    All other systems reviewed and are negative.      Objective:      Physical Exam  Vitals and nursing note reviewed.   Constitutional:       Appearance: Normal appearance. He is well-developed. He is obese.   HENT:      Right Ear: Tympanic membrane and external ear normal.      Left Ear: Tympanic membrane and external ear normal.   Eyes:      Conjunctiva/sclera: Conjunctivae normal.   Neck:      Thyroid: No thyromegaly.   Cardiovascular:      Rate and Rhythm: Normal rate. Rhythm irregular.      Pulses: Normal pulses.      Heart sounds: Normal heart sounds.      Comments: AV fistula L arm with good thrill  Pulmonary:      Effort: Pulmonary effort is normal.      Breath sounds: No wheezing.   Chest:      Chest wall: No tenderness.   Abdominal:      General: Bowel sounds are normal.      Palpations: Abdomen is soft. There is no mass.      Tenderness: There is no abdominal tenderness.  "  Musculoskeletal:         General: No tenderness or deformity. Normal range of motion.      Cervical back: Normal range of motion.   Lymphadenopathy:      Cervical: No cervical adenopathy.   Skin:     General: Skin is warm and dry.   Neurological:      Mental Status: He is alert and oriented to person, place, and time.   Psychiatric:         Mood and Affect: Mood normal.         Behavior: Behavior normal.         Thought Content: Thought content normal.         Judgment: Judgment normal.         Vitals:    24 1344   BP: 128/64   BP Location: Right arm   Patient Position: Sitting   BP Method: Large (Manual)   Pulse: 60   Resp: 16   Temp: 97.8 °F (36.6 °C)   TempSrc: Oral   SpO2: 98%   Weight: 103.7 kg (228 lb 9.9 oz)   Height: 5' 11" (1.803 m)     Body mass index is 31.89 kg/m².    RESULTS: Reviewed labs from last 12 months    Last Lab Results:     Lab Results   Component Value Date    WBC 6.84 2024    HGB 15.0 2024    HCT 45.6 2024     2024     2024    K 3.4 (L) 2024     2024    CO2 23 2024    BUN 42 (H) 2024    CREATININE 2.8 (H) 2024    CALCIUM 9.2 2024    ALBUMIN 3.4 (L) 2024    AST 21 2024    ALT 11 2024    CHOL 154 2022    TRIG 171 (H) 2022    HDL 33 (L) 2022    LDLCALC 86.8 2022    HGBA1C 5.2 2023    TSH 2.381 2023    PSA <0.01 2023       Assessment & Plan:       1. Hypertension, unspecified type  -     CBC W/ AUTO DIFFERENTIAL; Future; Expected date: 2024  -     COMPREHENSIVE METABOLIC PANEL; Future; Expected date: 2024    BP at goal, same treatment, blood work.    2. Class 1 obesity due to excess calories with serious comorbidity and body mass index (BMI) of 32.0 to 32.9 in adult    We discussed weight issues and safe, effective ways of losing pounds, includin) diet:  low carbohydrate, low fat diet, stay away from fast food, fried and " processed food, use whole grain, lot of fruits and vegetables, use healthy fat such as avocado, nuts and olive oil in reasonable quantity, stay away from sodas. Regular meals with lean proteins.  2) physical activity: ideally 150 min a week, with cardiovascular and resistance activity.  Patient was encouraged to set realistic attainable goals for weight loss, and we will follow up periodically.    3. CKD (chronic kidney disease), stage IV  -     Ambulatory referral/consult to Nephrology; Future; Expected date: 07/02/2024    Stable, patient has AV fistula but not using it for now.  He is going to see again Dr. Doe for close follow-up.      4. Hyperparathyroidism  -     PTH, intact; Future; Expected date: 06/25/2024    5. Coronary artery disease, unspecified vessel or lesion type, unspecified whether angina present, unspecified whether native or transplanted heart    F-up with Dr. Olivier coming soon. No symptoms.    6. Gout, unspecified cause, unspecified chronicity, unspecified site  -     URIC ACID; Future; Expected date: 06/25/2024    No recent flare.    7. Nonintractable headache, unspecified chronicity pattern, unspecified headache type  -     HYDROcodone-acetaminophen (NORCO) 7.5-325 mg per tablet; Take not more than one pill a day PRN as direct  Dispense: 30 tablet; Refill: 0    Takes Norco very sparingly, last refill in 09/2024,  checked.    No follow-ups on file.    This note was created by combination of typed  and M-Modal dictation.  Transcription errors may be present.  If there are any questions, please contact me.

## 2024-06-25 NOTE — PROGRESS NOTES
Health Maintenance Due   Topic     Shingles Vaccine (1 of 2)     RSV Vaccine (Age 60+ and Pregnant patients) (1 - 1-dose 60+ series) Not offered at this facility.     COVID-19 Vaccine (4 - 2023-24 season) Not offered at this facility.

## 2024-07-03 ENCOUNTER — OFFICE VISIT (OUTPATIENT)
Dept: CARDIOLOGY | Facility: CLINIC | Age: 74
End: 2024-07-03
Payer: MEDICARE

## 2024-07-03 VITALS
DIASTOLIC BLOOD PRESSURE: 70 MMHG | SYSTOLIC BLOOD PRESSURE: 138 MMHG | RESPIRATION RATE: 18 BRPM | HEIGHT: 71 IN | HEART RATE: 59 BPM | OXYGEN SATURATION: 98 % | WEIGHT: 229.94 LBS | BODY MASS INDEX: 32.19 KG/M2

## 2024-07-03 DIAGNOSIS — I10 PRIMARY HYPERTENSION: ICD-10-CM

## 2024-07-03 DIAGNOSIS — I25.118 CORONARY ARTERY DISEASE OF NATIVE ARTERY OF NATIVE HEART WITH STABLE ANGINA PECTORIS: Primary | ICD-10-CM

## 2024-07-03 DIAGNOSIS — I48.0 PAF (PAROXYSMAL ATRIAL FIBRILLATION): ICD-10-CM

## 2024-07-03 DIAGNOSIS — E66.09 CLASS 1 OBESITY DUE TO EXCESS CALORIES WITH SERIOUS COMORBIDITY AND BODY MASS INDEX (BMI) OF 33.0 TO 33.9 IN ADULT: ICD-10-CM

## 2024-07-03 DIAGNOSIS — R94.31 ABNORMAL EKG: ICD-10-CM

## 2024-07-03 DIAGNOSIS — R07.9 CHEST PAIN, UNSPECIFIED TYPE: ICD-10-CM

## 2024-07-03 DIAGNOSIS — E78.49 OTHER HYPERLIPIDEMIA: ICD-10-CM

## 2024-07-03 DIAGNOSIS — I77.0 A-V FISTULA: ICD-10-CM

## 2024-07-03 DIAGNOSIS — Z95.2 S/P AVR (AORTIC VALVE REPLACEMENT) AND AORTOPLASTY: ICD-10-CM

## 2024-07-03 DIAGNOSIS — Z79.01 LONG TERM (CURRENT) USE OF ANTICOAGULANTS: ICD-10-CM

## 2024-07-03 DIAGNOSIS — Z95.0 PACEMAKER: ICD-10-CM

## 2024-07-03 DIAGNOSIS — N18.4 CKD (CHRONIC KIDNEY DISEASE), STAGE IV: ICD-10-CM

## 2024-07-03 DIAGNOSIS — I34.0 MITRAL VALVE INSUFFICIENCY, UNSPECIFIED ETIOLOGY: ICD-10-CM

## 2024-07-03 PROBLEM — I25.119 CORONARY ARTERY DISEASE INVOLVING NATIVE CORONARY ARTERY OF NATIVE HEART WITH ANGINA PECTORIS: Status: ACTIVE | Noted: 2021-11-03

## 2024-07-03 LAB
OHS QRS DURATION: 130 MS
OHS QTC CALCULATION: 456 MS

## 2024-07-03 PROCEDURE — 93000 ELECTROCARDIOGRAM COMPLETE: CPT | Mod: S$GLB,,, | Performed by: INTERNAL MEDICINE

## 2024-07-03 PROCEDURE — 99999 PR PBB SHADOW E&M-EST. PATIENT-LVL IV: CPT | Mod: PBBFAC,,, | Performed by: INTERNAL MEDICINE

## 2024-07-03 RX ORDER — ISOSORBIDE MONONITRATE 60 MG/1
60 TABLET, EXTENDED RELEASE ORAL DAILY
Qty: 90 TABLET | Refills: 3 | Status: SHIPPED | OUTPATIENT
Start: 2024-07-03

## 2024-07-03 NOTE — PROGRESS NOTES
CARDIOVASCULAR PROGRESS NOTE    REASON FOR CONSULT:   Vivek Lema Jr. is a 74 y.o. male who presents for follow up of AVR/CHF/PAF.    PCP: Anwar  HISTORY OF PRESENT ILLNESS:   Last seen October 2023    Patient previously followed with Dr. Hannon, now requesting to switch to my service.    The patient returns for follow up.  He reports a single episode of nitroglycerin responsive left-sided chest discomfort.  He has had no further episodes.  He does describe a pulling sensation which is nonexertional.  This is not reproducible on palpation of his chest.  He otherwise denies dyspnea, palpitations, or syncope.  There has been no PND, orthopnea, melena, hematuria, or claudication symptoms.    His [employer] pacemaker was interrogated in the office today.  Current rhythm is a sense V sense at 60 beats per minute.  He is pacing 75% of time in the atria and less than 1% of time in the ventricle.  Estimated longevity is 2.2 years.  Pacing and sensing thresholds as well as impedance are normal.  He has had for mode switches.  Longest was in November 2023 for 2 hours which appears to be atrial fibrillation.  The most recent was in January 2024 for 3 minutes which also appears to be atrial fibrillation.  No programming changes were made.    CARDIOVASCULAR HISTORY:   SSS s/p St[employer] Meng PPM 2018  AVR with FEDERICO ligation 1/2018 (27mm Magna Ease bioprosthesis, hx Ao root dil/AI)  MR s/p failed mitraclip  PAF on coumadin    PAST MEDICAL HISTORY:     Past Medical History:   Diagnosis Date    A-fib     CAD (coronary artery disease)     CKD (chronic kidney disease)     Disorder of kidney and ureter     HTN (hypertension)     Macular degeneration     Mitral regurgitation     Pacemaker        PAST SURGICAL HISTORY:     Past Surgical History:   Procedure Laterality Date    AV FISTULA PLACEMENT Left     CATARACT EXTRACTION      COLONOSCOPY      COLONOSCOPY N/A 01/17/2023    Procedure: COLONOSCOPY;  Surgeon: Todd Oviedo MD;   Location: [facility] (2ND FLR);  Service: Endoscopy;  Laterality: N/A;  from referral / Pt with trouble clearing secretions at times - 2nd floor / prep ins. mailed and reviewed with Pt and Pt wife/ Pt reports last colon in [place], Hx polyps / Coumadin - per coumadin clinic ok to hold coumadin x 5 days prior- see telephone encounter dated 11/2/22 / pa    CORONARY ARTERY BYPASS GRAFT      with valve repair (aortic?)       ALLERGIES AND MEDICATION:   Review of patient's allergies indicates:  No Known Allergies     Medication List            Accurate as of July 3, 2024  1:53 PM. If you have any questions, ask your nurse or doctor.                CONTINUE taking these medications      aspirin 81 MG EC tablet  Commonly known as: ECOTRIN  TAKE 1 TABLET BY MOUTH EVERY DAY     atorvastatin 40 MG tablet  Commonly known as: LIPITOR  Take 1 tablet (40 mg total) by mouth once daily.     calcitRIOL 0.25 MCG Cap  Commonly known as: ROCALTROL  TAKE 1 CAPSULE(0.25 MCG) BY MOUTH EVERY DAY     cholecalciferol (vitamin D3) 50 mcg (2,000 unit) Cap capsule  Commonly known as: VITAMIN D3     EScitalopram oxalate 20 MG tablet  Commonly known as: LEXAPRO  Take 1 tablet (20 mg total) by mouth once daily.     febuxostat 40 mg Tab  Commonly known as: ULORIC  TAKE 1 TABLET(40 MG) BY MOUTH EVERY DAY     HYDROcodone-acetaminophen 7.5-325 mg per tablet  Commonly known as: NORCO  Take not more than one pill a day PRN as direct     moxifloxacin 0.5 % ophthalmic solution  Commonly known as: VIGAMOX  Place 1 drop into the right eye 3 (three) times daily.     nitroGLYCERIN 0.4 MG SL tablet  Commonly known as: NITROSTAT  Place 1 tablet (0.4 mg total) under the tongue every 5 (five) minutes as needed for Chest pain.     torsemide 20 MG Tab  Commonly known as: DEMADEX  TAKE 3 TABLETS BY MOUTH EVERY DAY     valsartan 40 MG tablet  Commonly known as: DIOVAN  Take 1 tablet (40 mg total) by mouth once daily.     warfarin 2 MG tablet  Commonly known as:  COUMADIN  TAKE 2 TABLETS BY MOUTH MONDAY, WEDNESDAY, FRIDAY. TAKE 1 TABLET BY MOUTH ON ALL OTHER DAYS              SOCIAL HISTORY:     Social History     Socioeconomic History    Marital status:    Tobacco Use    Smoking status: Never    Smokeless tobacco: Never   Substance and Sexual Activity    Alcohol use: Yes     Alcohol/week: 6.0 standard drinks of alcohol     Types: 6 Cans of beer per week     Comment: occ    Drug use: Never     Social Determinants of Health     Financial Resource Strain: Low Risk  (12/1/2023)    Overall Financial Resource Strain (CARDIA)     Difficulty of Paying Living Expenses: Not hard at all   Food Insecurity: No Food Insecurity (12/1/2023)    Hunger Vital Sign     Worried About Running Out of Food in the Last Year: Never true     Ran Out of Food in the Last Year: Never true   Transportation Needs: No Transportation Needs (12/1/2023)    PRAPARE - Transportation     Lack of Transportation (Medical): No     Lack of Transportation (Non-Medical): No   Physical Activity: Insufficiently Active (12/1/2023)    Exercise Vital Sign     Days of Exercise per Week: 2 days     Minutes of Exercise per Session: 30 min   Stress: No Stress Concern Present (12/1/2023)    [employer] [place] [employer] - Occupational Stress Questionnaire     Feeling of Stress : Not at all   Housing Stability: Low Risk  (12/15/2023)    Housing Stability Vital Sign     Unable to Pay for Housing in the Last Year: No     Number of Places Lived in the Last Year: 1     Unstable Housing in the Last Year: No       FAMILY HISTORY:     Family History   Problem Relation Name Age of Onset    Amblyopia Neg Hx      Blindness Neg Hx      Cataracts Neg Hx      Glaucoma Neg Hx      Macular degeneration Neg Hx      Retinal detachment Neg Hx      Strabismus Neg Hx         REVIEW OF SYSTEMS:   Review of Systems   Constitutional:  Negative for chills, diaphoresis, fever and malaise/fatigue.   HENT:  Negative for nosebleeds.   "  Eyes:  Negative for blurred vision, double vision and photophobia.   Respiratory:  Negative for hemoptysis, shortness of breath and wheezing.    Cardiovascular:  Positive for chest pain. Negative for palpitations, orthopnea, claudication, leg swelling and PND.   Gastrointestinal:  Negative for abdominal pain, blood in stool, heartburn, melena, nausea and vomiting.   Genitourinary:  Negative for flank pain and hematuria.   Musculoskeletal:  Negative for falls, myalgias and neck pain.   Skin:  Negative for rash.   Neurological:  Negative for dizziness, seizures, loss of consciousness, weakness and headaches.   Endo/Heme/Allergies:  Negative for polydipsia. Does not bruise/bleed easily.   Psychiatric/Behavioral:  Negative for depression and memory loss. The patient is not nervous/anxious.        PHYSICAL EXAM:     Vitals:    07/03/24 1351   BP: 138/70   Pulse: (!) 59   Resp: 18    Body mass index is 32.07 kg/m².  Weight: 104.3 kg (229 lb 15 oz)   Height: 5' 11" (180.3 cm)     Physical Exam  Vitals reviewed.   Constitutional:       General: He is not in acute distress.     Appearance: He is well-developed. He is obese. He is not ill-appearing, toxic-appearing or diaphoretic.   HENT:      Head: Normocephalic and atraumatic.   Eyes:      General: No scleral icterus.     Extraocular Movements: Extraocular movements intact.      Conjunctiva/sclera: Conjunctivae normal.      Pupils: Pupils are equal, round, and reactive to light.   Neck:      Thyroid: No thyromegaly.      Vascular: Normal carotid pulses. No carotid bruit or JVD.      Trachea: Trachea normal.   Cardiovascular:      Rate and Rhythm: Normal rate and regular rhythm.      Pulses:           Carotid pulses are 2+ on the right side and 2+ on the left side.     Heart sounds: S1 normal and S2 normal. Murmur heard.      Systolic murmur is present with a grade of 2/6.      No friction rub. No gallop.   Pulmonary:      Effort: Pulmonary effort is normal. No respiratory " distress.      Breath sounds: Normal breath sounds. No stridor. No wheezing, rhonchi or rales.   Chest:      Chest wall: No tenderness.   Abdominal:      General: There is no distension.      Palpations: Abdomen is soft.   Musculoskeletal:         General: No swelling or tenderness. Normal range of motion.      Cervical back: Normal range of motion and neck supple. No edema or rigidity.      Right lower leg: No edema.      Left lower leg: No edema.   Feet:      Right foot:      Skin integrity: No ulcer.      Left foot:      Skin integrity: No ulcer.   Skin:     General: Skin is warm and dry.      Coloration: Skin is not jaundiced.   Neurological:      General: No focal deficit present.      Mental Status: He is alert and oriented to person, place, and time.      Cranial Nerves: No cranial nerve deficit.   Psychiatric:         Mood and Affect: Mood normal.         Speech: Speech normal.         Behavior: Behavior normal. Behavior is cooperative.         DATA:   EKG: (personally reviewed tracing(s))  7/3/24 SR 60, IVCD, similar to 12/1/23    Laboratory:  CBC:  Recent Labs   Lab 12/15/23  1207 04/04/24  1306 06/25/24  1444   WBC 10.09 6.84 7.15   Hemoglobin 13.1 L 15.0 15.0   Hematocrit 40.6 45.6 46.2   Platelets 160 157 166       CHEMISTRIES:  Recent Labs   Lab 02/23/22  1213 03/13/22  0755 03/17/22  1419 03/21/22  1110 05/10/22  1413 06/27/22  1405 08/29/22  0855 11/21/23  0923 12/01/23  0330 12/15/23  1207 04/04/24  1306 06/25/24  1444   Glucose 95 94 106 101 93 85   < > 100 87 93 119 H 98   Sodium 144 137 136 140 139 141   < > 144 139 138 140 141   Potassium 3.9 3.6 4.5 4.7 4.1 4.0   < > 4.0 3.3 L 4.5 3.4 L 4.2   BUN 53 H 49 H 76 H 78 H 45 H 41 H   < > 42 H 43 H 45 H 42 H 35 H   Creatinine 3.2 H 3.1 H 4.0 H 3.3 H 2.7 H 2.6 H   < > 2.5 H 2.5 H 2.7 H 2.8 H 2.4 H   eGFR if non  18.1 A 19 A 13.8 A 17.4 A 22.2 A 23 A  --   --   --   --   --   --    eGFR  --   --   --   --   --   --    < > 26.1 A 26.1 A  24 A 22.7 A 27.3 A   Calcium 9.4 9.2 9.6 9.6 9.3 9.0   < > 9.3 9.0 8.8 9.2 9.2   Magnesium 2.2 2.3  --   --   --   --   --   --  2.1  --   --   --     < > = values in this interval not displayed.       CARDIAC BIOMARKERS:  Recent Labs   Lab 03/13/22  0755    H       COAGS:  Recent Labs   Lab 05/09/24  0944 05/24/24  0837 06/13/24  0934   INR 2.0 H 2.0 H 2.7 H       LIPIDS/LFTS:  Recent Labs   Lab 02/02/22  0945 02/23/22  1213 12/15/23  1207 04/04/24  1306 06/25/24  1444   Cholesterol 154  --   --   --   --    Triglycerides 171 H  --   --   --   --    HDL 33 L  --   --   --   --    LDL Cholesterol 86.8  --   --   --   --    Non-HDL Cholesterol 121  --   --   --   --    AST 15   < > 22 21 23   ALT 11   < > 12 11 14    < > = values in this interval not displayed.       Cardiovascular Testing:  Aortic US 10/16/23  Suprarenal aortic ectasia without evidence of AAA, maximum diameter 2.6 cm.    L MPI 9/22/23     Normal myocardial perfusion scan. There is no evidence of myocardial ischemia or infarction.    There is a  mild intensity fixed perfusion abnormality in the inferior wall of the left ventricle secondary to diaphragm attenuation.    The gated perfusion images showed an ejection fraction of 55% post stress.    The ECG portion of the study is negative for ischemia.    The patient reported chest pain during the stress test.    There were no arrhythmias during stress.    Echo 9/22/23    Left Ventricle: The left ventricle is normal in size. There is mild concentric hypertrophy. There is normal systolic function with a visually estimated ejection fraction of 55 - 60%. Grade II diastolic dysfunction.    Left Atrium: Left atrium is moderately dilated.    Right Ventricle: Mild right ventricular enlargement. Systolic function is normal.    Right Atrium: Right atrium is mildly dilated.    Aortic Valve: There is a bioprosthetic valve in the aortic position. Aortic valve area by VTI is 1.88 cm². Aortic valve peak  velocity is 2.43 m/s. Mean gradient is 13 mmHg. The dimensionless index is 0.43.    Mitral Valve: There is mild regurgitation.    Pulmonary Artery: The estimated pulmonary artery systolic pressure is 15 mmHg.    IVC/SVC: Normal venous pressure at 3 mmHg.    Right/left heart catheterization 07/16/2021: ([facility] note 2/8/22)  Nonobstructive coronary artery disease with the exception of a ramus intermedius that was of small caliber.  Mild pulmonary hypertension.  Elevated V-wave in setting of severe mitral regurgitation.  V-wave of 43 mm Hg.  Left anterior descending artery with a mid to distal 30-40% stenosis.  Ramus intermedius with a mid 70 80% stenosis.  Small caliber vessel.  Left circumflex artery:  Non dominant.  Ectatic and aneurysmal.  Mid 40% stenosis in the AV groove followed by a 40-50% stenosis prior to the bifurcation of OM 2.   Right coronary artery is ectatic and aneurysmal with a mid 20-30% stenosis.  Superior branch of the PDA has a 50% stenosis proximally in the inferior branch has luminal irregularities.  PLV branch with a proximal 30-40% stenosis followed by mid 40-50% stenosis.    ASSESSMENT:   # CAD, nonobst by cath 7/2021.  MPI/echo 9/2023 neg.  Some atyp CP at present.  # HTN, controlled  # HLP on atorva 40mg  # PAF in SR on coumadin. Hx FEDERICO ligation 1/2018.  # SSS s/p St. Meng dual chamber PPM  # Bio AVR 1/2018 (27mm Magna Ease) for Ao root dil/AI.  # MR s/p failed mitraclip    # CKD5, not on HD.  LUE AVF present  # BMI 32, up 1 unit(s) vs last OV  # aortic atherosclerosis (CXR 9/19/23)    PLAN:   Cont med rx  Cont coumadin for goal INR 2.0-3.0, consider switch to NOAC.  Add Imdur 60mg qd  RTC 6 months with surveillance echo and St. Meng PPM check (Jan 2024)  Hope to avoid cath (unless pt is on HD)    The above documents medical care services that are part of ongoing care related to this patient's serious/complex condition (Code ) (CAD/AS/MR/PAF).      Angel Olivier MD, FACC

## 2024-07-11 ENCOUNTER — ANTI-COAG VISIT (OUTPATIENT)
Dept: CARDIOLOGY | Facility: CLINIC | Age: 74
End: 2024-07-11
Payer: MEDICARE

## 2024-07-11 ENCOUNTER — LAB VISIT (OUTPATIENT)
Dept: LAB | Facility: HOSPITAL | Age: 74
End: 2024-07-11
Attending: INTERNAL MEDICINE
Payer: MEDICARE

## 2024-07-11 DIAGNOSIS — Z79.01 LONG TERM (CURRENT) USE OF ANTICOAGULANTS: ICD-10-CM

## 2024-07-11 DIAGNOSIS — Z86.79 HISTORY OF ATRIAL FIBRILLATION: ICD-10-CM

## 2024-07-11 DIAGNOSIS — Z86.79 HISTORY OF ATRIAL FIBRILLATION: Primary | ICD-10-CM

## 2024-07-11 LAB
INR PPP: 2.1 (ref 0.8–1.2)
PROTHROMBIN TIME: 21.6 SEC (ref 9–12.5)

## 2024-07-11 PROCEDURE — 36415 COLL VENOUS BLD VENIPUNCTURE: CPT | Mod: PO | Performed by: INTERNAL MEDICINE

## 2024-07-11 PROCEDURE — 85610 PROTHROMBIN TIME: CPT | Performed by: INTERNAL MEDICINE

## 2024-07-11 NOTE — PROGRESS NOTES
[facility]    2024 3:35 PM    Assessment/Plan:    Patient presents today with therapeutic INR.    Assessment of patient findings and chart review: no significant findings     Recommendation for patient's warfarin regimen: Continue current maintenance dose    Recommend repeat INR in 4 weeks  _________________________________________________________________    Vivek Lema Jr. (74 y.o.) is followed by the [facility].    Anticoagulation Summary  As of 2024      INR goal:  2.0-3.0   TTR:  78.3% (2.4 y)   INR used for dosin.1 (2024)   Warfarin maintenance plan:  4 mg (2 mg x 2) every e, Thu, Sat; 2 mg (2 mg x 1) all other days   Weekly warfarin total:  20 mg   Plan last modified:  Mihaela Ortega, PharmD (2024)   Next INR check:  2024   Target end date:      Indications    History of atrial fibrillation [Z86.79]  Long term (current) use of anticoagulants [Z79.01]                 Anticoagulation Episode Summary       INR check location:      Preferred lab:      Send INR reminders to:  [facility] COUMADIN MONITORING POOL    Comments:  [place] only Mon - Thur and Hospital Lab on           Anticoagulation Care Providers       Provider Role Specialty Phone number    Michelle Conway MD [facility] Medicine [phone]

## 2024-08-06 DIAGNOSIS — Z86.79 HISTORY OF ATRIAL FIBRILLATION: ICD-10-CM

## 2024-08-06 RX ORDER — WARFARIN 2 MG/1
TABLET ORAL
Qty: 128 TABLET | Refills: 1 | Status: SHIPPED | OUTPATIENT
Start: 2024-08-06

## 2024-08-08 ENCOUNTER — LAB VISIT (OUTPATIENT)
Dept: LAB | Facility: HOSPITAL | Age: 74
End: 2024-08-08
Attending: INTERNAL MEDICINE
Payer: MEDICARE

## 2024-08-08 ENCOUNTER — ANTI-COAG VISIT (OUTPATIENT)
Dept: CARDIOLOGY | Facility: CLINIC | Age: 74
End: 2024-08-08
Payer: MEDICARE

## 2024-08-08 DIAGNOSIS — Z79.01 LONG TERM (CURRENT) USE OF ANTICOAGULANTS: ICD-10-CM

## 2024-08-08 DIAGNOSIS — Z86.79 HISTORY OF ATRIAL FIBRILLATION: ICD-10-CM

## 2024-08-08 DIAGNOSIS — Z86.79 HISTORY OF ATRIAL FIBRILLATION: Primary | ICD-10-CM

## 2024-08-08 LAB
INR PPP: 2.2 (ref 0.8–1.2)
PROTHROMBIN TIME: 23.4 SEC (ref 9–12.5)

## 2024-08-08 PROCEDURE — 36415 COLL VENOUS BLD VENIPUNCTURE: CPT | Mod: PO | Performed by: INTERNAL MEDICINE

## 2024-08-08 PROCEDURE — 85610 PROTHROMBIN TIME: CPT | Performed by: INTERNAL MEDICINE

## 2024-09-16 ENCOUNTER — LAB VISIT (OUTPATIENT)
Dept: LAB | Facility: HOSPITAL | Age: 74
End: 2024-09-16
Attending: INTERNAL MEDICINE
Payer: MEDICARE

## 2024-09-16 ENCOUNTER — ANTI-COAG VISIT (OUTPATIENT)
Dept: CARDIOLOGY | Facility: CLINIC | Age: 74
End: 2024-09-16
Payer: MEDICARE

## 2024-09-16 DIAGNOSIS — Z79.01 LONG TERM (CURRENT) USE OF ANTICOAGULANTS: ICD-10-CM

## 2024-09-16 DIAGNOSIS — Z86.79 HISTORY OF ATRIAL FIBRILLATION: ICD-10-CM

## 2024-09-16 DIAGNOSIS — Z86.79 HISTORY OF ATRIAL FIBRILLATION: Primary | ICD-10-CM

## 2024-09-16 LAB
INR PPP: 2.9 (ref 0.8–1.2)
PROTHROMBIN TIME: 29.8 SEC (ref 9–12.5)

## 2024-09-16 PROCEDURE — 85610 PROTHROMBIN TIME: CPT | Performed by: INTERNAL MEDICINE

## 2024-09-16 PROCEDURE — 93793 ANTICOAG MGMT PT WARFARIN: CPT | Mod: S$GLB,,,

## 2024-09-16 PROCEDURE — 36415 COLL VENOUS BLD VENIPUNCTURE: CPT | Mod: PO | Performed by: INTERNAL MEDICINE

## 2024-09-16 NOTE — PROGRESS NOTES
[facility]    2024 3:35 PM    Assessment/Plan:    Patient presents today with therapeutic INR.    Assessment of patient findings and chart review: no significant findings     Recommendation for patient's warfarin regimen: Continue current maintenance dose    Recommend repeat INR in 4 weeks  _________________________________________________________________    Vivek Lema Jr. (74 y.o.) is followed by the [facility].    Anticoagulation Summary  As of 2024      INR goal:  2.0-3.0   TTR:  79.8% (2.6 y)   INR used for dosin.9 (2024)   Warfarin maintenance plan:  4 mg (2 mg x 2) every e, Thu, Sat; 2 mg (2 mg x 1) all other days   Weekly warfarin total:  20 mg   Plan last modified:  Mihaela Ortega, PharmD (2024)   Next INR check:  10/15/2024   Target end date:      Indications    History of atrial fibrillation [Z86.79]  Long term (current) use of anticoagulants [Z79.01]                 Anticoagulation Episode Summary       INR check location:      Preferred lab:      Send INR reminders to:  [facility] COUMADIN MONITORING POOL    Comments:  [place] only Mon - Thur and Hospital Lab on           Anticoagulation Care Providers       Provider Role Specialty Phone number    Michelle Conway MD [facility] Medicine [phone]

## 2024-09-20 DIAGNOSIS — F41.9 ANXIETY AND DEPRESSION: ICD-10-CM

## 2024-09-20 DIAGNOSIS — F32.A ANXIETY AND DEPRESSION: ICD-10-CM

## 2024-09-20 RX ORDER — ESCITALOPRAM OXALATE 20 MG/1
20 TABLET ORAL
Qty: 90 TABLET | Refills: 3 | Status: SHIPPED | OUTPATIENT
Start: 2024-09-20

## 2024-09-20 NOTE — TELEPHONE ENCOUNTER
Provider Staff:  Action required for this patient    Requires labs      Please see care gap opportunities below in Care Due Message.    Thanks!  [facility]     Appointments      Date Provider   Last Visit   6/25/2024 Michelle Conway MD   Next Visit   Visit date not found Michelle Conway MD     Refill Decision Note   Vivek Lema  is requesting a refill authorization.  Brief Assessment and Rationale for Refill:  Approve     Medication Therapy Plan:         Comments:     Note composed:8:52 AM 09/20/2024

## 2024-09-20 NOTE — TELEPHONE ENCOUNTER
Care Due:                  Date            Visit Type   Department     Provider  --------------------------------------------------------------------------------                                EP -                              PRIMARY      [facility]  Last Visit: 06-      CARE ([facility])   MEDICINE       Michelle Conway                              EP -                              PRIMARY      [facility]  Next Visit: 09-      CARE ([facility])   MEDICINE       Leigh Damon                                                            Last  Test          Frequency    Reason                     Performed    Due Date  --------------------------------------------------------------------------------    Lipid Panel.  12 months..  atorvastatin.............  02- 01-    [facility] Embedded Care Due Messages. Reference number: [id number].   9/20/2024 3:43:20 AM CDT

## 2024-09-25 ENCOUNTER — TELEPHONE (OUTPATIENT)
Dept: FAMILY MEDICINE | Facility: CLINIC | Age: 74
End: 2024-09-25
Payer: MEDICARE

## 2024-09-25 NOTE — TELEPHONE ENCOUNTER
Calling to confirm appointment on 9/26, patient didn't answer but spoke with wife and appt has been confirmed

## 2024-10-10 DIAGNOSIS — N18.4 CKD (CHRONIC KIDNEY DISEASE) STAGE 4, GFR 15-29 ML/MIN: Primary | ICD-10-CM

## 2024-10-14 ENCOUNTER — TELEPHONE (OUTPATIENT)
Dept: NEPHROLOGY | Facility: CLINIC | Age: 74
End: 2024-10-14
Payer: MEDICARE

## 2024-10-15 ENCOUNTER — ANTI-COAG VISIT (OUTPATIENT)
Dept: CARDIOLOGY | Facility: CLINIC | Age: 74
End: 2024-10-15
Payer: MEDICARE

## 2024-10-15 ENCOUNTER — PATIENT OUTREACH (OUTPATIENT)
Dept: ADMINISTRATIVE | Facility: HOSPITAL | Age: 74
End: 2024-10-15
Payer: MEDICARE

## 2024-10-15 ENCOUNTER — LAB VISIT (OUTPATIENT)
Dept: LAB | Facility: HOSPITAL | Age: 74
End: 2024-10-15
Attending: INTERNAL MEDICINE
Payer: MEDICARE

## 2024-10-15 ENCOUNTER — OFFICE VISIT (OUTPATIENT)
Dept: NEPHROLOGY | Facility: CLINIC | Age: 74
End: 2024-10-15
Payer: MEDICARE

## 2024-10-15 VITALS
HEIGHT: 71 IN | SYSTOLIC BLOOD PRESSURE: 123 MMHG | BODY MASS INDEX: 32.16 KG/M2 | HEART RATE: 62 BPM | OXYGEN SATURATION: 96 % | DIASTOLIC BLOOD PRESSURE: 70 MMHG | WEIGHT: 229.75 LBS

## 2024-10-15 DIAGNOSIS — Z86.79 HISTORY OF ATRIAL FIBRILLATION: Primary | ICD-10-CM

## 2024-10-15 DIAGNOSIS — Z79.01 LONG TERM (CURRENT) USE OF ANTICOAGULANTS: ICD-10-CM

## 2024-10-15 DIAGNOSIS — I10 HYPERTENSION, UNSPECIFIED TYPE: ICD-10-CM

## 2024-10-15 DIAGNOSIS — N18.4 CKD (CHRONIC KIDNEY DISEASE), STAGE IV: Primary | ICD-10-CM

## 2024-10-15 DIAGNOSIS — Z86.79 HISTORY OF ATRIAL FIBRILLATION: ICD-10-CM

## 2024-10-15 DIAGNOSIS — N18.4 CKD (CHRONIC KIDNEY DISEASE) STAGE 4, GFR 15-29 ML/MIN: ICD-10-CM

## 2024-10-15 DIAGNOSIS — E66.09 CLASS 1 OBESITY DUE TO EXCESS CALORIES WITH SERIOUS COMORBIDITY AND BODY MASS INDEX (BMI) OF 32.0 TO 32.9 IN ADULT: ICD-10-CM

## 2024-10-15 DIAGNOSIS — E66.811 CLASS 1 OBESITY DUE TO EXCESS CALORIES WITH SERIOUS COMORBIDITY AND BODY MASS INDEX (BMI) OF 32.0 TO 32.9 IN ADULT: ICD-10-CM

## 2024-10-15 LAB
ALBUMIN SERPL BCP-MCNC: 3.5 G/DL (ref 3.5–5.2)
ALP SERPL-CCNC: 88 U/L (ref 55–135)
ALT SERPL W/O P-5'-P-CCNC: 12 U/L (ref 10–44)
ANION GAP SERPL CALC-SCNC: 10 MMOL/L (ref 8–16)
AST SERPL-CCNC: 19 U/L (ref 10–40)
BASOPHILS # BLD AUTO: 0.05 K/UL (ref 0–0.2)
BASOPHILS NFR BLD: 0.6 % (ref 0–1.9)
BILIRUB SERPL-MCNC: 2.3 MG/DL (ref 0.1–1)
BUN SERPL-MCNC: 41 MG/DL (ref 8–23)
CALCIUM SERPL-MCNC: 9 MG/DL (ref 8.7–10.5)
CHLORIDE SERPL-SCNC: 105 MMOL/L (ref 95–110)
CO2 SERPL-SCNC: 24 MMOL/L (ref 23–29)
CREAT SERPL-MCNC: 3 MG/DL (ref 0.5–1.4)
DIFFERENTIAL METHOD BLD: ABNORMAL
EOSINOPHIL # BLD AUTO: 0.3 K/UL (ref 0–0.5)
EOSINOPHIL NFR BLD: 2.9 % (ref 0–8)
ERYTHROCYTE [DISTWIDTH] IN BLOOD BY AUTOMATED COUNT: 13.8 % (ref 11.5–14.5)
EST. GFR  (NO RACE VARIABLE): 21 ML/MIN/1.73 M^2
GLUCOSE SERPL-MCNC: 70 MG/DL (ref 70–110)
HCT VFR BLD AUTO: 41.8 % (ref 40–54)
HGB BLD-MCNC: 13.6 G/DL (ref 14–18)
IMM GRANULOCYTES # BLD AUTO: 0.02 K/UL (ref 0–0.04)
IMM GRANULOCYTES NFR BLD AUTO: 0.2 % (ref 0–0.5)
INR PPP: 2.2 (ref 0.8–1.2)
LYMPHOCYTES # BLD AUTO: 2 K/UL (ref 1–4.8)
LYMPHOCYTES NFR BLD: 23 % (ref 18–48)
MCH RBC QN AUTO: 30.5 PG (ref 27–31)
MCHC RBC AUTO-ENTMCNC: 32.5 G/DL (ref 32–36)
MCV RBC AUTO: 94 FL (ref 82–98)
MONOCYTES # BLD AUTO: 1.6 K/UL (ref 0.3–1)
MONOCYTES NFR BLD: 18.6 % (ref 4–15)
NEUTROPHILS # BLD AUTO: 4.8 K/UL (ref 1.8–7.7)
NEUTROPHILS NFR BLD: 54.7 % (ref 38–73)
NRBC BLD-RTO: 0 /100 WBC
PLATELET # BLD AUTO: 140 K/UL (ref 150–450)
PMV BLD AUTO: 13.2 FL (ref 9.2–12.9)
POTASSIUM SERPL-SCNC: 3.7 MMOL/L (ref 3.5–5.1)
PROT SERPL-MCNC: 6.6 G/DL (ref 6–8.4)
PROTHROMBIN TIME: 22.6 SEC (ref 9–12.5)
RBC # BLD AUTO: 4.46 M/UL (ref 4.6–6.2)
SODIUM SERPL-SCNC: 139 MMOL/L (ref 136–145)
WBC # BLD AUTO: 8.71 K/UL (ref 3.9–12.7)

## 2024-10-15 PROCEDURE — 1101F PT FALLS ASSESS-DOCD LE1/YR: CPT | Mod: CPTII,S$GLB,, | Performed by: INTERNAL MEDICINE

## 2024-10-15 PROCEDURE — 85025 COMPLETE CBC W/AUTO DIFF WBC: CPT | Performed by: INTERNAL MEDICINE

## 2024-10-15 PROCEDURE — 99999 PR PBB SHADOW E&M-EST. PATIENT-LVL IV: CPT | Mod: PBBFAC,,, | Performed by: INTERNAL MEDICINE

## 2024-10-15 PROCEDURE — 80053 COMPREHEN METABOLIC PANEL: CPT | Performed by: INTERNAL MEDICINE

## 2024-10-15 PROCEDURE — 36415 COLL VENOUS BLD VENIPUNCTURE: CPT | Mod: PO | Performed by: INTERNAL MEDICINE

## 2024-10-15 PROCEDURE — 1126F AMNT PAIN NOTED NONE PRSNT: CPT | Mod: CPTII,S$GLB,, | Performed by: INTERNAL MEDICINE

## 2024-10-15 PROCEDURE — G2211 COMPLEX E/M VISIT ADD ON: HCPCS | Mod: S$GLB,,, | Performed by: INTERNAL MEDICINE

## 2024-10-15 PROCEDURE — 85610 PROTHROMBIN TIME: CPT | Performed by: INTERNAL MEDICINE

## 2024-10-15 PROCEDURE — 99215 OFFICE O/P EST HI 40 MIN: CPT | Mod: S$GLB,,, | Performed by: INTERNAL MEDICINE

## 2024-10-15 PROCEDURE — 3288F FALL RISK ASSESSMENT DOCD: CPT | Mod: CPTII,S$GLB,, | Performed by: INTERNAL MEDICINE

## 2024-10-15 PROCEDURE — 3074F SYST BP LT 130 MM HG: CPT | Mod: CPTII,S$GLB,, | Performed by: INTERNAL MEDICINE

## 2024-10-15 PROCEDURE — 3078F DIAST BP <80 MM HG: CPT | Mod: CPTII,S$GLB,, | Performed by: INTERNAL MEDICINE

## 2024-10-15 NOTE — PROGRESS NOTES
[facility]    10/15/2024 3:41 PM    Assessment/Plan:    Patient presents today with therapeutic INR.    Assessment of patient findings and chart review: INR continues to be at goal. No changes noted to necessitate adjustment to warfarin regimen.     Recommendation for patient's warfarin regimen: Continue current maintenance dose    Recommend repeat INR in 4 weeks  _________________________________________________________________    Vivek Lema Jr. (74 y.o.) is followed by the [facility].    Anticoagulation Summary  As of 10/15/2024      INR goal:  2.0-3.0   TTR:  80.4% (2.7 y)   INR used for dosin.2 (10/15/2024)   Warfarin maintenance plan:  4 mg (2 mg x 2) every Tue, Thu, Sat; 2 mg (2 mg x 1) all other days   Weekly warfarin total:  20 mg   Plan last modified:  Mihaela Ortega, PharmD (2024)   Next INR check:  2024   Target end date:  --    Indications    History of atrial fibrillation [Z86.79]  Long term (current) use of anticoagulants [Z79.01]                 Anticoagulation Episode Summary       INR check location:  --    Preferred lab:  --    Send INR reminders to:  [facility] COUMADIN MONITORING POOL    Comments:  [place] only Mon - Thur and Hospital Lab on           Anticoagulation Care Providers       Provider Role Specialty Phone number    Michelle Conway MD [facility] Medicine [phone]

## 2024-10-15 NOTE — PROGRESS NOTES
"CHIEF COMPLAINT/HPI: Vivek is a 74 y.o. man with PMH of CAD CABG 3 year ago, repaired thoracic aortic aneurysm  ,In August 2021 underwent attempted MitraClip procedure.  Unsuccessful percutaneous repair.  Patient was deemed unsuitable for open repair secondary to comorbidities ,A.fib , mitral regurgitation , PPM secondary to SSS , Aortic bioprosthesis secondary to aortic aneurysm/aortic insufficiency S/P AVR On Coumadin . EF 45-50%  prostate cancer s/p surgery 2019 and clear for 2 years   Advanced CKD was following with Nephrologist in [place] , they relocate to [place] with as their daughter lives here , they have their own place . He has AVF in place .  He established care in [facility] with me in Feb 2022, with his wife ,     On 3/13 he hit his Lt hip on a piece of furniture and sustained hematoma ,Hb dropped to ~9 from ~11 , BP was low in his PCP visit and his BP meds doses were dropped ,   I tried to contact him on Monday 3/21( after PCP secured chat )  to check on his BP and may be stop BP meds all together , but no answer .   Daughter with him today , feels fine , " not drinking as much since the incident of hematoma "  Home # is [phone]   11/15/22 feels fine, will obtain labs today.    10/15/24:last seen in November 2022, since then he  was admitted in December 2023 for a cat bite at the AVF site. Got iv abx. Now fine.  He feels ok and BP is stable. He did not have labs until this morning, so we did not discuss any labs today.    Past Medical History:   Diagnosis Date    A-fib     CAD (coronary artery disease)     CKD (chronic kidney disease)     Disorder of kidney and ureter     HTN (hypertension)     Macular degeneration     Mitral regurgitation     Pacemaker        Vivek reports that he has never smoked. He has never used smokeless tobacco. He reports current alcohol use of about 6.0 standard drinks of alcohol per week. He reports that he does not use drugs.   Drinks beer daily ( ~2 cans )    FAMILY HX " ":  + family hx of HTN , Emphysema and CKD .    ROS:    Review of Systems   Constitutional:  Negative for activity change, appetite change, chills, fever and unexpected weight change.   HENT:  Negative for congestion, ear discharge, hearing loss, nosebleeds, sore throat and tinnitus.    Eyes:  Negative for photophobia, pain, redness and visual disturbance.   Respiratory:  Negative for cough, chest tightness, shortness of breath and wheezing.    Cardiovascular:  Negative for chest pain, palpitations and leg swelling.   Gastrointestinal:  Negative for abdominal distention, constipation, diarrhea, nausea and vomiting.   Endocrine: Negative for cold intolerance, heat intolerance, polydipsia and polyuria.   Genitourinary:  Negative for decreased urine volume, difficulty urinating, dysuria, flank pain and hematuria.   Musculoskeletal:  Negative for arthralgias, gait problem, joint swelling and neck stiffness.   Skin:  Negative for rash.   Neurological:  Negative for dizziness, tremors, weakness, numbness and headaches.   Psychiatric/Behavioral:  Negative for confusion and sleep disturbance.        PE:    Vitals:    10/15/24 1405   BP: 123/70   Pulse: 62   SpO2: 96%   Weight: 104.2 kg (229 lb 11.5 oz)   Height: 5' 11" (1.803 m)         Physical Exam  Constitutional:       General: He is not in acute distress.     Appearance: He is not diaphoretic.   HENT:      Head: Normocephalic and atraumatic.      Right Ear: External ear normal.      Left Ear: External ear normal.   Eyes:      General:         Right eye: No discharge.         Left eye: No discharge.      Conjunctiva/sclera: Conjunctivae normal.      Pupils: Pupils are equal, round, and reactive to light.   Neck:      Vascular: No JVD.   Cardiovascular:      Rate and Rhythm: Normal rate and regular rhythm.      Heart sounds: No murmur heard.     No friction rub. No gallop.   Pulmonary:      Effort: Pulmonary effort is normal. No respiratory distress.      Breath sounds: " Normal breath sounds. No stridor. No wheezing or rales.   Chest:      Chest wall: No tenderness.   Abdominal:      Palpations: Abdomen is soft.      Tenderness: There is no abdominal tenderness. There is no guarding or rebound.   Musculoskeletal:         General: No tenderness.      Cervical back: Normal range of motion and neck supple.      Comments: Lr forearm AVF with thrill and bruit    Skin:     Findings: No erythema or rash.   Neurological:      Mental Status: He is alert and oriented to person, place, and time.           Impression/Plan:    1. CKD (chronic kidney disease), stage IV        # CKD IV: likely CAD, HTN, age related   Has AVF inplace ,  -No issues with volume, K and Acidosis so far per patient.  -request to obtain record from previous nephrology was sent .  -had YAHAIRA after the hematoma, creatinine trending down now , will keep monitoring ,     ADDENDUM: labs reviewed with progression of CKD with eGFR of 21 NOW   Lab Results   Component Value Date    CREATININE 2.4 (H) 06/25/2024     Protein Creatinine Ratios:    Prot/Creat Ratio, Urine   Date Value Ref Range Status   11/15/2022 0.10 0.00 - 0.20 Final   08/29/2022 0.12 0.00 - 0.20 Final   05/10/2022 0.13 0.00 - 0.20 Final       Acid-Base:   Lab Results   Component Value Date     06/25/2024    K 4.2 06/25/2024    CO2 23 06/25/2024     #HTN: Blood pressures acceptable valsartan lowered to 40 mg since December 2023 by PCP.  Cont same dose and cont torsemide 20 mg ( lowered since I saw him last too)    # Renal osteodystrophy:   Lab Results   Component Value Date    .8 (H) 06/25/2024    CALCIUM 9.2 06/25/2024    PHOS 2.5 (L) 12/01/2023       # Anemia:   Lab Results   Component Value Date    HGB 15.0 06/25/2024        # DM:  Last HbA1C   Lab Results   Component Value Date    HGBA1C 5.2 09/19/2023       # Lipid management:   Lab Results   Component Value Date    LDLCALC 86.8 02/02/2022     70 minutes of total time spent on the encounter, which  includes face to face time and non-face to face time preparing to see the patient (eg, review of tests), Obtaining and/or reviewing separately obtained history, Documenting clinical information in the electronic or other health record, Independently interpreting results (not separately reported) and communicating results to the patient/family/caregiver, or Care coordination (not separately reported).    Visit today included increased complexity associated with the care of the episodic problem CKD, HTN, Secondary hyperparathyroidism, AVF in place and obesity addressed and managing the longitudinal care of the patient due to the serious and/or complex managed problem(s) .      # ESRD planing: AVF in place     Labs today and labs in 2 months if labs are stable,

## 2024-10-15 NOTE — PROGRESS NOTES
Population Health Chart Review & Patient Outreach Details      Additional [facility] Health Notes:    Upcoming appointment with Dr Damon 2/7/25, updated teams.            Updates Requested / Reviewed:      Updated Care Coordination Note, Care Everywhere, and Care Team Updated         Health Maintenance Topics Overdue:      VBHM Score: 0     Patient is not due for any topics at this time.    Influenza Vaccine  Shingles/Zoster Vaccine  RSV Vaccine                  Health Maintenance Topic(s) Outreach Outcomes & Actions Taken:    Provider Pt Reattribution - Outreach Outcomes & Actions Taken  : Upcoming Appt with Another Provider Already Scheduled

## 2024-10-16 NOTE — PROGRESS NOTES
I have reviewed the notes, assessments, and/or procedures performed by Mr Neal Forrest, I concur with her/his documentation of Vivek Lema Jr..  Date of Service: 10/15/2024

## 2024-10-25 ENCOUNTER — OFFICE VISIT (OUTPATIENT)
Dept: FAMILY MEDICINE | Facility: CLINIC | Age: 74
End: 2024-10-25
Payer: MEDICARE

## 2024-10-25 VITALS
BODY MASS INDEX: 32.16 KG/M2 | WEIGHT: 229.75 LBS | HEART RATE: 89 BPM | HEIGHT: 71 IN | RESPIRATION RATE: 16 BRPM | OXYGEN SATURATION: 97 % | SYSTOLIC BLOOD PRESSURE: 120 MMHG | DIASTOLIC BLOOD PRESSURE: 72 MMHG | TEMPERATURE: 97 F

## 2024-10-25 DIAGNOSIS — F41.9 ANXIETY AND DEPRESSION: ICD-10-CM

## 2024-10-25 DIAGNOSIS — F32.A ANXIETY AND DEPRESSION: ICD-10-CM

## 2024-10-25 DIAGNOSIS — Z23 INFLUENZA VACCINE NEEDED: Primary | ICD-10-CM

## 2024-10-25 DIAGNOSIS — R51.9 NONINTRACTABLE HEADACHE, UNSPECIFIED CHRONICITY PATTERN, UNSPECIFIED HEADACHE TYPE: ICD-10-CM

## 2024-10-25 DIAGNOSIS — N18.4 CKD (CHRONIC KIDNEY DISEASE), STAGE IV: ICD-10-CM

## 2024-10-25 DIAGNOSIS — I10 HYPERTENSION, UNSPECIFIED TYPE: ICD-10-CM

## 2024-10-25 DIAGNOSIS — Z86.79 HISTORY OF ATRIAL FIBRILLATION: ICD-10-CM

## 2024-10-25 PROCEDURE — 99999 PR PBB SHADOW E&M-EST. PATIENT-LVL III: CPT | Mod: PBBFAC,,,

## 2024-10-25 RX ORDER — CALCITRIOL 0.25 UG/1
0.25 CAPSULE ORAL DAILY
Qty: 90 CAPSULE | Refills: 1 | Status: SHIPPED | OUTPATIENT
Start: 2024-10-25

## 2024-10-25 RX ORDER — HYDROCODONE BITARTRATE AND ACETAMINOPHEN 7.5; 325 MG/1; MG/1
TABLET ORAL
Qty: 30 TABLET | Refills: 0 | Status: CANCELLED | OUTPATIENT
Start: 2024-10-25

## 2024-10-25 RX ORDER — ATORVASTATIN CALCIUM 40 MG/1
40 TABLET, FILM COATED ORAL DAILY
Qty: 90 TABLET | Refills: 3 | Status: SHIPPED | OUTPATIENT
Start: 2024-10-25

## 2024-10-25 RX ORDER — VALSARTAN 40 MG/1
40 TABLET ORAL DAILY
Qty: 30 TABLET | Refills: 0 | Status: SHIPPED | OUTPATIENT
Start: 2024-10-25

## 2024-10-25 RX ORDER — ESCITALOPRAM OXALATE 20 MG/1
20 TABLET ORAL DAILY
Qty: 90 TABLET | Refills: 3 | Status: SHIPPED | OUTPATIENT
Start: 2024-10-25

## 2024-10-25 RX ORDER — TORSEMIDE 20 MG/1
60 TABLET ORAL DAILY
Qty: 270 TABLET | Refills: 3 | Status: SHIPPED | OUTPATIENT
Start: 2024-10-25

## 2024-10-25 RX ORDER — ISOSORBIDE MONONITRATE 60 MG/1
60 TABLET, EXTENDED RELEASE ORAL DAILY
Qty: 90 TABLET | Refills: 3 | Status: SHIPPED | OUTPATIENT
Start: 2024-10-25

## 2024-10-25 RX ORDER — WARFARIN 2 MG/1
TABLET ORAL
Qty: 128 TABLET | Refills: 1 | Status: SHIPPED | OUTPATIENT
Start: 2024-10-25

## 2024-10-25 NOTE — PROGRESS NOTES
"  HPI     Chief Complaint:  Chief Complaint   Patient presents with    Follow-up    Fall     Hurt back and would like it checked       Vivek Lema Jr. is a 74 y.o. male with multiple medical diagnoses as listed in the medical history and problem list that presents for Follow-up.     HPI    History of Present Illness    CHIEF COMPLAINT:  Vivek presents today for follow-up after missing a previous appointment.    RECENT FALL AND BACK INJURY:  He reports experiencing his first fall about a month ago, falling against a cabinet and injuring his back. The pain has since resolved with no pain for approximately a week, even when pressure is applied. A bruise and small hematoma are still present at the impact site but are healing well. Initially, the fall caused significant discomfort, limiting mobility for 4-5 minutes, but he recovered quickly after the initial shock.    KNEE PAIN:  He uses a cane due to right knee pain. He decided against knee replacement surgery after discussing with family members who reported continued post-surgical pain. He denies any episodes of passing out or loss of consciousness related to his knee condition.    HEADACHES:  He experiences headaches triggered by strong scents, particularly perfumes. The pain is localized to the back of his head, described as a sensation of "pulling apart" or "trying to snatch it apart," associated with vomiting. He takes hydrocodone for relief, previously prescribed by Dr. Mims, which provides relief within 30-40 minutes. He typically requires two prescriptions per year.    CARDIOVASCULAR HISTORY:  He has a history of cardiac issues, including pacemaker implantation and atrial fibrillation. He reports occasional chest pain relieved by nitroglycerin within approximately 20 minutes, which he manages by avoiding overexertion. He denies any recurrence of atrial fibrillation since pacemaker placement. He adheres to regular cardiology follow-ups, with " comprehensive tests performed biannually.    CANCER HISTORY:  He reports a history of prostate cancer diagnosed approximately five years ago while living in [place]. The cancer was detected early, confined to the prostate gland, and treated successfully.    RENAL FUNCTION:  He reports a recent creatinine level of 3.0, indicating ongoing concerns with kidney function. He acknowledges this value represents a slight increase from previous measurements.    MEDICATIONS:  He reports taking atorvastatin and baby aspirin. His wife manages his medications, and he is unable to provide detailed information about his current regimen beyond these two medications.    PAST MEDICAL HISTORY:  He reports a history of skin rash while living in [place], which required dermatologist consultation. Topical treatments provided temporary relief, but the rash would recur with itching. Intramuscular injections were more effective in managing the condition than topical applications. He also confirms having an arteriovenous fistula.                HPI:      Patient with a long and complicated past medical history who comes today for regular follow-up visit.  From our last encounter:  - Ophthalmology   He reports feeling globally fine, he has no specific issue to report today.  He is still very concerned regarding his kidney function. He is very careful regarding drinking enough fluid and not taking NSAIDs.    He still has his AV fistula that is thrilling, see previous issues re: cat bites.  He is still on in Coumadin is going to see Dr. Fernandes next week.     Assessment & Plan:      Assessment  1. Hypertension, unspecified type  -     CBC W/ AUTO DIFFERENTIAL; Future; Expected date: 06/25/2024  -     COMPREHENSIVE METABOLIC PANEL; Future; Expected date: 06/25/2024     BP at goal, same treatment, blood work.     2. Class 1 obesity due to excess calories with serious comorbidity and body mass index (BMI) of 32.0 to 32.9 in adult     We  discussed weight issues and safe, effective ways of losing pounds, includin) diet:  low carbohydrate, low fat diet, stay away from fast food, fried and processed food, use whole grain, lot of fruits and vegetables, use healthy fat such as avocado, nuts and olive oil in reasonable quantity, stay away from sodas. Regular meals with lean proteins.  2) physical activity: ideally 150 min a week, with cardiovascular and resistance activity.  Patient was encouraged to set realistic attainable goals for weight loss, and we will follow up periodically.     3. CKD (chronic kidney disease), stage IV  -     Ambulatory referral/consult to Nephrology; Future; Expected date: 2024     Stable, patient has AV fistula but not using it for now.  He is going to see again Dr. Doe for close follow-up.       4. Hyperparathyroidism  -     PTH, intact; Future; Expected date: 2024     5. Coronary artery disease, unspecified vessel or lesion type, unspecified whether angina present, unspecified whether native or transplanted heart    F-up with Dr. Olivier coming soon. No symptoms.     6. Gout, unspecified cause, unspecified chronicity, unspecified site  -     URIC ACID; Future; Expected date: 2024     No recent flare.     7. Nonintractable headache, unspecified chronicity pattern, unspecified headache type  -     HYDROcodone-acetaminophen (NORCO) 7.5-325 mg per tablet; Take not more than one pill a day PRN as direct  Dispense: 30 tablet; Refill: 0     Takes Norco very sparingly, last refill in 2024,  checked.     No follow-ups on file.      Assessment & Plan      Assessed recent fall, noting bruising and hematoma on left side have mostly resolved  Evaluated kidney function, noting creatinine level of 3.0, increased from previous 2.4  Reviewed history of migraine headaches triggered by strong odors  Considered current pain management regimen with hydrocodone for severe headaches  Assessed cardiovascular status,  noting patient uses nitroglycerin as needed for chest pain  Confirmed patient is up-to-date with cardiology follow-up    FALL-RELATED INJURY:  - Explained that bruising from fall is healing well.  - Discussed that hematoma will likely resolve on its own.  - Vivek to apply ice to bruised area as needed to reduce inflammation.  - Vivek to continue using cane for mobility support.    CHEST PAIN:  - Recommend avoiding overexertion to prevent chest pain episodes.  - Continued nitroglycerin as needed for chest pain.    HEADACHES:  - Continued hydrocodone as needed for severe headaches/migraines.    HYPERLIPIDEMIA:  - Refilled atorvastatin (to be confirmed with patient's wife).  - Ordered cholesterol and A1C tests for February appointment.    FLU VACCINATION:  - Administered flu vaccine in office.    FOLLOW UP:  - Follow up with Dr. Damon on February 7th of next year as scheduled.  - Contact the office for medication refill; needs to confirm his refill list with wife.         Problem List Items Addressed This Visit       Hypertension    Relevant Medications    atorvastatin (LIPITOR) 40 MG tablet    valsartan (DIOVAN) 40 MG tablet    CKD (chronic kidney disease), stage IV    Relevant Medications    calcitRIOL (ROCALTROL) 0.25 MCG Cap    History of atrial fibrillation    Relevant Medications    warfarin (COUMADIN) 2 MG tablet    Anxiety and depression    Relevant Medications    EScitalopram oxalate (LEXAPRO) 20 MG tablet    Nonintractable headache     Other Visit Diagnoses       Influenza vaccine needed    -  Primary    Relevant Medications    influenza (adjuvanted) (Fluad) 45 mcg/0.5 mL IM vaccine (> or = 66 yo) 0.5 mL (Start on 10/25/2024 11:15 AM)              --------------------------------------------      Health Maintenance:  Health Maintenance         Date Due Completion Date    Shingles Vaccine (1 of 2) Never done ---    RSV Vaccine (Age 60+ and Pregnant patients) (1 - 1-dose 75+ series) Never done ---     "Influenza Vaccine (1) 09/01/2024 3/19/2024    COVID-19 Vaccine (4 - 2024-25 season) 09/01/2024 12/7/2021    Lipid Panel 02/02/2027 2/2/2022    TETANUS VACCINE 09/06/2031 9/6/2021            Health maintenance reviewed and Flu vaccine ordered    Follow Up:  No follow-ups on file.    Exam     Review of Systems:  (as noted above)  Review of Systems  General: -fever, -chills, -fatigue, -weight gain, -weight loss  Eyes: -vision changes, -redness, -discharge  ENT: -ear pain, -nasal congestion, -sore throat  Cardiovascular: -chest pain, -palpitations, -lower extremity edema  Respiratory: -cough, -shortness of breath  Gastrointestinal: -abdominal pain, -nausea, -vomiting, -diarrhea, -constipation, -blood in stool  Genitourinary: -dysuria, -hematuria, -frequency  Musculoskeletal: +joint pain, +muscle pain, +back pain  Skin: -rash, -lesion  Neurological: -headache (has migraines), -dizziness, -numbness, -tingling, -weakness  Psychiatric: -anxiety, -depression, -sleep difficulty       Physical Exam:   Physical Exam  Vitals:    10/25/24 0949   BP: 120/72   BP Location: Right arm   Patient Position: Sitting   Pulse: 89   Resp: 16   Temp: 97 °F (36.1 °C)   TempSrc: Oral   SpO2: 97%   Weight: 104.2 kg (229 lb 11.5 oz)   Height: 5' 11" (1.803 m)      Body mass index is 32.04 kg/m².    Physical Exam    General: No acute distress. Well-developed. Well-nourished.  Eyes: EOMI. Sclerae anicteric.  HENT: Normocephalic. Atraumatic. Nares patent.  Cardiovascular: Regular rate. Regular rhythm.No rubs. No gallops. Normal S1, S2. Palpable thrill in arteriovenous fistula. Visible pulsation in arteriovenous fistula.  Respiratory: Normal respiratory effort. Clear to auscultation bilaterally. No rales. No rhonchi. No wheezing.  Musculoskeletal: No  obvious deformity.  Extremities: No lower extremity edema.  Neurological: Alert & oriented x3. No slurred speech. Normal gait.  Psychiatric: Normal mood. Normal affect. Good insight. Good " judgment.  Skin: Warm. Dry. No rash. Healing bruise on left side. Small hematoma on hair.           History     Past Medical History:  Past Medical History:   Diagnosis Date    A-fib     CAD (coronary artery disease)     CKD (chronic kidney disease)     Disorder of kidney and ureter     HTN (hypertension)     Macular degeneration     Mitral regurgitation     Pacemaker        Past Surgical History:  Past Surgical History:   Procedure Laterality Date    AV FISTULA PLACEMENT Left     CATARACT EXTRACTION      COLONOSCOPY      COLONOSCOPY N/A 01/17/2023    Procedure: COLONOSCOPY;  Surgeon: Todd Oviedo MD;  Location: [facility] ([address] [place] [address]);  Service: Endoscopy;  Laterality: N/A;  from referral / Pt with trouble clearing secretions at times - 2nd floor / prep ins. mailed and reviewed with Pt and Pt wife/ Pt reports last colon in [place], Hx polyps / Coumadin - per coumadin clinic ok to hold coumadin x 5 days prior- see telephone encounter dated 11/2/22 / pa    CORONARY ARTERY BYPASS GRAFT      with valve repair (aortic?)       Social History:  Social History     Socioeconomic History    Marital status:    Tobacco Use    Smoking status: Never    Smokeless tobacco: Never   Substance and Sexual Activity    Alcohol use: Yes     Alcohol/week: 6.0 standard drinks of alcohol     Types: 6 Cans of beer per week     Comment: occ    Drug use: Never     Social Drivers of Health     Financial Resource Strain: Low Risk  (12/1/2023)    Overall Financial Resource Strain (CARDIA)     Difficulty of Paying Living Expenses: Not hard at all   Food Insecurity: No Food Insecurity (12/1/2023)    Hunger Vital Sign     Worried About Running Out of Food in the Last Year: Never true     Ran Out of Food in the Last Year: Never true   Transportation Needs: No Transportation Needs (12/1/2023)    PRAPARE - Transportation     Lack of Transportation (Medical): No     Lack of Transportation (Non-Medical): No   Physical Activity: Insufficiently Active  (12/1/2023)    Exercise Vital Sign     Days of Exercise per Week: 2 days     Minutes of Exercise per Session: 30 min   Stress: No Stress Concern Present (12/1/2023)    [employer] - Occupational Stress Questionnaire     Feeling of Stress : Not at all   Housing Stability: Low Risk  (12/15/2023)    Housing Stability Vital Sign     Unable to Pay for Housing in the Last Year: No     Number of Places Lived in the Last Year: 1     Unstable Housing in the Last Year: No       Family History:  Family History   Problem Relation Name Age of Onset    Amblyopia Neg Hx      Blindness Neg Hx      Cataracts Neg Hx      Glaucoma Neg Hx      Macular degeneration Neg Hx      Retinal detachment Neg Hx      Strabismus Neg Hx         Allergies and Medications: (updated and reviewed)  Review of patient's allergies indicates:  No Known Allergies  Current Outpatient Medications   Medication Sig Dispense Refill    aspirin (ECOTRIN) 81 MG EC tablet TAKE 1 TABLET BY MOUTH EVERY DAY 90 tablet 3    cholecalciferol, vitamin D3, (VITAMIN D3) 50 mcg (2,000 unit) Cap Take 1 capsule by mouth once daily.      febuxostat (ULORIC) 40 mg Tab TAKE 1 TABLET(40 MG) BY MOUTH EVERY DAY 90 tablet 0    HYDROcodone-acetaminophen (NORCO) 7.5-325 mg per tablet Take not more than one pill a day PRN as direct 30 tablet 0    moxifloxacin (VIGAMOX) 0.5 % ophthalmic solution Place 1 drop into the right eye 3 (three) times daily. 3 mL 0    nitroGLYCERIN (NITROSTAT) 0.4 MG SL tablet Place 1 tablet (0.4 mg total) under the tongue every 5 (five) minutes as needed for Chest pain. 30 tablet 3    atorvastatin (LIPITOR) 40 MG tablet Take 1 tablet (40 mg total) by mouth once daily. 90 tablet 3    calcitRIOL (ROCALTROL) 0.25 MCG Cap Take 1 capsule (0.25 mcg total) by mouth once daily. 90 capsule 1    EScitalopram oxalate (LEXAPRO) 20 MG tablet Take 1 tablet (20 mg total) by mouth once daily. 90 tablet 3    isosorbide mononitrate (IMDUR) 60 MG 24 hr  tablet Take 1 tablet (60 mg total) by mouth once daily. 90 tablet 3    torsemide (DEMADEX) 20 MG Tab Take 3 tablets (60 mg total) by mouth once daily. 270 tablet 3    valsartan (DIOVAN) 40 MG tablet Take 1 tablet (40 mg total) by mouth once daily. 30 tablet 0    warfarin (COUMADIN) 2 MG tablet TAKE 2 TABLETS BY MOUTH EVERY MONDAY, WEDNESDAY, FRIDAY AND 1 TABLET ON ALL OTHER DAYS 128 tablet 1     Current Facility-Administered Medications   Medication Dose Route Frequency Provider Last Rate Last Admin    influenza (adjuvanted) (Fluad) 45 mcg/0.5 mL IM vaccine (> or = 64 yo) 0.5 mL  0.5 mL Intramuscular 1 time in Clinic/HOD Lali Delaney PA-C           Patient Care Team:  Leigh Damon MD as PCP - General (Internal Medicine)  Mihaela Ortega PharmD as Pharmacist (Pharmacist)  Enzo Buchanan MA as Care Coordinator         - The patient is given an After Visit Summary that lists all medications with directions, allergies, education, orders placed during this encounter and follow-up instructions.      - I have reviewed the patient's medical information including past medical, family, and social history sections including the medications and allergies.      - We discussed the patient's current medications.     This note was created by combination of typed  and MModal dictation.  Transcription errors may be present.  If there are any questions, please contact me.     This note was generated with the assistance of ambient listening technology. Verbal consent was obtained by the patient and accompanying visitor(s) for the recording of patient appointment to facilitate this note. I attest to having reviewed and edited the generated note for accuracy, though some syntax or spelling errors may persist. Please contact the author of this note for any clarification.          Lali Delaney PA-C

## 2024-10-29 DIAGNOSIS — I10 HYPERTENSION, UNSPECIFIED TYPE: ICD-10-CM

## 2024-10-29 RX ORDER — VALSARTAN 40 MG/1
40 TABLET ORAL DAILY
Qty: 30 TABLET | Refills: 0 | Status: SHIPPED | OUTPATIENT
Start: 2024-10-29

## 2024-10-30 PROBLEM — Z09 FOLLOW-UP EXAM: Status: ACTIVE | Noted: 2024-10-30

## 2024-10-30 PROBLEM — N18.5 CHRONIC KIDNEY DISEASE, STAGE 5: Status: ACTIVE | Noted: 2024-10-30

## 2024-10-30 PROBLEM — N18.5: Status: ACTIVE | Noted: 2024-10-30

## 2024-10-30 PROBLEM — E11.22: Status: ACTIVE | Noted: 2024-10-30

## 2024-10-30 PROBLEM — Z23 INFLUENZA VACCINE NEEDED: Status: ACTIVE | Noted: 2024-10-30

## 2024-11-12 ENCOUNTER — LAB VISIT (OUTPATIENT)
Dept: LAB | Facility: HOSPITAL | Age: 74
End: 2024-11-12
Attending: INTERNAL MEDICINE
Payer: MEDICARE

## 2024-11-12 ENCOUNTER — ANTI-COAG VISIT (OUTPATIENT)
Dept: CARDIOLOGY | Facility: CLINIC | Age: 74
End: 2024-11-12
Payer: MEDICARE

## 2024-11-12 DIAGNOSIS — Z86.79 HISTORY OF ATRIAL FIBRILLATION: Primary | ICD-10-CM

## 2024-11-12 DIAGNOSIS — Z79.01 LONG TERM (CURRENT) USE OF ANTICOAGULANTS: ICD-10-CM

## 2024-11-12 DIAGNOSIS — Z86.79 HISTORY OF ATRIAL FIBRILLATION: ICD-10-CM

## 2024-11-12 LAB
INR PPP: 1.6 (ref 0.8–1.2)
PROTHROMBIN TIME: 16.7 SEC (ref 9–12.5)

## 2024-11-12 PROCEDURE — 36415 COLL VENOUS BLD VENIPUNCTURE: CPT | Mod: PO | Performed by: INTERNAL MEDICINE

## 2024-11-12 PROCEDURE — 85610 PROTHROMBIN TIME: CPT | Performed by: INTERNAL MEDICINE

## 2024-11-12 PROCEDURE — 93793 ANTICOAG MGMT PT WARFARIN: CPT | Mod: S$GLB,,,

## 2024-11-13 NOTE — PROGRESS NOTES
[facility]    2024 4:00 PM    Assessment/Plan:    Patient presents today with subtherapeutic  INR.    Assessment of patient findings and chart review: reports missing a couple doses last week; irregular diet and no beer intake last 2 weeks     Recommendation for patient's warfarin regimen: Boost dose today to 6mg then resume current maintenance dose    Recommend repeat INR in 3 weeks  _________________________________________________________________    Vivek Lema Jr. (74 y.o.) is followed by the [facility].    Anticoagulation Summary  As of 2024      INR goal:  2.0-3.0   TTR:  79.1% (2.8 y)   INR used for dosin.6 (2024)   Warfarin maintenance plan:  4 mg (2 mg x 2) every Tue, Thu, Sat; 2 mg (2 mg x 1) all other days   Weekly warfarin total:  20 mg   Plan last modified:  Mihaela Ortega, PharmD (2024)   Next INR check:  12/3/2024   Target end date:  --    Indications    History of atrial fibrillation [Z86.79]  Long term (current) use of anticoagulants [Z79.01]                 Anticoagulation Episode Summary       INR check location:  --    Preferred lab:  --    Send INR reminders to:  [facility] COUMADIN MONITORING POOL    Comments:  [place] only  and Hospital Lab on           Anticoagulation Care Providers       Provider Role Specialty Phone number    Angel Olivier MD [facility] Cardiology [phone]

## 2024-12-05 ENCOUNTER — LAB VISIT (OUTPATIENT)
Dept: LAB | Facility: HOSPITAL | Age: 74
End: 2024-12-05
Attending: INTERNAL MEDICINE
Payer: MEDICARE

## 2024-12-05 ENCOUNTER — ANTI-COAG VISIT (OUTPATIENT)
Dept: CARDIOLOGY | Facility: CLINIC | Age: 74
End: 2024-12-05
Payer: MEDICARE

## 2024-12-05 DIAGNOSIS — Z79.01 LONG TERM (CURRENT) USE OF ANTICOAGULANTS: ICD-10-CM

## 2024-12-05 DIAGNOSIS — Z86.79 HISTORY OF ATRIAL FIBRILLATION: Primary | ICD-10-CM

## 2024-12-05 DIAGNOSIS — Z86.79 HISTORY OF ATRIAL FIBRILLATION: ICD-10-CM

## 2024-12-05 LAB
INR PPP: 3.1 (ref 0.8–1.2)
PROTHROMBIN TIME: 31.7 SEC (ref 9–12.5)

## 2024-12-05 PROCEDURE — 85610 PROTHROMBIN TIME: CPT | Performed by: INTERNAL MEDICINE

## 2024-12-05 PROCEDURE — 36415 COLL VENOUS BLD VENIPUNCTURE: CPT | Mod: PO | Performed by: INTERNAL MEDICINE

## 2024-12-05 NOTE — PROGRESS NOTES
[facility]    12/05/2024 3:45 PM    Assessment/Plan:    Patient presents today with supratherapeutic INR.    Assessment of patient findings and chart review: no significant findings     Recommendation for patient's warfarin regimen: Lower dose today to 3mg then resume current maintenance dose    Recommend repeat INR in 2 weeks  _________________________________________________________________    Vivek Lema Jr. (74 y.o.) is followed by the [facility].    Anticoagulation Summary  As of 12/5/2024      INR goal:  2.0-3.0   TTR:  78.8% (2.8 y)   INR used for dosing:  3.1 (12/5/2024)   Warfarin maintenance plan:  4 mg (2 mg x 2) every Tue, Thu, Sat; 2 mg (2 mg x 1) all other days   Weekly warfarin total:  20 mg   Plan last modified:  Mihaela Ortega, PharmD (4/12/2024)   Next INR check:  12/19/2024   Target end date:  --    Indications    History of atrial fibrillation [Z86.79]  Long term (current) use of anticoagulants [Z79.01]                 Anticoagulation Episode Summary       INR check location:  --    Preferred lab:  --    Send INR reminders to:  [facility] COUMADIN MONITORING POOL    Comments:  [place] only Mon - Thur and Hospital Lab on Fridays          Anticoagulation Care Providers       Provider Role Specialty Phone number    Angel Olivier MD [facility] Cardiology [phone]

## 2024-12-06 NOTE — PROGRESS NOTES
12/06/24  Patient reports he took 4mg 12/05, did not get message to take 3mg yesterday 12/05, also reports decrease of appetite

## 2024-12-11 ENCOUNTER — OFFICE VISIT (OUTPATIENT)
Dept: CARDIOLOGY | Facility: CLINIC | Age: 74
End: 2024-12-11
Payer: MEDICARE

## 2024-12-11 VITALS
HEIGHT: 71 IN | RESPIRATION RATE: 18 BRPM | HEART RATE: 64 BPM | OXYGEN SATURATION: 97 % | SYSTOLIC BLOOD PRESSURE: 106 MMHG | BODY MASS INDEX: 31.56 KG/M2 | WEIGHT: 225.44 LBS | DIASTOLIC BLOOD PRESSURE: 62 MMHG

## 2024-12-11 DIAGNOSIS — Z95.0 PACEMAKER: ICD-10-CM

## 2024-12-11 DIAGNOSIS — I25.118 CORONARY ARTERY DISEASE OF NATIVE ARTERY OF NATIVE HEART WITH STABLE ANGINA PECTORIS: ICD-10-CM

## 2024-12-11 DIAGNOSIS — I10 PRIMARY HYPERTENSION: ICD-10-CM

## 2024-12-11 DIAGNOSIS — E78.49 OTHER HYPERLIPIDEMIA: ICD-10-CM

## 2024-12-11 DIAGNOSIS — R07.9 CHEST PAIN, UNSPECIFIED TYPE: ICD-10-CM

## 2024-12-11 DIAGNOSIS — R94.31 ABNORMAL EKG: ICD-10-CM

## 2024-12-11 DIAGNOSIS — Z79.01 LONG TERM (CURRENT) USE OF ANTICOAGULANTS: ICD-10-CM

## 2024-12-11 DIAGNOSIS — E66.9 NON MORBID OBESITY, UNSPECIFIED OBESITY TYPE: ICD-10-CM

## 2024-12-11 DIAGNOSIS — I48.0 PAF (PAROXYSMAL ATRIAL FIBRILLATION): ICD-10-CM

## 2024-12-11 DIAGNOSIS — Z95.2 S/P AVR (AORTIC VALVE REPLACEMENT) AND AORTOPLASTY: ICD-10-CM

## 2024-12-11 DIAGNOSIS — Z86.79 HISTORY OF ATRIAL FIBRILLATION: Primary | ICD-10-CM

## 2024-12-11 DIAGNOSIS — N18.4 CKD (CHRONIC KIDNEY DISEASE), STAGE IV: ICD-10-CM

## 2024-12-11 PROCEDURE — 3288F FALL RISK ASSESSMENT DOCD: CPT | Mod: CPTII,S$GLB,, | Performed by: INTERNAL MEDICINE

## 2024-12-11 PROCEDURE — 1160F RVW MEDS BY RX/DR IN RCRD: CPT | Mod: CPTII,S$GLB,, | Performed by: INTERNAL MEDICINE

## 2024-12-11 PROCEDURE — 3078F DIAST BP <80 MM HG: CPT | Mod: CPTII,S$GLB,, | Performed by: INTERNAL MEDICINE

## 2024-12-11 PROCEDURE — 3074F SYST BP LT 130 MM HG: CPT | Mod: CPTII,S$GLB,, | Performed by: INTERNAL MEDICINE

## 2024-12-11 PROCEDURE — 99214 OFFICE O/P EST MOD 30 MIN: CPT | Mod: S$GLB,,, | Performed by: INTERNAL MEDICINE

## 2024-12-11 PROCEDURE — 1101F PT FALLS ASSESS-DOCD LE1/YR: CPT | Mod: CPTII,S$GLB,, | Performed by: INTERNAL MEDICINE

## 2024-12-11 PROCEDURE — G2211 COMPLEX E/M VISIT ADD ON: HCPCS | Mod: S$GLB,,, | Performed by: INTERNAL MEDICINE

## 2024-12-11 PROCEDURE — 1159F MED LIST DOCD IN RCRD: CPT | Mod: CPTII,S$GLB,, | Performed by: INTERNAL MEDICINE

## 2024-12-11 PROCEDURE — 99999 PR PBB SHADOW E&M-EST. PATIENT-LVL IV: CPT | Mod: PBBFAC,,, | Performed by: INTERNAL MEDICINE

## 2024-12-11 PROCEDURE — 1126F AMNT PAIN NOTED NONE PRSNT: CPT | Mod: CPTII,S$GLB,, | Performed by: INTERNAL MEDICINE

## 2024-12-11 NOTE — PROGRESS NOTES
CARDIOVASCULAR PROGRESS NOTE    REASON FOR CONSULT:   Vivek Lema Jr. is a 74 y.o. male who presents for follow up of AVR/CHF/PAF.    PCP: Nelson Sanderson: [place]  HISTORY OF PRESENT ILLNESS:   The patient returns for follow up, accompanied by his wife.  He presents earlier than planned (was planning to see him in January).  He continues to describe episodic nonexertional chest pulling.  He denies any shortness breath, palpitations, or syncope there has been no PND, melena, hematuria, or claudication symptoms.    CARDIOVASCULAR HISTORY:   SSS s/p St. Meng PPM 2018  AVR with FEDERICO ligation 1/2018 (27mm Magna Ease bioprosthesis, hx Ao root dil/AI)  MR s/p failed mitraclip  PAF on coumadin    PAST MEDICAL HISTORY:     Past Medical History:   Diagnosis Date    A-fib     CAD (coronary artery disease)     CKD (chronic kidney disease)     Disorder of kidney and ureter     HTN (hypertension)     Macular degeneration     Mitral regurgitation     Pacemaker        PAST SURGICAL HISTORY:     Past Surgical History:   Procedure Laterality Date    AV FISTULA PLACEMENT Left     CATARACT EXTRACTION      COLONOSCOPY      COLONOSCOPY N/A 01/17/2023    Procedure: COLONOSCOPY;  Surgeon: Todd Oviedo MD;  Location: [facility] ([address] [place] [address]);  Service: Endoscopy;  Laterality: N/A;  from referral / Pt with trouble clearing secretions at times - 2nd floor / prep ins. mailed and reviewed with Pt and Pt wife/ Pt reports last colon in [place], Hx polyps / Coumadin - per coumadin clinic ok to hold coumadin x 5 days prior- see telephone encounter dated 11/2/22 / pa    CORONARY ARTERY BYPASS GRAFT      with valve repair (aortic?)       ALLERGIES AND MEDICATION:   Review of patient's allergies indicates:  No Known Allergies     Medication List            Accurate as of December 11, 2024  3:03 PM. If you have any questions, ask your nurse or doctor.                CONTINUE taking these medications      atorvastatin 40 MG tablet  Commonly known as:  LIPITOR  Take 1 tablet (40 mg total) by mouth once daily.     calcitRIOL 0.25 MCG Cap  Commonly known as: ROCALTROL  Take 1 capsule (0.25 mcg total) by mouth once daily.     cholecalciferol (vitamin D3) 50 mcg (2,000 unit) Cap capsule  Commonly known as: VITAMIN D3     EScitalopram oxalate 20 MG tablet  Commonly known as: LEXAPRO  Take 1 tablet (20 mg total) by mouth once daily.     febuxostat 40 mg Tab  Commonly known as: ULORIC  TAKE 1 TABLET(40 MG) BY MOUTH EVERY DAY     HYDROcodone-acetaminophen 7.5-325 mg per tablet  Commonly known as: NORCO  Take not more than one pill a day PRN as direct     isosorbide mononitrate 60 MG 24 hr tablet  Commonly known as: IMDUR  Take 1 tablet (60 mg total) by mouth once daily.     moxifloxacin 0.5 % ophthalmic solution  Commonly known as: VIGAMOX  Place 1 drop into the right eye 3 (three) times daily.     nitroGLYCERIN 0.4 MG SL tablet  Commonly known as: NITROSTAT  Place 1 tablet (0.4 mg total) under the tongue every 5 (five) minutes as needed for Chest pain.     torsemide 20 MG Tab  Commonly known as: DEMADEX  Take 3 tablets (60 mg total) by mouth once daily.     valsartan 40 MG tablet  Commonly known as: DIOVAN  Take 1 tablet (40 mg total) by mouth once daily.     warfarin 2 MG tablet  Commonly known as: COUMADIN  TAKE 2 TABLETS BY MOUTH EVERY MONDAY, WEDNESDAY, FRIDAY AND 1 TABLET ON ALL OTHER DAYS              SOCIAL HISTORY:     Social History     Socioeconomic History    Marital status:    Tobacco Use    Smoking status: Never    Smokeless tobacco: Never   Substance and Sexual Activity    Alcohol use: Yes     Alcohol/week: 6.0 standard drinks of alcohol     Types: 6 Cans of beer per week     Comment: occ    Drug use: Never     Social Drivers of Health     Financial Resource Strain: Low Risk  (12/1/2023)    Overall Financial Resource Strain (CARDIA)     Difficulty of Paying Living Expenses: Not hard at all   Food Insecurity: No Food Insecurity (12/1/2023)    Hunger  Vital Sign     Worried About Running Out of Food in the Last Year: Never true     Ran Out of Food in the Last Year: Never true   Transportation Needs: No Transportation Needs (12/1/2023)    PRAPARE - Transportation     Lack of Transportation (Medical): No     Lack of Transportation (Non-Medical): No   Physical Activity: Insufficiently Active (12/1/2023)    Exercise Vital Sign     Days of Exercise per Week: 2 days     Minutes of Exercise per Session: 30 min   Stress: No Stress Concern Present (12/1/2023)    [employer] - Occupational Stress Questionnaire     Feeling of Stress : Not at all   Housing Stability: Low Risk  (12/15/2023)    Housing Stability Vital Sign     Unable to Pay for Housing in the Last Year: No     Number of Places Lived in the Last Year: 1     Unstable Housing in the Last Year: No       FAMILY HISTORY:     Family History   Problem Relation Name Age of Onset    Amblyopia Neg Hx      Blindness Neg Hx      Cataracts Neg Hx      Glaucoma Neg Hx      Macular degeneration Neg Hx      Retinal detachment Neg Hx      Strabismus Neg Hx         REVIEW OF SYSTEMS:   Review of Systems   Constitutional:  Negative for chills, diaphoresis, fever and malaise/fatigue.   HENT:  Negative for nosebleeds.    Eyes:  Negative for blurred vision, double vision and photophobia.   Respiratory:  Negative for hemoptysis, shortness of breath and wheezing.    Cardiovascular:  Positive for chest pain. Negative for palpitations, orthopnea, claudication, leg swelling and PND.   Gastrointestinal:  Negative for abdominal pain, blood in stool, heartburn, melena, nausea and vomiting.   Genitourinary:  Negative for flank pain and hematuria.   Musculoskeletal:  Negative for falls, myalgias and neck pain.   Skin:  Negative for rash.   Neurological:  Negative for dizziness, seizures, loss of consciousness, weakness and headaches.   Endo/Heme/Allergies:  Negative for polydipsia. Does not bruise/bleed easily.  "  Psychiatric/Behavioral:  Negative for depression and memory loss. The patient is not nervous/anxious.        PHYSICAL EXAM:     Vitals:    12/11/24 1500   BP: 106/62   Pulse: 64   Resp: 18      Body mass index is 31.44 kg/m².  Weight: 102.3 kg (225 lb 6.7 oz)   Height: 5' 11" (180.3 cm)     Physical Exam  Vitals reviewed.   Constitutional:       General: He is not in acute distress.     Appearance: He is well-developed. He is obese. He is not ill-appearing, toxic-appearing or diaphoretic.   HENT:      Head: Normocephalic and atraumatic.   Eyes:      General: No scleral icterus.     Extraocular Movements: Extraocular movements intact.      Conjunctiva/sclera: Conjunctivae normal.      Pupils: Pupils are equal, round, and reactive to light.   Neck:      Thyroid: No thyromegaly.      Vascular: Normal carotid pulses. No carotid bruit or JVD.      Trachea: Trachea normal.   Cardiovascular:      Rate and Rhythm: Normal rate and regular rhythm.      Pulses:           Carotid pulses are 2+ on the right side and 2+ on the left side.     Heart sounds: S1 normal and S2 normal. Murmur heard.      Systolic murmur is present with a grade of 2/6.      No friction rub. No gallop.   Pulmonary:      Effort: Pulmonary effort is normal. No respiratory distress.      Breath sounds: Normal breath sounds. No stridor. No wheezing, rhonchi or rales.   Chest:      Chest wall: No tenderness.   Abdominal:      General: There is no distension.      Palpations: Abdomen is soft.   Musculoskeletal:         General: No swelling or tenderness. Normal range of motion.      Cervical back: Normal range of motion and neck supple. No edema or rigidity.      Right lower leg: No edema.      Left lower leg: No edema.   Feet:      Right foot:      Skin integrity: No ulcer.      Left foot:      Skin integrity: No ulcer.   Skin:     General: Skin is warm and dry.      Coloration: Skin is not jaundiced.   Neurological:      General: No focal deficit present. "      Mental Status: He is alert and oriented to person, place, and time.      Cranial Nerves: No cranial nerve deficit.   Psychiatric:         Mood and Affect: Mood normal.         Speech: Speech normal.         Behavior: Behavior normal. Behavior is cooperative.         DATA:   EKG: (personally reviewed tracing(s))  12/11/24 AP-, IVCD    Laboratory:  CBC:  Recent Labs   Lab 04/04/24  1306 06/25/24  1444 10/15/24  0958   WBC 6.84 7.15 8.71   Hemoglobin 15.0 15.0 13.6 L   Hematocrit 45.6 46.2 41.8   Platelets 157 166 140 L       CHEMISTRIES:  Recent Labs   Lab 02/23/22  1213 03/13/22  0755 03/17/22  1419 03/21/22  1110 05/10/22  1413 06/27/22  1405 08/29/22  0855 12/01/23  0330 12/15/23  1207 04/04/24  1306 06/25/24  1444 10/15/24  0958   Glucose 95 94 106 101 93 85   < > 87 93 119 H 98 70   Sodium 144 137 136 140 139 141   < > 139 138 140 141 139   Potassium 3.9 3.6 4.5 4.7 4.1 4.0   < > 3.3 L 4.5 3.4 L 4.2 3.7   BUN 53 H 49 H 76 H 78 H 45 H 41 H   < > 43 H 45 H 42 H 35 H 41 H   Creatinine 3.2 H 3.1 H 4.0 H 3.3 H 2.7 H 2.6 H   < > 2.5 H 2.7 H 2.8 H 2.4 H 3.0 H   eGFR if non  18.1 A 19 A 13.8 A 17.4 A 22.2 A 23 A  --   --   --   --   --   --    eGFR  --   --   --   --   --   --    < > 26.1 A 24 A 22.7 A 27.3 A 21 A   Calcium 9.4 9.2 9.6 9.6 9.3 9.0   < > 9.0 8.8 9.2 9.2 9.0   Magnesium 2.2 2.3  --   --   --   --   --  2.1  --   --   --   --     < > = values in this interval not displayed.       CARDIAC BIOMARKERS:  Recent Labs   Lab 03/13/22  0755    H       COAGS:  Recent Labs   Lab 10/15/24  0958 11/12/24  1322 12/05/24  1158   INR 2.2 H 1.6 H 3.1 H       LIPIDS/LFTS:  Recent Labs   Lab 02/02/22  0945 02/23/22  1213 04/04/24  1306 06/25/24  1444 10/15/24  0958   Cholesterol 154  --   --   --   --    Triglycerides 171 H  --   --   --   --    HDL 33 L  --   --   --   --    LDL Cholesterol 86.8  --   --   --   --    Non-HDL Cholesterol 121  --   --   --   --    AST 15   < > 21 23 19   ALT 11    < > 11 14 12    < > = values in this interval not displayed.       Cardiovascular Testing:  Aortic US 10/16/23  Suprarenal aortic ectasia without evidence of AAA, maximum diameter 2.6 cm.    L MPI 9/22/23     Normal myocardial perfusion scan. There is no evidence of myocardial ischemia or infarction.    There is a  mild intensity fixed perfusion abnormality in the inferior wall of the left ventricle secondary to diaphragm attenuation.    The gated perfusion images showed an ejection fraction of 55% post stress.    The ECG portion of the study is negative for ischemia.    The patient reported chest pain during the stress test.    There were no arrhythmias during stress.    Echo 9/22/23    Left Ventricle: The left ventricle is normal in size. There is mild concentric hypertrophy. There is normal systolic function with a visually estimated ejection fraction of 55 - 60%. Grade II diastolic dysfunction.    Left Atrium: Left atrium is moderately dilated.    Right Ventricle: Mild right ventricular enlargement. Systolic function is normal.    Right Atrium: Right atrium is mildly dilated.    Aortic Valve: There is a bioprosthetic valve in the aortic position. Aortic valve area by VTI is 1.88 cm². Aortic valve peak velocity is 2.43 m/s. Mean gradient is 13 mmHg. The dimensionless index is 0.43.    Mitral Valve: There is mild regurgitation.    Pulmonary Artery: The estimated pulmonary artery systolic pressure is 15 mmHg.    IVC/SVC: Normal venous pressure at 3 mmHg.    Right/left heart catheterization 07/16/2021: ([facility] note 2/8/22)  Nonobstructive coronary artery disease with the exception of a ramus intermedius that was of small caliber.  Mild pulmonary hypertension.  Elevated V-wave in setting of severe mitral regurgitation.  V-wave of 43 mm Hg.  Left anterior descending artery with a mid to distal 30-40% stenosis.  Ramus intermedius with a mid 70 80% stenosis.  Small caliber vessel.  Left circumflex artery:  Non dominant.   Ectatic and aneurysmal.  Mid 40% stenosis in the AV groove followed by a 40-50% stenosis prior to the bifurcation of OM 2.   Right coronary artery is ectatic and aneurysmal with a mid 20-30% stenosis.  Superior branch of the PDA has a 50% stenosis proximally in the inferior branch has luminal irregularities.  PLV branch with a proximal 30-40% stenosis followed by mid 40-50% stenosis.    ASSESSMENT:   # CAD, nonobst by cath 7/2021.  MPI/echo 9/2023 neg.  Some chronic/persistent atyp CP at present.  # HTN, controlled  # HLP on atorva 40mg  # PAF in [place] on coumadin. Hx FEDERICO ligation 1/2018.  # SSS s/p St. Meng dual chamber PPM  # Bio AVR 1/2018 (27mm Magna Ease) for Ao root dil/AI.  # MR s/p failed mitraclip    # CKD5, not on HD.  LUE AVF present  # BMI 32, up 1 unit(s) vs last OV  # aortic atherosclerosis (CXR 9/19/23)    PLAN:   Cont med rx  Cont coumadin for goal INR 2.0-3.0, consider switch to NOAC.  RTC 6 months with surveillance echo and St. Meng PPM check (June 2024)  Hope to avoid cath (unless pt is on HD)    The above documents medical care services that are part of ongoing care related to this patient's serious/complex condition (Code ) (CAD/AS/MR/PAF).      Angel Olivier MD, FACC

## 2024-12-20 ENCOUNTER — LAB VISIT (OUTPATIENT)
Dept: LAB | Facility: HOSPITAL | Age: 74
End: 2024-12-20
Attending: INTERNAL MEDICINE
Payer: MEDICARE

## 2024-12-20 ENCOUNTER — ANTI-COAG VISIT (OUTPATIENT)
Dept: CARDIOLOGY | Facility: CLINIC | Age: 74
End: 2024-12-20
Payer: MEDICARE

## 2024-12-20 DIAGNOSIS — Z79.01 LONG TERM (CURRENT) USE OF ANTICOAGULANTS: ICD-10-CM

## 2024-12-20 DIAGNOSIS — Z86.79 HISTORY OF ATRIAL FIBRILLATION: Primary | ICD-10-CM

## 2024-12-20 DIAGNOSIS — Z86.79 HISTORY OF ATRIAL FIBRILLATION: ICD-10-CM

## 2024-12-20 LAB
INR PPP: 2.5 (ref 0.8–1.2)
PROTHROMBIN TIME: 26.3 SEC (ref 9–12.5)

## 2024-12-20 PROCEDURE — 36415 COLL VENOUS BLD VENIPUNCTURE: CPT | Mod: PO | Performed by: INTERNAL MEDICINE

## 2024-12-20 PROCEDURE — 85610 PROTHROMBIN TIME: CPT | Performed by: INTERNAL MEDICINE

## 2024-12-20 NOTE — PROGRESS NOTES
[facility]    2024 3:03 PM    Assessment/Plan:    Patient presents today with therapeutic INR.    Assessment of patient findings and chart review: reviewed    Recommendation for patient's warfarin regimen: Continue current maintenance dose    Recommend repeat INR in 2 weeks with other lab  _________________________________________________________________    Vivek Lema Jr. (74 y.o.) is followed by the [facility].    Anticoagulation Summary  As of 2024      INR goal:  2.0-3.0   TTR:  78.9% (2.9 y)   INR used for dosin.5 (2024)   Warfarin maintenance plan:  4 mg (2 mg x 2) every e, Thu, Sat; 2 mg (2 mg x 1) all other days   Weekly warfarin total:  20 mg   Plan last modified:  Mihaela Ortega, PharmD (2024)   Next INR check:  1/3/2025   Target end date:  --    Indications    History of atrial fibrillation [Z86.79]  Long term (current) use of anticoagulants [Z79.01]                 Anticoagulation Episode Summary       INR check location:  --    Preferred lab:  --    Send INR reminders to:  [facility] COUMADIN MONITORING POOL    Comments:  [place] only Mon - Thur and Hospital Lab on           Anticoagulation Care Providers       Provider Role Specialty Phone number    Angel Olivier MD [facility] Cardiology [phone]

## 2025-01-03 ENCOUNTER — LAB VISIT (OUTPATIENT)
Dept: LAB | Facility: HOSPITAL | Age: 75
End: 2025-01-03
Attending: INTERNAL MEDICINE
Payer: MEDICARE

## 2025-01-03 ENCOUNTER — ANTI-COAG VISIT (OUTPATIENT)
Dept: CARDIOLOGY | Facility: CLINIC | Age: 75
End: 2025-01-03
Payer: MEDICARE

## 2025-01-03 DIAGNOSIS — N18.4 CKD (CHRONIC KIDNEY DISEASE), STAGE IV: ICD-10-CM

## 2025-01-03 DIAGNOSIS — Z79.01 LONG TERM (CURRENT) USE OF ANTICOAGULANTS: ICD-10-CM

## 2025-01-03 DIAGNOSIS — Z86.79 HISTORY OF ATRIAL FIBRILLATION: ICD-10-CM

## 2025-01-03 DIAGNOSIS — Z86.79 HISTORY OF ATRIAL FIBRILLATION: Primary | ICD-10-CM

## 2025-01-03 LAB
25(OH)D3+25(OH)D2 SERPL-MCNC: 54 NG/ML (ref 30–96)
ALBUMIN SERPL BCP-MCNC: 3.6 G/DL (ref 3.5–5.2)
ALP SERPL-CCNC: 77 U/L (ref 40–150)
ALT SERPL W/O P-5'-P-CCNC: 10 U/L (ref 10–44)
ANION GAP SERPL CALC-SCNC: 10 MMOL/L (ref 8–16)
AST SERPL-CCNC: 18 U/L (ref 10–40)
BASOPHILS # BLD AUTO: 0.06 K/UL (ref 0–0.2)
BASOPHILS NFR BLD: 0.9 % (ref 0–1.9)
BILIRUB SERPL-MCNC: 1.9 MG/DL (ref 0.1–1)
BUN SERPL-MCNC: 43 MG/DL (ref 8–23)
CALCIUM SERPL-MCNC: 8.6 MG/DL (ref 8.7–10.5)
CHLORIDE SERPL-SCNC: 106 MMOL/L (ref 95–110)
CO2 SERPL-SCNC: 24 MMOL/L (ref 23–29)
CREAT SERPL-MCNC: 3.2 MG/DL (ref 0.5–1.4)
DIFFERENTIAL METHOD BLD: ABNORMAL
EOSINOPHIL # BLD AUTO: 0.4 K/UL (ref 0–0.5)
EOSINOPHIL NFR BLD: 5.3 % (ref 0–8)
ERYTHROCYTE [DISTWIDTH] IN BLOOD BY AUTOMATED COUNT: 13.2 % (ref 11.5–14.5)
EST. GFR  (NO RACE VARIABLE): 19.3 ML/MIN/1.73 M^2
GLUCOSE SERPL-MCNC: 101 MG/DL (ref 70–110)
HCT VFR BLD AUTO: 39.9 % (ref 40–54)
HGB BLD-MCNC: 13.3 G/DL (ref 14–18)
IMM GRANULOCYTES # BLD AUTO: 0.03 K/UL (ref 0–0.04)
IMM GRANULOCYTES NFR BLD AUTO: 0.4 % (ref 0–0.5)
INR PPP: 2.1 (ref 0.8–1.2)
LYMPHOCYTES # BLD AUTO: 2 K/UL (ref 1–4.8)
LYMPHOCYTES NFR BLD: 28.6 % (ref 18–48)
MAGNESIUM SERPL-MCNC: 2.3 MG/DL (ref 1.6–2.6)
MCH RBC QN AUTO: 30.4 PG (ref 27–31)
MCHC RBC AUTO-ENTMCNC: 33.3 G/DL (ref 32–36)
MCV RBC AUTO: 91 FL (ref 82–98)
MONOCYTES # BLD AUTO: 0.9 K/UL (ref 0.3–1)
MONOCYTES NFR BLD: 13.1 % (ref 4–15)
NEUTROPHILS # BLD AUTO: 3.6 K/UL (ref 1.8–7.7)
NEUTROPHILS NFR BLD: 51.7 % (ref 38–73)
NRBC BLD-RTO: 0 /100 WBC
PHOSPHATE SERPL-MCNC: 3.9 MG/DL (ref 2.7–4.5)
PLATELET # BLD AUTO: 143 K/UL (ref 150–450)
PMV BLD AUTO: 12.2 FL (ref 9.2–12.9)
POTASSIUM SERPL-SCNC: 4.1 MMOL/L (ref 3.5–5.1)
PROT SERPL-MCNC: 6.9 G/DL (ref 6–8.4)
PROTHROMBIN TIME: 21.4 SEC (ref 9–12.5)
PTH-INTACT SERPL-MCNC: 311.6 PG/ML (ref 9–77)
RBC # BLD AUTO: 4.37 M/UL (ref 4.6–6.2)
SODIUM SERPL-SCNC: 140 MMOL/L (ref 136–145)
WBC # BLD AUTO: 7.02 K/UL (ref 3.9–12.7)

## 2025-01-03 PROCEDURE — 85610 PROTHROMBIN TIME: CPT | Performed by: INTERNAL MEDICINE

## 2025-01-03 PROCEDURE — 84100 ASSAY OF PHOSPHORUS: CPT | Performed by: INTERNAL MEDICINE

## 2025-01-03 PROCEDURE — 83970 ASSAY OF PARATHORMONE: CPT | Performed by: INTERNAL MEDICINE

## 2025-01-03 PROCEDURE — 80053 COMPREHEN METABOLIC PANEL: CPT | Performed by: INTERNAL MEDICINE

## 2025-01-03 PROCEDURE — 85025 COMPLETE CBC W/AUTO DIFF WBC: CPT | Performed by: INTERNAL MEDICINE

## 2025-01-03 PROCEDURE — 36415 COLL VENOUS BLD VENIPUNCTURE: CPT | Performed by: INTERNAL MEDICINE

## 2025-01-03 PROCEDURE — 82306 VITAMIN D 25 HYDROXY: CPT | Performed by: INTERNAL MEDICINE

## 2025-01-03 PROCEDURE — 83735 ASSAY OF MAGNESIUM: CPT | Performed by: INTERNAL MEDICINE

## 2025-01-03 NOTE — PROGRESS NOTES
[facility]    2025 8:53 AM    Assessment/Plan:    Patient presents today with therapeutic INR.    Assessment of patient findings and chart review: no significant findings     Recommendation for patient's warfarin regimen: Continue current maintenance dose    Recommend repeat INR in 3 weeks  _________________________________________________________________    Vivek Lema Jr. (74 y.o.) is followed by the [facility].    Anticoagulation Summary  As of 1/3/2025      INR goal:  2.0-3.0   TTR:  79.2% (2.9 y)   INR used for dosin.1 (1/3/2025)   Warfarin maintenance plan:  4 mg (2 mg x 2) every e, Thu, Sat; 2 mg (2 mg x 1) all other days   Weekly warfarin total:  20 mg   Plan last modified:  Mihaela Ortega, PharmD (2024)   Next INR check:  2025   Target end date:  --    Indications    History of atrial fibrillation [Z86.79]  Long term (current) use of anticoagulants [Z79.01]                 Anticoagulation Episode Summary       INR check location:  --    Preferred lab:  --    Send INR reminders to:  [facility] COUMADIN MONITORING POOL    Comments:  [place] only Mon - Thur and Hospital Lab on           Anticoagulation Care Providers       Provider Role Specialty Phone number    Angel Olivier MD [facility] Cardiology [phone]

## 2025-01-07 ENCOUNTER — OFFICE VISIT (OUTPATIENT)
Dept: NEPHROLOGY | Facility: CLINIC | Age: 75
End: 2025-01-07
Payer: MEDICARE

## 2025-01-07 VITALS
BODY MASS INDEX: 31.73 KG/M2 | DIASTOLIC BLOOD PRESSURE: 60 MMHG | HEART RATE: 74 BPM | HEIGHT: 71 IN | WEIGHT: 226.63 LBS | OXYGEN SATURATION: 96 % | SYSTOLIC BLOOD PRESSURE: 101 MMHG

## 2025-01-07 DIAGNOSIS — N25.81 SECONDARY HYPERPARATHYROIDISM OF RENAL ORIGIN: ICD-10-CM

## 2025-01-07 DIAGNOSIS — N18.4 CKD (CHRONIC KIDNEY DISEASE), STAGE IV: Primary | ICD-10-CM

## 2025-01-07 DIAGNOSIS — I10 HYPERTENSION, UNSPECIFIED TYPE: ICD-10-CM

## 2025-01-07 PROBLEM — N18.5: Status: RESOLVED | Noted: 2024-10-30 | Resolved: 2025-01-07

## 2025-01-07 PROBLEM — E11.22: Status: RESOLVED | Noted: 2024-10-30 | Resolved: 2025-01-07

## 2025-01-07 PROBLEM — N18.5 CHRONIC KIDNEY DISEASE, STAGE 5: Status: RESOLVED | Noted: 2024-10-30 | Resolved: 2025-01-07

## 2025-01-07 PROCEDURE — 1126F AMNT PAIN NOTED NONE PRSNT: CPT | Mod: CPTII,S$GLB,, | Performed by: INTERNAL MEDICINE

## 2025-01-07 PROCEDURE — 3288F FALL RISK ASSESSMENT DOCD: CPT | Mod: CPTII,S$GLB,, | Performed by: INTERNAL MEDICINE

## 2025-01-07 PROCEDURE — 99999 PR PBB SHADOW E&M-EST. PATIENT-LVL III: CPT | Mod: PBBFAC,,, | Performed by: INTERNAL MEDICINE

## 2025-01-07 PROCEDURE — 99215 OFFICE O/P EST HI 40 MIN: CPT | Mod: S$GLB,,, | Performed by: INTERNAL MEDICINE

## 2025-01-07 PROCEDURE — G2211 COMPLEX E/M VISIT ADD ON: HCPCS | Mod: S$GLB,,, | Performed by: INTERNAL MEDICINE

## 2025-01-07 PROCEDURE — 3078F DIAST BP <80 MM HG: CPT | Mod: CPTII,S$GLB,, | Performed by: INTERNAL MEDICINE

## 2025-01-07 PROCEDURE — 1159F MED LIST DOCD IN RCRD: CPT | Mod: CPTII,S$GLB,, | Performed by: INTERNAL MEDICINE

## 2025-01-07 PROCEDURE — 1101F PT FALLS ASSESS-DOCD LE1/YR: CPT | Mod: CPTII,S$GLB,, | Performed by: INTERNAL MEDICINE

## 2025-01-07 PROCEDURE — 3074F SYST BP LT 130 MM HG: CPT | Mod: CPTII,S$GLB,, | Performed by: INTERNAL MEDICINE

## 2025-01-07 NOTE — PROGRESS NOTES
"CHIEF COMPLAINT/HPI: Vivek is a 74 y.o. man with PMH of CAD CABG 3 year ago, repaired thoracic aortic aneurysm  ,In August 2021 underwent attempted MitraClip procedure. Unsuccessful percutaneous repair.  Patient was deemed unsuitable for open repair secondary to comorbidities, A.fib , mitral regurgitation , PPM secondary to SSS, Aortic bioprosthesis secondary to aortic aneurysm/aortic insufficiency S/P AVR On Coumadin . EF 45-50%.  prostate cancer s/p surgery 2019 and clear for 2 years   Advanced CKD was following with Nephrologist in [place] , they relocate to [place] with as their daughter lives here , they have their own place . He has AVF in place .  He established care in [facility] with me in Feb 2022, with his wife ,     On 3/13 he hit his Lt hip on a piece of furniture and sustained hematoma ,Hb dropped to ~9 from ~11 , BP was low in his PCP visit and his BP meds doses were dropped ,   I tried to contact him on Monday 3/21( after PCP secured chat )  to check on his BP and may be stop BP meds all together , but no answer .   Daughter with him today , feels fine , " not drinking as much since the incident of hematoma "  Home # is [phone]   11/15/22 feels fine, will obtain labs today.  10/15/24:last seen in November 2022, since then he  was admitted in December 2023 for a cat bite at the AVF site. Got iv abx. Now fine.  He feels ok and BP is stable. He did not have labs until this morning, so we did not discuss any labs today.    1/7/25: here for follow up, had multiple questions about his kidney function, all questions answered.  AVF still with good bruit and thrill. But asked not to start Dialysis until he " really needs it, as late as possible and I hope I don't ever need it".    Kidney Failure Risk Equation (Tangri)    Kidney Failure Risk at 2 years: 5.6%    Kidney Failure Risk at 5 years: 16.4%    Lab Results   Component Value Date    MICALBCREAT 15.8 01/03/2025    CREATININE 3.2 (H) 01/03/2025       Past " "Medical History:   Diagnosis Date    A-fib     CAD (coronary artery disease)     CKD (chronic kidney disease)     Disorder of kidney and ureter     HTN (hypertension)     Macular degeneration     Mitral regurgitation     Pacemaker        Vivek reports that he has never smoked. He has never used smokeless tobacco. He reports current alcohol use of about 6.0 standard drinks of alcohol per week. He reports that he does not use drugs.   Drinks beer daily ( ~2 cans )    FAMILY HX :  + family hx of HTN , Emphysema and CKD .    ROS:    Review of Systems   Constitutional:  Negative for activity change, appetite change, chills, fever and unexpected weight change.   HENT:  Negative for congestion, ear discharge, hearing loss, nosebleeds, sore throat and tinnitus.    Eyes:  Negative for photophobia, pain, redness and visual disturbance.   Respiratory:  Negative for cough, chest tightness, shortness of breath and wheezing.    Cardiovascular:  Negative for chest pain, palpitations and leg swelling.   Gastrointestinal:  Negative for abdominal distention, constipation, diarrhea, nausea and vomiting.   Endocrine: Negative for cold intolerance, heat intolerance, polydipsia and polyuria.   Genitourinary:  Negative for decreased urine volume, difficulty urinating, dysuria, flank pain and hematuria.   Musculoskeletal:  Negative for arthralgias, gait problem, joint swelling and neck stiffness.   Skin:  Negative for rash.   Neurological:  Negative for dizziness, tremors, weakness, numbness and headaches.   Psychiatric/Behavioral:  Negative for confusion and sleep disturbance.        PE:    Vitals:    01/07/25 1443   BP: 101/60   Pulse: 74   SpO2: 96%   Weight: 102.8 kg (226 lb 10.1 oz)   Height: 5' 11" (1.803 m)           Physical Exam  Constitutional:       General: He is not in acute distress.     Appearance: He is not diaphoretic.   HENT:      Head: Normocephalic and atraumatic.      Right Ear: External ear normal.      Left Ear: " External ear normal.   Eyes:      General:         Right eye: No discharge.         Left eye: No discharge.      Conjunctiva/sclera: Conjunctivae normal.      Pupils: Pupils are equal, round, and reactive to light.   Neck:      Vascular: No JVD.   Cardiovascular:      Rate and Rhythm: Normal rate and regular rhythm.      Heart sounds: No murmur heard.     No friction rub. No gallop.   Pulmonary:      Effort: Pulmonary effort is normal. No respiratory distress.      Breath sounds: Normal breath sounds. No stridor. No wheezing or rales.   Chest:      Chest wall: No tenderness.   Abdominal:      Palpations: Abdomen is soft.      Tenderness: There is no abdominal tenderness. There is no guarding or rebound.   Musculoskeletal:         General: No tenderness.      Cervical back: Normal range of motion and neck supple.      Comments: Lr forearm AVF with thrill and bruit    Skin:     Findings: No erythema or rash.   Neurological:      Mental Status: He is alert and oriented to person, place, and time.         Impression/Plan:    1. CKD (chronic kidney disease), stage IV    2. Hypertension, unspecified type    3. Class 1 obesity due to excess calories with serious comorbidity and body mass index (BMI) of 32.0 to 32.9 in adult        # progression of CKD IV: likely CAD, diastolic HF, Obesity, HTN, and age related   Has AVF in place with good thrill and Bruit.   -No issues with volume, K and Acidosis so far.   -request to obtain record from previous nephrology was sent .  -had YAHAIRA after the hematoma, creatinine trending down now , will keep monitoring.   -US ordered     Lab Results   Component Value Date    CREATININE 3.2 (H) 01/03/2025     Protein Creatinine Ratios:    Prot/Creat Ratio, Urine   Date Value Ref Range Status   01/03/2025 Unable to calculate 0.00 - 0.20 Final   10/15/2024 0.05 0.00 - 0.20 Final   11/15/2022 0.10 0.00 - 0.20 Final       Acid-Base:   Lab Results   Component Value Date     01/03/2025    K 4.1  01/03/2025    CO2 24 01/03/2025     #HTN: Blood pressures acceptable.    # Renal osteodystrophy:   Lab Results   Component Value Date    .6 (H) 01/03/2025    CALCIUM 8.6 (L) 01/03/2025    PHOS 3.9 01/03/2025       # Anemia:   Lab Results   Component Value Date    HGB 13.3 (L) 01/03/2025        # DM:  Last HbA1C   Lab Results   Component Value Date    HGBA1C 5.2 09/19/2023       # Lipid management:   Lab Results   Component Value Date    LDLCALC 86.8 02/02/2022     70 minutes of total time spent on the encounter, which includes face to face time and non-face to face time preparing to see the patient (eg, review of tests), Obtaining and/or reviewing separately obtained history, Documenting clinical information in the electronic or other health record, Independently interpreting results (not separately reported) and communicating results to the patient/family/caregiver, or Care coordination (not separately reported).    Visit today included increased complexity associated with the care of the episodic problem CKD, HTN, Secondary hyperparathyroidism, AVF in place and obesity addressed and managing the longitudinal care of the patient due to the serious and/or complex managed problem(s) .      # ESRD planing: AVF in place     Labs today and labs in 2-3 months if labs are stable,

## 2025-01-08 ENCOUNTER — TELEPHONE (OUTPATIENT)
Dept: TRANSPLANT | Facility: CLINIC | Age: 75
End: 2025-01-08
Payer: MEDICARE

## 2025-01-09 ENCOUNTER — PATIENT MESSAGE (OUTPATIENT)
Dept: NEPHROLOGY | Facility: CLINIC | Age: 75
End: 2025-01-09
Payer: MEDICARE

## 2025-01-10 DIAGNOSIS — N18.4 CKD (CHRONIC KIDNEY DISEASE), STAGE IV: Primary | ICD-10-CM

## 2025-01-14 ENCOUNTER — HOSPITAL ENCOUNTER (OUTPATIENT)
Dept: RADIOLOGY | Facility: HOSPITAL | Age: 75
Discharge: HOME | End: 2025-01-14
Attending: INTERNAL MEDICINE
Payer: MEDICARE

## 2025-01-14 ENCOUNTER — OFFICE VISIT (OUTPATIENT)
Dept: FAMILY MEDICINE | Facility: CLINIC | Age: 75
End: 2025-01-14
Payer: MEDICARE

## 2025-01-14 VITALS
HEIGHT: 71 IN | HEART RATE: 64 BPM | DIASTOLIC BLOOD PRESSURE: 78 MMHG | SYSTOLIC BLOOD PRESSURE: 126 MMHG | WEIGHT: 226.44 LBS | BODY MASS INDEX: 31.7 KG/M2 | RESPIRATION RATE: 17 BRPM | OXYGEN SATURATION: 98 % | TEMPERATURE: 97 F

## 2025-01-14 DIAGNOSIS — I25.119 CORONARY ARTERY DISEASE INVOLVING NATIVE CORONARY ARTERY OF NATIVE HEART WITH ANGINA PECTORIS: ICD-10-CM

## 2025-01-14 DIAGNOSIS — I10 HYPERTENSION, UNSPECIFIED TYPE: ICD-10-CM

## 2025-01-14 DIAGNOSIS — N25.81 SECONDARY HYPERPARATHYROIDISM OF RENAL ORIGIN: ICD-10-CM

## 2025-01-14 DIAGNOSIS — N18.4 CKD (CHRONIC KIDNEY DISEASE), STAGE IV: ICD-10-CM

## 2025-01-14 DIAGNOSIS — N18.4 CKD (CHRONIC KIDNEY DISEASE), STAGE IV: Primary | ICD-10-CM

## 2025-01-14 DIAGNOSIS — Z13.1 DIABETES MELLITUS SCREENING: ICD-10-CM

## 2025-01-14 DIAGNOSIS — Z23 NEED FOR SHINGLES VACCINE: ICD-10-CM

## 2025-01-14 DIAGNOSIS — Z85.46 HISTORY OF PROSTATE CANCER: ICD-10-CM

## 2025-01-14 DIAGNOSIS — Z12.5 ENCOUNTER FOR SCREENING FOR MALIGNANT NEOPLASM OF PROSTATE: ICD-10-CM

## 2025-01-14 PROCEDURE — 99999 PR PBB SHADOW E&M-EST. PATIENT-LVL IV: CPT | Mod: PBBFAC,,,

## 2025-01-14 PROCEDURE — 99214 OFFICE O/P EST MOD 30 MIN: CPT | Mod: 25,S$GLB,,

## 2025-01-14 PROCEDURE — 76770 US EXAM ABDO BACK WALL COMP: CPT | Mod: TC,TXP

## 2025-01-14 PROCEDURE — 3288F FALL RISK ASSESSMENT DOCD: CPT | Mod: CPTII,S$GLB,,

## 2025-01-14 PROCEDURE — 1101F PT FALLS ASSESS-DOCD LE1/YR: CPT | Mod: CPTII,S$GLB,,

## 2025-01-14 PROCEDURE — 1159F MED LIST DOCD IN RCRD: CPT | Mod: CPTII,S$GLB,,

## 2025-01-14 PROCEDURE — 3078F DIAST BP <80 MM HG: CPT | Mod: CPTII,S$GLB,,

## 2025-01-14 PROCEDURE — 90471 IMMUNIZATION ADMIN: CPT | Mod: S$GLB,,,

## 2025-01-14 PROCEDURE — 76770 US EXAM ABDO BACK WALL COMP: CPT | Mod: 26,NTX,, | Performed by: RADIOLOGY

## 2025-01-14 PROCEDURE — 90750 HZV VACC RECOMBINANT IM: CPT | Mod: S$GLB,,,

## 2025-01-14 PROCEDURE — 1126F AMNT PAIN NOTED NONE PRSNT: CPT | Mod: CPTII,S$GLB,,

## 2025-01-14 PROCEDURE — 3074F SYST BP LT 130 MM HG: CPT | Mod: CPTII,S$GLB,,

## 2025-01-14 NOTE — PROGRESS NOTES
Family Medicine      Patient Name: Vivek Lema Jr.  MRN: [id number]  : 1950    PCP: Leigh Damon MD       HPI      Vivek Lema Jr. is a 74 y.o. male with multiple medical diagnoses as listed in the medical history and problem list that presents for   Chief Complaint   Patient presents with    Annual Exam       Mr. Lema presents for his annual exam. He has a history of Prostate Cancer, CKD5, and HTN. He reports eating less than he used to, mostly sandwiches or smaller bites every now and then, but he is comfortable with this. He has no complaints.             History      Past Medical History:  Past Medical History:   Diagnosis Date    A-fib     CAD (coronary artery disease)     CKD (chronic kidney disease)     Disorder of kidney and ureter     HTN (hypertension)     Macular degeneration     Mitral regurgitation     Pacemaker        Past Surgical History:  Past Surgical History:   Procedure Laterality Date    AV FISTULA PLACEMENT Left     CATARACT EXTRACTION      COLONOSCOPY      COLONOSCOPY N/A 2023    Procedure: COLONOSCOPY;  Surgeon: Todd Oviedo MD;  Location: [facility] ([address] [place] [address]);  Service: Endoscopy;  Laterality: N/A;  from referral / Pt with trouble clearing secretions at times - 2nd floor / prep ins. mailed and reviewed with Pt and Pt wife/ Pt reports last colon in [place], Hx polyps / Coumadin - per coumadin clinic ok to hold coumadin x 5 days prior- see telephone encounter dated 22 / pa    CORONARY ARTERY BYPASS GRAFT      with valve repair (aortic?)       Social History:  Social History     Socioeconomic History    Marital status:    Tobacco Use    Smoking status: Never    Smokeless tobacco: Never   Substance and Sexual Activity    Alcohol use: Yes     Alcohol/week: 6.0 standard drinks of alcohol     Types: 6 Cans of beer per week     Comment: occ    Drug use: Never     Social Drivers of Health     Financial Resource Strain: Low Risk  (2023)     Overall Financial Resource Strain (CARDIA)     Difficulty of Paying Living Expenses: Not hard at all   Food Insecurity: No Food Insecurity (12/1/2023)    Hunger Vital Sign     Worried About Running Out of Food in the Last Year: Never true     Ran Out of Food in the Last Year: Never true   Transportation Needs: No Transportation Needs (12/1/2023)    PRAPARE - Transportation     Lack of Transportation (Medical): No     Lack of Transportation (Non-Medical): No   Physical Activity: Insufficiently Active (12/1/2023)    Exercise Vital Sign     Days of Exercise per Week: 2 days     Minutes of Exercise per Session: 30 min   Stress: No Stress Concern Present (12/1/2023)    [employer] - Occupational Stress Questionnaire     Feeling of Stress : Not at all   Housing Stability: Low Risk  (12/15/2023)    Housing Stability Vital Sign     Unable to Pay for Housing in the Last Year: No     Number of Places Lived in the Last Year: 1     Unstable Housing in the Last Year: No       Family History:  Family History   Problem Relation Name Age of Onset    Amblyopia Neg Hx      Blindness Neg Hx      Cataracts Neg Hx      Glaucoma Neg Hx      Macular degeneration Neg Hx      Retinal detachment Neg Hx      Strabismus Neg Hx         Allergies and Medications: (updated and reviewed)  Review of patient's allergies indicates:  No Known Allergies  Current Outpatient Medications   Medication Sig Dispense Refill    atorvastatin (LIPITOR) 40 MG tablet Take 1 tablet (40 mg total) by mouth once daily. 90 tablet 3    calcitRIOL (ROCALTROL) 0.25 MCG Cap Take 1 capsule (0.25 mcg total) by mouth once daily. 90 capsule 1    cholecalciferol, vitamin D3, (VITAMIN D3) 50 mcg (2,000 unit) Cap Take 1 capsule by mouth once daily.      EScitalopram oxalate (LEXAPRO) 20 MG tablet Take 1 tablet (20 mg total) by mouth once daily. 90 tablet 3    febuxostat (ULORIC) 40 mg Tab TAKE 1 TABLET(40 MG) BY MOUTH EVERY DAY 90 tablet 0     HYDROcodone-acetaminophen (NORCO) 7.5-325 mg per tablet Take not more than one pill a day PRN as direct 30 tablet 0    isosorbide mononitrate (IMDUR) 60 MG 24 hr tablet Take 1 tablet (60 mg total) by mouth once daily. 90 tablet 3    nitroGLYCERIN (NITROSTAT) 0.4 MG SL tablet Place 1 tablet (0.4 mg total) under the tongue every 5 (five) minutes as needed for Chest pain. 30 tablet 3    torsemide (DEMADEX) 20 MG Tab Take 3 tablets (60 mg total) by mouth once daily. 270 tablet 3    valsartan (DIOVAN) 40 MG tablet Take 1 tablet (40 mg total) by mouth once daily. 30 tablet 0    warfarin (COUMADIN) 2 MG tablet TAKE 2 TABLETS BY MOUTH EVERY MONDAY, WEDNESDAY, FRIDAY AND 1 TABLET ON ALL OTHER DAYS 128 tablet 1     Current Facility-Administered Medications   Medication Dose Route Frequency Provider Last Rate Last Admin    varicella zoster (Shingrix) IM vaccine (>/= 49 yo)  0.5 mL Intramuscular Once Macy Alvarez PA-C           Patient Care Team:  Leigh Damon MD as PCP - General (Internal Medicine)  Mihaela Ortega PharmD as Pharmacist (Pharmacist)         Exam      Review of Systems:  (as noted above)  Review of Systems   Gastrointestinal:  Negative for abdominal pain, constipation and diarrhea.       Physical Exam:  Physical Exam  Vitals and nursing note reviewed.   Constitutional:       Appearance: Normal appearance. He is normal weight.   HENT:      Head: Normocephalic and atraumatic.   Eyes:      Extraocular Movements: Extraocular movements intact.      Pupils: Pupils are equal, round, and reactive to light.   Cardiovascular:      Rate and Rhythm: Normal rate and regular rhythm.      Pulses: Normal pulses.      Heart sounds: Normal heart sounds.   Pulmonary:      Effort: Pulmonary effort is normal.      Breath sounds: Normal breath sounds.   Abdominal:      General: Abdomen is flat.      Palpations: Abdomen is soft.   Musculoskeletal:         General: Normal range of motion.      Cervical back: Normal range of  "motion and neck supple.   Skin:     General: Skin is warm and dry.   Neurological:      Mental Status: He is alert and oriented to person, place, and time. Mental status is at baseline.   Psychiatric:         Mood and Affect: Mood normal.         Behavior: Behavior normal.       Vitals:    01/14/25 1301   BP: 126/78   BP Location: Left arm   Patient Position: Sitting   Pulse: 64   Resp: 17   Temp: 97.3 °F (36.3 °C)   TempSrc: Temporal   SpO2: 98%   Weight: 102.7 kg (226 lb 6.6 oz)   Height: 5' 11" (1.803 m)      Body mass index is 31.58 kg/m².           Assessment & Plan      1. CKD (chronic kidney disease), stage IV  - Stable. Patient follows with nephrology.  - Patient advised to keep blood pressure under control.    2. Coronary artery disease involving native coronary artery of native heart with angina pectoris  - LIPID PANEL; Future    3. History of prostate cancer  - PSA, SCREENING; Future    4. Encounter for screening for malignant neoplasm of prostate  - PSA, SCREENING; Future    5. Need for shingles vaccine  - varicella zoster (Shingrix) IM vaccine (>/= 51 yo)    6. Diabetes mellitus screening  - Hemoglobin A1C; Future           --------------------------------------------      Health Maintenance:  Health Maintenance         Date Due Completion Date    Shingles Vaccine (1 of 2) Never done ---    RSV Vaccine (Age 60+ and Pregnant patients) (1 - 1-dose 75+ series) Never done ---    COVID-19 Vaccine (4 - 2024-25 season) 09/01/2024 12/7/2021    Lipid Panel 02/02/2027 2/2/2022    TETANUS VACCINE 09/06/2031 9/6/2021            Health maintenance reviewed and Shingles vaccine ordered    Follow Up:  No follow-ups on file.       - The patient is given an After Visit Summary that lists all medications with directions, allergies, education, orders placed during this encounter and follow-up instructions.      - I have reviewed the patient's medical information including past medical, family, and social history sections " including the medications and allergies.      - We discussed the patient's current medications.     This note was created by combination of typed  and MModal dictation.  Transcription errors may be present.  If there are any questions, please contact me.     Macy Alvarez PA-C  [facility]

## 2025-01-27 ENCOUNTER — ANTI-COAG VISIT (OUTPATIENT)
Dept: CARDIOLOGY | Facility: CLINIC | Age: 75
End: 2025-01-27
Payer: MEDICARE

## 2025-01-27 ENCOUNTER — LAB VISIT (OUTPATIENT)
Dept: LAB | Facility: HOSPITAL | Age: 75
End: 2025-01-27
Payer: MEDICARE

## 2025-01-27 DIAGNOSIS — Z86.79 HISTORY OF ATRIAL FIBRILLATION: Primary | ICD-10-CM

## 2025-01-27 DIAGNOSIS — Z86.79 HISTORY OF ATRIAL FIBRILLATION: ICD-10-CM

## 2025-01-27 DIAGNOSIS — Z79.01 LONG TERM (CURRENT) USE OF ANTICOAGULANTS: ICD-10-CM

## 2025-01-27 LAB
INR PPP: 1.8 (ref 0.8–1.2)
PROTHROMBIN TIME: 19.1 SEC (ref 9–12.5)

## 2025-01-27 PROCEDURE — 36415 COLL VENOUS BLD VENIPUNCTURE: CPT | Mod: PO,TXP | Performed by: INTERNAL MEDICINE

## 2025-01-27 PROCEDURE — 85610 PROTHROMBIN TIME: CPT | Mod: TXP | Performed by: INTERNAL MEDICINE

## 2025-01-27 PROCEDURE — 93793 ANTICOAG MGMT PT WARFARIN: CPT | Mod: S$GLB,,,

## 2025-01-27 NOTE — PROGRESS NOTES
[facility]    2025 4:22 PM    Assessment/Plan:    Patient presents today with subtherapeutic  INR.    Assessment of patient findings and chart review: no significant findings     Recommendation for patient's warfarin regimen: Boost dose today to 3mg then resume current maintenance dose    Recommend repeat INR in 2 weeks   _________________________________________________________________    Vivek Lema Jr. (74 y.o.) is followed by the [facility].    Anticoagulation Summary  As of 2025      INR goal:  2.0-3.0   TTR:  78.1% (3 y)   INR used for dosin.8 (2025)   Warfarin maintenance plan:  4 mg (2 mg x 2) every Tue, Thu, Sat; 2 mg (2 mg x 1) all other days   Weekly warfarin total:  20 mg   Plan last modified:  Mihaela Ortega, PharmD (2024)   Next INR check:  2/10/2025   Target end date:  --    Indications    History of atrial fibrillation [Z86.79]  Long term (current) use of anticoagulants [Z79.01]                 Anticoagulation Episode Summary       INR check location:  --    Preferred lab:  --    Send INR reminders to:  [facility] COUMADIN MONITORING POOL    Comments:  [place] only  and Hospital Lab on           Anticoagulation Care Providers       Provider Role Specialty Phone number    Angel Olivier MD [facility] Cardiology [phone]

## 2025-02-12 ENCOUNTER — ANTI-COAG VISIT (OUTPATIENT)
Dept: CARDIOLOGY | Facility: CLINIC | Age: 75
End: 2025-02-12
Payer: MEDICARE

## 2025-02-12 ENCOUNTER — LAB VISIT (OUTPATIENT)
Dept: LAB | Facility: HOSPITAL | Age: 75
End: 2025-02-12
Payer: MEDICARE

## 2025-02-12 DIAGNOSIS — Z79.01 LONG TERM (CURRENT) USE OF ANTICOAGULANTS: ICD-10-CM

## 2025-02-12 DIAGNOSIS — Z86.79 HISTORY OF ATRIAL FIBRILLATION: ICD-10-CM

## 2025-02-12 DIAGNOSIS — Z86.79 HISTORY OF ATRIAL FIBRILLATION: Primary | ICD-10-CM

## 2025-02-12 LAB
INR PPP: 2.1 (ref 0.8–1.2)
PROTHROMBIN TIME: 22.1 SEC (ref 9–12.5)

## 2025-02-12 PROCEDURE — 36415 COLL VENOUS BLD VENIPUNCTURE: CPT | Mod: PO,TXP | Performed by: INTERNAL MEDICINE

## 2025-02-12 PROCEDURE — 85610 PROTHROMBIN TIME: CPT | Mod: TXP | Performed by: INTERNAL MEDICINE

## 2025-02-12 NOTE — PROGRESS NOTES
[facility]    2025 3:11 PM    Assessment/Plan:    Patient presents today with therapeutic INR.    Assessment of patient findings and chart review: no significant findings     Recommendation for patient's warfarin regimen: Continue current maintenance dose    Recommend repeat INR in 3 weeks  _________________________________________________________________    Vivek Lema Jr. (75 y.o.) is followed by the [facility].    Anticoagulation Summary  As of 2025      INR goal:  2.0-3.0   TTR:  77.5% (3 y)   INR used for dosin.1 (2025)   Warfarin maintenance plan:  4 mg (2 mg x 2) every e, Thu, Sat; 2 mg (2 mg x 1) all other days   Weekly warfarin total:  20 mg   Plan last modified:  Mihaela Ortega, PharmD (2024)   Next INR check:  3/5/2025   Target end date:  --    Indications    History of atrial fibrillation [Z86.79]  Long term (current) use of anticoagulants [Z79.01]                 Anticoagulation Episode Summary       INR check location:  --    Preferred lab:  --    Send INR reminders to:  [facility] COUMADIN MONITORING POOL    Comments:  [place] only Mon - Thur and Hospital Lab on           Anticoagulation Care Providers       Provider Role Specialty Phone number    Angel Olivier MD [facility] Cardiology [phone]

## 2025-03-05 ENCOUNTER — ANTI-COAG VISIT (OUTPATIENT)
Dept: CARDIOLOGY | Facility: CLINIC | Age: 75
End: 2025-03-05
Payer: MEDICARE

## 2025-03-05 ENCOUNTER — LAB VISIT (OUTPATIENT)
Dept: LAB | Facility: HOSPITAL | Age: 75
End: 2025-03-05
Attending: INTERNAL MEDICINE
Payer: MEDICARE

## 2025-03-05 DIAGNOSIS — Z79.01 LONG TERM (CURRENT) USE OF ANTICOAGULANTS: ICD-10-CM

## 2025-03-05 DIAGNOSIS — Z86.79 HISTORY OF ATRIAL FIBRILLATION: Primary | ICD-10-CM

## 2025-03-05 DIAGNOSIS — Z86.79 HISTORY OF ATRIAL FIBRILLATION: ICD-10-CM

## 2025-03-05 LAB
INR PPP: 1.9 (ref 0.8–1.2)
PROTHROMBIN TIME: 20.2 SEC (ref 9–12.5)

## 2025-03-05 PROCEDURE — 93793 ANTICOAG MGMT PT WARFARIN: CPT | Mod: S$GLB,,,

## 2025-03-05 PROCEDURE — 36415 COLL VENOUS BLD VENIPUNCTURE: CPT | Mod: PO | Performed by: INTERNAL MEDICINE

## 2025-03-05 PROCEDURE — 85610 PROTHROMBIN TIME: CPT | Performed by: INTERNAL MEDICINE

## 2025-03-05 NOTE — PROGRESS NOTES
[facility]    2025 3:49 PM    Assessment/Plan:    Patient presents today with slightly subtherapeutic  INR.    Recommendation for patient's warfarin regimen: Boost dose today to 3mg then resume current maintenance dose, may need weekly increase if INR remains subtherapeutic at next appt    Recommend repeat INR in 2 weeks  _________________________________________________________________    Vivek Eric Pita Leigh (75 y.o.) is followed by the [facility]ticoagulation Management [facility].    Anticoagulation Summary  As of 3/5/2025      INR goal:  2.0-3.0   TTR:  77.0% (3.1 y)   INR used for dosin.9 (3/5/2025)   Warfarin maintenance plan:  4 mg (2 mg x 2) every Tue, Thu, Sat; 2 mg (2 mg x 1) all other days   Weekly warfarin total:  20 mg   Plan last modified:  Mihaela Ortega, PharmD (2024)   Next INR check:  3/19/2025   Target end date:  --    Indications    History of atrial fibrillation [Z86.79]  Long term (current) use of anticoagulants [Z79.01]                 Anticoagulation Episode Summary       INR check location:  --    Preferred lab:  --    Send INR reminders to:  [facility] COUMADIN MONITORING POOL    Comments:  [place] only  and Hospital Lab on           Anticoagulation Care Providers       Provider Role Specialty Phone number    Angel Olivier MD [facility] Cardiology [phone]

## 2025-03-14 ENCOUNTER — CLINICAL SUPPORT (OUTPATIENT)
Dept: FAMILY MEDICINE | Facility: CLINIC | Age: 75
End: 2025-03-14
Payer: MEDICARE

## 2025-03-14 DIAGNOSIS — Z23 NEED FOR SHINGLES VACCINE: Primary | ICD-10-CM

## 2025-03-14 PROCEDURE — 99999 PR PBB SHADOW E&M-EST. PATIENT-LVL I: CPT | Mod: PBBFAC,,,

## 2025-03-14 NOTE — PROGRESS NOTES
Administered Shingrix vaccine IM to right deltoid.  Patient tolerated injection well, no adverse reactions noted.   Patient advised that there may be a cost associated with vaccine.  Patient verbalized understanding, stated he did not want to have it administered at the local pharmacy. Please be advised.

## 2025-03-18 ENCOUNTER — TELEPHONE (OUTPATIENT)
Dept: NEPHROLOGY | Facility: CLINIC | Age: 75
End: 2025-03-18
Payer: MEDICARE

## 2025-03-18 ENCOUNTER — TELEPHONE (OUTPATIENT)
Dept: ADMINISTRATIVE | Facility: HOSPITAL | Age: 75
End: 2025-03-18
Payer: MEDICARE

## 2025-03-18 NOTE — TELEPHONE ENCOUNTER
----- Message from Cecille sent at 3/18/2025  8:24 AM CDT -----  Name of Who is Calling: Pt   What is the request in detail:Pt would like a call back to [facility] lab appts. Please advise thank you   Can the clinic reply by [facility]:no  What Number to Call Back if not in [facility]:Telephone Information:Mobile          [phone]

## 2025-03-19 ENCOUNTER — ANTI-COAG VISIT (OUTPATIENT)
Dept: CARDIOLOGY | Facility: CLINIC | Age: 75
End: 2025-03-19
Payer: MEDICARE

## 2025-03-19 ENCOUNTER — LAB VISIT (OUTPATIENT)
Dept: LAB | Facility: HOSPITAL | Age: 75
End: 2025-03-19
Payer: MEDICARE

## 2025-03-19 DIAGNOSIS — Z79.01 LONG TERM (CURRENT) USE OF ANTICOAGULANTS: ICD-10-CM

## 2025-03-19 DIAGNOSIS — Z86.79 HISTORY OF ATRIAL FIBRILLATION: ICD-10-CM

## 2025-03-19 DIAGNOSIS — Z86.79 HISTORY OF ATRIAL FIBRILLATION: Primary | ICD-10-CM

## 2025-03-19 LAB
INR PPP: 1.8 (ref 0.8–1.2)
PROTHROMBIN TIME: 19.5 SEC (ref 9–12.5)

## 2025-03-19 PROCEDURE — 85610 PROTHROMBIN TIME: CPT | Performed by: INTERNAL MEDICINE

## 2025-03-19 PROCEDURE — 36415 COLL VENOUS BLD VENIPUNCTURE: CPT | Mod: PO | Performed by: INTERNAL MEDICINE

## 2025-03-20 NOTE — PROGRESS NOTES
[facility]    2025 8:10 AM    Assessment/Plan:    Patient presents today with subtherapeutic INR.    Assessment of patient findings and chart review: no significant findings     Recommendation for patient's warfarin regimen: Increase maintenance dose    Recommend repeat INR in 2 weeks  _________________________________________________________________    Vivek Lema Jr. (75 y.o.) is followed by the [facility]tico[facility] Management [facility].    Anticoagulation Summary  As of 3/19/2025      INR goal:  2.0-3.0   TTR:  76.0% (3.1 y)   INR used for dosin.8 (3/19/2025)   Warfarin maintenance plan:  2 mg (2 mg x 1) every Mon, Wed, Fri; 4 mg (2 mg x 2) all other days   Weekly warfarin total:  22 mg   Plan last modified:  Mihaela Ortega, PharmD (3/20/2025)   Next INR check:  2025   Target end date:  --    Indications    History of atrial fibrillation [Z86.79]  Long term (current) use of anticoagulants [Z79.01]                 Anticoagulation Episode Summary       INR check location:  --    Preferred lab:  --    Send INR reminders to:  [facility] COUMADIN MONITORING POOL    Comments:  [place] only Mon - Thur and Hospital Lab on           Anticoagulation Care Providers       Provider Role Specialty Phone number    Angel Olivier MD [facility] Cardiology [phone]

## 2025-04-04 ENCOUNTER — TELEPHONE (OUTPATIENT)
Dept: OPTOMETRY | Facility: CLINIC | Age: 75
End: 2025-04-04
Payer: MEDICARE

## 2025-04-04 ENCOUNTER — OFFICE VISIT (OUTPATIENT)
Dept: OPHTHALMOLOGY | Facility: CLINIC | Age: 75
End: 2025-04-04
Payer: MEDICARE

## 2025-04-04 ENCOUNTER — CLINICAL SUPPORT (OUTPATIENT)
Dept: OPHTHALMOLOGY | Facility: CLINIC | Age: 75
End: 2025-04-04
Payer: MEDICARE

## 2025-04-04 ENCOUNTER — ANTI-COAG VISIT (OUTPATIENT)
Dept: CARDIOLOGY | Facility: CLINIC | Age: 75
End: 2025-04-04
Payer: MEDICARE

## 2025-04-04 ENCOUNTER — LAB VISIT (OUTPATIENT)
Dept: LAB | Facility: HOSPITAL | Age: 75
End: 2025-04-04
Payer: MEDICARE

## 2025-04-04 DIAGNOSIS — Z79.01 LONG TERM (CURRENT) USE OF ANTICOAGULANTS: ICD-10-CM

## 2025-04-04 DIAGNOSIS — Z96.1 PSEUDOPHAKIA OF BOTH EYES: ICD-10-CM

## 2025-04-04 DIAGNOSIS — H35.3221 EXUDATIVE AGE-RELATED MACULAR DEGENERATION OF LEFT EYE WITH ACTIVE CHOROIDAL NEOVASCULARIZATION: ICD-10-CM

## 2025-04-04 DIAGNOSIS — H35.372 EPIRETINAL MEMBRANE, LEFT: ICD-10-CM

## 2025-04-04 DIAGNOSIS — H35.052 CHOROIDAL NEOVASCULARIZATION, LEFT EYE: ICD-10-CM

## 2025-04-04 DIAGNOSIS — Z86.79 HISTORY OF ATRIAL FIBRILLATION: ICD-10-CM

## 2025-04-04 DIAGNOSIS — Z86.79 HISTORY OF ATRIAL FIBRILLATION: Primary | ICD-10-CM

## 2025-04-04 DIAGNOSIS — H35.3112 INTERMEDIATE STAGE DRY AGE-RELATED MACULAR DEGENERATION OF RIGHT EYE: Primary | ICD-10-CM

## 2025-04-04 LAB
INR PPP: 2.9 (ref 0.8–1.2)
PROTHROMBIN TIME: 29.8 SECONDS (ref 9–12.5)

## 2025-04-04 PROCEDURE — 85610 PROTHROMBIN TIME: CPT

## 2025-04-04 PROCEDURE — 99999 PR PBB SHADOW E&M-EST. PATIENT-LVL III: CPT | Mod: PBBFAC,,, | Performed by: OPHTHALMOLOGY

## 2025-04-04 PROCEDURE — 36415 COLL VENOUS BLD VENIPUNCTURE: CPT

## 2025-04-04 NOTE — PROGRESS NOTES
HPI    Pt here for 6 month DFE/OCTm.    Pt states no eye pain or discomfort. No flashes or floaters. No sudden   loss of vision. Pt wants to check eye health. No other complaints.  Last edited by Beto Leblanc MA on 4/4/2025 10:38 AM.           A/P    ICD-10-CM ICD-9-CM   1. Intermediate stage dry age-related macular degeneration of right eye  H35.3112 362.51   2. Exudative age-related macular degeneration of left eye with active choroidal neovascularization  H35.3221 362.52     362.16   3. Choroidal neovascularization, left eye  H35.052 362.16   4. Epiretinal membrane, left  H35.372 362.56   5. Pseudophakia of both eyes  Z96.1 V43.1         1. Intermediate stage dry age-related macular degeneration of right eye  Here for retina f/u    Today 4/4/2025  OD VA 20/60 ( stable), slight incr serous PED appearance but no fluid     Plan: Observation no injection   Amsler precautions  Recommend Antioxidants, lutein, omega 3, brown sunglasses (blue blockers).     2. Exudative age-related macular degeneration of left eye with active choroidal neovascularization  3. Choroidal neovascularization, left eye    S/p RIVERA x2 8/10/23    VA 20/70 (was 20/40), stable serous PED no CNVM    Plan: observe today, no injection, consider Avastin if recurrent     Visit today is associated with current or anticipated ongoing medical care related to this patients single serious condition/complex condition (exudative macular degeneration left eye)     4. Epiretinal membrane, left  Mod ERM, mixed mechanism component for decr VA    Plan: Observation for now, may need PPV/MP   Amsler precautions discussed     5. Pseudophakia of both eyes  Good lens position OU  Plan: Observation, update Mrx        RTC 6 mo DFE/OCTm OU, possible RIVERA OS if worsening SRF  RTC Optom Mrx     I saw and examined the patient and reviewed in detail the findings documented. The final examination findings, image interpretations which have been independently interpreted, and  plan as documented in the record represent my personal judgment and conclusions.    Jaylen Santiago MD  Vitreoretinal Surgery   [facility]

## 2025-04-04 NOTE — TELEPHONE ENCOUNTER
----- Message from Sarmad Bill sent at 4/4/2025 11:12 AM CDT -----  Regarding: appt  Pt needs appt for MRX.

## 2025-04-04 NOTE — PROGRESS NOTES
[facility]    2025 11:22 AM    Assessment/Plan:    Patient presents today with therapeutic INR.    Assessment of patient findings and chart review: no significant findings     Recommendation for patient's warfarin regimen: Continue current maintenance dose    Recommend repeat INR in 2 weeks  _________________________________________________________________    Vivek Lema Jr. (75 y.o.) is followed by the [facility].    Anticoagulation Summary  As of 2025      INR goal:  2.0-3.0   TTR:  76.1% (3.1 y)   INR used for dosin.9 (2025)   Warfarin maintenance plan:  2 mg (2 mg x 1) every Mon, Wed, Fri; 4 mg (2 mg x 2) all other days   Weekly warfarin total:  22 mg   Plan last modified:  Mihaela Ortega, PharmD (3/20/2025)   Next INR check:  4/15/2025   Target end date:  --    Indications    History of atrial fibrillation [Z86.79]  Long term (current) use of anticoagulants [Z79.01]                 Anticoagulation Episode Summary       INR check location:  --    Preferred lab:  --    Send INR reminders to:  [facility] COUMADIN MONITORING POOL    Comments:  [place] only Mon - Thur and Hospital Lab on           Anticoagulation Care Providers       Provider Role Specialty Phone number    Angel Olivier MD [facility] Cardiology [phone]

## 2025-04-15 ENCOUNTER — LAB VISIT (OUTPATIENT)
Dept: LAB | Facility: HOSPITAL | Age: 75
End: 2025-04-15
Payer: MEDICARE

## 2025-04-15 ENCOUNTER — ANTI-COAG VISIT (OUTPATIENT)
Dept: CARDIOLOGY | Facility: CLINIC | Age: 75
End: 2025-04-15
Payer: MEDICARE

## 2025-04-15 ENCOUNTER — OFFICE VISIT (OUTPATIENT)
Dept: FAMILY MEDICINE | Facility: CLINIC | Age: 75
End: 2025-04-15
Payer: MEDICARE

## 2025-04-15 VITALS
RESPIRATION RATE: 18 BRPM | HEIGHT: 71 IN | HEART RATE: 60 BPM | OXYGEN SATURATION: 98 % | SYSTOLIC BLOOD PRESSURE: 122 MMHG | DIASTOLIC BLOOD PRESSURE: 62 MMHG | TEMPERATURE: 98 F | WEIGHT: 220.88 LBS | BODY MASS INDEX: 30.92 KG/M2

## 2025-04-15 DIAGNOSIS — F41.9 ANXIETY AND DEPRESSION: ICD-10-CM

## 2025-04-15 DIAGNOSIS — N18.4 CKD (CHRONIC KIDNEY DISEASE), STAGE IV: ICD-10-CM

## 2025-04-15 DIAGNOSIS — I25.119 CORONARY ARTERY DISEASE INVOLVING NATIVE CORONARY ARTERY OF NATIVE HEART WITH ANGINA PECTORIS: ICD-10-CM

## 2025-04-15 DIAGNOSIS — Z79.01 LONG TERM (CURRENT) USE OF ANTICOAGULANTS: Primary | ICD-10-CM

## 2025-04-15 DIAGNOSIS — Z79.01 LONG TERM (CURRENT) USE OF ANTICOAGULANTS: ICD-10-CM

## 2025-04-15 DIAGNOSIS — Z95.0 PACEMAKER: ICD-10-CM

## 2025-04-15 DIAGNOSIS — I10 PRIMARY HYPERTENSION: ICD-10-CM

## 2025-04-15 DIAGNOSIS — R51.9 NONINTRACTABLE HEADACHE, UNSPECIFIED CHRONICITY PATTERN, UNSPECIFIED HEADACHE TYPE: Primary | ICD-10-CM

## 2025-04-15 DIAGNOSIS — Z86.79 HISTORY OF ATRIAL FIBRILLATION: ICD-10-CM

## 2025-04-15 DIAGNOSIS — Z85.46 HISTORY OF PROSTATE CANCER: ICD-10-CM

## 2025-04-15 DIAGNOSIS — M10.9 GOUT, UNSPECIFIED CAUSE, UNSPECIFIED CHRONICITY, UNSPECIFIED SITE: ICD-10-CM

## 2025-04-15 DIAGNOSIS — F32.A ANXIETY AND DEPRESSION: ICD-10-CM

## 2025-04-15 DIAGNOSIS — I48.0 PAF (PAROXYSMAL ATRIAL FIBRILLATION): ICD-10-CM

## 2025-04-15 PROBLEM — U07.1 COVID: Status: RESOLVED | Noted: 2022-05-23 | Resolved: 2025-04-15

## 2025-04-15 PROBLEM — E66.09 CLASS 1 OBESITY DUE TO EXCESS CALORIES WITH SERIOUS COMORBIDITY AND BODY MASS INDEX (BMI) OF 32.0 TO 32.9 IN ADULT: Status: RESOLVED | Noted: 2021-11-03 | Resolved: 2025-04-15

## 2025-04-15 PROBLEM — M1A.3490: Status: ACTIVE | Noted: 2021-11-03

## 2025-04-15 PROBLEM — E66.811 CLASS 1 OBESITY DUE TO EXCESS CALORIES WITH SERIOUS COMORBIDITY AND BODY MASS INDEX (BMI) OF 32.0 TO 32.9 IN ADULT: Status: RESOLVED | Noted: 2021-11-03 | Resolved: 2025-04-15
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
## 2025-04-15 PROBLEM — I27.20 PULMONARY HYPERTENSION, UNSPECIFIED: Status: ACTIVE | Noted: 2025-04-15

## 2025-04-15 PROBLEM — Z09 FOLLOW-UP EXAM: Status: RESOLVED | Noted: 2024-10-30 | Resolved: 2025-04-15

## 2025-04-15 PROBLEM — Z23 INFLUENZA VACCINE NEEDED: Status: RESOLVED | Noted: 2024-10-30 | Resolved: 2025-04-15

## 2025-04-15 PROBLEM — I27.20 PULMONARY HYPERTENSION, UNSPECIFIED: Status: RESOLVED | Noted: 2025-04-15 | Resolved: 2025-04-15

## 2025-04-15 LAB
INR PPP: 2.1 (ref 0.8–1.2)
PROTHROMBIN TIME: 22.1 SECONDS (ref 9–12.5)

## 2025-04-15 PROCEDURE — 99999 PR PBB SHADOW E&M-EST. PATIENT-LVL IV: CPT | Mod: PBBFAC,,, | Performed by: INTERNAL MEDICINE

## 2025-04-15 PROCEDURE — 85610 PROTHROMBIN TIME: CPT

## 2025-04-15 PROCEDURE — 36415 COLL VENOUS BLD VENIPUNCTURE: CPT | Mod: PO

## 2025-04-15 RX ORDER — HYDROCODONE BITARTRATE AND ACETAMINOPHEN 7.5; 325 MG/1; MG/1
TABLET ORAL
Qty: 30 TABLET | Refills: 0 | Status: SHIPPED | OUTPATIENT
Start: 2025-04-15

## 2025-04-15 NOTE — ASSESSMENT & PLAN NOTE
Takes norco for his headaches. Says 30 tablets last a year.  Has been on this for many years   - refilled norco   - f/up 6 months

## 2025-04-15 NOTE — PROGRESS NOTES
[facility]    04/15/2025 2:49 PM    Assessment/Plan:    Patient presents today with therapeutic INR.    Assessment of patient findings and chart review: no significant findings     Recommendation for patient's warfarin regimen: Continue current maintenance dose    Recommend repeat INR in 2 weeks  _________________________________________________________________    Vivek Lema Jr. (75 y.o.) is followed by the [facility].    Anticoagulation Summary  As of 4/15/2025      INR goal:  2.0-3.0   TTR:  76.3% (3.2 y)   INR used for dosin.1 (4/15/2025)   Warfarin maintenance plan:  2 mg (2 mg x 1) every Mon, Wed, Fri; 4 mg (2 mg x 2) all other days   Weekly warfarin total:  22 mg   Plan last modified:  Mihaela Ortega, PharmD (3/20/2025)   Next INR check:  2025   Target end date:  --    Indications    History of atrial fibrillation (Resolved) [Z86.79]  Long term (current) use of anticoagulants [Z79.01]                 Anticoagulation Episode Summary       INR check location:  --    Preferred lab:  --    Send INR reminders to:  [facility] COUMADIN MONITORING POOL    Comments:  [place] only Mon - Thur and Hospital Lab on           Anticoagulation Care Providers       Provider Role Specialty Phone number    Angel Olivier MD [facility] Cardiology [phone]

## 2025-04-15 NOTE — PROGRESS NOTES
Chief Complaint: Follow-up and Establish Care      Vivek Lema Jr.  is a 75 y.o. year old patient who presents today for est care     Patient is here with his wife. His left arm wound has healed. Patient is overall doing well. Reports worsening of his presbyopia, has up coming appt with ophthal.   Also see's cards, nephro and attends warfarin clinic.   No abnormal bleeding   Takes norco for his headaches. Says 30 tablets last a year.    Past Medical History:   Diagnosis Date    A-fib     CAD (coronary artery disease)     Cataract     CKD (chronic kidney disease)     Disorder of kidney and ureter     HTN (hypertension)     Macular degeneration     Mitral regurgitation     Pacemaker        Past Surgical History:   Procedure Laterality Date    AV FISTULA PLACEMENT Left     CATARACT EXTRACTION      COLONOSCOPY      COLONOSCOPY N/A 01/17/2023    Procedure: COLONOSCOPY;  Surgeon: Todd Oviedo MD;  Location: [facility] ([address] [place] [address]);  Service: Endoscopy;  Laterality: N/A;  from referral / Pt with trouble clearing secretions at times - 2nd floor / prep ins. mailed and reviewed with Pt and Pt wife/ Pt reports last colon in [place], Hx polyps / Coumadin - per coumadin clinic ok to hold coumadin x 5 days prior- see telephone encounter dated 11/2/22 / pa    CORONARY ARTERY BYPASS GRAFT      with valve repair (aortic?)        Family History   Problem Relation Name Age of Onset    Amblyopia Neg Hx      Blindness Neg Hx      Cataracts Neg Hx      Glaucoma Neg Hx      Macular degeneration Neg Hx      Retinal detachment Neg Hx      Strabismus Neg Hx          Social History     Socioeconomic History    Marital status:    Tobacco Use    Smoking status: Never    Smokeless tobacco: Never   Substance and Sexual Activity    Alcohol use: Yes     Alcohol/week: 6.0 standard drinks of alcohol     Types: 6 Cans of beer per week     Comment: occ    Drug use: Never     Social Drivers of Health     Financial Resource Strain: Low Risk   (12/1/2023)    Overall Financial Resource Strain (CARDIA)     Difficulty of Paying Living Expenses: Not hard at all   Food Insecurity: No Food Insecurity (12/1/2023)    Hunger Vital Sign     Worried About Running Out of Food in the Last Year: Never true     Ran Out of Food in the Last Year: Never true   Transportation Needs: No Transportation Needs (12/1/2023)    PRAPARE - Transportation     Lack of Transportation (Medical): No     Lack of Transportation (Non-Medical): No   Physical Activity: Insufficiently Active (12/1/2023)    Exercise Vital Sign     Days of Exercise per Week: 2 days     Minutes of Exercise per Session: 30 min   Stress: No Stress Concern Present (12/1/2023)    [employer] - Occupational Stress Questionnaire     Feeling of Stress : Not at all   Housing Stability: Low Risk  (12/15/2023)    Housing Stability Vital Sign     Unable to Pay for Housing in the Last Year: No     Number of Places Lived in the Last Year: 1     Unstable Housing in the Last Year: No       Current Medications[1]     Review of Systems   HENT:  Negative for congestion.    Eyes:  Negative for blurred vision.   Respiratory:  Negative for cough and shortness of breath.    Cardiovascular:  Negative for chest pain and leg swelling.   Gastrointestinal:  Negative for abdominal pain, blood in stool, constipation, diarrhea, melena and nausea.   Genitourinary:  Negative for dysuria.   Musculoskeletal:  Negative for joint pain.   Neurological:  Negative for headaches.   Psychiatric/Behavioral:  Negative for depression. The patient is not nervous/anxious.         Objective:      Vitals:    04/15/25 1009   BP: 122/62   Pulse: 60   Resp: 18   Temp: 97.5 °F (36.4 °C)       Physical Exam  Constitutional:       Appearance: Normal appearance.   HENT:      Head: Normocephalic and atraumatic.   Cardiovascular:      Rate and Rhythm: Normal rate.   Musculoskeletal:         General: Normal range of motion.   Skin:      General: Skin is warm and dry.      Comments: Fistula on left forearm   Neurological:      General: No focal deficit present.      Mental Status: He is alert and oriented to person, place, and time.          Assessment:       1. Nonintractable headache, unspecified chronicity pattern, unspecified headache type    2. Anxiety and depression    3. PAF (paroxysmal atrial fibrillation)    4. Pacemaker    5. Coronary artery disease involving native coronary artery of native heart with angina pectoris    6. History of prostate cancer    7. CKD (chronic kidney disease), stage IV    8. Gout, unspecified cause, unspecified chronicity, unspecified site    9. Primary hypertension          Plan:   1. Nonintractable headache, unspecified chronicity pattern, unspecified headache type  Assessment & Plan:  Takes norco for his headaches. Says 30 tablets last a year.  Has been on this for many years   - refilled norco   - f/up 6 months    Orders:  -     HYDROcodone-acetaminophen (NORCO) 7.5-325 mg per tablet; Take not more than one pill a day PRN as direct  Dispense: 30 tablet; Refill: 0    2. Anxiety and depression  Assessment & Plan:  Stable   - cont lexapro      3. PAF (paroxysmal atrial fibrillation)  Assessment & Plan:  HR stable   Not on rate control meds; has pacemaker  - cont warfarin         4. Pacemaker  Assessment & Plan:  Has [employer] dual-chamber device placed 2018.        5. Coronary artery disease involving native coronary artery of native heart with angina pectoris  Assessment & Plan:  Coronary angiogram in 7/2021 with non obstructive CAD, no intervention needed at the time per last cardiology office visit    Plan:  - Continue aspirin 81mg PO daily  - Cont statin, goal LDL <70      6. History of prostate cancer  Overview:  Had complicated surgery with significant blood loss    Assessment & Plan:  PSA <0.01  No longer following urology/onc      7. CKD (chronic kidney disease), stage IV  Assessment & Plan:  Creatine  stable for now. BMP reviewed- noted Cr 3.2 according to latest data  Based on current GFR, CKD stage is stage 4 - GFR 15-29.    - cont f/up nephro. No HD for now. Does have fistula   - Renally dose meds. Avoid nephrotoxic medications and procedures.  - cont torsemide      8. Gout, unspecified cause, unspecified chronicity, unspecified site  Assessment & Plan:  Pt on febuxostat 40mg QD at home, uric acid normal      9. Primary hypertension  Assessment & Plan:  controlled    - cont 40mg valsartan and imdur           Follow up in about 6 months (around 10/15/2025).                [1]   Current Outpatient Medications:     atorvastatin (LIPITOR) 40 MG tablet, Take 1 tablet (40 mg total) by mouth once daily., Disp: 90 tablet, Rfl: 3    calcitRIOL (ROCALTROL) 0.25 MCG Cap, Take 1 capsule (0.25 mcg total) by mouth once daily., Disp: 90 capsule, Rfl: 1    cholecalciferol, vitamin D3, (VITAMIN D3) 50 mcg (2,000 unit) Cap, Take 1 capsule by mouth once daily., Disp: , Rfl:     EScitalopram oxalate (LEXAPRO) 20 MG tablet, Take 1 tablet (20 mg total) by mouth once daily., Disp: 90 tablet, Rfl: 3    febuxostat (ULORIC) 40 mg Tab, TAKE 1 TABLET(40 MG) BY MOUTH EVERY DAY, Disp: 90 tablet, Rfl: 0    isosorbide mononitrate (IMDUR) 60 MG 24 hr tablet, Take 1 tablet (60 mg total) by mouth once daily., Disp: 90 tablet, Rfl: 3    nitroGLYCERIN (NITROSTAT) 0.4 MG SL tablet, Place 1 tablet (0.4 mg total) under the tongue every 5 (five) minutes as needed for Chest pain., Disp: 30 tablet, Rfl: 3    torsemide (DEMADEX) 20 MG Tab, Take 3 tablets (60 mg total) by mouth once daily., Disp: 270 tablet, Rfl: 3    valsartan (DIOVAN) 40 MG tablet, Take 1 tablet (40 mg total) by mouth once daily., Disp: 30 tablet, Rfl: 0    warfarin (COUMADIN) 2 MG tablet, TAKE 2 TABLETS BY MOUTH EVERY MONDAY, WEDNESDAY, FRIDAY AND 1 TABLET ON ALL OTHER DAYS, Disp: 128 tablet, Rfl: 1    HYDROcodone-acetaminophen (NORCO) 7.5-325 mg per tablet, Take not more than one  pill a day PRN as direct, Disp: 30 tablet, Rfl: 0

## 2025-04-15 NOTE — PROGRESS NOTES
Health Maintenance Due   Topic     RSV Vaccine (Age 60+ and Pregnant patients) (1 - 1-dose 75+ series) Not offered at this facility    COVID-19 Vaccine (4 - 2024-25 season) Patient declined

## 2025-04-15 NOTE — ASSESSMENT & PLAN NOTE
Creatine stable for now. BMP reviewed- noted Cr 3.2 according to latest data  Based on current GFR, CKD stage is stage 4 - GFR 15-29.    - cont f/up nephro. No HD for now. Does have fistula   - Renally dose meds. Avoid nephrotoxic medications and procedures.  - cont torsemide

## 2025-04-21 DIAGNOSIS — Z86.79 HISTORY OF ATRIAL FIBRILLATION: ICD-10-CM

## 2025-04-21 RX ORDER — WARFARIN 2 MG/1
TABLET ORAL
Qty: 128 TABLET | Refills: 1 | Status: SHIPPED | OUTPATIENT
Start: 2025-04-21

## 2025-04-21 NOTE — TELEPHONE ENCOUNTER
Refill Routing Note   Medication(s) are not appropriate for processing by [facility] for the following reason(s):        Non-participating provider  Responsible provider unclear    [facility] action(s):  Defer             Appointments  past 12m or future 3m with PCP    Date Provider   Last Visit   10/25/2024 Lali Delaney PA-C   Next Visit   Visit date not found Lali Delaney PA-C   ED visits in past 90 days: 0        Note composed:3:28 PM 04/21/2025

## 2025-04-25 DIAGNOSIS — N18.4 CKD (CHRONIC KIDNEY DISEASE), STAGE IV: ICD-10-CM

## 2025-04-25 RX ORDER — CALCITRIOL 0.25 UG/1
0.25 CAPSULE ORAL DAILY
Qty: 90 CAPSULE | Refills: 1 | Status: SHIPPED | OUTPATIENT
Start: 2025-04-25

## 2025-05-01 ENCOUNTER — LAB VISIT (OUTPATIENT)
Dept: LAB | Facility: HOSPITAL | Age: 75
End: 2025-05-01
Payer: MEDICARE

## 2025-05-01 ENCOUNTER — ANTI-COAG VISIT (OUTPATIENT)
Dept: CARDIOLOGY | Facility: CLINIC | Age: 75
End: 2025-05-01
Payer: MEDICARE

## 2025-05-01 DIAGNOSIS — Z79.01 LONG TERM (CURRENT) USE OF ANTICOAGULANTS: ICD-10-CM

## 2025-05-01 DIAGNOSIS — Z79.01 LONG TERM (CURRENT) USE OF ANTICOAGULANTS: Primary | ICD-10-CM

## 2025-05-01 DIAGNOSIS — Z86.79 HISTORY OF ATRIAL FIBRILLATION: ICD-10-CM

## 2025-05-01 DIAGNOSIS — N18.4 CKD (CHRONIC KIDNEY DISEASE), STAGE IV: ICD-10-CM

## 2025-05-01 LAB
ABSOLUTE EOSINOPHIL (OHS): 0.37 K/UL
ABSOLUTE MONOCYTE (OHS): 1 K/UL (ref 0.3–1)
ABSOLUTE NEUTROPHIL COUNT (OHS): 3.44 K/UL (ref 1.8–7.7)
ALBUMIN SERPL BCP-MCNC: 3.5 G/DL (ref 3.5–5.2)
ALP SERPL-CCNC: 78 UNIT/L (ref 40–150)
ALT SERPL W/O P-5'-P-CCNC: 10 UNIT/L (ref 10–44)
ANION GAP (OHS): 9 MMOL/L (ref 8–16)
AST SERPL-CCNC: 16 UNIT/L (ref 11–45)
BASOPHILS # BLD AUTO: 0.06 K/UL
BASOPHILS NFR BLD AUTO: 0.9 %
BILIRUB SERPL-MCNC: 1.3 MG/DL (ref 0.1–1)
BUN SERPL-MCNC: 35 MG/DL (ref 8–23)
CALCIUM SERPL-MCNC: 8.8 MG/DL (ref 8.7–10.5)
CHLORIDE SERPL-SCNC: 108 MMOL/L (ref 95–110)
CO2 SERPL-SCNC: 26 MMOL/L (ref 23–29)
CREAT SERPL-MCNC: 2.5 MG/DL (ref 0.5–1.4)
ERYTHROCYTE [DISTWIDTH] IN BLOOD BY AUTOMATED COUNT: 13.7 % (ref 11.5–14.5)
GFR SERPLBLD CREATININE-BSD FMLA CKD-EPI: 26 ML/MIN/1.73/M2
GLUCOSE SERPL-MCNC: 95 MG/DL (ref 70–110)
HCT VFR BLD AUTO: 41 % (ref 40–54)
HGB BLD-MCNC: 13.2 GM/DL (ref 14–18)
IMM GRANULOCYTES # BLD AUTO: 0.01 K/UL (ref 0–0.04)
IMM GRANULOCYTES NFR BLD AUTO: 0.1 % (ref 0–0.5)
INR PPP: 2.5 (ref 0.8–1.2)
LYMPHOCYTES # BLD AUTO: 2.03 K/UL (ref 1–4.8)
MCH RBC QN AUTO: 29.7 PG (ref 27–31)
MCHC RBC AUTO-ENTMCNC: 32.2 G/DL (ref 32–36)
MCV RBC AUTO: 92 FL (ref 82–98)
NUCLEATED RBC (/100WBC) (OHS): 0 /100 WBC
PLATELET # BLD AUTO: 129 K/UL (ref 150–450)
PMV BLD AUTO: 12.9 FL (ref 9.2–12.9)
POTASSIUM SERPL-SCNC: 3.8 MMOL/L (ref 3.5–5.1)
PROT SERPL-MCNC: 6.5 GM/DL (ref 6–8.4)
PROTHROMBIN TIME: 26.1 SECONDS (ref 9–12.5)
RBC # BLD AUTO: 4.44 M/UL (ref 4.6–6.2)
RELATIVE EOSINOPHIL (OHS): 5.4 %
RELATIVE LYMPHOCYTE (OHS): 29.4 % (ref 18–48)
RELATIVE MONOCYTE (OHS): 14.5 % (ref 4–15)
RELATIVE NEUTROPHIL (OHS): 49.7 % (ref 38–73)
SODIUM SERPL-SCNC: 143 MMOL/L (ref 136–145)
WBC # BLD AUTO: 6.91 K/UL (ref 3.9–12.7)

## 2025-05-01 PROCEDURE — 93793 ANTICOAG MGMT PT WARFARIN: CPT | Mod: S$GLB,,,

## 2025-05-01 PROCEDURE — 36415 COLL VENOUS BLD VENIPUNCTURE: CPT | Mod: PO

## 2025-05-01 PROCEDURE — 85610 PROTHROMBIN TIME: CPT

## 2025-05-01 PROCEDURE — 82040 ASSAY OF SERUM ALBUMIN: CPT

## 2025-05-01 PROCEDURE — 85025 COMPLETE CBC W/AUTO DIFF WBC: CPT

## 2025-05-01 NOTE — PROGRESS NOTES
[facility]    2025 10:06 AM    Assessment/Plan:    Patient presents today with therapeutic INR.    Assessment of patient findings and chart review: no significant findings     Recommendation for patient's warfarin regimen: Continue current maintenance dose    Recommend repeat INR in 4 weeks  _________________________________________________________________    Vivek Lema Jr. (75 y.o.) is followed by the [facility].    Anticoagulation Summary  As of 2025      INR goal:  2.0-3.0   TTR:  76.6% (3.2 y)   INR used for dosin.5 (2025)   Warfarin maintenance plan:  2 mg (2 mg x 1) every Mon, Wed, Fri; 4 mg (2 mg x 2) all other days   Weekly warfarin total:  22 mg   Plan last modified:  Mihaela Ortega, PharmD (3/20/2025)   Next INR check:  2025   Target end date:  --    Indications    History of atrial fibrillation (Resolved) [Z86.79]  Long term (current) use of anticoagulants [Z79.01]                 Anticoagulation Episode Summary       INR check location:  --    Preferred lab:  --    Send INR reminders to:  [facility] COUMADIN MONITORING POOL    Comments:  [place] only Mon - Thur and Hospital Lab on           Anticoagulation Care Providers       Provider Role Specialty Phone number    Angel Olivier MD [facility] Interventional Cardiology [phone]

## 2025-05-06 ENCOUNTER — OFFICE VISIT (OUTPATIENT)
Dept: NEPHROLOGY | Facility: CLINIC | Age: 75
End: 2025-05-06
Payer: MEDICARE

## 2025-05-06 VITALS
DIASTOLIC BLOOD PRESSURE: 74 MMHG | WEIGHT: 208.13 LBS | SYSTOLIC BLOOD PRESSURE: 126 MMHG | BODY MASS INDEX: 29.14 KG/M2 | HEIGHT: 71 IN | OXYGEN SATURATION: 97 % | HEART RATE: 68 BPM

## 2025-05-06 DIAGNOSIS — E66.811 CLASS 1 OBESITY DUE TO EXCESS CALORIES WITH SERIOUS COMORBIDITY AND BODY MASS INDEX (BMI) OF 32.0 TO 32.9 IN ADULT: ICD-10-CM

## 2025-05-06 DIAGNOSIS — N18.4 CKD (CHRONIC KIDNEY DISEASE), STAGE IV: Primary | ICD-10-CM

## 2025-05-06 DIAGNOSIS — I10 HYPERTENSION, UNSPECIFIED TYPE: ICD-10-CM

## 2025-05-06 DIAGNOSIS — N25.81 SECONDARY HYPERPARATHYROIDISM OF RENAL ORIGIN: ICD-10-CM

## 2025-05-06 DIAGNOSIS — E66.09 CLASS 1 OBESITY DUE TO EXCESS CALORIES WITH SERIOUS COMORBIDITY AND BODY MASS INDEX (BMI) OF 32.0 TO 32.9 IN ADULT: ICD-10-CM

## 2025-05-06 PROCEDURE — 1126F AMNT PAIN NOTED NONE PRSNT: CPT | Mod: CPTII,S$GLB,, | Performed by: INTERNAL MEDICINE

## 2025-05-06 PROCEDURE — 3074F SYST BP LT 130 MM HG: CPT | Mod: CPTII,S$GLB,, | Performed by: INTERNAL MEDICINE

## 2025-05-06 PROCEDURE — G2211 COMPLEX E/M VISIT ADD ON: HCPCS | Mod: S$GLB,,, | Performed by: INTERNAL MEDICINE

## 2025-05-06 PROCEDURE — 3078F DIAST BP <80 MM HG: CPT | Mod: CPTII,S$GLB,, | Performed by: INTERNAL MEDICINE

## 2025-05-06 PROCEDURE — 99999 PR PBB SHADOW E&M-EST. PATIENT-LVL III: CPT | Mod: PBBFAC,,, | Performed by: INTERNAL MEDICINE

## 2025-05-06 PROCEDURE — 3288F FALL RISK ASSESSMENT DOCD: CPT | Mod: CPTII,S$GLB,, | Performed by: INTERNAL MEDICINE

## 2025-05-06 PROCEDURE — 99214 OFFICE O/P EST MOD 30 MIN: CPT | Mod: S$GLB,,, | Performed by: INTERNAL MEDICINE

## 2025-05-06 PROCEDURE — 1101F PT FALLS ASSESS-DOCD LE1/YR: CPT | Mod: CPTII,S$GLB,, | Performed by: INTERNAL MEDICINE

## 2025-05-06 PROCEDURE — 1159F MED LIST DOCD IN RCRD: CPT | Mod: CPTII,S$GLB,, | Performed by: INTERNAL MEDICINE

## 2025-05-06 NOTE — PROGRESS NOTES
"CHIEF COMPLAINT/HPI: Vivek is a 75 y.o. man with PMH of CAD CABG 3 year ago, repaired thoracic aortic aneurysm  ,In August 2021 underwent attempted MitraClip procedure. Unsuccessful percutaneous repair.  Patient was deemed unsuitable for open repair secondary to comorbidities, A.fib , mitral regurgitation , PPM secondary to SSS, Aortic bioprosthesis secondary to aortic aneurysm/aortic insufficiency S/P AVR On Coumadin . EF 45-50%.  prostate cancer s/p surgery 2019 and clear for 2 years   Advanced CKD was following with Nephrologist in [place] , they relocate to [place] with as their daughter lives here , they have their own place . He has AVF in place .  He established care [facility] with [facility] in Feb 2022, with his wife ,     On 3/13 he hit his Lt hip on a piece of furniture and sustained hematoma ,Hb dropped to ~9 from ~11 , BP was low in his PCP visit and his BP meds doses were dropped ,   I tried to contact him on Monday 3/21( after PCP secured chat )  to check on his BP and may be stop BP meds all together , but no answer .   Daughter with him today , feels fine , " not drinking as much since the incident of hematoma "  Home # is [phone]   11/15/22 feels fine, will obtain labs today.  10/15/24:last seen in November 2022, since then he  was admitted in December 2023 for a cat bite at the AVF site. Got iv abx. Now fine.  He feels ok and BP is stable. He did not have labs until this morning, so we did not discuss any labs today.    1/7/25: here for follow up, had multiple questions about his kidney function, all questions answered.  AVF still with good bruit and thrill. But asked not to start Dialysis until he " really needs it, as late as possible and I hope I don't ever need it".    Kidney Failure Risk Equation (Tangri)    Kidney Failure Risk at 2 years: 3.5%    Kidney Failure Risk at 5 years: 10.4%    Lab Results   Component Value Date    MICALBCREAT 31.9 (H) 05/01/2025    CREATININE 2.5 (H) 05/01/2025 "       Past Medical History:   Diagnosis Date    A-fib     CAD (coronary artery disease)     Cataract     CKD (chronic kidney disease)     Disorder of kidney and ureter     HTN (hypertension)     Macular degeneration     Mitral regurgitation     Pacemaker        Vivek reports that he has never smoked. He has never used smokeless tobacco. He reports current alcohol use of about 6.0 standard drinks of alcohol per week. He reports that he does not use drugs.   Drinks beer daily ( ~2 cans )    FAMILY HX :  + family hx of HTN , Emphysema and CKD .    ROS:    Review of Systems   Constitutional:  Negative for activity change, appetite change, chills, fever and unexpected weight change.   HENT:  Negative for congestion, ear discharge, hearing loss, nosebleeds, sore throat and tinnitus.    Eyes:  Negative for photophobia, pain, redness and visual disturbance.   Respiratory:  Negative for cough, chest tightness, shortness of breath and wheezing.    Cardiovascular:  Negative for chest pain, palpitations and leg swelling.   Gastrointestinal:  Negative for abdominal distention, constipation, diarrhea, nausea and vomiting.   Endocrine: Negative for cold intolerance, heat intolerance, polydipsia and polyuria.   Genitourinary:  Negative for decreased urine volume, difficulty urinating, dysuria, flank pain and hematuria.   Musculoskeletal:  Negative for arthralgias, gait problem, joint swelling and neck stiffness.   Skin:  Negative for rash.   Neurological:  Negative for dizziness, tremors, weakness, numbness and headaches.   Psychiatric/Behavioral:  Negative for confusion and sleep disturbance.        PE:    There were no vitals filed for this visit.          Physical Exam  Constitutional:       General: He is not in acute distress.     Appearance: He is not diaphoretic.   HENT:      Head: Normocephalic and atraumatic.      Right Ear: External ear normal.      Left Ear: External ear normal.   Eyes:      General:         Right eye:  No discharge.         Left eye: No discharge.      Conjunctiva/sclera: Conjunctivae normal.      Pupils: Pupils are equal, round, and reactive to light.   Neck:      Vascular: No JVD.   Cardiovascular:      Rate and Rhythm: Normal rate and regular rhythm.      Heart sounds: No murmur heard.     No friction rub. No gallop.   Pulmonary:      Effort: Pulmonary effort is normal. No respiratory distress.      Breath sounds: Normal breath sounds. No stridor. No wheezing or rales.   Chest:      Chest wall: No tenderness.   Abdominal:      Palpations: Abdomen is soft.      Tenderness: There is no abdominal tenderness. There is no guarding or rebound.   Musculoskeletal:         General: No tenderness.      Cervical back: Normal range of motion and neck supple.      Comments: Lr forearm AVF with thrill and bruit    Skin:     Findings: No erythema or rash.   Neurological:      Mental Status: He is alert and oriented to person, place, and time.         Impression/Plan:    No diagnosis found.      #CKD IV: likely CAD, diastolic HF, Obesity, HTN, and age related   Has AVF in place with good thrill and Bruit.   -No issues with volume, K and Acidosis so far.   -request to obtain record from previous nephrology was sent .  -had YAHAIRA after the hematoma, creatinine trending down now , will keep monitoring.   -US reviewed by me no stone, no mass no hydronephrosis.     Lab Results   Component Value Date    CREATININE 2.5 (H) 05/01/2025     Protein Creatinine Ratios:    Urine Protein/Creatinine Ratio   Date Value Ref Range Status   05/01/2025 0.10 <=0.20 Final     Prot/Creat Ratio, Urine   Date Value Ref Range Status   01/03/2025 Unable to calculate 0.00 - 0.20 Final   10/15/2024 0.05 0.00 - 0.20 Final   11/15/2022 0.10 0.00 - 0.20 Final       Acid-Base:   Lab Results   Component Value Date     05/01/2025    K 3.8 05/01/2025    CO2 26 05/01/2025     #HTN: Blood pressures acceptable.    # Renal osteodystrophy:   Lab Results    Component Value Date    .6 (H) 01/03/2025    CALCIUM 8.8 05/01/2025    PHOS 3.9 01/03/2025       # Anemia:   Lab Results   Component Value Date    HGB 13.2 (L) 05/01/2025        # last HbA1C   Lab Results   Component Value Date    HGBA1C 5.4 01/14/2025       # Lipid management:   Lab Results   Component Value Date    LDLCALC 75.8 01/14/2025       Visit today included increased complexity associated with the care of the episodic problem CKD, HTN, Secondary hyperparathyroidism, AVF in place and obesity addressed and managing the longitudinal care of the patient due to the serious and/or complex managed problem(s) .      # ESRD planing: AVF in place     Labs today and labs in 2-3 months if labs are stable,

## 2025-05-16 ENCOUNTER — OFFICE VISIT (OUTPATIENT)
Dept: OPTOMETRY | Facility: CLINIC | Age: 75
End: 2025-05-16
Payer: MEDICARE

## 2025-05-16 DIAGNOSIS — H52.4 PRESBYOPIA OF BOTH EYES: Primary | ICD-10-CM

## 2025-05-16 PROCEDURE — 99999 PR PBB SHADOW E&M-EST. PATIENT-LVL II: CPT | Mod: PBBFAC,,, | Performed by: OPTOMETRIST

## 2025-05-16 NOTE — PROGRESS NOTES
HPI    75 yr old male in today for MRX. Pt was last seen with Dr. Santiago. Bifocals   are in use. Pt was diagnosed with Intermediate stage dry age-related   macular degeneration of right eye, Exudative age-related macular   degeneration of left eye with active choroidal neovascularization,   Choroidal neovascularization, left eye, Epiretinal membrane, left, and.   Pseudophakia of both eyes. Pt states that his near vision has changed.   Reading is difficult and eyes feel scratchy. OTC drops are in use.   Last edited by Gabbie Mccray, OD on 5/16/2025  4:25 PM.            Assessment /Plan     For exam results, see Encounter Report.    Presbyopia of both eyes      Eyeglass Final Rx       Eyeglass Final Rx         Sphere Cylinder Axis Add    Right -1.25 +1.25 060 +3.00    Left -1.25 +1.25 095 +3.00      Type: Bifocal    Expiration Date: 5/16/2026                   RTC in 1 year for annual REFRACTION unless changes noted sooner.

## 2025-05-21 ENCOUNTER — TELEPHONE (OUTPATIENT)
Dept: CARDIOLOGY | Facility: CLINIC | Age: 75
End: 2025-05-21
Payer: MEDICARE

## 2025-05-21 NOTE — TELEPHONE ENCOUNTER
----- Message from Abigail sent at 5/21/2025  8:27 AM CDT -----  Contact: paul  Type:  Patient CallWho Called: patient Does the patient know what this is regarding?: Requesting a call back about having his appointment rescheduled ; please advise Would the patient rather a call back or a response via [facility]?call Best Call Back Number: [phone] Additional Information:

## 2025-06-11 ENCOUNTER — LAB VISIT (OUTPATIENT)
Dept: LAB | Facility: HOSPITAL | Age: 75
End: 2025-06-11
Payer: MEDICARE

## 2025-06-11 ENCOUNTER — ANTI-COAG VISIT (OUTPATIENT)
Dept: CARDIOLOGY | Facility: CLINIC | Age: 75
End: 2025-06-11
Payer: MEDICARE

## 2025-06-11 DIAGNOSIS — Z86.79 HISTORY OF ATRIAL FIBRILLATION: ICD-10-CM

## 2025-06-11 DIAGNOSIS — Z79.01 LONG TERM (CURRENT) USE OF ANTICOAGULANTS: ICD-10-CM

## 2025-06-11 DIAGNOSIS — Z79.01 LONG TERM (CURRENT) USE OF ANTICOAGULANTS: Primary | ICD-10-CM

## 2025-06-11 LAB
INR PPP: 4 (ref 0.8–1.2)
PROTHROMBIN TIME: 40.9 SECONDS (ref 9–12.5)

## 2025-06-11 PROCEDURE — 93793 ANTICOAG MGMT PT WARFARIN: CPT | Mod: S$GLB,,,

## 2025-06-11 PROCEDURE — 36415 COLL VENOUS BLD VENIPUNCTURE: CPT | Mod: PO

## 2025-06-11 PROCEDURE — 85610 PROTHROMBIN TIME: CPT

## 2025-06-13 NOTE — PROGRESS NOTES
[facility]    2025 8:29 AM    Assessment/Plan:    Patient presents today with supratherapeutic INR.    Assessment of patient findings and chart review: reports increased beer intake and diarrhea; reports taking dose lower than prescribed maintenance     Recommendation for patient's warfarin regimen: Hold dose today then continue dose reported by patient    Recommend repeat INR in 2 weeks  _________________________________________________________________    Vivek Lema Jr. (75 y.o.) is followed by the [facility].    Anticoagulation Summary  As of 2025      INR goal:  2.0-3.0   TTR:  75.2% (3.3 y)   INR used for dosin.0 (2025)   Warfarin maintenance plan:  4 mg (2 mg x 2) every Tue, Thu, Sat; 2 mg (2 mg x 1) all other days   Weekly warfarin total:  20 mg   Plan last modified:  Mihaela Ortega, PharmD (2025)   Next INR check:  2025   Target end date:  --    Indications    History of atrial fibrillation (Resolved) [Z86.79]  Long term (current) use of anticoagulants [Z79.01]                 Anticoagulation Episode Summary       INR check location:  --    Preferred lab:  --    Send INR reminders to:  [facility] COUMADIN MONITORING POOL    Comments:  [place] only  and Hospital Lab on           Anticoagulation Care Providers       Provider Role Specialty Phone number    Angel Olivier MD [facility] Interventional Cardiology [phone]

## 2025-06-17 DIAGNOSIS — M10.9 GOUT, UNSPECIFIED CAUSE, UNSPECIFIED CHRONICITY, UNSPECIFIED SITE: ICD-10-CM

## 2025-06-17 RX ORDER — FEBUXOSTAT 40 MG/1
40 TABLET, FILM COATED ORAL DAILY
Qty: 90 TABLET | Refills: 3 | Status: SHIPPED | OUTPATIENT
Start: 2025-06-17 | End: 2026-06-17

## 2025-06-17 NOTE — TELEPHONE ENCOUNTER
Care Due:                  Date            Visit Type   Department     Provider  --------------------------------------------------------------------------------                                EP -                              PRIMARY      [facility]  Last Visit: 04-      CARE ([facility])   MEDICINE       Leigh Damon                              EP -                              PRIMARY      [facility]  Next Visit: 10-      CARE ([facility])   MEDICINE       Leightarsha Damon                                                            Last  Test          Frequency    Reason                     Performed    Due Date  --------------------------------------------------------------------------------    Uric Acid...  12 months..  febuxostat...............  06- 06-    [employer] Embedded Care Due Messages. Reference number: [id number].   6/17/2025 1:24:13 PM CDT

## 2025-06-26 ENCOUNTER — ANTI-COAG VISIT (OUTPATIENT)
Dept: CARDIOLOGY | Facility: CLINIC | Age: 75
End: 2025-06-26
Payer: MEDICARE

## 2025-06-26 ENCOUNTER — LAB VISIT (OUTPATIENT)
Dept: LAB | Facility: HOSPITAL | Age: 75
End: 2025-06-26
Payer: MEDICARE

## 2025-06-26 DIAGNOSIS — Z79.01 LONG TERM (CURRENT) USE OF ANTICOAGULANTS: ICD-10-CM

## 2025-06-26 DIAGNOSIS — Z79.01 LONG TERM (CURRENT) USE OF ANTICOAGULANTS: Primary | ICD-10-CM

## 2025-06-26 DIAGNOSIS — Z86.79 HISTORY OF ATRIAL FIBRILLATION: ICD-10-CM

## 2025-06-26 LAB
INR PPP: 2 (ref 0.8–1.2)
PROTHROMBIN TIME: 21.6 SECONDS (ref 9–12.5)

## 2025-06-26 PROCEDURE — 93793 ANTICOAG MGMT PT WARFARIN: CPT | Mod: S$GLB,,,

## 2025-06-26 PROCEDURE — 85610 PROTHROMBIN TIME: CPT

## 2025-06-26 PROCEDURE — 36415 COLL VENOUS BLD VENIPUNCTURE: CPT | Mod: PO

## 2025-06-26 NOTE — PROGRESS NOTES
[facility]    2025 2:47 PM    Assessment/Plan:    Patient presents today with therapeutic INR.    Assessment of patient findings and chart review: no significant findings     Recommendation for patient's warfarin regimen: Continue current maintenance dose    Recommend repeat INR in 2 weeks  _________________________________________________________________    Vivek Lema Jr. (75 y.o.) is followed by the [facility].    Anticoagulation Summary  As of 2025      INR goal:  2.0-3.0   TTR:  74.9% (3.4 y)   INR used for dosin.0 (2025)   Warfarin maintenance plan:  4 mg (2 mg x 2) every e, Thu, Sat; 2 mg (2 mg x 1) all other days   Weekly warfarin total:  20 mg   Plan last modified:  Mihaela Ortega, PharmD (2025)   Next INR check:  7/10/2025   Target end date:  --    Indications    History of atrial fibrillation (Resolved) [Z86.79]  Long term (current) use of anticoagulants [Z79.01]                 Anticoagulation Episode Summary       INR check location:  --    Preferred lab:  --    Send INR reminders to:  [facility] COUMADIN MONITORING POOL    Comments:  [place] only Mon - Thur and Hospital Lab on           Anticoagulation Care Providers       Provider Role Specialty Phone number    Angel Olivier MD [facility] Interventional Cardiology [phone]

## 2025-07-14 RX ORDER — ISOSORBIDE MONONITRATE 60 MG/1
TABLET, EXTENDED RELEASE ORAL
Qty: 90 TABLET | Refills: 1 | Status: SHIPPED | OUTPATIENT
Start: 2025-07-14